# Patient Record
Sex: FEMALE | Race: WHITE | Employment: UNEMPLOYED | URBAN - NONMETROPOLITAN AREA
[De-identification: names, ages, dates, MRNs, and addresses within clinical notes are randomized per-mention and may not be internally consistent; named-entity substitution may affect disease eponyms.]

---

## 2017-01-17 RX ORDER — SPIRONOLACTONE 50 MG/1
TABLET, FILM COATED ORAL
Qty: 90 TABLET | Refills: 1 | Status: SHIPPED | OUTPATIENT
Start: 2017-01-17 | End: 2017-07-13 | Stop reason: SDUPTHER

## 2017-03-20 RX ORDER — GABAPENTIN 300 MG/1
300 CAPSULE ORAL 3 TIMES DAILY
Qty: 90 CAPSULE | Refills: 5 | OUTPATIENT
Start: 2017-03-20

## 2017-04-14 RX ORDER — ROSUVASTATIN CALCIUM 5 MG/1
5 TABLET, COATED ORAL NIGHTLY
Qty: 30 TABLET | Refills: 1 | Status: SHIPPED | OUTPATIENT
Start: 2017-04-14 | End: 2017-06-07 | Stop reason: SDUPTHER

## 2017-04-14 RX ORDER — GABAPENTIN 300 MG/1
300 CAPSULE ORAL 3 TIMES DAILY
Qty: 90 CAPSULE | Refills: 1 | Status: SHIPPED | OUTPATIENT
Start: 2017-04-14 | End: 2017-06-07 | Stop reason: DRUGHIGH

## 2017-04-17 ENCOUNTER — TELEPHONE (OUTPATIENT)
Dept: PRIMARY CARE CLINIC | Age: 47
End: 2017-04-17

## 2017-04-19 DIAGNOSIS — E78.2 MIXED HYPERLIPIDEMIA: ICD-10-CM

## 2017-04-19 LAB
CHOLESTEROL/HDL RATIO: 3.2
CHOLESTEROL: 168 MG/DL
HDLC SERPL-MCNC: 52 MG/DL
LDL CHOLESTEROL: 74 MG/DL (ref 0–130)
TRIGL SERPL-MCNC: 211 MG/DL
VLDLC SERPL CALC-MCNC: ABNORMAL MG/DL (ref 1–30)

## 2017-04-19 PROCEDURE — 80061 LIPID PANEL: CPT | Performed by: FAMILY MEDICINE

## 2017-05-15 RX ORDER — ESOMEPRAZOLE MAGNESIUM 40 MG/1
CAPSULE, DELAYED RELEASE ORAL
Qty: 60 CAPSULE | Refills: 6 | Status: SHIPPED | OUTPATIENT
Start: 2017-05-15 | End: 2017-12-08 | Stop reason: SDUPTHER

## 2017-05-15 RX ORDER — TOLTERODINE TARTRATE 2 MG/1
TABLET, EXTENDED RELEASE ORAL
Qty: 60 TABLET | Refills: 6 | Status: SHIPPED | OUTPATIENT
Start: 2017-05-15 | End: 2017-12-08 | Stop reason: SDUPTHER

## 2017-05-15 RX ORDER — METOPROLOL TARTRATE 50 MG/1
TABLET, FILM COATED ORAL
Qty: 60 TABLET | Refills: 6 | Status: SHIPPED | OUTPATIENT
Start: 2017-05-15 | End: 2017-12-08 | Stop reason: SDUPTHER

## 2017-06-07 ENCOUNTER — OFFICE VISIT (OUTPATIENT)
Dept: FAMILY MEDICINE CLINIC | Age: 47
End: 2017-06-07
Payer: MEDICARE

## 2017-06-07 VITALS
BODY MASS INDEX: 37.73 KG/M2 | WEIGHT: 221 LBS | DIASTOLIC BLOOD PRESSURE: 70 MMHG | HEART RATE: 72 BPM | RESPIRATION RATE: 16 BRPM | HEIGHT: 64 IN | SYSTOLIC BLOOD PRESSURE: 100 MMHG

## 2017-06-07 DIAGNOSIS — K21.9 GASTROESOPHAGEAL REFLUX DISEASE WITHOUT ESOPHAGITIS: ICD-10-CM

## 2017-06-07 DIAGNOSIS — N18.2 TYPE 2 DIABETES MELLITUS WITH STAGE 2 CHRONIC KIDNEY DISEASE, WITHOUT LONG-TERM CURRENT USE OF INSULIN (HCC): Primary | ICD-10-CM

## 2017-06-07 DIAGNOSIS — Z12.31 ENCOUNTER FOR SCREENING MAMMOGRAM FOR BREAST CANCER: ICD-10-CM

## 2017-06-07 DIAGNOSIS — F32.A DEPRESSION, UNSPECIFIED DEPRESSION TYPE: ICD-10-CM

## 2017-06-07 DIAGNOSIS — E03.9 HYPOTHYROIDISM, UNSPECIFIED TYPE: ICD-10-CM

## 2017-06-07 DIAGNOSIS — E11.22 TYPE 2 DIABETES MELLITUS WITH STAGE 2 CHRONIC KIDNEY DISEASE, WITHOUT LONG-TERM CURRENT USE OF INSULIN (HCC): Primary | ICD-10-CM

## 2017-06-07 DIAGNOSIS — F41.9 ANXIETY: ICD-10-CM

## 2017-06-07 DIAGNOSIS — Z00.00 MEDICARE ANNUAL WELLNESS VISIT, INITIAL: ICD-10-CM

## 2017-06-07 DIAGNOSIS — E78.2 MIXED HYPERLIPIDEMIA: ICD-10-CM

## 2017-06-07 DIAGNOSIS — I10 ESSENTIAL HYPERTENSION: ICD-10-CM

## 2017-06-07 PROCEDURE — G8417 CALC BMI ABV UP PARAM F/U: HCPCS | Performed by: FAMILY MEDICINE

## 2017-06-07 PROCEDURE — G8427 DOCREV CUR MEDS BY ELIG CLIN: HCPCS | Performed by: FAMILY MEDICINE

## 2017-06-07 PROCEDURE — 3046F HEMOGLOBIN A1C LEVEL >9.0%: CPT | Performed by: FAMILY MEDICINE

## 2017-06-07 PROCEDURE — 1036F TOBACCO NON-USER: CPT | Performed by: FAMILY MEDICINE

## 2017-06-07 PROCEDURE — G0438 PPPS, INITIAL VISIT: HCPCS | Performed by: FAMILY MEDICINE

## 2017-06-07 PROCEDURE — 99214 OFFICE O/P EST MOD 30 MIN: CPT | Performed by: FAMILY MEDICINE

## 2017-06-07 RX ORDER — ROSUVASTATIN CALCIUM 5 MG/1
5 TABLET, COATED ORAL NIGHTLY
Qty: 30 TABLET | Refills: 5 | Status: SHIPPED | OUTPATIENT
Start: 2017-06-07 | End: 2017-12-08 | Stop reason: SDUPTHER

## 2017-06-07 RX ORDER — GABAPENTIN 300 MG/1
300 CAPSULE ORAL 3 TIMES DAILY
Qty: 90 CAPSULE | Refills: 1 | Status: CANCELLED | OUTPATIENT
Start: 2017-06-15

## 2017-06-07 RX ORDER — POTASSIUM CHLORIDE 600 MG/1
TABLET, FILM COATED, EXTENDED RELEASE ORAL
Qty: 90 TABLET | Refills: 5 | Status: SHIPPED | OUTPATIENT
Start: 2017-06-07 | End: 2017-12-08 | Stop reason: SDUPTHER

## 2017-06-07 RX ORDER — LEVOTHYROXINE SODIUM 0.1 MG/1
TABLET ORAL
Qty: 30 TABLET | Refills: 5 | Status: SHIPPED | OUTPATIENT
Start: 2017-06-07 | End: 2017-12-08 | Stop reason: SDUPTHER

## 2017-06-07 RX ORDER — GABAPENTIN 600 MG/1
600 TABLET ORAL 3 TIMES DAILY
Qty: 90 TABLET | Refills: 2 | Status: SHIPPED | OUTPATIENT
Start: 2017-06-07 | End: 2017-07-13 | Stop reason: SDUPTHER

## 2017-06-07 ASSESSMENT — ENCOUNTER SYMPTOMS
NAUSEA: 0
SHORTNESS OF BREATH: 0
COUGH: 0
VOMITING: 0
CONSTIPATION: 0
SINUS PRESSURE: 0
EYE DISCHARGE: 0
SORE THROAT: 0
DIARRHEA: 0
WHEEZING: 0
RHINORRHEA: 0
TROUBLE SWALLOWING: 0
EYE REDNESS: 0
ABDOMINAL PAIN: 0

## 2017-06-07 ASSESSMENT — PATIENT HEALTH QUESTIONNAIRE - PHQ9
1. LITTLE INTEREST OR PLEASURE IN DOING THINGS: 0
2. FEELING DOWN, DEPRESSED OR HOPELESS: 0
SUM OF ALL RESPONSES TO PHQ QUESTIONS 1-9: 0
SUM OF ALL RESPONSES TO PHQ9 QUESTIONS 1 & 2: 0

## 2017-06-20 ENCOUNTER — OFFICE VISIT (OUTPATIENT)
Dept: FAMILY MEDICINE CLINIC | Age: 47
End: 2017-06-20
Payer: MEDICARE

## 2017-06-20 VITALS — DIASTOLIC BLOOD PRESSURE: 70 MMHG | HEART RATE: 64 BPM | SYSTOLIC BLOOD PRESSURE: 94 MMHG

## 2017-06-20 DIAGNOSIS — M25.561 ACUTE PAIN OF RIGHT KNEE: ICD-10-CM

## 2017-06-20 DIAGNOSIS — M25.562 ACUTE PAIN OF LEFT KNEE: Primary | ICD-10-CM

## 2017-06-20 PROCEDURE — G8417 CALC BMI ABV UP PARAM F/U: HCPCS | Performed by: FAMILY MEDICINE

## 2017-06-20 PROCEDURE — G8427 DOCREV CUR MEDS BY ELIG CLIN: HCPCS | Performed by: FAMILY MEDICINE

## 2017-06-20 PROCEDURE — 99214 OFFICE O/P EST MOD 30 MIN: CPT | Performed by: FAMILY MEDICINE

## 2017-06-20 PROCEDURE — 1036F TOBACCO NON-USER: CPT | Performed by: FAMILY MEDICINE

## 2017-06-20 RX ORDER — TRAZODONE HYDROCHLORIDE 100 MG/1
200 TABLET ORAL NIGHTLY
COMMUNITY
Start: 2017-06-16 | End: 2018-03-30 | Stop reason: DRUGHIGH

## 2017-06-20 RX ORDER — PRAZOSIN HYDROCHLORIDE 2 MG/1
2 CAPSULE ORAL NIGHTLY
COMMUNITY
Start: 2017-06-16 | End: 2021-04-13 | Stop reason: DRUGHIGH

## 2017-06-20 RX ORDER — PREDNISONE 20 MG/1
TABLET ORAL
Qty: 15 TABLET | Refills: 0 | Status: SHIPPED | OUTPATIENT
Start: 2017-06-20 | End: 2017-11-01 | Stop reason: ALTCHOICE

## 2017-06-20 RX ORDER — BUPROPION HYDROCHLORIDE 150 MG/1
150 TABLET ORAL EVERY MORNING
COMMUNITY
Start: 2017-05-02 | End: 2017-12-08 | Stop reason: DRUGHIGH

## 2017-06-20 RX ORDER — ALPRAZOLAM 0.25 MG/1
0.25 TABLET ORAL
COMMUNITY
Start: 2017-06-16 | End: 2018-03-30 | Stop reason: ALTCHOICE

## 2017-06-20 ASSESSMENT — ENCOUNTER SYMPTOMS
GASTROINTESTINAL NEGATIVE: 1
BACK PAIN: 0
RESPIRATORY NEGATIVE: 1
EYES NEGATIVE: 1

## 2017-07-13 RX ORDER — GABAPENTIN 300 MG/1
CAPSULE ORAL
Qty: 90 CAPSULE | Refills: 1 | OUTPATIENT
Start: 2017-07-13

## 2017-07-13 RX ORDER — SPIRONOLACTONE 50 MG/1
TABLET, FILM COATED ORAL
Qty: 90 TABLET | Refills: 1 | Status: SHIPPED | OUTPATIENT
Start: 2017-07-13 | End: 2017-12-10 | Stop reason: SDUPTHER

## 2017-07-13 RX ORDER — GABAPENTIN 600 MG/1
600 TABLET ORAL 3 TIMES DAILY
Qty: 90 TABLET | Refills: 2 | Status: SHIPPED | OUTPATIENT
Start: 2017-07-13 | End: 2017-11-21 | Stop reason: SDUPTHER

## 2017-09-11 RX ORDER — GABAPENTIN 600 MG/1
600 TABLET ORAL 3 TIMES DAILY
Qty: 90 TABLET | Refills: 2 | Status: CANCELLED | OUTPATIENT
Start: 2017-09-11

## 2017-09-11 RX ORDER — GABAPENTIN 300 MG/1
CAPSULE ORAL
Qty: 90 CAPSULE | Refills: 1 | OUTPATIENT
Start: 2017-09-11

## 2017-11-01 ENCOUNTER — HOSPITAL ENCOUNTER (OUTPATIENT)
Dept: LAB | Age: 47
Setting detail: SPECIMEN
Discharge: HOME | End: 2017-11-01
Payer: MEDICARE

## 2017-11-01 ENCOUNTER — OFFICE VISIT (OUTPATIENT)
Dept: FAMILY MEDICINE CLINIC | Age: 47
End: 2017-11-01
Payer: MEDICARE

## 2017-11-01 VITALS
HEIGHT: 64 IN | BODY MASS INDEX: 39.09 KG/M2 | HEART RATE: 68 BPM | DIASTOLIC BLOOD PRESSURE: 64 MMHG | RESPIRATION RATE: 16 BRPM | SYSTOLIC BLOOD PRESSURE: 96 MMHG | WEIGHT: 229 LBS

## 2017-11-01 DIAGNOSIS — D50.9 IRON DEFICIENCY ANEMIA, UNSPECIFIED IRON DEFICIENCY ANEMIA TYPE: ICD-10-CM

## 2017-11-01 DIAGNOSIS — E03.9 HYPOTHYROIDISM, UNSPECIFIED TYPE: ICD-10-CM

## 2017-11-01 DIAGNOSIS — Z23 NEED FOR INFLUENZA VACCINATION: ICD-10-CM

## 2017-11-01 DIAGNOSIS — R53.83 FATIGUE, UNSPECIFIED TYPE: Primary | ICD-10-CM

## 2017-11-01 LAB
ABSOLUTE EOS #: 0.2 K/UL (ref 0–0.4)
ABSOLUTE IMMATURE GRANULOCYTE: ABNORMAL K/UL (ref 0–0.3)
ABSOLUTE LYMPH #: 2.8 K/UL (ref 1–4.8)
ABSOLUTE MONO #: 0.6 K/UL (ref 0.1–1.2)
BASOPHILS # BLD: 1 % (ref 0–1)
BASOPHILS ABSOLUTE: 0.1 K/UL (ref 0–0.2)
DIFFERENTIAL TYPE: ABNORMAL
EOSINOPHILS RELATIVE PERCENT: 2 % (ref 1–7)
FERRITIN: 30 UG/L (ref 13–150)
HCT VFR BLD CALC: 39.4 % (ref 36–46)
HEMOGLOBIN: 13.2 G/DL (ref 12–16)
IMMATURE GRANULOCYTES: ABNORMAL %
IRON SATURATION: 20 % (ref 20–55)
IRON: 67 UG/DL (ref 37–145)
LYMPHOCYTES # BLD: 29 % (ref 16–46)
MCH RBC QN AUTO: 28.8 PG (ref 26–34)
MCHC RBC AUTO-ENTMCNC: 33.4 G/DL (ref 31–37)
MCV RBC AUTO: 86.3 FL (ref 80–100)
MONOCYTES # BLD: 6 % (ref 4–11)
PDW BLD-RTO: 15.4 % (ref 11–14.5)
PLATELET # BLD: 215 K/UL (ref 140–450)
PLATELET ESTIMATE: ABNORMAL
PMV BLD AUTO: 9.3 FL (ref 6–12)
RBC # BLD: 4.56 M/UL (ref 4–5.2)
RBC # BLD: ABNORMAL 10*6/UL
SEG NEUTROPHILS: 62 % (ref 43–77)
SEGMENTED NEUTROPHILS ABSOLUTE COUNT: 6 K/UL (ref 1.8–7.7)
THYROXINE, FREE: 1.07 NG/DL (ref 0.93–1.7)
TOTAL IRON BINDING CAPACITY: 342 UG/DL (ref 250–450)
TSH SERPL DL<=0.05 MIU/L-ACNC: 1.67 MIU/L (ref 0.3–5)
UNSATURATED IRON BINDING CAPACITY: 275 UG/DL (ref 112–347)
WBC # BLD: 9.6 K/UL (ref 3.5–11)
WBC # BLD: ABNORMAL 10*3/UL

## 2017-11-01 PROCEDURE — G8417 CALC BMI ABV UP PARAM F/U: HCPCS | Performed by: FAMILY MEDICINE

## 2017-11-01 PROCEDURE — 82728 ASSAY OF FERRITIN: CPT

## 2017-11-01 PROCEDURE — 84439 ASSAY OF FREE THYROXINE: CPT

## 2017-11-01 PROCEDURE — 90686 IIV4 VACC NO PRSV 0.5 ML IM: CPT | Performed by: FAMILY MEDICINE

## 2017-11-01 PROCEDURE — 36415 COLL VENOUS BLD VENIPUNCTURE: CPT

## 2017-11-01 PROCEDURE — G8484 FLU IMMUNIZE NO ADMIN: HCPCS | Performed by: FAMILY MEDICINE

## 2017-11-01 PROCEDURE — 1036F TOBACCO NON-USER: CPT | Performed by: FAMILY MEDICINE

## 2017-11-01 PROCEDURE — G8427 DOCREV CUR MEDS BY ELIG CLIN: HCPCS | Performed by: FAMILY MEDICINE

## 2017-11-01 PROCEDURE — 83550 IRON BINDING TEST: CPT

## 2017-11-01 PROCEDURE — 99214 OFFICE O/P EST MOD 30 MIN: CPT | Performed by: FAMILY MEDICINE

## 2017-11-01 PROCEDURE — 83540 ASSAY OF IRON: CPT

## 2017-11-01 PROCEDURE — 84443 ASSAY THYROID STIM HORMONE: CPT

## 2017-11-01 PROCEDURE — 85025 COMPLETE CBC W/AUTO DIFF WBC: CPT

## 2017-11-01 PROCEDURE — G0008 ADMIN INFLUENZA VIRUS VAC: HCPCS | Performed by: FAMILY MEDICINE

## 2017-11-01 RX ORDER — DESVENLAFAXINE 100 MG/1
50 TABLET, EXTENDED RELEASE ORAL DAILY
Refills: 0 | COMMUNITY
Start: 2017-10-03 | End: 2022-08-12 | Stop reason: DRUGHIGH

## 2017-11-01 ASSESSMENT — ENCOUNTER SYMPTOMS
GASTROINTESTINAL NEGATIVE: 1
EYES NEGATIVE: 1
RESPIRATORY NEGATIVE: 1

## 2017-11-01 NOTE — PROGRESS NOTES
Have you had an allergic reaction to the flu (influenza) shot? no  Are you allergic to eggs or any component of the flu vaccine? no  Do you have a history of Guillain-Kennard Syndrome (GBS), which is paralysis after receiving the flu vaccine? no  Are you feeling well today? yes  Flu vaccine given as ordered. Patient tolerated it well. No questions re: VIS information.

## 2017-11-01 NOTE — PROGRESS NOTES
Subjective:      Patient ID: Deyanira Wallace is a 49 y.o. female. Fatigue   This is a new problem. The current episode started 1 to 4 weeks ago. The problem occurs constantly. The problem has been gradually worsening. Associated symptoms include fatigue. Pertinent negatives include no chills or fever. Associated symptoms comments: Patient states that over the last few weeks has been feeling very fatigued. States that nothing has changed as far as her daily regimen, stress, dietary intake, or medication. Does note that she has noticed her hair is more brittle and is coming out more. Her nails are breaking easily. Also notes that her face has a line down the center and the right side is dry and the left side is oily. Does note that she doesn't sleep well at night. Has very unrefreshed sleep. States that she tosses and turns in her sleep. Has frequent awakenings. Does have known obstructive sleep apnea and previously had used a cpap machine, but has changed to a mouth guard. Unsure if this is no longer effective. . She has tried nothing for the symptoms. Did review patient's med list, allergies, social history,pmhx and pshx today as noted in the record. Review of Systems   Constitutional: Positive for fatigue. Negative for chills and fever. HENT: Negative. Eyes: Negative. Respiratory: Negative. Cardiovascular: Negative. Gastrointestinal: Negative. Musculoskeletal: Negative. Skin: Negative. Psychiatric/Behavioral: Positive for sleep disturbance. Negative for agitation and dysphoric mood. The patient is not nervous/anxious. Objective:   Physical Exam   Constitutional: She is oriented to person, place, and time. She appears well-developed and well-nourished. No distress. HENT:   Head: Normocephalic and atraumatic.    Right Ear: External ear normal.   Left Ear: External ear normal.   Nose: Nose normal.   Mouth/Throat: Oropharynx is clear and moist. No oropharyngeal

## 2017-11-07 ENCOUNTER — TELEPHONE (OUTPATIENT)
Dept: FAMILY MEDICINE CLINIC | Age: 47
End: 2017-11-07

## 2017-11-09 RX ORDER — GABAPENTIN 300 MG/1
CAPSULE ORAL
Qty: 90 CAPSULE | Refills: 1 | OUTPATIENT
Start: 2017-11-09

## 2017-11-09 NOTE — TELEPHONE ENCOUNTER
Berta Blackwell called requesting a refill of the below medication which has been pended for you: left message for patient to call back to confirm what dosage of Neurontin she is taking. I have in our files she is taking Neurontin 600 mg.     Requested Prescriptions     Pending Prescriptions Disp Refills    gabapentin (NEURONTIN) 300 MG capsule [Pharmacy Med Name: GABAPENTIN 300 MG CAPSULE] 90 capsule 1     Sig: take 1 capsule by mouth three times a day       Last Appointment Date: 11/1/2017  Next Appointment Date: 12/8/2017    Allergies   Allergen Reactions    Allopurinol     Colchicine     Erythromycin     Indocin [Indomethacin]     Lisinopril     Morphine     Norvasc [Amlodipine]     Tenormin [Atenolol]     Uloric [Febuxostat]     Hydrocodone-Acetaminophen Nausea Only    Topamax [Topiramate] Swelling and Rash

## 2017-11-21 RX ORDER — LIDOCAINE 50 MG/G
OINTMENT TOPICAL
Qty: 50 G | Refills: 5 | Status: SHIPPED | OUTPATIENT
Start: 2017-11-21 | End: 2018-12-18 | Stop reason: SDUPTHER

## 2017-11-21 RX ORDER — GABAPENTIN 600 MG/1
TABLET ORAL
Qty: 90 TABLET | Refills: 2 | Status: SHIPPED | OUTPATIENT
Start: 2017-11-21 | End: 2018-02-22 | Stop reason: SDUPTHER

## 2017-12-08 ENCOUNTER — OFFICE VISIT (OUTPATIENT)
Dept: FAMILY MEDICINE CLINIC | Age: 47
End: 2017-12-08
Payer: MEDICARE

## 2017-12-08 ENCOUNTER — TELEPHONE (OUTPATIENT)
Dept: FAMILY MEDICINE CLINIC | Age: 47
End: 2017-12-08

## 2017-12-08 VITALS
BODY MASS INDEX: 39.61 KG/M2 | HEART RATE: 68 BPM | HEIGHT: 64 IN | SYSTOLIC BLOOD PRESSURE: 100 MMHG | RESPIRATION RATE: 16 BRPM | DIASTOLIC BLOOD PRESSURE: 70 MMHG | WEIGHT: 232 LBS

## 2017-12-08 DIAGNOSIS — E11.22 TYPE 2 DIABETES MELLITUS WITH STAGE 2 CHRONIC KIDNEY DISEASE, WITHOUT LONG-TERM CURRENT USE OF INSULIN (HCC): ICD-10-CM

## 2017-12-08 DIAGNOSIS — I10 ESSENTIAL HYPERTENSION: ICD-10-CM

## 2017-12-08 DIAGNOSIS — Z12.31 SCREENING MAMMOGRAM, ENCOUNTER FOR: ICD-10-CM

## 2017-12-08 DIAGNOSIS — R20.2 PARESTHESIA OF BOTH FEET: Primary | ICD-10-CM

## 2017-12-08 DIAGNOSIS — M54.41 CHRONIC BILATERAL LOW BACK PAIN WITH RIGHT-SIDED SCIATICA: ICD-10-CM

## 2017-12-08 DIAGNOSIS — E03.9 HYPOTHYROIDISM, UNSPECIFIED TYPE: ICD-10-CM

## 2017-12-08 DIAGNOSIS — S03.00XA DISLOCATION OF TEMPOROMANDIBULAR JOINT, INITIAL ENCOUNTER: ICD-10-CM

## 2017-12-08 DIAGNOSIS — N18.2 TYPE 2 DIABETES MELLITUS WITH STAGE 2 CHRONIC KIDNEY DISEASE, WITHOUT LONG-TERM CURRENT USE OF INSULIN (HCC): ICD-10-CM

## 2017-12-08 DIAGNOSIS — G89.29 CHRONIC BILATERAL LOW BACK PAIN WITH RIGHT-SIDED SCIATICA: ICD-10-CM

## 2017-12-08 PROCEDURE — G8417 CALC BMI ABV UP PARAM F/U: HCPCS | Performed by: FAMILY MEDICINE

## 2017-12-08 PROCEDURE — 99214 OFFICE O/P EST MOD 30 MIN: CPT | Performed by: FAMILY MEDICINE

## 2017-12-08 PROCEDURE — 3044F HG A1C LEVEL LT 7.0%: CPT | Performed by: FAMILY MEDICINE

## 2017-12-08 PROCEDURE — 1036F TOBACCO NON-USER: CPT | Performed by: FAMILY MEDICINE

## 2017-12-08 PROCEDURE — G8484 FLU IMMUNIZE NO ADMIN: HCPCS | Performed by: FAMILY MEDICINE

## 2017-12-08 PROCEDURE — G8427 DOCREV CUR MEDS BY ELIG CLIN: HCPCS | Performed by: FAMILY MEDICINE

## 2017-12-08 RX ORDER — METOPROLOL TARTRATE 50 MG/1
TABLET, FILM COATED ORAL
Qty: 60 TABLET | Refills: 5 | Status: SHIPPED | OUTPATIENT
Start: 2017-12-08 | End: 2018-05-13 | Stop reason: SDUPTHER

## 2017-12-08 RX ORDER — TOLTERODINE TARTRATE 2 MG/1
TABLET, EXTENDED RELEASE ORAL
Qty: 60 TABLET | Refills: 5 | Status: SHIPPED | OUTPATIENT
Start: 2017-12-08 | End: 2018-05-13 | Stop reason: SDUPTHER

## 2017-12-08 RX ORDER — POTASSIUM CHLORIDE 600 MG/1
TABLET, FILM COATED, EXTENDED RELEASE ORAL
Qty: 90 TABLET | Refills: 5 | Status: SHIPPED | OUTPATIENT
Start: 2017-12-08 | End: 2018-06-22 | Stop reason: SDUPTHER

## 2017-12-08 RX ORDER — ESOMEPRAZOLE MAGNESIUM 40 MG/1
CAPSULE, DELAYED RELEASE ORAL
Qty: 60 CAPSULE | Refills: 5 | Status: SHIPPED | OUTPATIENT
Start: 2017-12-08 | End: 2018-05-13 | Stop reason: SDUPTHER

## 2017-12-08 RX ORDER — CYCLOBENZAPRINE HCL 10 MG
10 TABLET ORAL 3 TIMES DAILY PRN
Qty: 60 TABLET | Refills: 1 | Status: SHIPPED | OUTPATIENT
Start: 2017-12-08 | End: 2018-03-07 | Stop reason: SDUPTHER

## 2017-12-08 RX ORDER — ROSUVASTATIN CALCIUM 5 MG/1
5 TABLET, COATED ORAL NIGHTLY
Qty: 30 TABLET | Refills: 5 | Status: SHIPPED | OUTPATIENT
Start: 2017-12-08 | End: 2018-03-19 | Stop reason: CLARIF

## 2017-12-08 RX ORDER — LEVOTHYROXINE SODIUM 0.1 MG/1
TABLET ORAL
Qty: 30 TABLET | Refills: 5 | Status: SHIPPED | OUTPATIENT
Start: 2017-12-08 | End: 2018-09-05 | Stop reason: SDUPTHER

## 2017-12-08 NOTE — PATIENT INSTRUCTIONS
Hospital Outpatient Visit on 11/01/2017   Component Date Value Ref Range Status    WBC 11/01/2017 9.6  3.5 - 11.0 k/uL Final    RBC 11/01/2017 4.56  4.0 - 5.2 m/uL Final    Hemoglobin 11/01/2017 13.2  12.0 - 16.0 g/dL Final    Hematocrit 11/01/2017 39.4  36 - 46 % Final    MCV 11/01/2017 86.3  80 - 100 fL Final    MCH 11/01/2017 28.8  26 - 34 pg Final    MCHC 11/01/2017 33.4  31 - 37 g/dL Final    RDW 11/01/2017 15.4* 11.0 - 14.5 % Final    Platelets 19/89/9693 215  140 - 450 k/uL Final    MPV 11/01/2017 9.3  6.0 - 12.0 fL Final    Differential Type 11/01/2017 NOT REPORTED   Final    Immature Granulocytes 11/01/2017 NOT REPORTED  0 % Final    Absolute Immature Granulocyte 11/01/2017 NOT REPORTED  0.00 - 0.30 k/uL Final    WBC Morphology 11/01/2017 NOT REPORTED   Final    RBC Morphology 11/01/2017 NOT REPORTED   Final    Platelet Estimate 23/48/6944 NOT REPORTED   Final    Seg Neutrophils 11/01/2017 62  43 - 77 % Final    Lymphocytes 11/01/2017 29  16 - 46 % Final    Monocytes 11/01/2017 6  4 - 11 % Final    Eosinophils % 11/01/2017 2  1 - 7 % Final    Basophils 11/01/2017 1  0 - 1 % Final    Segs Absolute 11/01/2017 6.00  1.8 - 7.7 k/uL Final    Absolute Lymph # 11/01/2017 2.80  1.0 - 4.8 k/uL Final    Absolute Mono # 11/01/2017 0.60  0.1 - 1.2 k/uL Final    Absolute Eos # 11/01/2017 0.20  0.0 - 0.4 k/uL Final    Basophils # 11/01/2017 0.10  0.0 - 0.2 k/uL Final    Comment: Performed at [facility] [address] [address] [phone]      TSH 11/01/2017 1.67  0.30 - 5.00 mIU/L Final    Comment: Performed at [facility] [address] [address] [phone]      Thyroxine, Free 11/01/2017 1.07  0.93 - 1.70 ng/dL Final    Comment: Performed at [facility] [address] [facility] [address] [phone] [facility] 11/01/2017 67  37 - 145 ug/dL Final    TIBC 11/01/2017 342  250 - 450 ug/dL Final    Iron Saturation 11/01/2017 20  20 - 55 % Final    UIBC 11/01/2017 275  112 - 347 ug/dL Final    Comment: Performed at [facility] [address] [id number] [phone]      Ferritin 11/01/2017 30  13 - 150 ug/L Final    Comment: Performed at [facility] [address] [id number] [phone]         Patient Education        Diabetic Neuropathy: Care Instructions  Your Care Instructions    When you have diabetes, your blood sugar level may get too high. Over time, high blood sugar levels can damage nerves. This is called diabetic neuropathy. Nerve damage can cause pain, burning, tingling, and numbness and may leave you feeling weak. The feet are often affected. When you have nerve damage in your feet, you cannot feel your feet and toes as well as normal and may not notice cuts or sores. Even a small injury can lead to a serious infection. It is very important that you follow your doctor's advice on foot care. Sometimes diabetes damages nerves that help the body function. If this happens, your blood pressure, sweating, digestion, and urination might be affected. Your doctor may give you a target blood sugar level that is higher or lower than you are used to. Try to keep your blood sugar very close to this target level to prevent more damage. Follow-up care is a key part of your treatment and safety. Be sure to make and go to all appointments, and call your doctor if you are having problems. It's also a good idea to know your test results and keep a list of the medicines you take. How can you care for yourself at home? · Take your medicines exactly as prescribed. Call your doctor if you think you are having a problem with your medicine. It is very important that you take your insulin or diabetes pills as your doctor tells you.   · Try to keep blood sugar at your target level. ¨ Eat a variety of healthy foods, with carbohydrate spread out in your meals. A dietitian can help you plan meals. ¨ Try to get at least 30 minutes of exercise on most days. ¨ Check your blood sugar as many times each day as your doctor recommends. · Take and record your blood pressure at home if your doctor tells you to. Learn the importance of the two measures of blood pressure (such as 130 over 80, or 130/80). To take your blood pressure at home:  ¨ Ask your doctor to check your blood pressure monitor to be sure it is accurate and the cuff fits you. Also ask your doctor to watch you to make sure that you are using it right. ¨ Do not use medicine known to raise blood pressure (such as some nasal decongestant sprays) before taking your blood pressure. ¨ Avoid taking your blood pressure if you have just exercised or are nervous or upset. Rest at least 15 minutes before you take a reading. · Take pain medicines exactly as directed. ¨ If the doctor gave you a prescription medicine for pain, take it as prescribed. ¨ If you are not taking a prescription pain medicine, ask your doctor if you can take an over-the-counter medicine. · Do not smoke. Smoking can increase your chance for a heart attack or stroke. If you need help quitting, talk to your doctor about stop-smoking programs and medicines. These can increase your chances of quitting for good. · Limit alcohol to 2 drinks a day for men and 1 drink a day for women. Too much alcohol can cause health problems. · Eat small meals often, rather than 2 or 3 large meals a day. To care for your feet  · Prevent injury by wearing shoes at all times, even when you are indoors. · Do foot care as part of your daily routine. Wash your feet and then rub lotion on your feet, but not between your toes. Use a handheld mirror or magnifying mirror to inspect your feet for blisters, cuts, cracks, or sores.   · Have your toenails trimmed and filed straight across. · Wear shoes and socks that fit well. Soft shoes that have good support and that fit well (such as tennis shoes) are best for your feet. · Check your shoes for any loose objects or rough edges before you put them on. · Ask your doctor to check your feet during each visit. Your doctor may notice a foot problem you have missed. · Get early treatment for any foot problem, even a minor one. When should you call for help? Call your doctor now or seek immediate medical care if:  ? · You have symptoms of infection, such as:  ¨ Increased pain, swelling, warmth, or redness. ¨ Red streaks leading from the area. ¨ Pus draining from the area. ¨ A fever. ? · You have new or worse numbness, pain, or tingling in any part of your body. ? Watch closely for changes in your health, and be sure to contact your doctor if:  ? · You have a new problem with your feet, such as:  ¨ A new sore or ulcer. ¨ A break in the skin that is not healing after several days. ¨ Bleeding corns or calluses. ¨ An ingrown toenail. ? · You do not get better as expected. Where can you learn more? Go to [email or web address]://[email or web address] org and sign in to your [employer] account. Enter U987 in the [employer] box to learn more about \"Diabetic Neuropathy: Care Instructions. \"     If you do not have an account, please click on the \"Sign Up Now\" link. Current as of: March 13, 2017  Content Version: 11.4  © 2682-6382 Healthwise, Incorporated. Care instructions adapted under license by [facility] [place] ([facility]). If you have questions about a medical condition or this instruction, always ask your healthcare professional. [place] [address] any warranty or liability for your use of this information.

## 2017-12-11 RX ORDER — GABAPENTIN 300 MG/1
CAPSULE ORAL
Qty: 90 CAPSULE | Refills: 1 | OUTPATIENT
Start: 2017-12-11

## 2017-12-11 RX ORDER — SPIRONOLACTONE 50 MG/1
TABLET, FILM COATED ORAL
Qty: 90 TABLET | Refills: 1 | Status: SHIPPED | OUTPATIENT
Start: 2017-12-11 | End: 2018-06-13 | Stop reason: SDUPTHER

## 2017-12-11 NOTE — TELEPHONE ENCOUNTER
Harshil Marquez called requesting a refill of the below medication which has been pended for you: declined gabapentin as patient is no longer on this dose.     Requested Prescriptions     Pending Prescriptions Disp Refills    spironolactone (ALDACTONE) 50 MG tablet [Pharmacy Med Name: SPIRONOLACTONE 50 MG TABLET] 90 tablet 1     Sig: take 1 tablet by mouth once daily    gabapentin (NEURONTIN) 300 MG capsule [Pharmacy Med Name: GABAPENTIN 300 MG CAPSULE] 90 capsule 1     Sig: take 1 capsule by mouth three times a day       Last Appointment Date: 12/8/2017  Next Appointment Date: 06/14/2018    Allergies   Allergen Reactions    Allopurinol     Colchicine     Erythromycin     Indocin [Indomethacin]     Lisinopril     Morphine     Norvasc [Amlodipine]     Tenormin [Atenolol]     Uloric [Febuxostat]     Hydrocodone-Acetaminophen Nausea Only    Topamax [Topiramate] Swelling and Rash

## 2017-12-13 DIAGNOSIS — R20.2 PARESTHESIA OF BOTH FEET: Primary | ICD-10-CM

## 2017-12-13 DIAGNOSIS — M54.41 CHRONIC BILATERAL LOW BACK PAIN WITH RIGHT-SIDED SCIATICA: ICD-10-CM

## 2017-12-13 DIAGNOSIS — G89.29 CHRONIC BILATERAL LOW BACK PAIN WITH RIGHT-SIDED SCIATICA: ICD-10-CM

## 2017-12-13 NOTE — PROGRESS NOTES
Subjective:      Patient ID: Melanie Castle is a 49 y.o. female. HPI  Patient comes in today for follow up of chronic health issues   Chief Complaint   Patient presents with    Diabetes     6 mo f/u; bilateral feet \"with pins and needles on the bottom of them\"; planned visit    Chronic Kidney Disease     stage 2; 6 mo f/u    Hypertension     6 mo f/u    Hyperlipidemia     6 mo f/u    Hypothyroidism     6 mo f/u    Gastroesophageal Reflux     6 mo f/u    Anxiety     6 mo f/u    Depression     6 mo f/u    Discuss Labs     drawn 11/1/17    Back Pain     thoracic to low back with right sciatica   . Patient Does state that she has been having increased issues with paresthesias to the bottom of the feet. States that she feels constantly like she has pins and needles on the plantar aspect of both feet. This seems to be progressively worsening over time. Has had this for approximately 3-6 months duration. Seems to be progressively worsening. She is on gabapentin but doesn't really feel that this is providing her with significant benefit. Has been having increased pain in the lower thoracic and lumbar region with radicular pain down into the right lower extremity. Again this seems to be progressively worsening over time. She has taken NSAIDs and tylenol with little relief in her symptoms. At times feels weakening in her lower extremity. Did discuss with her that we may want to pursue MRI evaluation of her lumbar spine. Has a known lumbar fusion and so potentially could have disc issues in the levels above her fusion that could be contributing to her symptoms. Ultimately she seems agreeable with this plan. Her blood pressure is stable and controlled on her current medical therapy. Did have lab work done recently to check her thyroid levels that she was having increased issues with fatigue and brittleness to her hair.   We did do a blood count and a thyroid level at that time which were both relatively normal.  Does have other labs ordered for routine check of her other chronic health issues and she does need to have these done in the near future. We'll get them done at her convenience. Does have known chronic kidney disease and will get updated lab work to check the status of her renal function. Does have a known history of diabetes mellitus type 2 which has been relatively stable and controlled with dietary efforts. Her anxiety and depression are stable and controlled on her current medical therapy. Her blood pressure is stable and controlled on her current medical therapy. She does report to me that she has been having issues with her jaw popping and clicking and causing her quite a bit of pain. She did see the dentist at [facility] and they were going to refer her to an oral surgeon in [place] but apparently that surgeon did not take her insurance. She wondered if there was anyone that I knew of that would take her insurance. I did advise her after doing some research that she may have to call her insurance company and ask who would be the closest oral surgeon that would be a network. She does express understanding of this. Otherwise today no other acute medical concerns. .  Patient's recent lab reports are as follows:    Results for orders placed or performed during the hospital encounter of 11/01/17   CBC Auto Differential   Result Value Ref Range    WBC 9.6 3.5 - 11.0 k/uL    RBC 4.56 4.0 - 5.2 m/uL    Hemoglobin 13.2 12.0 - 16.0 g/dL    Hematocrit 39.4 36 - 46 %    MCV 86.3 80 - 100 fL    MCH 28.8 26 - 34 pg    MCHC 33.4 31 - 37 g/dL    RDW 15.4 (H) 11.0 - 14.5 %    Platelets 189 581 - 371 k/uL    MPV 9.3 6.0 - 12.0 fL    Differential Type NOT REPORTED     Immature Granulocytes NOT REPORTED 0 %    Absolute Immature Granulocyte NOT REPORTED 0.00 - 0.30 k/uL    WBC Morphology NOT REPORTED     RBC Morphology NOT REPORTED     Platelet Estimate NOT REPORTED     Seg Neutrophils 62 43 - 77 % Lymphocytes 29 16 - 46 %    Monocytes 6 4 - 11 %    Eosinophils % 2 1 - 7 %    Basophils 1 0 - 1 %    Segs Absolute 6.00 1.8 - 7.7 k/uL    Absolute Lymph # 2.80 1.0 - 4.8 k/uL    Absolute Mono # 0.60 0.1 - 1.2 k/uL    Absolute Eos # 0.20 0.0 - 0.4 k/uL    Basophils # 0.10 0.0 - 0.2 k/uL   TSH without Reflex   Result Value Ref Range    TSH 1.67 0.30 - 5.00 mIU/L   T4, Free   Result Value Ref Range    Thyroxine, Free 1.07 0.93 - 1.70 ng/dL   Iron And TIBC   Result Value Ref Range    Iron 67 37 - 145 ug/dL    TIBC 342 250 - 450 ug/dL    Iron Saturation 20 20 - 55 %    UIBC 275 112 - 347 ug/dL   Ferritin   Result Value Ref Range    Ferritin 30 13 - 150 ug/L     Did discuss dietary modification and increased exercise to keep cholesterol and blood sugar under good control. Other review of systems are as noted below. Did review patient's med list, allergies, social history, fam history, pmhx and pshx today as noted in the record. Preventative measures are reviewed today. See health maintenance section for complete preventative plan of care. Review of Systems   Constitutional: Negative for chills, fatigue and fever. HENT: Negative for congestion, ear pain, postnasal drip, rhinorrhea, sinus pressure, sore throat and trouble swallowing. Jaw pain to the left TMJ   Eyes: Negative for discharge and redness. Respiratory: Negative for cough, shortness of breath and wheezing. Cardiovascular: Negative for chest pain. Gastrointestinal: Negative for abdominal pain, constipation, diarrhea, nausea and vomiting. Musculoskeletal: Positive for arthralgias, back pain and myalgias. Negative for neck pain. Skin: Negative for rash and wound. Allergic/Immunologic: Negative for environmental allergies. Neurological: Positive for weakness and numbness. Negative for dizziness, light-headedness and headaches. Hematological: Negative for adenopathy. Psychiatric/Behavioral: Negative.         Objective: Physical Exam   Constitutional: She is oriented to person, place, and time. She appears well-developed and well-nourished. No distress. HENT:   Head: Normocephalic and atraumatic. Right Ear: External ear normal.   Left Ear: External ear normal.   Nose: Nose normal.   Mouth/Throat: Oropharynx is clear and moist. No oropharyngeal exudate. Eyes: Conjunctivae and EOM are normal. Pupils are equal, round, and reactive to light. Right eye exhibits no discharge. Left eye exhibits no discharge. Neck: Normal range of motion. Neck supple. No thyromegaly present. Cardiovascular: Normal rate, regular rhythm and normal heart sounds. Pulmonary/Chest: Effort normal and breath sounds normal. She has no wheezes. She has no rales. Abdominal: Soft. Bowel sounds are normal.   Musculoskeletal: Normal range of motion. She exhibits tenderness. She exhibits no edema or deformity. Increased paravertebral muscular tension and tenderness with palpation to the lower thoracic and lumbosacral spine. Pain with straight leg raise bilaterally. Patient had a diabetic foot exam today. No open areas or ulcerations noted. Fine filament testing to the entire dorsal and plantar aspect of the foot reveals good sensation in all areas. No cyanosis. +2/4 pedal pulses, symmetric bilaterally   Lymphadenopathy:     She has no cervical adenopathy. Neurological: She is alert and oriented to person, place, and time. Skin: Skin is warm and dry. No rash noted. She is not diaphoretic. Psychiatric: She has a normal mood and affect. Her behavior is normal. Judgment and thought content normal.   Nursing note and vitals reviewed. Assessment:      Encounter Diagnoses   Name Primary?     Paresthesia of both feet Yes    Chronic bilateral low back pain with right-sided sciatica     Hypothyroidism, unspecified type     Type 2 diabetes mellitus with stage 2 chronic kidney disease, without long-term current use of insulin ([address]     Essential hypertension     Dislocation of temporomandibular joint, initial encounter     Screening mammogram, encounter for            Plan:      Orders Placed This Encounter   Medications    esomeprazole (NEXIUM) 40 MG delayed release capsule     Sig: take 1 capsule by mouth twice a day     Dispense:  60 capsule     Refill:  5    tolterodine (DETROL) 2 MG tablet     Sig: take 1 tablet by mouth twice a day     Dispense:  60 tablet     Refill:  5    metoprolol tartrate (LOPRESSOR) 50 MG tablet     Sig: take 1 tablet by mouth twice a day     Dispense:  60 tablet     Refill:  5    rosuvastatin (CRESTOR) 5 MG tablet     Sig: Take 1 tablet by mouth nightly     Dispense:  30 tablet     Refill:  5    potassium chloride (KLOR-CON) 8 MEQ extended release tablet     Sig: take 3 tablets by mouth at bedtime     Dispense:  90 tablet     Refill:  5    levothyroxine (SYNTHROID) 100 MCG tablet     Sig: take 1 tablet by mouth once daily     Dispense:  30 tablet     Refill:  5    cyclobenzaprine (FLEXERIL) 10 MG tablet     Sig: Take 1 tablet by mouth 3 times daily as needed for Muscle spasms     Dispense:  60 tablet     Refill:  1     Orders Placed This Encounter   Procedures    GRACIELA DIGITAL SCREEN W CAD BILATERAL     Standing Status:   Future     Standing Expiration Date:   12/8/2018     Scheduling Instructions:      Prior to the exam, the patient should request any previous mammogram films from other organizations. On the day of the exam, the patient should not wear powder, perfume, lotions, or underarm deodorant to the breast or underarm area. Please wear a two piece outfit and be prepared to give information about prior breast procedures including mammograms, biopsies, or surgeries.      Order Specific Question:   Reason for exam:     Answer:   screening    MRI LUMBAR SPINE W CONTRAST     Standing Status:   Future     Standing Expiration Date:   12/8/2018     Patient is having worsening pain in her lower back with new onset paresthesias that are persistent in her feet. Would recommend MRI evaluation of the lumbar spine to rule out worsening disc disease as etiology of her symptoms. In the interim would continue with conservative measures including gabapentin and NSAIDs. Also will give script for flexeril to help with any tightness or spasm in the lumbar spine. Patient is to have lab work done as previously ordered. Patient is to continue on the rest of her current medical therapy. Patient is to return to my office in 6 months duration or sooner if any further problems or symptoms arise.     (Please note that portions of this note were completed with a voice recognition program. Efforts were made to edit the dictations but occasionally words are mis-transcribed.)

## 2017-12-14 ASSESSMENT — ENCOUNTER SYMPTOMS
SORE THROAT: 0
DIARRHEA: 0
TROUBLE SWALLOWING: 0
EYE DISCHARGE: 0
EYE REDNESS: 0
WHEEZING: 0
CONSTIPATION: 0
SINUS PRESSURE: 0
VOMITING: 0
COUGH: 0
SHORTNESS OF BREATH: 0
NAUSEA: 0
BACK PAIN: 1
ABDOMINAL PAIN: 0
RHINORRHEA: 0

## 2017-12-19 ENCOUNTER — HOSPITAL ENCOUNTER (OUTPATIENT)
Dept: LAB | Age: 47
Setting detail: SPECIMEN
Discharge: HOME | End: 2017-12-19
Payer: MEDICARE

## 2017-12-19 DIAGNOSIS — E11.22 TYPE 2 DIABETES MELLITUS WITH STAGE 2 CHRONIC KIDNEY DISEASE, WITHOUT LONG-TERM CURRENT USE OF INSULIN (HCC): ICD-10-CM

## 2017-12-19 DIAGNOSIS — N18.2 TYPE 2 DIABETES MELLITUS WITH STAGE 2 CHRONIC KIDNEY DISEASE, WITHOUT LONG-TERM CURRENT USE OF INSULIN (HCC): ICD-10-CM

## 2017-12-19 DIAGNOSIS — E78.2 MIXED HYPERLIPIDEMIA: ICD-10-CM

## 2017-12-19 DIAGNOSIS — E03.9 HYPOTHYROIDISM, UNSPECIFIED TYPE: ICD-10-CM

## 2017-12-19 LAB
ABSOLUTE EOS #: 0.2 K/UL (ref 0–0.4)
ABSOLUTE IMMATURE GRANULOCYTE: ABNORMAL K/UL (ref 0–0.3)
ABSOLUTE LYMPH #: 3.2 K/UL (ref 1–4.8)
ABSOLUTE MONO #: 0.5 K/UL (ref 0.1–1.2)
ALBUMIN SERPL-MCNC: 4.1 G/DL (ref 3.5–5.2)
ALBUMIN/GLOBULIN RATIO: 1.3 (ref 1–2.5)
ALP BLD-CCNC: 109 U/L (ref 35–104)
ALT SERPL-CCNC: 16 U/L (ref 5–33)
ANION GAP SERPL CALCULATED.3IONS-SCNC: 13 MMOL/L (ref 9–17)
AST SERPL-CCNC: 15 U/L
BASOPHILS # BLD: 1 % (ref 0–1)
BASOPHILS ABSOLUTE: 0.1 K/UL (ref 0–0.2)
BILIRUB SERPL-MCNC: 0.22 MG/DL (ref 0.3–1.2)
BUN BLDV-MCNC: 13 MG/DL (ref 6–20)
BUN/CREAT BLD: 12 (ref 9–20)
CALCIUM SERPL-MCNC: 9 MG/DL (ref 8.6–10.4)
CHLORIDE BLD-SCNC: 103 MMOL/L (ref 98–107)
CHOLESTEROL/HDL RATIO: 4.1
CHOLESTEROL: 176 MG/DL
CO2: 26 MMOL/L (ref 20–31)
CREAT SERPL-MCNC: 1.05 MG/DL (ref 0.5–0.9)
CREATININE URINE: 382.7 MG/DL (ref 28–217)
DIFFERENTIAL TYPE: ABNORMAL
EOSINOPHILS RELATIVE PERCENT: 2 % (ref 1–7)
ESTIMATED AVERAGE GLUCOSE: 108 MG/DL
GFR AFRICAN AMERICAN: >60 ML/MIN
GFR NON-AFRICAN AMERICAN: 56 ML/MIN
GFR SERPL CREATININE-BSD FRML MDRD: ABNORMAL ML/MIN/{1.73_M2}
GFR SERPL CREATININE-BSD FRML MDRD: ABNORMAL ML/MIN/{1.73_M2}
GLUCOSE BLD-MCNC: 99 MG/DL (ref 70–99)
HBA1C MFR BLD: 5.4 % (ref 4.8–5.9)
HCT VFR BLD CALC: 38.8 % (ref 36–46)
HDLC SERPL-MCNC: 43 MG/DL
HEMOGLOBIN: 12.9 G/DL (ref 12–16)
IMMATURE GRANULOCYTES: ABNORMAL %
LDL CHOLESTEROL: 82 MG/DL (ref 0–130)
LYMPHOCYTES # BLD: 36 % (ref 16–46)
MCH RBC QN AUTO: 28.7 PG (ref 26–34)
MCHC RBC AUTO-ENTMCNC: 33.2 G/DL (ref 31–37)
MCV RBC AUTO: 86.5 FL (ref 80–100)
MICROALBUMIN/CREAT 24H UR: 19 MG/L
MICROALBUMIN/CREAT UR-RTO: 5 MCG/MG CREAT
MONOCYTES # BLD: 6 % (ref 4–11)
PDW BLD-RTO: 15.4 % (ref 11–14.5)
PLATELET # BLD: 206 K/UL (ref 140–450)
PLATELET ESTIMATE: ABNORMAL
PMV BLD AUTO: 8.9 FL (ref 6–12)
POTASSIUM SERPL-SCNC: 4.4 MMOL/L (ref 3.7–5.3)
RBC # BLD: 4.49 M/UL (ref 4–5.2)
RBC # BLD: ABNORMAL 10*6/UL
SEG NEUTROPHILS: 55 % (ref 43–77)
SEGMENTED NEUTROPHILS ABSOLUTE COUNT: 4.9 K/UL (ref 1.8–7.7)
SODIUM BLD-SCNC: 142 MMOL/L (ref 135–144)
THYROXINE, FREE: 1.15 NG/DL (ref 0.93–1.7)
TOTAL PROTEIN: 7.3 G/DL (ref 6.4–8.3)
TRIGL SERPL-MCNC: 256 MG/DL
TSH SERPL DL<=0.05 MIU/L-ACNC: 2.25 MIU/L (ref 0.3–5)
VLDLC SERPL CALC-MCNC: ABNORMAL MG/DL (ref 1–30)
WBC # BLD: 8.9 K/UL (ref 3.5–11)
WBC # BLD: ABNORMAL 10*3/UL

## 2017-12-19 PROCEDURE — 85025 COMPLETE CBC W/AUTO DIFF WBC: CPT

## 2017-12-19 PROCEDURE — 36415 COLL VENOUS BLD VENIPUNCTURE: CPT

## 2017-12-19 PROCEDURE — 82570 ASSAY OF URINE CREATININE: CPT

## 2017-12-19 PROCEDURE — 84439 ASSAY OF FREE THYROXINE: CPT

## 2017-12-19 PROCEDURE — 80053 COMPREHEN METABOLIC PANEL: CPT

## 2017-12-19 PROCEDURE — 84443 ASSAY THYROID STIM HORMONE: CPT

## 2017-12-19 PROCEDURE — 80061 LIPID PANEL: CPT

## 2017-12-19 PROCEDURE — 83036 HEMOGLOBIN GLYCOSYLATED A1C: CPT

## 2017-12-19 PROCEDURE — 82043 UR ALBUMIN QUANTITATIVE: CPT

## 2017-12-20 ENCOUNTER — HOSPITAL ENCOUNTER (OUTPATIENT)
Dept: LAB | Age: 47
Setting detail: SPECIMEN
Discharge: HOME | End: 2017-12-20
Payer: MEDICARE

## 2017-12-20 ENCOUNTER — HOSPITAL ENCOUNTER (OUTPATIENT)
Dept: MRI IMAGING | Age: 47
Discharge: HOME | End: 2017-12-20
Payer: MEDICARE

## 2017-12-20 ENCOUNTER — TELEPHONE (OUTPATIENT)
Dept: FAMILY MEDICINE CLINIC | Age: 47
End: 2017-12-20

## 2017-12-20 DIAGNOSIS — R20.2 PARESTHESIA OF BOTH FEET: ICD-10-CM

## 2017-12-20 DIAGNOSIS — R30.0 DYSURIA: Primary | ICD-10-CM

## 2017-12-20 DIAGNOSIS — G89.29 CHRONIC BILATERAL LOW BACK PAIN WITH RIGHT-SIDED SCIATICA: ICD-10-CM

## 2017-12-20 DIAGNOSIS — R30.0 DYSURIA: ICD-10-CM

## 2017-12-20 DIAGNOSIS — M54.41 CHRONIC BILATERAL LOW BACK PAIN WITH RIGHT-SIDED SCIATICA: ICD-10-CM

## 2017-12-20 LAB
-: ABNORMAL
AMORPHOUS: ABNORMAL
BACTERIA: ABNORMAL
BILIRUBIN URINE: NEGATIVE
CASTS UA: ABNORMAL /LPF (ref 0–2)
COLOR: ABNORMAL
COMMENT UA: ABNORMAL
CRYSTALS, UA: ABNORMAL /HPF
EPITHELIAL CELLS UA: ABNORMAL /HPF (ref 0–5)
GLUCOSE URINE: NEGATIVE
KETONES, URINE: NEGATIVE
LEUKOCYTE ESTERASE, URINE: ABNORMAL
MUCUS: ABNORMAL
NITRITE, URINE: NEGATIVE
OTHER OBSERVATIONS UA: ABNORMAL
PH UA: 6 (ref 5–6)
PROTEIN UA: NEGATIVE
RBC UA: ABNORMAL /HPF (ref 0–4)
RENAL EPITHELIAL, UA: ABNORMAL /HPF
SPECIFIC GRAVITY UA: 1.02 (ref 1.01–1.02)
TRICHOMONAS: ABNORMAL
TURBIDITY: ABNORMAL
URINE HGB: ABNORMAL
UROBILINOGEN, URINE: NORMAL
WBC UA: >100 /HPF (ref 0–4)
YEAST: ABNORMAL

## 2017-12-20 PROCEDURE — 81001 URINALYSIS AUTO W/SCOPE: CPT

## 2017-12-20 PROCEDURE — 87086 URINE CULTURE/COLONY COUNT: CPT

## 2017-12-20 PROCEDURE — 87186 SC STD MICRODIL/AGAR DIL: CPT

## 2017-12-20 PROCEDURE — 6360000004 HC RX CONTRAST MEDICATION: Performed by: FAMILY MEDICINE

## 2017-12-20 PROCEDURE — 72158 MRI LUMBAR SPINE W/O & W/DYE: CPT

## 2017-12-20 PROCEDURE — A9579 GAD-BASE MR CONTRAST NOS,1ML: HCPCS | Performed by: FAMILY MEDICINE

## 2017-12-20 PROCEDURE — 87088 URINE BACTERIA CULTURE: CPT

## 2017-12-20 RX ORDER — NITROFURANTOIN 25; 75 MG/1; MG/1
100 CAPSULE ORAL 2 TIMES DAILY
Qty: 14 CAPSULE | Refills: 0 | Status: SHIPPED | OUTPATIENT
Start: 2017-12-20 | End: 2018-03-30 | Stop reason: ALTCHOICE

## 2017-12-20 RX ADMIN — GADOTERIDOL 20 ML: 279.3 INJECTION, SOLUTION INTRAVENOUS at 11:17

## 2017-12-20 NOTE — TELEPHONE ENCOUNTER
Spoke with patient and advised her that the urine given yesterday was for a microalbumin and at this point today is over 24 hours old and cannot be used for a UA. Patient verbalized understanding, advised that she would need to come in and leave a sample at the lab. Orders placed per instruction of ANGELA. Advised that patient would be contacted with results.

## 2017-12-20 NOTE — TELEPHONE ENCOUNTER
Pt calling for lab results. Pt also states she had a UA done and thinks she has a UTI. Please call pt.

## 2017-12-21 ENCOUNTER — TELEPHONE (OUTPATIENT)
Dept: FAMILY MEDICINE CLINIC | Age: 47
End: 2017-12-21

## 2017-12-21 DIAGNOSIS — N18.2 TYPE 2 DIABETES MELLITUS WITH STAGE 2 CHRONIC KIDNEY DISEASE, WITHOUT LONG-TERM CURRENT USE OF INSULIN (HCC): ICD-10-CM

## 2017-12-21 DIAGNOSIS — D50.9 IRON DEFICIENCY ANEMIA, UNSPECIFIED IRON DEFICIENCY ANEMIA TYPE: Primary | ICD-10-CM

## 2017-12-21 DIAGNOSIS — E11.22 TYPE 2 DIABETES MELLITUS WITH STAGE 2 CHRONIC KIDNEY DISEASE, WITHOUT LONG-TERM CURRENT USE OF INSULIN (HCC): ICD-10-CM

## 2017-12-21 DIAGNOSIS — R20.2 PARESTHESIA OF BOTH LOWER EXTREMITIES: Primary | ICD-10-CM

## 2017-12-21 DIAGNOSIS — E03.9 HYPOTHYROIDISM, UNSPECIFIED TYPE: ICD-10-CM

## 2017-12-21 NOTE — TELEPHONE ENCOUNTER
Patient aware of results of MRI and recommendation for EMG of lower extremities. Patient verbalized understanding. Order placed per instruction of TWV. Patient declined being transferred to schedule and state she will call at a later time to do so. Patient was given number to [facility] in [place] to schedule at her convenience.

## 2017-12-23 LAB
CULTURE: ABNORMAL
CULTURE: ABNORMAL
Lab: ABNORMAL
ORGANISM: ABNORMAL
SPECIMEN DESCRIPTION: ABNORMAL
SPECIMEN DESCRIPTION: ABNORMAL
STATUS: ABNORMAL

## 2018-01-09 RX ORDER — GABAPENTIN 300 MG/1
CAPSULE ORAL
Qty: 90 CAPSULE | Refills: 1 | OUTPATIENT
Start: 2018-01-09

## 2018-01-10 ENCOUNTER — HOSPITAL ENCOUNTER (OUTPATIENT)
Dept: GENERAL RADIOLOGY | Age: 48
Discharge: HOME | End: 2018-01-10
Payer: MEDICARE

## 2018-01-10 ENCOUNTER — OFFICE VISIT (OUTPATIENT)
Dept: PRIMARY CARE CLINIC | Age: 48
End: 2018-01-10
Payer: MEDICARE

## 2018-01-10 VITALS
DIASTOLIC BLOOD PRESSURE: 74 MMHG | SYSTOLIC BLOOD PRESSURE: 126 MMHG | OXYGEN SATURATION: 98 % | HEART RATE: 74 BPM | TEMPERATURE: 98.3 F

## 2018-01-10 DIAGNOSIS — M25.562 ACUTE PAIN OF LEFT KNEE: ICD-10-CM
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
## 2018-01-10 DIAGNOSIS — S80.02XA CONTUSION OF LEFT KNEE, INITIAL ENCOUNTER: ICD-10-CM

## 2018-01-10 DIAGNOSIS — M25.562 ACUTE PAIN OF LEFT KNEE: Primary | ICD-10-CM

## 2018-01-10 PROCEDURE — G8417 CALC BMI ABV UP PARAM F/U: HCPCS | Performed by: PHYSICIAN ASSISTANT

## 2018-01-10 PROCEDURE — 99214 OFFICE O/P EST MOD 30 MIN: CPT | Performed by: PHYSICIAN ASSISTANT

## 2018-01-10 PROCEDURE — G8427 DOCREV CUR MEDS BY ELIG CLIN: HCPCS | Performed by: PHYSICIAN ASSISTANT

## 2018-01-10 PROCEDURE — G8484 FLU IMMUNIZE NO ADMIN: HCPCS | Performed by: PHYSICIAN ASSISTANT

## 2018-01-10 PROCEDURE — 1036F TOBACCO NON-USER: CPT | Performed by: PHYSICIAN ASSISTANT

## 2018-01-10 PROCEDURE — 73562 X-RAY EXAM OF KNEE 3: CPT

## 2018-01-10 RX ORDER — METHYLPREDNISOLONE 4 MG/1
TABLET ORAL
Qty: 1 KIT | Refills: 0 | Status: SHIPPED | OUTPATIENT
Start: 2018-01-10 | End: 2018-01-16

## 2018-01-10 ASSESSMENT — ENCOUNTER SYMPTOMS: NAUSEA: 0

## 2018-01-10 NOTE — PROGRESS NOTES
Subjective:      Patient ID: Boston Heredia is a 49 y.o. female. The patient is seen due to left knee pain after a fall on ice 2 weeks. It is getting worse and swelling more with time. Her left foot swells too. Taking motrin it helps slightly. Had a TKA in 4/15 in [facility] with Dr. Barbara Killian. Has not called his office. It hurts to walk more. Getting sharp. It is not keeping her awake. Up and down steps is painful and keeps her leg straight. Review of Systems   Gastrointestinal: Negative for nausea. Musculoskeletal: Positive for arthralgias, gait problem and joint swelling. Has slight bruising medially. The left knee hurts anteriorly and distal patellar area and posterior too. Neurological: Negative for weakness and numbness. Tingling noted in left foot. Psychiatric/Behavioral: Negative for sleep disturbance. Objective:   Physical Exam   Constitutional: She is oriented to person, place, and time. She appears well-developed and well-nourished. No distress. HENT:   Head: Normocephalic and atraumatic. Nose: Nose normal.   Eyes: Conjunctivae are normal. No scleral icterus. Cardiovascular: Intact distal pulses. Pulmonary/Chest: Effort normal.   Musculoskeletal: She exhibits tenderness. Mild swelling noted in the left anterior knee. Check significant pain with palpation over the left anterior knee in the medial lateral joint line. Pain with varus and valgus maneuvers but no laxity noted. Negative drawer. She had pain with flexion extension especially extension of the left knee. Neurological: She is alert and oriented to person, place, and time. Normal strength and sensation lower extremities bilaterally. Has an antalgic gait. Skin: Skin is warm and dry. No rash noted. No erythema. Psychiatric: She has a normal mood and affect. Nursing note and vitals reviewed.   Xr Knee Left (3 Views)    Result Date: 1/10/2018  EXAMINATION: 3 VIEWS OF THE LEFT KNEE 1/10/2018 5:41 pm COMPARISON: 06/20/2017 HISTORY: ORDERING SYSTEM PROVIDED HISTORY: Acute pain of left knee TECHNOLOGIST PROVIDED HISTORY: Reason for exam:->fell a 2  weeks ago, landed on left knee, still painful. hx of knee replaced 2015 FINDINGS: AP, lateral, and sunrise views of the left knee are submitted for review. There is no evidence for hardware complication status post total knee arthroplasty. No significant patellofemoral joint effusion. No perihardware fracture or evidence for loosening. Of overlying soft tissues are otherwise unremarkable. Status post total knee arthroplasty without evidence for hardware complication. Assessment:      1. Acute pain of left knee  XR KNEE LEFT (3 VIEWS)    methylPREDNISolone (MEDROL DOSEPACK) 4 MG tablet   2. Fall due to slipping on ice or snow, initial encounter  methylPREDNISolone (MEDROL DOSEPACK) 4 MG tablet   3.  Contusion of left knee, initial encounter  methylPREDNISolone (MEDROL DOSEPACK) 4 MG tablet           Plan:      Can use her brace on the knee  Ice to the knee for 48 hours and ice and heat  Follow-up not improving or worsens  Reviewed x-rays with patient  Needs to contact her orthopedist and let him know what has happened and follow-up accordingly

## 2018-01-31 ENCOUNTER — TELEPHONE (OUTPATIENT)
Dept: FAMILY MEDICINE CLINIC | Age: 48
End: 2018-01-31

## 2018-02-22 DIAGNOSIS — G89.29 CHRONIC BILATERAL LOW BACK PAIN, WITH SCIATICA PRESENCE UNSPECIFIED: Primary | ICD-10-CM

## 2018-02-22 DIAGNOSIS — M54.2 CHRONIC NECK PAIN: ICD-10-CM

## 2018-02-22 DIAGNOSIS — G89.29 CHRONIC NECK PAIN: ICD-10-CM

## 2018-02-22 DIAGNOSIS — M54.5 CHRONIC BILATERAL LOW BACK PAIN, WITH SCIATICA PRESENCE UNSPECIFIED: Primary | ICD-10-CM

## 2018-02-23 RX ORDER — GABAPENTIN 600 MG/1
TABLET ORAL
Qty: 90 TABLET | Refills: 2 | Status: SHIPPED | OUTPATIENT
Start: 2018-02-23 | End: 2018-06-14 | Stop reason: SDUPTHER

## 2018-02-23 NOTE — TELEPHONE ENCOUNTER
Pepe called requesting a refill of the below medication which has been pended for you: last filled 1/23/2018 #90    Requested Prescriptions     Pending Prescriptions Disp Refills    gabapentin (NEURONTIN) 600 MG tablet [Pharmacy Med Name: GABAPENTIN 600 MG TABLET] 90 tablet 2     Sig: take 1 tablet by mouth three times a day       Last Appointment Date: 12/8/2017  Next Appointment Date: 6/14/2018    Allergies   Allergen Reactions    Allopurinol     Colchicine     Erythromycin     Indocin [Indomethacin]     Lisinopril     Morphine     Norvasc [Amlodipine]     Tenormin [Atenolol]     Uloric [Febuxostat]     Hydrocodone-Acetaminophen Nausea Only    Topamax [Topiramate] Swelling and Rash

## 2018-03-07 ENCOUNTER — HOSPITAL ENCOUNTER (OUTPATIENT)
Dept: NEUROLOGY | Age: 48
Discharge: HOME | End: 2018-03-07
Payer: MEDICARE

## 2018-03-07 DIAGNOSIS — R20.2 PARESTHESIA OF BOTH LOWER EXTREMITIES: ICD-10-CM

## 2018-03-07 PROCEDURE — 95886 MUSC TEST DONE W/N TEST COMP: CPT

## 2018-03-07 PROCEDURE — 95911 NRV CNDJ TEST 9-10 STUDIES: CPT

## 2018-03-07 NOTE — PROGRESS NOTES
EMG/NCS Bilateral    lower Completed    PCP: Jorden Fabian DO    Ordering: Shabbir Ramirez, Neurologist    Interpreting Physician: Shabbir Holly, Neurologist    Technician: Lauren Brooks RN

## 2018-03-08 RX ORDER — CYCLOBENZAPRINE HCL 10 MG
TABLET ORAL
Qty: 60 TABLET | Refills: 1 | Status: SHIPPED | OUTPATIENT
Start: 2018-03-08 | End: 2018-09-04 | Stop reason: ALTCHOICE

## 2018-03-08 NOTE — PROCEDURES
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 Further clinical correlation and followup recommended.         Keenan Cabrales MD  Board Certified Neurologist    D: 03/07/2018 12:04:22       T: 03/07/2018 12:49:58     [email or web address]  Job#: [id number]     Doc#: [id number]    CC:

## 2018-03-09 DIAGNOSIS — R94.131 ABNORMAL ELECTROMYOGRAM (EMG): ICD-10-CM

## 2018-03-09 DIAGNOSIS — G62.9 PERIPHERAL POLYNEUROPATHY: Primary | ICD-10-CM

## 2018-03-19 ENCOUNTER — TELEPHONE (OUTPATIENT)
Dept: FAMILY MEDICINE CLINIC | Age: 48
End: 2018-03-19

## 2018-03-19 RX ORDER — ATORVASTATIN CALCIUM 20 MG/1
20 TABLET, FILM COATED ORAL DAILY
Qty: 30 TABLET | Refills: 5 | Status: SHIPPED | OUTPATIENT
Start: 2018-03-19 | End: 2018-09-14 | Stop reason: SDUPTHER

## 2018-03-19 NOTE — TELEPHONE ENCOUNTER
Received prior auth for Crestor. This if the first cholesterol medication the patient has tried. Preferred is Pravastatin, Lovastatin, Atorvastatin, or Simvastatin. Please send new mediation to [facility].

## 2018-03-20 RX ORDER — SPIRONOLACTONE 50 MG/1
TABLET, FILM COATED ORAL
Qty: 90 TABLET | Refills: 1 | OUTPATIENT
Start: 2018-03-20

## 2018-03-30 ENCOUNTER — OFFICE VISIT (OUTPATIENT)
Dept: FAMILY MEDICINE CLINIC | Age: 48
End: 2018-03-30
Payer: MEDICARE

## 2018-03-30 VITALS
RESPIRATION RATE: 16 BRPM | SYSTOLIC BLOOD PRESSURE: 100 MMHG | HEIGHT: 64 IN | HEART RATE: 68 BPM | DIASTOLIC BLOOD PRESSURE: 70 MMHG | BODY MASS INDEX: 41.48 KG/M2 | WEIGHT: 243 LBS

## 2018-03-30 DIAGNOSIS — R29.898 WEAKNESS OF BOTH UPPER EXTREMITIES: ICD-10-CM

## 2018-03-30 DIAGNOSIS — G56.93 BILATERAL NEUROPATHY OF UPPER EXTREMITIES: Primary | ICD-10-CM

## 2018-03-30 DIAGNOSIS — M54.2 NECK PAIN: ICD-10-CM

## 2018-03-30 DIAGNOSIS — R20.2 PARESTHESIA OF BOTH HANDS: ICD-10-CM

## 2018-03-30 PROCEDURE — G8427 DOCREV CUR MEDS BY ELIG CLIN: HCPCS | Performed by: FAMILY MEDICINE

## 2018-03-30 PROCEDURE — 1036F TOBACCO NON-USER: CPT | Performed by: FAMILY MEDICINE

## 2018-03-30 PROCEDURE — 99214 OFFICE O/P EST MOD 30 MIN: CPT | Performed by: FAMILY MEDICINE

## 2018-03-30 PROCEDURE — G8482 FLU IMMUNIZE ORDER/ADMIN: HCPCS | Performed by: FAMILY MEDICINE

## 2018-03-30 PROCEDURE — G8417 CALC BMI ABV UP PARAM F/U: HCPCS | Performed by: FAMILY MEDICINE

## 2018-03-30 RX ORDER — BUPROPION HYDROCHLORIDE 200 MG/1
TABLET, EXTENDED RELEASE ORAL
Refills: 0 | COMMUNITY
Start: 2018-03-01 | End: 2018-08-21 | Stop reason: ALTCHOICE

## 2018-03-30 RX ORDER — FOLIC ACID 1 MG/1
TABLET ORAL
Refills: 0 | COMMUNITY
Start: 2018-03-02 | End: 2018-06-14

## 2018-03-30 RX ORDER — TRAZODONE HYDROCHLORIDE 300 MG/1
TABLET ORAL
Refills: 0 | COMMUNITY
Start: 2018-03-01

## 2018-03-30 RX ORDER — NORTRIPTYLINE HYDROCHLORIDE 25 MG/1
25 CAPSULE ORAL NIGHTLY
Qty: 30 CAPSULE | Refills: 5 | Status: SHIPPED | OUTPATIENT
Start: 2018-03-30 | End: 2018-10-09 | Stop reason: SDUPTHER

## 2018-03-30 RX ORDER — LAMOTRIGINE 100 MG/1
100 TABLET ORAL
COMMUNITY
Start: 2018-03-29 | End: 2018-08-21 | Stop reason: DRUGHIGH

## 2018-03-30 ASSESSMENT — ENCOUNTER SYMPTOMS
EYES NEGATIVE: 1
GASTROINTESTINAL NEGATIVE: 1
RESPIRATORY NEGATIVE: 1

## 2018-04-04 ENCOUNTER — HOSPITAL ENCOUNTER (OUTPATIENT)
Dept: LAB | Age: 48
Setting detail: SPECIMEN
Discharge: HOME | End: 2018-04-04
Payer: MEDICARE

## 2018-04-04 DIAGNOSIS — R20.2 PARESTHESIA OF BOTH HANDS: ICD-10-CM

## 2018-04-04 DIAGNOSIS — M54.2 NECK PAIN: ICD-10-CM

## 2018-04-04 LAB
CREAT SERPL-MCNC: 1 MG/DL (ref 0.5–0.9)
GFR AFRICAN AMERICAN: >60 ML/MIN
GFR NON-AFRICAN AMERICAN: 59 ML/MIN
GFR SERPL CREATININE-BSD FRML MDRD: ABNORMAL ML/MIN/{1.73_M2}
GFR SERPL CREATININE-BSD FRML MDRD: ABNORMAL ML/MIN/{1.73_M2}

## 2018-04-04 PROCEDURE — 82565 ASSAY OF CREATININE: CPT

## 2018-04-04 PROCEDURE — 36415 COLL VENOUS BLD VENIPUNCTURE: CPT

## 2018-04-05 ENCOUNTER — HOSPITAL ENCOUNTER (OUTPATIENT)
Dept: MRI IMAGING | Age: 48
Discharge: HOME | End: 2018-04-07
Payer: MEDICARE

## 2018-04-05 DIAGNOSIS — G56.93 BILATERAL NEUROPATHY OF UPPER EXTREMITIES: ICD-10-CM

## 2018-04-05 DIAGNOSIS — R29.898 WEAKNESS OF BOTH UPPER EXTREMITIES: ICD-10-CM

## 2018-04-05 DIAGNOSIS — R20.2 PARESTHESIA OF BOTH HANDS: ICD-10-CM

## 2018-04-05 DIAGNOSIS — M54.2 NECK PAIN: ICD-10-CM

## 2018-04-05 PROCEDURE — 6360000004 HC RX CONTRAST MEDICATION: Performed by: FAMILY MEDICINE

## 2018-04-05 PROCEDURE — A9579 GAD-BASE MR CONTRAST NOS,1ML: HCPCS | Performed by: FAMILY MEDICINE

## 2018-04-05 PROCEDURE — 72156 MRI NECK SPINE W/O & W/DYE: CPT

## 2018-04-05 RX ADMIN — GADOTERIDOL 15 ML: 279.3 INJECTION, SOLUTION INTRAVENOUS at 11:38

## 2018-04-09 RX ORDER — GABAPENTIN 300 MG/1
CAPSULE ORAL
Qty: 90 CAPSULE | Refills: 1 | OUTPATIENT
Start: 2018-04-09

## 2018-04-23 ENCOUNTER — OFFICE VISIT (OUTPATIENT)
Dept: PRIMARY CARE CLINIC | Age: 48
End: 2018-04-23
Payer: MEDICARE

## 2018-04-23 VITALS
HEIGHT: 66 IN | BODY MASS INDEX: 39.7 KG/M2 | RESPIRATION RATE: 16 BRPM | HEART RATE: 80 BPM | WEIGHT: 247 LBS | SYSTOLIC BLOOD PRESSURE: 100 MMHG | TEMPERATURE: 98 F | DIASTOLIC BLOOD PRESSURE: 70 MMHG | OXYGEN SATURATION: 98 %

## 2018-04-23 DIAGNOSIS — M72.2 PLANTAR FASCIITIS OF RIGHT FOOT: Primary | ICD-10-CM

## 2018-04-23 PROCEDURE — G8427 DOCREV CUR MEDS BY ELIG CLIN: HCPCS | Performed by: NURSE PRACTITIONER

## 2018-04-23 PROCEDURE — 1036F TOBACCO NON-USER: CPT | Performed by: NURSE PRACTITIONER

## 2018-04-23 PROCEDURE — G8417 CALC BMI ABV UP PARAM F/U: HCPCS | Performed by: NURSE PRACTITIONER

## 2018-04-23 PROCEDURE — 99213 OFFICE O/P EST LOW 20 MIN: CPT | Performed by: NURSE PRACTITIONER

## 2018-04-23 RX ORDER — PREDNISONE 50 MG/1
50 TABLET ORAL DAILY
Qty: 5 TABLET | Refills: 0 | Status: SHIPPED | OUTPATIENT
Start: 2018-04-23 | End: 2018-04-28

## 2018-04-23 ASSESSMENT — ENCOUNTER SYMPTOMS
ALLERGIC/IMMUNOLOGIC NEGATIVE: 1
VOMITING: 0
NAUSEA: 0
EYES NEGATIVE: 1
SHORTNESS OF BREATH: 0
COUGH: 0
SINUS PRESSURE: 0
DIARRHEA: 0
ABDOMINAL PAIN: 0
RESPIRATORY NEGATIVE: 1
CONSTIPATION: 0
CHEST TIGHTNESS: 0
GASTROINTESTINAL NEGATIVE: 1
TROUBLE SWALLOWING: 0

## 2018-05-02 ENCOUNTER — TELEPHONE (OUTPATIENT)
Dept: FAMILY MEDICINE CLINIC | Age: 48
End: 2018-05-02

## 2018-05-02 DIAGNOSIS — E87.6 LOW BLOOD POTASSIUM: Primary | ICD-10-CM

## 2018-05-03 RX ORDER — HYDROCHLOROTHIAZIDE 25 MG/1
25 TABLET ORAL DAILY
Qty: 30 TABLET | Refills: 2 | Status: SHIPPED | OUTPATIENT
Start: 2018-05-03 | End: 2018-07-08 | Stop reason: SDUPTHER

## 2018-05-09 RX ORDER — GABAPENTIN 300 MG/1
CAPSULE ORAL
Qty: 90 CAPSULE | Refills: 1 | OUTPATIENT
Start: 2018-05-09

## 2018-05-11 ENCOUNTER — HOSPITAL ENCOUNTER (OUTPATIENT)
Dept: LAB | Age: 48
Setting detail: SPECIMEN
Discharge: HOME | End: 2018-05-11
Payer: MEDICARE

## 2018-05-11 ENCOUNTER — TELEPHONE (OUTPATIENT)
Dept: FAMILY MEDICINE CLINIC | Age: 48
End: 2018-05-11

## 2018-05-11 DIAGNOSIS — E87.6 LOW BLOOD POTASSIUM: ICD-10-CM

## 2018-05-11 DIAGNOSIS — R26.89 BALANCE PROBLEM: ICD-10-CM

## 2018-05-11 DIAGNOSIS — G60.9 IDIOPATHIC PERIPHERAL NEUROPATHY: Primary | ICD-10-CM

## 2018-05-11 LAB
ANION GAP SERPL CALCULATED.3IONS-SCNC: 10 MMOL/L (ref 9–17)
BUN BLDV-MCNC: 10 MG/DL (ref 6–20)
BUN/CREAT BLD: 10 (ref 9–20)
CALCIUM SERPL-MCNC: 9.5 MG/DL (ref 8.6–10.4)
CHLORIDE BLD-SCNC: 100 MMOL/L (ref 98–107)
CO2: 31 MMOL/L (ref 20–31)
CREAT SERPL-MCNC: 0.98 MG/DL (ref 0.5–0.9)
GFR AFRICAN AMERICAN: >60 ML/MIN
GFR NON-AFRICAN AMERICAN: >60 ML/MIN
GFR SERPL CREATININE-BSD FRML MDRD: ABNORMAL ML/MIN/{1.73_M2}
GFR SERPL CREATININE-BSD FRML MDRD: ABNORMAL ML/MIN/{1.73_M2}
GLUCOSE BLD-MCNC: 140 MG/DL (ref 70–99)
POTASSIUM SERPL-SCNC: 3.5 MMOL/L (ref 3.7–5.3)
SODIUM BLD-SCNC: 141 MMOL/L (ref 135–144)

## 2018-05-11 PROCEDURE — 36415 COLL VENOUS BLD VENIPUNCTURE: CPT

## 2018-05-11 PROCEDURE — 80048 BASIC METABOLIC PNL TOTAL CA: CPT

## 2018-05-14 RX ORDER — TOLTERODINE TARTRATE 2 MG/1
TABLET, EXTENDED RELEASE ORAL
Qty: 60 TABLET | Refills: 5 | Status: SHIPPED | OUTPATIENT
Start: 2018-05-14 | End: 2018-12-06 | Stop reason: SDUPTHER

## 2018-05-14 RX ORDER — ESOMEPRAZOLE MAGNESIUM 40 MG/1
CAPSULE, DELAYED RELEASE ORAL
Qty: 60 CAPSULE | Refills: 5 | Status: SHIPPED | OUTPATIENT
Start: 2018-05-14 | End: 2018-11-21 | Stop reason: SDUPTHER

## 2018-05-14 RX ORDER — METOPROLOL TARTRATE 50 MG/1
TABLET, FILM COATED ORAL
Qty: 60 TABLET | Refills: 5 | Status: SHIPPED | OUTPATIENT
Start: 2018-05-14 | End: 2018-11-21 | Stop reason: SDUPTHER

## 2018-05-16 ENCOUNTER — HOSPITAL ENCOUNTER (OUTPATIENT)
Dept: NEUROLOGY | Age: 48
Discharge: HOME | End: 2018-05-16
Payer: MEDICARE

## 2018-05-16 DIAGNOSIS — R20.2 PARESTHESIA OF BOTH HANDS: ICD-10-CM

## 2018-05-16 DIAGNOSIS — G56.93 BILATERAL NEUROPATHY OF UPPER EXTREMITIES: ICD-10-CM

## 2018-05-16 PROCEDURE — 95886 MUSC TEST DONE W/N TEST COMP: CPT

## 2018-05-16 PROCEDURE — 95912 NRV CNDJ TEST 11-12 STUDIES: CPT

## 2018-05-21 ENCOUNTER — TELEPHONE (OUTPATIENT)
Dept: FAMILY MEDICINE CLINIC | Age: 48
End: 2018-05-21

## 2018-06-02 ENCOUNTER — HOSPITAL ENCOUNTER (OUTPATIENT)
Dept: LAB | Age: 48
Setting detail: SPECIMEN
Discharge: HOME | End: 2018-06-02
Payer: MEDICARE

## 2018-06-02 DIAGNOSIS — E03.9 HYPOTHYROIDISM, UNSPECIFIED TYPE: ICD-10-CM

## 2018-06-02 DIAGNOSIS — N18.2 TYPE 2 DIABETES MELLITUS WITH STAGE 2 CHRONIC KIDNEY DISEASE, WITHOUT LONG-TERM CURRENT USE OF INSULIN (HCC): ICD-10-CM

## 2018-06-02 DIAGNOSIS — D50.9 IRON DEFICIENCY ANEMIA, UNSPECIFIED IRON DEFICIENCY ANEMIA TYPE: ICD-10-CM

## 2018-06-02 DIAGNOSIS — E11.22 TYPE 2 DIABETES MELLITUS WITH STAGE 2 CHRONIC KIDNEY DISEASE, WITHOUT LONG-TERM CURRENT USE OF INSULIN (HCC): ICD-10-CM

## 2018-06-02 LAB
ABSOLUTE EOS #: 0.3 K/UL (ref 0–0.4)
ABSOLUTE IMMATURE GRANULOCYTE: ABNORMAL K/UL (ref 0–0.3)
ABSOLUTE LYMPH #: 2.4 K/UL (ref 1–4.8)
ABSOLUTE MONO #: 0.4 K/UL (ref 0.1–1.2)
ALBUMIN SERPL-MCNC: 4.1 G/DL (ref 3.5–5.2)
ALBUMIN/GLOBULIN RATIO: 1.5 (ref 1–2.5)
ALP BLD-CCNC: 116 U/L (ref 35–104)
ALT SERPL-CCNC: 15 U/L (ref 5–33)
ANION GAP SERPL CALCULATED.3IONS-SCNC: 12 MMOL/L (ref 9–17)
AST SERPL-CCNC: 15 U/L
BASOPHILS # BLD: 1 % (ref 0–1)
BASOPHILS ABSOLUTE: 0.1 K/UL (ref 0–0.2)
BILIRUB SERPL-MCNC: 0.26 MG/DL (ref 0.3–1.2)
BUN BLDV-MCNC: 10 MG/DL (ref 6–20)
BUN/CREAT BLD: 10 (ref 9–20)
CALCIUM SERPL-MCNC: 9.1 MG/DL (ref 8.6–10.4)
CHLORIDE BLD-SCNC: 103 MMOL/L (ref 98–107)
CHOLESTEROL/HDL RATIO: 3.9
CHOLESTEROL: 162 MG/DL
CO2: 29 MMOL/L (ref 20–31)
CREAT SERPL-MCNC: 1.03 MG/DL (ref 0.5–0.9)
DIFFERENTIAL TYPE: ABNORMAL
EOSINOPHILS RELATIVE PERCENT: 3 % (ref 1–7)
ESTIMATED AVERAGE GLUCOSE: 117 MG/DL
GFR AFRICAN AMERICAN: >60 ML/MIN
GFR NON-AFRICAN AMERICAN: 57 ML/MIN
GFR SERPL CREATININE-BSD FRML MDRD: ABNORMAL ML/MIN/{1.73_M2}
GFR SERPL CREATININE-BSD FRML MDRD: ABNORMAL ML/MIN/{1.73_M2}
GLUCOSE BLD-MCNC: 102 MG/DL (ref 70–99)
HBA1C MFR BLD: 5.7 % (ref 4.8–5.9)
HCT VFR BLD CALC: 39.4 % (ref 36–46)
HDLC SERPL-MCNC: 42 MG/DL
HEMOGLOBIN: 13 G/DL (ref 12–16)
IMMATURE GRANULOCYTES: ABNORMAL %
LDL CHOLESTEROL: 69 MG/DL (ref 0–130)
LYMPHOCYTES # BLD: 28 % (ref 16–46)
MCH RBC QN AUTO: 29 PG (ref 26–34)
MCHC RBC AUTO-ENTMCNC: 32.9 G/DL (ref 31–37)
MCV RBC AUTO: 88.3 FL (ref 80–100)
MONOCYTES # BLD: 5 % (ref 4–11)
NRBC AUTOMATED: ABNORMAL PER 100 WBC
PDW BLD-RTO: 15.5 % (ref 11–14.5)
PLATELET # BLD: 214 K/UL (ref 140–450)
PLATELET ESTIMATE: ABNORMAL
PMV BLD AUTO: 9.3 FL (ref 6–12)
POTASSIUM SERPL-SCNC: 3.8 MMOL/L (ref 3.7–5.3)
RBC # BLD: 4.47 M/UL (ref 4–5.2)
RBC # BLD: ABNORMAL 10*6/UL
SEG NEUTROPHILS: 63 % (ref 43–77)
SEGMENTED NEUTROPHILS ABSOLUTE COUNT: 5.5 K/UL (ref 1.8–7.7)
SODIUM BLD-SCNC: 144 MMOL/L (ref 135–144)
THYROXINE, FREE: 0.96 NG/DL (ref 0.93–1.7)
TOTAL PROTEIN: 6.8 G/DL (ref 6.4–8.3)
TRIGL SERPL-MCNC: 253 MG/DL
TSH SERPL DL<=0.05 MIU/L-ACNC: 3.37 MIU/L (ref 0.3–5)
VLDLC SERPL CALC-MCNC: ABNORMAL MG/DL (ref 1–30)
WBC # BLD: 8.7 K/UL (ref 3.5–11)
WBC # BLD: ABNORMAL 10*3/UL

## 2018-06-02 PROCEDURE — 80053 COMPREHEN METABOLIC PANEL: CPT

## 2018-06-02 PROCEDURE — 84439 ASSAY OF FREE THYROXINE: CPT

## 2018-06-02 PROCEDURE — 84443 ASSAY THYROID STIM HORMONE: CPT

## 2018-06-02 PROCEDURE — 80061 LIPID PANEL: CPT

## 2018-06-02 PROCEDURE — 83036 HEMOGLOBIN GLYCOSYLATED A1C: CPT

## 2018-06-02 PROCEDURE — 36415 COLL VENOUS BLD VENIPUNCTURE: CPT

## 2018-06-02 PROCEDURE — 85025 COMPLETE CBC W/AUTO DIFF WBC: CPT

## 2018-06-13 RX ORDER — SPIRONOLACTONE 50 MG/1
TABLET, FILM COATED ORAL
Qty: 90 TABLET | Refills: 1 | Status: SHIPPED | OUTPATIENT
Start: 2018-06-13 | End: 2018-07-24

## 2018-06-14 ENCOUNTER — OFFICE VISIT (OUTPATIENT)
Dept: FAMILY MEDICINE CLINIC | Age: 48
End: 2018-06-14
Payer: MEDICARE

## 2018-06-14 ENCOUNTER — HOSPITAL ENCOUNTER (OUTPATIENT)
Dept: MAMMOGRAPHY | Age: 48
Discharge: HOME | End: 2018-06-16
Payer: MEDICARE

## 2018-06-14 VITALS
SYSTOLIC BLOOD PRESSURE: 114 MMHG | DIASTOLIC BLOOD PRESSURE: 80 MMHG | HEIGHT: 65 IN | RESPIRATION RATE: 16 BRPM | HEART RATE: 80 BPM | BODY MASS INDEX: 40.98 KG/M2 | WEIGHT: 246 LBS

## 2018-06-14 DIAGNOSIS — Z12.31 SCREENING MAMMOGRAM, ENCOUNTER FOR: ICD-10-CM

## 2018-06-14 DIAGNOSIS — E03.9 HYPOTHYROIDISM, UNSPECIFIED TYPE: ICD-10-CM

## 2018-06-14 DIAGNOSIS — Z00.00 ROUTINE GENERAL MEDICAL EXAMINATION AT A HEALTH CARE FACILITY: ICD-10-CM

## 2018-06-14 DIAGNOSIS — G89.29 CHRONIC NECK PAIN: ICD-10-CM

## 2018-06-14 DIAGNOSIS — G89.29 CHRONIC BILATERAL LOW BACK PAIN, WITH SCIATICA PRESENCE UNSPECIFIED: Primary | ICD-10-CM

## 2018-06-14 DIAGNOSIS — E66.01 MORBID OBESITY (HCC): ICD-10-CM

## 2018-06-14 DIAGNOSIS — M54.2 CHRONIC NECK PAIN: ICD-10-CM

## 2018-06-14 DIAGNOSIS — M54.5 CHRONIC BILATERAL LOW BACK PAIN, WITH SCIATICA PRESENCE UNSPECIFIED: Primary | ICD-10-CM

## 2018-06-14 DIAGNOSIS — G47.33 OSA (OBSTRUCTIVE SLEEP APNEA): ICD-10-CM

## 2018-06-14 DIAGNOSIS — F41.9 ANXIETY: ICD-10-CM

## 2018-06-14 DIAGNOSIS — F32.A DEPRESSION, UNSPECIFIED DEPRESSION TYPE: ICD-10-CM

## 2018-06-14 DIAGNOSIS — E78.2 MIXED HYPERLIPIDEMIA: ICD-10-CM

## 2018-06-14 DIAGNOSIS — R73.01 IMPAIRED FASTING GLUCOSE: ICD-10-CM

## 2018-06-14 DIAGNOSIS — R13.12 OROPHARYNGEAL DYSPHAGIA: ICD-10-CM

## 2018-06-14 DIAGNOSIS — Z11.4 SCREENING FOR HIV WITHOUT PRESENCE OF RISK FACTORS: ICD-10-CM

## 2018-06-14 DIAGNOSIS — I10 ESSENTIAL HYPERTENSION: ICD-10-CM

## 2018-06-14 PROBLEM — E11.42 CONTROLLED TYPE 2 DIABETES MELLITUS WITH DIABETIC POLYNEUROPATHY, WITHOUT LONG-TERM CURRENT USE OF INSULIN (HCC): Status: ACTIVE | Noted: 2018-06-14

## 2018-06-14 PROCEDURE — 99214 OFFICE O/P EST MOD 30 MIN: CPT | Performed by: FAMILY MEDICINE

## 2018-06-14 PROCEDURE — 77067 SCR MAMMO BI INCL CAD: CPT

## 2018-06-14 PROCEDURE — 1036F TOBACCO NON-USER: CPT | Performed by: FAMILY MEDICINE

## 2018-06-14 PROCEDURE — G8417 CALC BMI ABV UP PARAM F/U: HCPCS | Performed by: FAMILY MEDICINE

## 2018-06-14 PROCEDURE — G0439 PPPS, SUBSEQ VISIT: HCPCS | Performed by: FAMILY MEDICINE

## 2018-06-14 PROCEDURE — G0444 DEPRESSION SCREEN ANNUAL: HCPCS | Performed by: FAMILY MEDICINE

## 2018-06-14 PROCEDURE — G8427 DOCREV CUR MEDS BY ELIG CLIN: HCPCS | Performed by: FAMILY MEDICINE

## 2018-06-14 RX ORDER — GABAPENTIN 600 MG/1
TABLET ORAL
Qty: 90 TABLET | Refills: 2 | Status: SHIPPED | OUTPATIENT
Start: 2018-06-14 | End: 2018-09-14 | Stop reason: SDUPTHER

## 2018-06-14 ASSESSMENT — ANXIETY QUESTIONNAIRES: GAD7 TOTAL SCORE: 4

## 2018-06-14 ASSESSMENT — LIFESTYLE VARIABLES: HOW OFTEN DO YOU HAVE A DRINK CONTAINING ALCOHOL: 0

## 2018-06-17 ASSESSMENT — ENCOUNTER SYMPTOMS
TROUBLE SWALLOWING: 1
EYE REDNESS: 0
SORE THROAT: 0
DIARRHEA: 0
WHEEZING: 0
SINUS PRESSURE: 0
CONSTIPATION: 0
COUGH: 0
SHORTNESS OF BREATH: 0
VOMITING: 0
NAUSEA: 0
RHINORRHEA: 0
ABDOMINAL PAIN: 0
EYE DISCHARGE: 0

## 2018-06-21 ENCOUNTER — TELEPHONE (OUTPATIENT)
Dept: FAMILY MEDICINE CLINIC | Age: 48
End: 2018-06-21

## 2018-06-22 RX ORDER — POTASSIUM CHLORIDE 600 MG/1
TABLET, FILM COATED, EXTENDED RELEASE ORAL
Qty: 120 TABLET | Refills: 5 | Status: SHIPPED | OUTPATIENT
Start: 2018-06-22 | End: 2018-12-04 | Stop reason: SDUPTHER

## 2018-06-25 ENCOUNTER — HOSPITAL ENCOUNTER (OUTPATIENT)
Dept: INTERVENTIONAL RADIOLOGY/VASCULAR | Age: 48
Discharge: HOME | End: 2018-06-27
Payer: MEDICARE

## 2018-06-25 DIAGNOSIS — R13.12 OROPHARYNGEAL DYSPHAGIA: ICD-10-CM

## 2018-06-25 DIAGNOSIS — E03.9 HYPOTHYROIDISM, UNSPECIFIED TYPE: ICD-10-CM

## 2018-06-25 PROCEDURE — 76536 US EXAM OF HEAD AND NECK: CPT

## 2018-07-05 ENCOUNTER — TELEPHONE (OUTPATIENT)
Dept: FAMILY MEDICINE CLINIC | Age: 48
End: 2018-07-05

## 2018-07-05 NOTE — TELEPHONE ENCOUNTER
Patient called in regards to the Sleep study she is to have, I did try to call shea to find out information however no answer. Patient states she was suppose to call and talk to Brennon Patel.

## 2018-07-09 ENCOUNTER — HOSPITAL ENCOUNTER (OUTPATIENT)
Dept: LAB | Age: 48
Setting detail: SPECIMEN
Discharge: HOME | End: 2018-07-09
Payer: MEDICARE

## 2018-07-09 ENCOUNTER — OFFICE VISIT (OUTPATIENT)
Dept: NEUROLOGY | Age: 48
End: 2018-07-09
Payer: MEDICARE

## 2018-07-09 VITALS
SYSTOLIC BLOOD PRESSURE: 124 MMHG | HEART RATE: 82 BPM | WEIGHT: 245 LBS | OXYGEN SATURATION: 95 % | HEIGHT: 66 IN | DIASTOLIC BLOOD PRESSURE: 72 MMHG | BODY MASS INDEX: 39.37 KG/M2

## 2018-07-09 DIAGNOSIS — D50.8 OTHER IRON DEFICIENCY ANEMIA: ICD-10-CM

## 2018-07-09 DIAGNOSIS — G63 POLYNEUROPATHY ASSOCIATED WITH UNDERLYING DISEASE (HCC): ICD-10-CM

## 2018-07-09 DIAGNOSIS — G56.03 BILATERAL CARPAL TUNNEL SYNDROME: ICD-10-CM

## 2018-07-09 DIAGNOSIS — I10 ESSENTIAL HYPERTENSION: ICD-10-CM

## 2018-07-09 DIAGNOSIS — R41.3 MEMORY PROBLEM: ICD-10-CM

## 2018-07-09 DIAGNOSIS — R20.2 PARESTHESIAS: ICD-10-CM

## 2018-07-09 DIAGNOSIS — F34.1 DYSTHYMIA: ICD-10-CM

## 2018-07-09 DIAGNOSIS — E03.9 HYPOTHYROIDISM, UNSPECIFIED TYPE: ICD-10-CM

## 2018-07-09 DIAGNOSIS — G93.89 OTHER SPECIFIED DISORDERS OF BRAIN (CODE): ICD-10-CM

## 2018-07-09 DIAGNOSIS — G57.30 PERONEAL NEUROPATHY, UNSPECIFIED LATERALITY: ICD-10-CM

## 2018-07-09 DIAGNOSIS — G57.40 TIBIAL NEUROPATHY, UNSPECIFIED LATERALITY: ICD-10-CM

## 2018-07-09 DIAGNOSIS — R53.82 CHRONIC FATIGUE: ICD-10-CM

## 2018-07-09 DIAGNOSIS — M54.40 CHRONIC LOW BACK PAIN WITH SCIATICA, SCIATICA LATERALITY UNSPECIFIED, UNSPECIFIED BACK PAIN LATERALITY: ICD-10-CM

## 2018-07-09 DIAGNOSIS — F41.9 ANXIETY: ICD-10-CM
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
## 2018-07-09 DIAGNOSIS — M54.2 CHRONIC NECK PAIN: ICD-10-CM

## 2018-07-09 DIAGNOSIS — R26.89 BALANCE PROBLEMS: ICD-10-CM

## 2018-07-09 DIAGNOSIS — E78.2 MIXED HYPERLIPIDEMIA: ICD-10-CM

## 2018-07-09 DIAGNOSIS — G56.23 ULNAR NEUROPATHY OF BOTH UPPER EXTREMITIES: ICD-10-CM

## 2018-07-09 DIAGNOSIS — G89.29 CHRONIC NECK PAIN: ICD-10-CM

## 2018-07-09 DIAGNOSIS — I67.82 CEREBRAL ISCHEMIA: ICD-10-CM

## 2018-07-09 DIAGNOSIS — E78.49 OTHER HYPERLIPIDEMIA: ICD-10-CM

## 2018-07-09 DIAGNOSIS — K21.9 GASTROESOPHAGEAL REFLUX DISEASE, ESOPHAGITIS PRESENCE NOT SPECIFIED: ICD-10-CM

## 2018-07-09 DIAGNOSIS — G89.29 CHRONIC LOW BACK PAIN WITH SCIATICA, SCIATICA LATERALITY UNSPECIFIED, UNSPECIFIED BACK PAIN LATERALITY: ICD-10-CM

## 2018-07-09 DIAGNOSIS — R26.89 BALANCE PROBLEMS: Primary | ICD-10-CM

## 2018-07-09 DIAGNOSIS — R13.19 OTHER DYSPHAGIA: ICD-10-CM

## 2018-07-09 DIAGNOSIS — R47.81 SLURRED SPEECH: ICD-10-CM

## 2018-07-09 PROBLEM — E78.5 HYPERLIPIDEMIA: Status: ACTIVE | Noted: 2018-07-09

## 2018-07-09 LAB
FOLATE: >20 NG/ML
RHEUMATOID FACTOR: <10 IU/ML
TSH SERPL DL<=0.05 MIU/L-ACNC: 10.15 MIU/L (ref 0.3–5)
URIC ACID: 7.1 MG/DL (ref 2.4–5.7)
VITAMIN B-12: 852 PG/ML (ref 232–1245)

## 2018-07-09 PROCEDURE — 82607 VITAMIN B-12: CPT

## 2018-07-09 PROCEDURE — 36415 COLL VENOUS BLD VENIPUNCTURE: CPT

## 2018-07-09 PROCEDURE — 99205 OFFICE O/P NEW HI 60 MIN: CPT | Performed by: PSYCHIATRY & NEUROLOGY

## 2018-07-09 PROCEDURE — 86431 RHEUMATOID FACTOR QUANT: CPT

## 2018-07-09 PROCEDURE — G8427 DOCREV CUR MEDS BY ELIG CLIN: HCPCS | Performed by: PSYCHIATRY & NEUROLOGY

## 2018-07-09 PROCEDURE — G8417 CALC BMI ABV UP PARAM F/U: HCPCS | Performed by: PSYCHIATRY & NEUROLOGY

## 2018-07-09 PROCEDURE — 85613 RUSSELL VIPER VENOM DILUTED: CPT

## 2018-07-09 PROCEDURE — 84443 ASSAY THYROID STIM HORMONE: CPT

## 2018-07-09 PROCEDURE — 85610 PROTHROMBIN TIME: CPT

## 2018-07-09 PROCEDURE — 84550 ASSAY OF BLOOD/URIC ACID: CPT

## 2018-07-09 PROCEDURE — 86038 ANTINUCLEAR ANTIBODIES: CPT

## 2018-07-09 PROCEDURE — 86618 LYME DISEASE ANTIBODY: CPT

## 2018-07-09 PROCEDURE — 86147 CARDIOLIPIN ANTIBODY EA IG: CPT

## 2018-07-09 PROCEDURE — 85730 THROMBOPLASTIN TIME PARTIAL: CPT

## 2018-07-09 PROCEDURE — 82746 ASSAY OF FOLIC ACID SERUM: CPT

## 2018-07-09 RX ORDER — HYDROCHLOROTHIAZIDE 25 MG/1
TABLET ORAL
Qty: 30 TABLET | Refills: 2 | Status: SHIPPED | OUTPATIENT
Start: 2018-07-09 | End: 2018-10-03 | Stop reason: SDUPTHER

## 2018-07-09 ASSESSMENT — ENCOUNTER SYMPTOMS
EYE DISCHARGE: 0
ABDOMINAL PAIN: 0
COLOR CHANGE: 0
BACK PAIN: 0
PHOTOPHOBIA: 0
DIARRHEA: 0
FACIAL SWELLING: 0
SINUS PRESSURE: 0
EYE REDNESS: 0
NAUSEA: 0
APNEA: 0
TROUBLE SWALLOWING: 1
EYE PAIN: 0
BLOOD IN STOOL: 0
COUGH: 0
CHOKING: 0
SHORTNESS OF BREATH: 0
SORE THROAT: 0
ABDOMINAL DISTENTION: 0
CHEST TIGHTNESS: 0
EYE ITCHING: 0
BOWEL INCONTINENCE: 0
VOMITING: 0
WHEEZING: 0
CONSTIPATION: 0
VISUAL CHANGE: 0
VOICE CHANGE: 0

## 2018-07-09 NOTE — TELEPHONE ENCOUNTER
Spoke with Noemi Hess in the sleep lab and she said she was in her que to call. She would be calling patient this week or next to schedule. Patient made aware.

## 2018-07-09 NOTE — PATIENT INSTRUCTIONS
stop taking any medications before the test. You should also make a list of your medications and give it to the technician performing the EEG. Avoid eating or drinking anything containing caffeine for at least eight hours before the test.  Your doctor may ask you to sleep as little as possible the night before the test if you have to sleep during the EEG. You may also be given a sedative to help you relax and sleep before the test begins. After the EEG is over, you can continue with your regular routine. However, if you were given a sedative, the medication will remain in your system for a little while. This means that youll have to bring someone with you so they can take you home after the test. Phuong Peralta need to rest and avoid driving until the medication wears off. What can I expect during an EEG? An EEG measures the electrical impulses in your brain by using several electrodes that are attached to your scalp. An electrode is a conductor through which an electric current enters or leaves. The electrodes transfer information from your brain to a machine that measures and records the data. Specialized technicians administer EEGs at [facility], [place] [address], and [facility]. The test usually takes 30 to 60 minutes to complete, and involves the following steps:  Youll lie down on your back in a reclining chair or on a bed. The technician will measure your head and aruna where to place the electrodes. These spots are scrubbed with a special cream that helps the electrodes get a high-quality reading. The technician will put a sticky gel adhesive on 16 to 25 electrodes, and attach them to spots on your scalp. Once the test begins, the electrodes send electrical impulse data from your brain to the recording machine. This machine converts the electrical impulses into visual patterns that appear on a screen. A computer saves these patterns.    The technician may instruct you to do certain things while the test

## 2018-07-09 NOTE — PROGRESS NOTES
BILATERAL  ULNAR  NEUROPATHY                         5)   OBESITY                        6)    CONCERN  FOR  EARLY  TYPE  II    DM                              NEEDS  MONITORING                        7)    H/O    CHRONIC  ANXIETY,   DEPRESSION      FOR  MELIDA  THAN    30  YEARS                           ON   ABILIFY,  WELLBUTRIN,  BUSPAR,  LAMICTAL,   MINIPRESS                               BEING    FOLLOWED    BY   HER  [facility]                     8)     H/O   FIBROMYALGIA                        9)       MULTIPLE  CO  MORBID  MEDICAL  CONDITIONS                               BEING  FOLLOWED  BY  HER  PCP.                       10)      H/O   MEMORY  PROBLEMS                                                   FOR   ONE  YEAR                  6)  H/O   CHRONIC  LUMBAR    PAIN     FOR  10  YEARS                       H/O  LUMBAR  SURGERY     2008   AND   2010                   12)    H/O   SLURRED  SPEECH     AND    DYSPHAGIAS                                SINCE  June 2018                               INTERMITTENTLY                           CONCERN  FOR  TIA,  STROKE,    CEREBRAL  ISCHEMIA                                              DEMYELINATING   DISEASE  LIKE  MS                                    13)      IN  VIEW  OF  THE  PATIENT'S    MULTIPLE   NEUROLOGICAL                           SYMPTOMS  AND  CONCERNS    FOR  PROLONGED   DURATION,                           AND    MULTIPLE   CO MORBID  MEDICAL  CONDITIONS,                           PATIENT    NEEDS  NEURO  DIAGNOSTIC  EVALUATIONS                      FOR   ANY   NEUROLOGICAL  PATHOLOGIES    AND  OTHER                        CORRECTABLE   ETIOLOGIES;     AND                              PATIENT  REQUEST  THE  SAME.                                   PRECIPITATING  FACTORS: including  fever/infection, exertion/relaxation, position change, stress, weather change, medications/alcohol, time of day/darkness/light  Are    absent                                                             MODIFYING  FACTORS:  fever/infection, exertion/relaxation, position change, stress, weather change, medications/alcohol, time of day/darkness/light     Are  absent         Patient   Indicates   The  Presence   And  The  Absence  Of  The  Following  Associated  And   Additional  Neurological    Symptoms:                                Balance  And coordination problems  present           Gait problems     absent            Headaches      absent              Migraines           absent           Memory problems        present           Confusion        absent            Paresthesia numbness          present           Seizures  And  Starring  Episodes           absent           Syncope,  Near  syncopal episodes         absent           Speech problems           absent             Swallowing  Problems      present            Dizziness,  Light headedness           absent                        Vertigo        absent             Generalized   Weakness    absent              focal  Weakness     present             Tremors         absent              Sleep  Problems     present             History  Of   Recent   Head  Injury     absent             History  Of   Recent  TIA     absent             History  Of   Recent    Stroke     absent             Neck  Pain and  Neck muscle  Spasms  absent             Radiating  down   And   Weakness           absent            Lower back   Pain  And     Spasms  present            Radiating    Down   And   Weakness          present                H/O   FALLS        absent               History  Of   Visual  Symptoms    absent                Associated   Diplopia       absent                                Also   Additional   Symptoms   Present    As  Documented    In   The detailed    Review  Of  Systems   And    Please   Refer   To    Them for   Additional  Information.                 Any Daughter     Depression Daughter     Anxiety Disorder Daughter     Asthma Other         sibling         Social History     Social History    Marital status:      Spouse name: N/A    Number of children: N/A    Years of education: N/A     Occupational History    Not on file.      Social History Main Topics    Smoking status: Never Smoker    Smokeless tobacco: Never Used    Alcohol use No    Drug use: No    Sexual activity: Not on file     Other Topics Concern    Not on file     Social History Narrative    No narrative on file       Vitals:    07/09/18 0942   BP: 124/72   Pulse: 82   SpO2: 95%         Wt Readings from Last 3 Encounters:   07/09/18 245 lb (111.1 kg)   06/14/18 246 lb (111.6 kg)   04/23/18 247 lb (112 kg)         BP Readings from Last 3 Encounters:   07/09/18 124/72   06/14/18 114/80   04/23/18 100/70       Hematology and Coagulation  Lab Results   Component Value Date    WBC 8.7 06/02/2018    RBC 4.47 06/02/2018    HGB 13.0 06/02/2018    HCT 39.4 06/02/2018    MCV 88.3 06/02/2018    MCH 29.0 06/02/2018    MCHC 32.9 06/02/2018    RDW 15.5 06/02/2018     06/02/2018    MPV 9.3 06/02/2018       Chemistries  Lab Results   Component Value Date     06/02/2018    K 3.8 06/02/2018     06/02/2018    CO2 29 06/02/2018    BUN 10 06/02/2018    CREATININE 1.03 06/02/2018    CALCIUM 9.1 06/02/2018    PROT 6.8 06/02/2018    LABALBU 4.1 06/02/2018    BILITOT 0.26 06/02/2018    ALKPHOS 116 06/02/2018    AST 15 06/02/2018    ALT 15 06/02/2018     Lab Results   Component Value Date    ALKPHOS 116 06/02/2018    ALT 15 06/02/2018    AST 15 06/02/2018    PROT 6.8 06/02/2018    BILITOT 0.26 06/02/2018    LABALBU 4.1 06/02/2018     Lab Results   Component Value Date    BUN 10 06/02/2018    CREATININE 1.03 06/02/2018     Lab Results   Component Value Date    CALCIUM 9.1 06/02/2018    MG 1.9 04/04/2016     Lab Results   Component Value Date    AST 15 06/02/2018    ALT 15 06/02/2018     Lab Results   Component Value Date    [id number] 308 04/04/2016         Review of Systems   Constitutional: Negative for appetite change, chills, diaphoresis, fatigue, fever and unexpected weight change. HENT: Positive for trouble swallowing. Negative for congestion, dental problem, drooling, ear discharge, ear pain, facial swelling, hearing loss, mouth sores, nosebleeds, postnasal drip, sinus pressure, sore throat, tinnitus and voice change. Eyes: Negative for photophobia, pain, discharge, redness, itching and visual disturbance. Respiratory: Negative for apnea, cough, choking, chest tightness, shortness of breath and wheezing. Cardiovascular: Negative for chest pain, palpitations, leg swelling and near-syncope. Gastrointestinal: Negative for abdominal distention, abdominal pain, blood in stool, bowel incontinence, constipation, diarrhea, nausea and vomiting. Endocrine: Negative for cold intolerance, heat intolerance, polydipsia, polyphagia and polyuria. Genitourinary: Negative for bladder incontinence. Musculoskeletal: Positive for gait problem. Negative for arthralgias, back pain, joint swelling, myalgias, neck pain and neck stiffness. Skin: Negative for color change, pallor, rash and wound. Allergic/Immunologic: Negative for environmental allergies, food allergies and immunocompromised state. Neurological: Positive for focal weakness, speech difficulty, weakness, numbness and loss of balance. Negative for dizziness, vertigo, tremors, seizures, syncope, facial asymmetry, light-headedness and headaches. Hematological: Negative for adenopathy. Does not bruise/bleed easily. Psychiatric/Behavioral: Positive for decreased concentration, memory loss and sleep disturbance. Negative for agitation, behavioral problems, confusion, dysphoric mood, hallucinations, self-injury and suicidal ideas. The patient is nervous/anxious. The patient is not hyperactive.         OBJECTIVE:    Physical Exam limits              Recent  And  Remote memory DECREASED              Attention &Concentration are DECREASED                                               B) CRANIAL NERVES :             2 CN : Visual  Acuity  And  Visual fields  within normal limits                      Fundi  Could  Not  Be  Could  Not  Be  Evaluated. 3,4,6 CN : Both  Pupils are   Reactive and  Equal.                          Extraocular   Movements  Are  Intact. No  Nystagmus. No  COLEMAN. No  Afferent  Pupillary  Defect noted. 5 CN :  Normal  Facial sensations and Corneal  Reflexes         7 CN :  Normal  Facial  Symmetry  And  Strength. No facial  Weakness. 8 CN :  Hearing  Appears within normal limits        9, 10 CN: Normal spontaneous, reflex palate movements       11 CN:   Normal  Shoulder shrug and  strength       12 CN :   Normal  Tongue movements and  Tongue  In midline                      No tongue   Fasciculations or atrophy     C) MOTOR  EXAM:                 Strength  In upper   4+/5  WITH  DECREASED  HAND              And  Lower extremities      4+/5               No  Drift. No  Atrophy             Rapid alternating  And  repetitions  Movements  DECREASED               Muscle  Tone  In upper  And  Lower  Extremities  normal              No rigidity. No  Spasticity. Bradykinesia   absent               No  Asterixis. Sustention  Tremor , Resting  Tremor   absent                  No other  Abnormal  Movements noted         D) SENSORY :             light touch, pinprick, position  And  Vibration   DECREASED       E) REFLEXES:                 Deep  Tendon  Reflexes DECREASED                  No pathological  Reflexes  Bilaterally.                                 F) COORDINATION  AND  GAIT :                              Station and  Gait  SLOW                                    Romberg's test   POSITIVE                        Ataxia negative        ASSESSMENT:    Patient Active Problem List   Diagnosis    Iron deficiency anemia    Chronic low back pain    Chronic neck pain    Hypertension    Depression    Anxiety    Hypothyroidism    Allergic rhinitis    GERD (gastroesophageal reflux disease)    Anemia    Chronic fatigue    Mixed hyperlipidemia    Paresthesias    Hyperlipidemia    Tripping over things    Balance problems    Memory problem    Bilateral carpal tunnel syndrome    Polyneuropathy associated with underlying disease (HCC)    Ulnar neuropathy of both upper extremities    Peroneal neuropathy    Tibial neuropathy    Slurred speech    Other dysphagia    Cerebral ischemia     MRI BRAIN W WO CONTRAST - 5/6/2016 10:30 AM.       HISTORY: pt c/o losing balance, shaking in hands, tremors, right sided weakness, history of HTN and DM       COMPARISON: MRI brain April 21, 2011       TECHNIQUE: Multiplanar, multisequence MR imaging of the brain was performed prior to and following the intravenous administration of 20 mL of Magnevist.       FINDINGS:  Diffusion weighted images are without evidence for active diffusion restriction to suggest acute ischemic event.  The ventricular system is normal in size and morphology.  There are no intraaxial or extraaxial fluid collections or mass    lesions.  The brainstem and craniocervical junction is normal.  Normal signal characteristics and morphology are demonstrated within the cerebral cortex, subcortical white matter, corpus callosum, deep gray nuclei, brainstem and cerebellum.  The basilar    cisterns are preserved.  There is no evidence of abnormal intracranial enhancement.  The orbits and globes are within normal limits.  The paranasal sinuses and mastoids are clear.           Impression   IMPRESSION: Unremarkable MRI examination of the brain.       Final report electronically signed by Russell Moore M.D. on 5/9/2016 8:15 AM             VISITING DIAGNOSIS:      ICD-10-CM ICD-9-CM    1. Balance problems R26.89 781.99 HELLEN Screen with Reflex      Rheumatoid Factor      Lyme Ab      Uric Acid      Lupus Anticoagulant      TSH without Reflex      Vitamin B12 & Folate      EEG      MRI Brain WO Contrast      US Carotid Artery Bilateral      US Transcranial Doppler Complete   2. Mixed hyperlipidemia E78.2 272.2 TSH without Reflex   3. Chronic fatigue R53.82 780.79 HELLEN Screen with Reflex      Rheumatoid Factor      Lyme Ab      Uric Acid      Lupus Anticoagulant      TSH without Reflex      Vitamin B12 & Folate      EEG      MRI Brain WO Contrast      US Carotid Artery Bilateral      US Transcranial Doppler Complete   4. Chronic low back pain with sciatica, sciatica laterality unspecified, unspecified back pain laterality M54.40 724.2     G89.29 724.3      338.29    5. Anxiety F41.9 300.00    6. Other iron deficiency anemia D50.8 280.8    7. Hypothyroidism, unspecified type E03.9 244.9    8. Gastroesophageal reflux disease, esophagitis presence not specified K21.9 530.81    9. Dysthymia F34.1 300.4    10. Essential hypertension I10 401.9    11. Paresthesias R20.2 782.0    12. Chronic neck pain M54.2 723.1     G89.29 338.29    13. Other hyperlipidemia E78.4 272.4    14. Tripping over things W18.40XA E917.9 HELLEN Screen with Reflex      Rheumatoid Factor      Lyme Ab      Uric Acid      Lupus Anticoagulant      TSH without Reflex      Vitamin B12 & Folate      EEG      MRI Brain WO Contrast      US Carotid Artery Bilateral      US Transcranial Doppler Complete   15. Memory problem R41.3 780.93 HELLEN Screen with Reflex      Rheumatoid Factor      Lyme Ab      Uric Acid      Lupus Anticoagulant      TSH without Reflex      Vitamin B12 & Folate      EEG      MRI Brain WO Contrast      US Carotid Artery Bilateral      US Transcranial Doppler Complete   16. Bilateral carpal tunnel syndrome G56.03 354.0    17. Polyneuropathy associated with underlying disease ([facility] [address] G63 357.4    18.  Ulnar neuropathy of both upper extremities G56.23 354.2    19. Peroneal neuropathy, unspecified laterality G57.30 355.3    20. Tibial neuropathy, unspecified laterality G57.40 355.79    21. Slurred speech R47.81 784.59    22. Other specified disorders of brain (CODE)  G93.89 348.89 US Transcranial Doppler Complete   23. Other dysphagia R13.19 787.29    24. Cerebral ischemia I67.82 437.1               CONCERNS   &   INCREASED   RISK   FOR        * TIA,  CEREBRO  VASCULAR  ISCHEMIA, STROKE       *   COGNITIVE  &   MEMORY PROBLEMS        *  MONONEUROPATHIES, RADICULOPATHY  AND  PLEXOPATHY      *   PERIPHERAL  NEUROPATHY,   NEUROPATHIC  PAIN        *  GAIT  DIFFICULTIES  &   BALANCE PROBLMES   AND  FALL            VARIOUS  RISK   FACTORS   WERE  REVIEWED   AND   DISCUSSED. *  PATIENT   HAS  MULTIPLE   MEDICAL, MENTAL HEALTH           NEUROLOGICAL   PROBLEMS . PATIENT'S   MANAGEMENT  IS  CHALLENGING. PLAN:       Deyanira Holloway  Of  The  Diagnoses,  The  Management & Treatment  Options           AND    Care  plan  Were        Reviewed and   Discussed   With  patient. * Goals  And  Expectations  Of  The  Therapy  Discussed   And  Reviewed. *   Benefits   And   Side  Effect  Profile  Of  Medication/s   Were   Discussed             * Need   For  Further   Follow up For  The  Various  Problems  Were discussed. * Results  Of  The  Previous  Diagnostic tests were reviewed and questions answered. patient  understand the same. Medical  Decision  Making  Was  Made  Based on the   Complexity  Of  Above  Mentioned  Diagnoses,    Data reviewed   & diagnostic  Tests  Reviewed,  Risk  Of  Significant   Co morbidities and complicating   Factors.              Medical  Decision  Was   High  Complexity  Due   To  The  Patient's  Multiple  Symptoms,  Advancing   Disease,  Complex  Treatment  Regimen,  Multiple medications and   Risk  Of   Side  Effects,  Difficulty  In  Medication  Management  And Diagnostic  Challenges   In  View  Of  The  Associated   Co  Morbid  Conditions   And  Problems. * FALL   PRECAUTIONS. THESE  REVIEWED   AND  DISCUSSED      *   BE  CAREFUL  WITH  ACTIVITIES   INCLUDING  DRIVING. *   AVOID   NECK  AND/ BACK  STRAINING  ACTIVITIES        *   TO   WEAR   BILATERAL   WRIST  BRACES       *   TO  AVOID  PRESSURE  OVER   ULNAR  ASPECT   OF   ELBOWS        *   ADEQUATE   FLUID  INTAKE   AND  ELECTROLYTE  BALANCE         * KEEP  DAIRY  OF   THE  NEUROLOGICAL  SYMPTOMS        RECORDING THE    DURATION  AND  FREQUENCY. *  AVOID    CONDITIONS  AND  FACTORS   THAT  MAKE                  NEUROLOGICAL  SYMPTOMS  WORSE. *  TO  MAINTAIN  REGULAR  SLEEP  WAKE  CYCLES. *   TO  HAVE  ADEQUATE  REST  AND   SLEEP    HOURS.          *    TO   AVOID   TO  SLEEP  IN   SUPINE  POSITION. *      WEIGHT   LOSS. *    AVOID  ANY USAGE OF                 TOBACCO,  EXCESSIVE  ALCOHOL  AND   ILLEGAL   SUBSTANCES      *  CONTINUE MEDICATIONS PRESCRIBED BY NEUROLOGIST AS    RECOMMENDED     *   Compliance   With  Medications   And  Instructions      * CURRENTLY  TOLERATING  THE  PRESCRIBED   MEDICATIONS. WITHOUT  ANY  SIGNIFICANT  SIDE  EFFECTS   &  GETTING BENEFIT. *  May   Use  Pill  Box,    If  Needed      *    To  Continue  The    Antiplatelet  therapy    As   Recommended  Was   Discussed      *    Prophylactic  Use   Of     Vitamin   B   Complex,  Folic  Acid,    Vitamin  B12    Multivitamin,   Calcium  With  magnesium  And  Vit D    Supplementations   Over  The  Counter  Discussed         *  FOOT  CARE, DAILY  INSPECTION  OF  FEET   AND         PERIODIC  PODIATRY EVALUATIONS .       *  PATIENT  IS  ALSO   COUNSELED   TO  KEEP    ACTIVITIES:         A)   SIMPLE      B)  ORGANIZED      C)  WRITE   DOWN                 *  EVALUATIONS  AND  FOLLOW UP:    IF  PATIENT  IS  WILLING                   * PHYSICAL  THERAPY   / OCCUPATIONAL THERAPY about stop-smoking programs and medicines. Keep trying. In addition to reducing your risk of diseases in the future, you will notice some benefits soon after you stop using tobacco. If you have shortness of breath or asthma symptoms, they will likely get better within a few weeks after you quit. Limit how much alcohol you drink. Moderate amounts of alcohol (up to 2 drinks a day for men, 1 drink a day for women) are okay. But drinking too much can lead to liver problems, high blood pressure, and other health problems. Family health  If you have a family, there are many things you can do together to improve your health. Eat meals together as a family as often as possible. Eat healthy foods. This includes fruits, vegetables, lean meats and dairy, and whole grains. Include your family in your fitness plan. Most people think of activities such as jogging or tennis as the way to fitness, but there are many ways you and your family can be more active. Anything that makes you breathe hard and gets your heart pumping is exercise. Here are some tips:  Walk to do errands or to take your child to school or the bus. Go for a family bike ride after dinner instead of watching TV. Where can you learn more? Go to https://[employer] SystemspeUni2.[employer]. org and sign in to your [employer] account. Enter V843 in the [employer] box to learn more about \"A Healthy Lifestyle: Care Instructions. \"     If you do not have an account, please click on the \"Sign Up Now\" link. Current as of: July 26, 2016  Content Version: 11.2  © 9023-9046 [employer]. Care instructions adapted under license by [facility] ([facility]). If you have questions about a medical condition or this instruction, always ask your healthcare professional. [place] [address] any warranty or liability for your use of this information.

## 2018-07-10 LAB
ANTI-NUCLEAR ANTIBODY (ANA): NEGATIVE
LYME ANTIBODY: 0.51

## 2018-07-12 LAB
ANTICARDIOLIPIN IGA ANTIBODY: 6 APU
ANTICARDIOLIPIN IGG ANTIBODY: 2.9 GPU
CARDIOLIPIN AB IGM: 16 MPU
DILUTE RUSSELL VIPER VENOM TIME: NORMAL
INR BLD: 0.9
LUPUS ANTICOAG: NORMAL
PARTIAL THROMBOPLASTIN TIME: 26.4 SEC (ref 20.5–30.5)
PROTHROMBIN TIME: 9.8 SEC (ref 9–12)

## 2018-07-16 ENCOUNTER — HOSPITAL ENCOUNTER (OUTPATIENT)
Dept: INTERVENTIONAL RADIOLOGY/VASCULAR | Age: 48
Discharge: HOME | End: 2018-07-18
Payer: MEDICARE

## 2018-07-16 ENCOUNTER — HOSPITAL ENCOUNTER (OUTPATIENT)
Dept: MRI IMAGING | Age: 48
Discharge: HOME | End: 2018-07-18
Payer: MEDICARE

## 2018-07-16 DIAGNOSIS — R53.82 CHRONIC FATIGUE: ICD-10-CM

## 2018-07-16 DIAGNOSIS — R41.3 MEMORY PROBLEM: ICD-10-CM

## 2018-07-16 DIAGNOSIS — R26.89 BALANCE PROBLEMS: ICD-10-CM
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
## 2018-07-16 PROCEDURE — 70551 MRI BRAIN STEM W/O DYE: CPT

## 2018-07-16 PROCEDURE — 93880 EXTRACRANIAL BILAT STUDY: CPT

## 2018-07-19 ENCOUNTER — TELEPHONE (OUTPATIENT)
Dept: SURGERY | Age: 48
End: 2018-07-19

## 2018-07-19 NOTE — TELEPHONE ENCOUNTER
Patient called to talk with Dr. Ki Morrsion nurse regarding her appt. Time for 7/24/19. States she is unable to do 0900 but can come in at 11:00 is that is preferred over 4:30pm. Please advise patient on preffered appt. time.

## 2018-07-24 ENCOUNTER — INITIAL CONSULT (OUTPATIENT)
Dept: SURGERY | Age: 48
End: 2018-07-24
Payer: MEDICARE

## 2018-07-24 VITALS
DIASTOLIC BLOOD PRESSURE: 80 MMHG | HEART RATE: 74 BPM | BODY MASS INDEX: 41.48 KG/M2 | HEIGHT: 65 IN | WEIGHT: 249 LBS | SYSTOLIC BLOOD PRESSURE: 112 MMHG

## 2018-07-24 DIAGNOSIS — R13.10 DYSPHAGIA, UNSPECIFIED TYPE: Primary | ICD-10-CM

## 2018-07-24 PROCEDURE — G8427 DOCREV CUR MEDS BY ELIG CLIN: HCPCS | Performed by: SURGERY

## 2018-07-24 PROCEDURE — 99215 OFFICE O/P EST HI 40 MIN: CPT | Performed by: SURGERY

## 2018-07-24 PROCEDURE — G8417 CALC BMI ABV UP PARAM F/U: HCPCS | Performed by: SURGERY

## 2018-07-24 PROCEDURE — 1036F TOBACCO NON-USER: CPT | Performed by: SURGERY

## 2018-07-24 RX ORDER — SPIRONOLACTONE 50 MG/1
TABLET, FILM COATED ORAL
Qty: 90 TABLET | Refills: 1 | Status: SHIPPED | OUTPATIENT
Start: 2018-07-24 | End: 2018-12-18 | Stop reason: SDUPTHER

## 2018-07-24 NOTE — PROGRESS NOTES
Jacob Glass is a 51 y.o. female          CC:    Chief Complaint   Patient presents with    Dysphagia       HISTORY OF PRESENT ILLNESS:    EGD  Nausea: no  Vomiting: no  Heartburn:yes, if food gets stuck or pills  Dysphagia:yes  Hematemesis:no  Epigastric pain:yes  Anemia: no  Previous work up date:2011-EGD=hiatal hernia, Dr. Lizzie Masters BID for many years      Patient is a 43-year-old female who states that she has had GERD for years. She has been on Nexium twice a day for over 15 years. She describes a sensation in the cervical esophagus or near the base of the tongue were food or pills get stuck. And then she will get some pain in the left upper quadrant as well. She had a swallowing study which was fairly normal coronary the patient. She had an EGD in 2011 which showed a hiatal hernia but no biopsies were taken. She also has a workup going for neurological issues. She has MRI of the brain MRI of the spine. this is for numbness and tingling in the hands and other issues.           Review of Systems:    General:  Fever: Negative  Weight Change:Negative  Night Sweats: Negative    Eye:  Blurry Vision:Positive  Double Vision: Positive    Ent:  Headaches: Negative  Sore throat: positive for when swallowing    Allergy/Immunology:  Hives: Negative  Latex allergy: Negative    Hematology/Lymphatic:  Bleeding Problems: Negative  Blood Clots: Negative  Swollen Lymph Nodes: Negative    Lungs:  Cough: Negative  SOB: Negative  Wheezing:Negative    Cardiovascular:  Chest Pain: Negative  Palpitations:Negative    GI:   Decreased Appetite: Negative  Heartburn: Positive  Dysphagia: Positive  Nausea/Vomiting: Negative  Abdominal Pain: Negative  Change in Bowels:Negative  Constipation: Negative  Diarrhea: Negative  Rectal Bleeding: Negative    :   Dysuria:Negative  Increase Urinary Frequency/Urgency: Negative    Neuro:  Seizures: Negative  Confusion: Negative        PAST MEDICAL HISTORY:      Family History   Problem Relation Age of Onset   Maria Del Rosario Toney Cancer Mother         melanoma    Thyroid Disease Mother     Coronary Art Dis Father         coronary artery bypass grafting x4    Hypertension Father     Glaucoma Father     Prostate Cancer Father     Heart Disease Father     Heart Disease Paternal Grandmother     High Blood Pressure Paternal Grandmother     Diabetes Paternal Grandmother     Thyroid Disease Paternal Grandmother     Heart Disease Paternal Grandfather     High Blood Pressure Paternal Grandfather     Diabetes Paternal Grandfather     Asthma Daughter     Depression Daughter     Anxiety Disorder Daughter     Depression Daughter     Anxiety Disorder Daughter     Asthma Other         sibling     Social History     Social History    Marital status:      Spouse name: N/A    Number of children: N/A    Years of education: N/A     Occupational History    Not on file. Social History Main Topics    Smoking status: Never Smoker    Smokeless tobacco: Never Used    Alcohol use No    Drug use: No    Sexual activity: Not on file     Other Topics Concern    Not on file     Social History Narrative    No narrative on file     Past Surgical History:   Procedure Laterality Date    ARTHROPLASTY  03/26/1986    5th metatarsals, MTP joints, right and left feet.  CERVICAL FUSION  6/2014    Dr Mayda Warner, LAPAROSCOPIC  11/14/2011    CYSTOSCOPY  2/8/13    no acute findings    HYSTERECTOMY  05/2005    JOINT REPLACEMENT      KNEE ARTHROSCOPY Left     KNEE ARTHROSCOPY Left 10/16/13    KNEE ARTHROSCOPY Right 02/08/2012    With partial medial and lateral synovectomies and abrasion chondroplasty.  LAPAROSCOPY  03/08/2002    diagnostic with D&C and hysterscopy. [facility] SURGERY  10/6/10    Anterior C5-C6, fusion with C-trap allograft and [place] plates, performed by Dr Dixon Motta.     LUMBAR SPINE SURGERY Left 2/13/08    L4-L5 and L5-S1 foraminotomies and L4 through S1 posterolateral fusion with pedicle screw instrumentation.  NASAL SEPTUM SURGERY      OTHER SURGICAL HISTORY  12/30/2014    spinal cord stimulator paddle electrode, spinal cord stimulator generator. both [place] specter generator and 32 lead paddle electrode by Dr Tommie Sandhoff at [address] [place] [address]  04/16/2015    spinal cord stimulator removed    SPINAL CORD DECOMPRESSION  4/23/12    Lumbar.  TOTAL KNEE ARTHROPLASTY Left 04/2015    [facility] orth[facility]    TUBAL LIGATION  04/02/1990    UPPER GASTROINTESTINAL ENDOSCOPY  10/14/2011    Hiatal hernia. Past Medical History:   Diagnosis Date    Abnormal CT of the abdomen     revealed 2 very small noncalcified pulmonary nodules in the right lung base. Suspect benign progress but repeat CT scan in June 2013 advised.  Allergic rhinitis     Anemia     Anxiety     Asthma     Chronic low back pain     Chronic neck pain     Degenerative joint disease of knee     Bilateral.    Depression     Endometriosis     history of     GERD (gastroesophageal reflux disease)     Gout     Hyperlipidemia     Hypertension     Hypothyroid     Kidney stone     history of     Left shoulder pain     Status post injections.  Leukocytosis     Palpitation     history of     Paresthesias     Generalized    Right knee pain     Status post injections. Current Outpatient Prescriptions on File Prior to Visit   Medication Sig Dispense Refill    spironolactone (ALDACTONE) 50 MG tablet take 1 tablet by mouth once daily 90 tablet 1    hydrochlorothiazide (HYDRODIURIL) 25 MG tablet take 1 tablet by mouth once daily 30 tablet 2    potassium chloride (KLOR-CON) 8 MEQ extended release tablet take 4 tablets by mouth once daily 120 tablet 5    gabapentin (NEURONTIN) 600 MG tablet take 1 tablet by mouth three times a day.  90 tablet 2    esomeprazole (NEXIUM) 40 MG delayed release capsule take 1 capsule by mouth twice a day 60 capsule 5    metoprolol tartrate (LOPRESSOR) 50 MG tablet take 1 tablet by mouth twice a day 60 tablet 5    tolterodine (DETROL) 2 MG tablet take 1 tablet by mouth twice a day 60 tablet 5    traZODone (DESYREL) 300 MG tablet take 1 tablet by mouth at bedtime  0    buPROPion (WELLBUTRIN SR) 200 MG extended release tablet take 1 tablet by mouth once daily WITH BREAKFAST  0    lamoTRIgine (LAMICTAL) 100 MG tablet Take 100 mg by mouth      nortriptyline (PAMELOR) 25 MG capsule Take 1 capsule by mouth nightly 30 capsule 5    atorvastatin (LIPITOR) 20 MG tablet Take 1 tablet by mouth daily 30 tablet 5    cyclobenzaprine (FLEXERIL) 10 MG tablet take 1 tablet by mouth three times a day if needed for muscle spasm 60 tablet 1    levothyroxine (SYNTHROID) 100 MCG tablet take 1 tablet by mouth once daily 30 tablet 5    lidocaine (XYLOCAINE) 5 % ointment Apply topically to affected area bid as needed 50 g 5    desvenlafaxine succinate (PRISTIQ) 100 MG TB24 extended release tablet take 1 tablet by mouth once daily  0    prazosin (MINIPRESS) 2 MG capsule Take 2 mg by mouth nightly       busPIRone (BUSPAR) 30 MG tablet Take 30 mg by mouth 3 times daily       promethazine (PHENERGAN) 25 MG tablet Take 1 tablet by mouth every 6 hours as needed for Nausea 60 tablet 2    Handicap Placard MISC by Does not apply route Duration 5 years 1 each 0    Cholecalciferol (VITAMIN D3) 5000 UNITS TABS Take 1 tablet by mouth daily 30 tablet 2    ARIPiprazole (ABILIFY) 10 MG tablet Take 10 mg by mouth daily       albuterol (PROVENTIL HFA) 108 (90 BASE) MCG/ACT inhaler Inhale 2 puffs into the lungs every 6 hours as needed for Wheezing 1 Inhaler 3     No current facility-administered medications on file prior to visit.       Allergies as of 07/24/2018 - Review Complete 07/24/2018   Allergen Reaction Noted    Allopurinol  08/22/2013    Colchicine  08/22/2013    Erythromycin  08/22/2013    Indocin [indomethacin]

## 2018-08-07 NOTE — TELEPHONE ENCOUNTER
Pepe called requesting a refill of the below medication which has been pended for you:     Requested Prescriptions     Pending Prescriptions Disp Refills    PROAIR  (90 Base) MCG/ACT inhaler [Pharmacy Med Name: PROAIR HFA 90 MCG INHALER] 8.5 g 3     Sig: inhale 2 puffs by mouth every 6 hours if needed for wheezing       Last Appointment Date: 6/14/2018  Next Appointment Date: 12/18/2018    Allergies   Allergen Reactions    Allopurinol     Colchicine     Erythromycin     Indocin [Indomethacin]     Lisinopril     Morphine     Norvasc [Amlodipine]     Tenormin [Atenolol]     Uloric [Febuxostat]     Hydrocodone-Acetaminophen Nausea Only    Topamax [Topiramate] Swelling and Rash

## 2018-08-13 ENCOUNTER — HOSPITAL ENCOUNTER (OUTPATIENT)
Dept: NEUROLOGY | Age: 48
Discharge: HOME | End: 2018-08-13
Payer: MEDICARE

## 2018-08-13 DIAGNOSIS — R53.82 CHRONIC FATIGUE: ICD-10-CM
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
## 2018-08-13 DIAGNOSIS — R26.89 BALANCE PROBLEMS: ICD-10-CM

## 2018-08-13 DIAGNOSIS — R41.3 MEMORY PROBLEM: ICD-10-CM

## 2018-08-13 DIAGNOSIS — G93.89 OTHER SPECIFIED DISORDERS OF BRAIN (CODE): ICD-10-CM

## 2018-08-13 PROCEDURE — 93886 INTRACRANIAL COMPLETE STUDY: CPT

## 2018-08-13 PROCEDURE — 95957 EEG DIGITAL ANALYSIS: CPT

## 2018-08-13 PROCEDURE — 95813 EEG EXTND MNTR 61-119 MIN: CPT

## 2018-08-13 PROCEDURE — 93892 TCD EMBOLI DETECT W/O INJ: CPT

## 2018-08-13 NOTE — PROGRESS NOTES
EXTENDED EEG Completed with [facility]. PCP: Clayton Georges DO    Ordering: Deja Aggarwal.  James Neurologist    Interpreting Physician: Racheal Ash Neurologist    Technician: Anahi Cristina RN

## 2018-08-14 ENCOUNTER — HOSPITAL ENCOUNTER (OUTPATIENT)
Dept: SLEEP CENTER | Age: 48
Discharge: HOME | End: 2018-08-16
Payer: MEDICARE

## 2018-08-14 DIAGNOSIS — G47.33 OSA (OBSTRUCTIVE SLEEP APNEA): ICD-10-CM

## 2018-08-14 PROCEDURE — 95810 POLYSOM 6/> YRS 4/> PARAM: CPT

## 2018-08-16 ENCOUNTER — HOSPITAL ENCOUNTER (OUTPATIENT)
Dept: GENERAL RADIOLOGY | Age: 48
Discharge: HOME | End: 2018-08-18
Payer: MEDICARE

## 2018-08-16 DIAGNOSIS — G47.33 OSA (OBSTRUCTIVE SLEEP APNEA): Primary | ICD-10-CM

## 2018-08-16 DIAGNOSIS — R13.10 DYSPHAGIA, UNSPECIFIED TYPE: ICD-10-CM

## 2018-08-16 PROCEDURE — 2500000003 HC RX 250 WO HCPCS: Performed by: SURGERY

## 2018-08-16 PROCEDURE — 74220 X-RAY XM ESOPHAGUS 1CNTRST: CPT

## 2018-08-16 RX ADMIN — BARIUM SULFATE 140 ML: 980 POWDER, FOR SUSPENSION ORAL at 09:32

## 2018-08-16 RX ADMIN — BARIUM SULFATE 120 ML: 960 POWDER, FOR SUSPENSION ORAL at 09:32

## 2018-08-17 ENCOUNTER — TELEPHONE (OUTPATIENT)
Dept: FAMILY MEDICINE CLINIC | Age: 48
End: 2018-08-17

## 2018-08-17 NOTE — PROGRESS NOTES
[facility] [address]                   [address] [place] [id number]                                    SLEEP STUDY  PATIENT NAME: Mariola Rajan                     :        1970  MED REC NO:   [id number]                             ROOM:  ACCOUNT NO:   [de-identified]                           ADMIT DATE: 2018  PROVIDER:     Candy Mcdaniel    [facility]  INTERPRETATION OF CLINICAL POLYSOMNOGRAPHY    DATE OF STUDY:  2018    REFERRING PROVIDER:  Dr. Estela Marr. CLINICAL IMPRESSION:  Sleep apnea syndrome, hypertension, hypothyroidism,  morbid obesity, depression and diabetes mellitus. PROCEDURE STANDARD:  It was a 1-night study. PROCEDURE:  The sleep/wake evaluation consisted of an intensive intake  interview, one night of clinical polysomnography, a nocturnal respiratory  battery, left and right leg anterior tibialis surface electromyography. The standard montage for clinical polysomnography included the  electroencephalogram, the electro-oculogram, mentalis surface  electromyography, and modified Lead II cardiography. The respiratory  battery consisted of measurements of oral/nasal airflow by heat thermistor,  nasal pressure transducer, thoracic and abdominal effort by Respiratory  Inductance Plethysmography. Nocturnal oxyhemoglobin saturations were  obtained by finger oximetry. Polysomnography revealed 471 minutes in bed with a total sleep time of 435  minutes. Sleep efficiency was essentially normal at 89%. Latency of sleep  onset normal at 16 minutes with 131 sleep stage changes. There were 45  awakenings during the night. Sleep architecture was abnormal with 5% stage I, 95% stage II, 0 delta, 0  REM. Latency to various sleep stages were normal.    Polysomnography did not reveal any other abnormality. Electrocardiogram did not reveal any arrhythmia.     Respiratory evaluation revealed the patient had total of a 707 respiratory  events out of which 480 were complete, 227 were partial obstructions,  apnea/hypopnea index was 97 per hour. The lowest oxygen saturation during  the night being 80%. Movement disorder evaluation did not reveal any significant degree of  periodic leg movements. IMPRESSION:  1. Obstructive sleep apnea syndrome, severe. 2.  Hypertension. 3.  Hypothyroid. 4.  Morbid obesity. 5.  Depression. 6.  Diabetes. RECOMMENDATIONS:  1. The patient has a severe degree of sleep apnea syndrome. It is  recommended that the patient should return to the sleep center for  treatment with nasal CPAP or BiPAP thereby relieving the apnea. It should  improve her daytime symptoms and also protect the patient from the  morbidity associated with the apnea. 2.  The patient should be encouraged to lose weight. 3.  The patient should be instructed on proper sleep hygiene techniques. 4.  Treatment of apnea will improve the outcome of hypertension, diabetes  and also may improve some of the symptoms of depression. 5.  The patient's thyroid function should be optimized. Hypothyroidism  will contribute to sleep apnea.         Ada Monge    D: 08/17/2018 8:48:35       T: 08/17/2018 9:52:00     [id number]  Job#: [id number]     Doc#: [id number]

## 2018-08-21 ENCOUNTER — HOSPITAL ENCOUNTER (OUTPATIENT)
Age: 48
Setting detail: SPECIMEN
Discharge: HOME | End: 2018-08-21
Payer: MEDICARE

## 2018-08-21 ENCOUNTER — OFFICE VISIT (OUTPATIENT)
Dept: PRIMARY CARE CLINIC | Age: 48
End: 2018-08-21
Payer: MEDICARE

## 2018-08-21 VITALS
HEART RATE: 84 BPM | HEIGHT: 65 IN | BODY MASS INDEX: 42.15 KG/M2 | DIASTOLIC BLOOD PRESSURE: 70 MMHG | WEIGHT: 253 LBS | SYSTOLIC BLOOD PRESSURE: 100 MMHG | TEMPERATURE: 98.5 F

## 2018-08-21 DIAGNOSIS — R30.0 DYSURIA: Primary | ICD-10-CM

## 2018-08-21 DIAGNOSIS — R30.0 DYSURIA: ICD-10-CM

## 2018-08-21 LAB
-: ABNORMAL
AMORPHOUS: ABNORMAL
BACTERIA: ABNORMAL
BILIRUBIN URINE: NEGATIVE
CASTS UA: ABNORMAL /LPF (ref 0–2)
COLOR: ABNORMAL
COMMENT UA: ABNORMAL
CRYSTALS, UA: ABNORMAL /HPF
EPITHELIAL CELLS UA: ABNORMAL /HPF (ref 0–5)
GLUCOSE URINE: ABNORMAL
KETONES, URINE: NEGATIVE
LEUKOCYTE ESTERASE, URINE: ABNORMAL
MUCUS: ABNORMAL
NITRITE, URINE: POSITIVE
OTHER OBSERVATIONS UA: ABNORMAL
PH UA: 6 (ref 5–6)
PROTEIN UA: ABNORMAL
RBC UA: ABNORMAL /HPF (ref 0–4)
RENAL EPITHELIAL, UA: ABNORMAL /HPF
SPECIFIC GRAVITY UA: 1.01 (ref 1.01–1.02)
TRICHOMONAS: ABNORMAL
TURBIDITY: ABNORMAL
URINE HGB: ABNORMAL
UROBILINOGEN, URINE: ABNORMAL
WBC UA: >100 /HPF (ref 0–4)
YEAST: ABNORMAL

## 2018-08-21 PROCEDURE — 1036F TOBACCO NON-USER: CPT | Performed by: NURSE PRACTITIONER

## 2018-08-21 PROCEDURE — G8427 DOCREV CUR MEDS BY ELIG CLIN: HCPCS | Performed by: NURSE PRACTITIONER

## 2018-08-21 PROCEDURE — 87086 URINE CULTURE/COLONY COUNT: CPT

## 2018-08-21 PROCEDURE — 87088 URINE BACTERIA CULTURE: CPT

## 2018-08-21 PROCEDURE — G8417 CALC BMI ABV UP PARAM F/U: HCPCS | Performed by: NURSE PRACTITIONER

## 2018-08-21 PROCEDURE — 81001 URINALYSIS AUTO W/SCOPE: CPT

## 2018-08-21 PROCEDURE — 99213 OFFICE O/P EST LOW 20 MIN: CPT | Performed by: NURSE PRACTITIONER

## 2018-08-21 PROCEDURE — 87186 SC STD MICRODIL/AGAR DIL: CPT

## 2018-08-21 RX ORDER — PHENAZOPYRIDINE HYDROCHLORIDE 100 MG/1
100 TABLET, FILM COATED ORAL 3 TIMES DAILY PRN
Qty: 20 TABLET | Refills: 0 | Status: SHIPPED | OUTPATIENT
Start: 2018-08-21 | End: 2018-09-04 | Stop reason: SDUPTHER

## 2018-08-21 RX ORDER — FLUCONAZOLE 150 MG/1
TABLET ORAL
Qty: 1 TABLET | Refills: 0 | Status: SHIPPED | OUTPATIENT
Start: 2018-08-21 | End: 2018-09-04 | Stop reason: ALTCHOICE

## 2018-08-21 RX ORDER — NITROFURANTOIN 25; 75 MG/1; MG/1
100 CAPSULE ORAL 2 TIMES DAILY
Qty: 14 CAPSULE | Refills: 0 | Status: SHIPPED | OUTPATIENT
Start: 2018-08-21 | End: 2018-09-04 | Stop reason: ALTCHOICE

## 2018-08-21 RX ORDER — LAMOTRIGINE 200 MG/1
200 TABLET ORAL DAILY
COMMUNITY
Start: 2018-08-17 | End: 2020-09-01 | Stop reason: DRUGHIGH

## 2018-08-21 RX ORDER — PHENAZOPYRIDINE HYDROCHLORIDE 100 MG/1
100 TABLET, FILM COATED ORAL 3 TIMES DAILY PRN
COMMUNITY
End: 2018-11-27 | Stop reason: SDUPTHER

## 2018-08-21 ASSESSMENT — ENCOUNTER SYMPTOMS
RESPIRATORY NEGATIVE: 1
BACK PAIN: 1
NAUSEA: 1

## 2018-08-21 NOTE — PROGRESS NOTES
Darci Thomas  8/21/18    Chief Complaint   Patient presents with    Dysuria     burning with urination and now cramping, abd. HPI: Presents with a history of UTI symptoms since yesterday. Now having burning with urination and abdominal and back cramping. No blood in urine. No fevers. Took some pyridium that helped temporarily. States she has had 2-3 UTI's in the past year. Treated with Macrobid last time and did well with it, but does state she is prone to yeast infections with antibiotic use. Past Med Hx:     Past Medical History:   Diagnosis Date    Abnormal CT of the abdomen     revealed 2 very small noncalcified pulmonary nodules in the right lung base. Suspect benign progress but repeat CT scan in June 2013 advised.  Allergic rhinitis     Anemia     Anxiety     Asthma     Chronic low back pain     Chronic neck pain     Degenerative joint disease of knee     Bilateral.    Depression     Endometriosis     history of     GERD (gastroesophageal reflux disease)     Gout     Hyperlipidemia     Hypertension     Hypothyroid     Kidney stone     history of     Left shoulder pain     Status post injections.  Leukocytosis     Palpitation     history of     Paresthesias     Generalized    Right knee pain     Status post injections. Past Surgical History:   Procedure Laterality Date    ARTHROPLASTY  03/26/1986    5th metatarsals, MTP joints, right and left feet.  CERVICAL FUSION  6/2014    Dr Pai Armas, LAPAROSCOPIC  11/14/2011    CYSTOSCOPY  2/8/13    no acute findings    HYSTERECTOMY  05/2005    JOINT REPLACEMENT      KNEE ARTHROSCOPY Left     KNEE ARTHROSCOPY Left 10/16/13    KNEE ARTHROSCOPY Right 02/08/2012    With partial medial and lateral synovectomies and abrasion chondroplasty.  LAPAROSCOPY  03/08/2002    diagnostic with D&C and hysterscopy.    [facility] SURGERY  10/6/10    Anterior C5-C6, fusion with C-trap allograft and [place] plates, performed by Dr Tan Peng.  LUMBAR SPINE SURGERY Left 2/13/08    L4-L5 and L5-S1 foraminotomies and L4 through S1 posterolateral fusion with pedicle screw instrumentation.  NASAL SEPTUM SURGERY      OTHER SURGICAL HISTORY  12/30/2014    spinal cord stimulator paddle electrode, spinal cord stimulator generator. both [employer] specter generator and 32 lead paddle electrode by Dr Loki Wilson at [address] [place] [address]  04/16/2015    spinal cord stimulator removed    SPINAL CORD DECOMPRESSION  4/23/12    Lumbar.  TOTAL KNEE ARTHROPLASTY Left 04/2015    [facility] orthopedics    TUBAL LIGATION  04/02/1990    UPPER GASTROINTESTINAL ENDOSCOPY  10/14/2011    Hiatal hernia. Social Hx:     Social History     Social History    Marital status:      Spouse name: N/A    Number of children: N/A    Years of education: N/A     Occupational History    Not on file.      Social History Main Topics    Smoking status: Never Smoker    Smokeless tobacco: Never Used    Alcohol use No    Drug use: No    Sexual activity: Not on file     Other Topics Concern    Not on file     Social History Narrative    No narrative on file       Lab Results   Component Value Date    LABA1C 5.7 06/02/2018     Lab Results   Component Value Date     06/02/2018     Lab Results   Component Value Date    WBC 8.7 06/02/2018    HGB 13.0 06/02/2018    HCT 39.4 06/02/2018    MCV 88.3 06/02/2018     06/02/2018     Lab Results   Component Value Date     06/02/2018    K 3.8 06/02/2018     06/02/2018    CO2 29 06/02/2018    BUN 10 06/02/2018    CREATININE 1.03 (H) 06/02/2018    GLUCOSE 102 (H) 06/02/2018    CALCIUM 9.1 06/02/2018    PROT 6.8 06/02/2018    LABALBU 4.1 06/02/2018    BILITOT 0.26 (L) 06/02/2018    ALKPHOS 116 (H) 06/02/2018    AST 15 06/02/2018    ALT 15 06/02/2018    LABGLOM 57 (L) 06/02/2018    GFRAA >60 06/02/2018       Outpatient Encounter Prescriptions as of release tablet take 1 tablet by mouth once daily  0    prazosin (MINIPRESS) 2 MG capsule Take 2 mg by mouth nightly       busPIRone (BUSPAR) 30 MG tablet Take 30 mg by mouth 3 times daily       promethazine (PHENERGAN) 25 MG tablet Take 1 tablet by mouth every 6 hours as needed for Nausea 60 tablet 2    Cholecalciferol (VITAMIN D3) 5000 UNITS TABS Take 1 tablet by mouth daily 30 tablet 2    ARIPiprazole (ABILIFY) 10 MG tablet Take 10 mg by mouth daily       [DISCONTINUED] buPROPion (WELLBUTRIN SR) 200 MG extended release tablet take 1 tablet by mouth once daily WITH BREAKFAST  0    [DISCONTINUED] lamoTRIgine (LAMICTAL) 100 MG tablet Take 100 mg by mouth      Handicap Placard MISC by Does not apply route Duration 5 years 1 each 0     No facility-administered encounter medications on file as of 8/21/2018. Review of Systems   Constitutional: Negative. HENT: Negative. Respiratory: Negative. Cardiovascular: Negative. Gastrointestinal: Positive for nausea. Genitourinary: Positive for dysuria and flank pain. Musculoskeletal: Positive for back pain. Skin: Negative. Physical Exam   Constitutional: She is oriented to person, place, and time. She appears well-developed and well-nourished. HENT:   Head: Normocephalic. Right Ear: External ear normal.   Left Ear: External ear normal.   Nose: Nose normal.   Mouth/Throat: Oropharynx is clear and moist.   Neck: Normal range of motion. No thyromegaly present. Cardiovascular: Normal rate, regular rhythm and normal heart sounds. Pulmonary/Chest: Effort normal and breath sounds normal.   Musculoskeletal: Normal range of motion. She exhibits no tenderness. Neurological: She is alert and oriented to person, place, and time. Skin: Skin is warm. Psychiatric: She has a normal mood and affect. Data reviewed:      ASSESSMENT:   Diagnosis Orders   1.  Dysuria  Urinalysis Reflex to Culture       PLAN:  Return if symptoms worsen or

## 2018-08-22 ENCOUNTER — OFFICE VISIT (OUTPATIENT)
Dept: PREOP | Age: 48
End: 2018-08-22
Payer: MEDICARE

## 2018-08-22 VITALS
SYSTOLIC BLOOD PRESSURE: 144 MMHG | DIASTOLIC BLOOD PRESSURE: 81 MMHG | RESPIRATION RATE: 16 BRPM | OXYGEN SATURATION: 95 %

## 2018-08-22 PROCEDURE — 6370000000 HC RX 637 (ALT 250 FOR IP)

## 2018-08-22 PROCEDURE — 3609015500 HC GASTRIC/DUODENAL MOTILITY &/OR MANOMETRY STUDY

## 2018-08-22 NOTE — PROGRESS NOTES
Esophageal manometry completed. Right nare prepped with viscous lidocaine. Probe inserted to 50 CM. Pt tolerated procedure well.

## 2018-08-24 LAB
CULTURE: ABNORMAL
Lab: ABNORMAL
ORGANISM: ABNORMAL
SPECIMEN DESCRIPTION: ABNORMAL
STATUS: ABNORMAL

## 2018-08-28 NOTE — PROCEDURES
artifacts noted. Intermittent generalized slow waves and slow sharp waves noted throughout  this recording. ACTIVATION PROCEDURES:    HYPERVENTILATION:  Hyperventilation was performed for 3 minutes. Patient  was cooperative with good effort. Also Post-Hyperventilation period was  monitored closely. Hyperventilation:  Unremarkable. PHOTIC STIMULATION:  Photic stimulation was performed at the following  frequencies: 1 Hz, 3 Hz, 6 Hz, 9 Hz, 12 Hz, 15 Hz, 18 Hz, 21 Hz, 25 Hz, 30  Hz and patient tolerated well. Photic stimulation:  No significant driving response noted. SLEEP:  Stage I and stage II sleep were noted. EKG:  EKG showed normal sinus rhythm without any significant abnormality. 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 Further clinical correlation, followup recommended.           Flavio Aguilar MD  Board Certified Neurologist    D: 08/24/2018 16:50:37       T: 08/24/2018 17:35:39     [id number]  Job#: [id number]     Doc#: [id number]    CC:  Tatyana Lopez

## 2018-09-04 ENCOUNTER — OFFICE VISIT (OUTPATIENT)
Dept: NEUROLOGY | Age: 48
End: 2018-09-04
Payer: MEDICARE

## 2018-09-04 VITALS
SYSTOLIC BLOOD PRESSURE: 114 MMHG | BODY MASS INDEX: 42.02 KG/M2 | DIASTOLIC BLOOD PRESSURE: 80 MMHG | HEART RATE: 88 BPM | WEIGHT: 252.2 LBS | HEIGHT: 65 IN

## 2018-09-04 DIAGNOSIS — R13.19 OTHER DYSPHAGIA: ICD-10-CM

## 2018-09-04 DIAGNOSIS — G57.30 PERONEAL NEUROPATHY, UNSPECIFIED LATERALITY: ICD-10-CM

## 2018-09-04 DIAGNOSIS — M54.40 CHRONIC LOW BACK PAIN WITH SCIATICA, SCIATICA LATERALITY UNSPECIFIED, UNSPECIFIED BACK PAIN LATERALITY: ICD-10-CM
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
## 2018-09-04 DIAGNOSIS — F32.A DEPRESSION, UNSPECIFIED DEPRESSION TYPE: ICD-10-CM

## 2018-09-04 DIAGNOSIS — M54.2 CHRONIC NECK PAIN: ICD-10-CM

## 2018-09-04 DIAGNOSIS — G57.32 NEUROPATHY OF LEFT PERONEAL NERVE: ICD-10-CM

## 2018-09-04 DIAGNOSIS — E78.2 MIXED HYPERLIPIDEMIA: ICD-10-CM

## 2018-09-04 DIAGNOSIS — G56.03 BILATERAL CARPAL TUNNEL SYNDROME: ICD-10-CM

## 2018-09-04 DIAGNOSIS — F41.9 ANXIETY: ICD-10-CM

## 2018-09-04 DIAGNOSIS — I10 ESSENTIAL HYPERTENSION: ICD-10-CM

## 2018-09-04 DIAGNOSIS — R94.01 ABNORMAL EEG: ICD-10-CM

## 2018-09-04 DIAGNOSIS — G56.23 ULNAR NEUROPATHY OF BOTH UPPER EXTREMITIES: ICD-10-CM

## 2018-09-04 DIAGNOSIS — I67.82 CEREBRAL ISCHEMIA: ICD-10-CM

## 2018-09-04 DIAGNOSIS — G89.29 CHRONIC LOW BACK PAIN WITH SCIATICA, SCIATICA LATERALITY UNSPECIFIED, UNSPECIFIED BACK PAIN LATERALITY: ICD-10-CM

## 2018-09-04 DIAGNOSIS — G89.29 CHRONIC NECK PAIN: ICD-10-CM

## 2018-09-04 DIAGNOSIS — R56.9 NOCTURNAL SEIZURES (HCC): Primary | ICD-10-CM

## 2018-09-04 DIAGNOSIS — R53.82 CHRONIC FATIGUE: ICD-10-CM

## 2018-09-04 DIAGNOSIS — E03.9 HYPOTHYROIDISM, UNSPECIFIED TYPE: ICD-10-CM

## 2018-09-04 DIAGNOSIS — R41.3 MEMORY PROBLEM: ICD-10-CM

## 2018-09-04 DIAGNOSIS — R20.2 PARESTHESIAS: ICD-10-CM

## 2018-09-04 DIAGNOSIS — K21.9 GASTROESOPHAGEAL REFLUX DISEASE, ESOPHAGITIS PRESENCE NOT SPECIFIED: ICD-10-CM

## 2018-09-04 DIAGNOSIS — G63 POLYNEUROPATHY ASSOCIATED WITH UNDERLYING DISEASE (HCC): ICD-10-CM

## 2018-09-04 DIAGNOSIS — R26.89 BALANCE PROBLEMS: ICD-10-CM

## 2018-09-04 PROCEDURE — G8417 CALC BMI ABV UP PARAM F/U: HCPCS | Performed by: PSYCHIATRY & NEUROLOGY

## 2018-09-04 PROCEDURE — 99214 OFFICE O/P EST MOD 30 MIN: CPT | Performed by: PSYCHIATRY & NEUROLOGY

## 2018-09-04 PROCEDURE — G8427 DOCREV CUR MEDS BY ELIG CLIN: HCPCS | Performed by: PSYCHIATRY & NEUROLOGY

## 2018-09-04 PROCEDURE — 1036F TOBACCO NON-USER: CPT | Performed by: PSYCHIATRY & NEUROLOGY

## 2018-09-04 RX ORDER — OXCARBAZEPINE 150 MG/1
TABLET, FILM COATED ORAL
Qty: 60 TABLET | Refills: 1 | Status: SHIPPED | OUTPATIENT
Start: 2018-09-04 | End: 2018-10-25 | Stop reason: SDUPTHER

## 2018-09-04 ASSESSMENT — ENCOUNTER SYMPTOMS
EYE ITCHING: 0
EYE REDNESS: 0
EYE PAIN: 0
SINUS PRESSURE: 0
COLOR CHANGE: 0
ABDOMINAL PAIN: 0
CHEST TIGHTNESS: 0
VOMITING: 0
FACIAL SWELLING: 0
APNEA: 0
WHEEZING: 0
BOWEL INCONTINENCE: 0
COUGH: 0
VISUAL CHANGE: 0
DIARRHEA: 0
SORE THROAT: 0
PHOTOPHOBIA: 0
TROUBLE SWALLOWING: 1
CHOKING: 0
BLOOD IN STOOL: 0
BACK PAIN: 0
EYE DISCHARGE: 0
SHORTNESS OF BREATH: 0
ABDOMINAL DISTENTION: 0
NAUSEA: 0
CONSTIPATION: 0
VOICE CHANGE: 0

## 2018-09-04 NOTE — PATIENT INSTRUCTIONS
With  You           B)  Make  Sure  There  Are  No  Sharp or  Hard  Objects  Around you. C)  Lay down  On  Your  Side  And  Relax. * FALL   PRECAUTIONS. *      WEIGHT   LOSS. *   ADEQUATE   FLUID  INTAKE   AND  ELECTROLYTE  BALANCE           * KEEP  DAIRY  OF   THE  NEUROLOGICAL  SYMPTOMS        *  TO  MAINTAIN  REGULAR  SLEEP  WAKE  CYCLES. *   TO  HAVE  ADEQUATE  REST  AND   SLEEP    HOURS.    *    AVOID  USAGE OF            TOBACCO,  EXCESSIVE  ALCOHOL  AND   ILLEGAL   SUBSTANCES,  IF  ANY        *  Maintain   Healthy  Life Style    With   Periodic  Monitoring  Of    Any  Medical  Conditions  Including   Elevated  Blood  Pressure,  Lipid  Profile,   Blood  Sugar levels  And   Heart  Disease. *   Period   Screening  For  Cancers  Involving  Breast,  Colon,   lungs  And  Other  Organs  As  Applicable,  In consultation   With  Your  Primary Care Providers. *  If   There is  Any  Significant  Worsening   Of  Current  Symptoms  And  Or  If    Any additional  New  Neurological  Symptoms  Or  Significant  Concerns   Should  Call  911 or  Go  To  Emergency  Department  For  Further  Immediate  Evaluation.

## 2018-09-04 NOTE — PROGRESS NOTES
4)    BILATERAL  CARPAL  TUNNEL   SYNDROME,                                BILATERAL  ULNAR  NEUROPATHY                         5)   OBESITY                        6)    CONCERN  FOR  EARLY  TYPE  II    DM                              NEEDS  MONITORING                        7)    H/O    CHRONIC  ANXIETY,   DEPRESSION                                       FOR  MELIDA  THAN    30  YEARS                           ON   ABILIFY,  WELLBUTRIN,  BUSPAR,  LAMICTAL,   MINIPRESS                                 BEING    FOLLOWED    BY   HER  [facility]                     8)     H/O   FIBROMYALGIA                        9)       MULTIPLE  CO  MORBID  MEDICAL  CONDITIONS                               BEING  FOLLOWED  BY  HER  PCP.                       10)      H/O   MEMORY  PROBLEMS                                                   FOR   ONE  YEAR                  6)  H/O   CHRONIC  LUMBAR    PAIN     FOR  10  YEARS                       H/O  LUMBAR  SURGERY     2008   AND   2010                   12)    H/O   SLURRED  SPEECH     AND    DYSPHAGIAS                                SINCE  June 2018                               INTERMITTENTLY                            D/D     TIA,  STROKE,    CEREBRAL  ISCHEMIA                                      PATIENT  ON  ASA   DAILY  PROPHYLACTICALLY                                     13)    PATIENT   HAD  NEURO  DIAGNOSTIC  EVALUATIONS  IN  July 2018                         MRI  BRAIN,  LABS  AND  CAROTID  DOPPLER  SHOWED  NO  ABNORMALITY                       14)     EEG    AUGUST 2018    ABNORMAL. H/O   WAKING  UP    IN  MORNING    WITH  TONGUE  BITING                                      FOR  MORE  THAN  ONE  YEAR.                                       H/O   SHAKING    EPISODES   WHILE  SLEEPING                                          WITNESSED  BY  HER                                        D/D NOCTURNAL  SEIZURE  ACTIVITY                                  PATIENT   WILL  BENEFIT  FROM    ANTI  EPILEPTIC  MEDICATIONS,                                    IF  WILLING  FOR  THE  SAME.                                  PRECIPITATING  FACTORS: including  fever/infection, exertion/relaxation, position change, stress, weather change, medications/alcohol, time of day/darkness/light  Are    absent                                                             MODIFYING  FACTORS:  fever/infection, exertion/relaxation, position change, stress, weather change, medications/alcohol, time of day/darkness/light     Are  absent         Patient   Indicates   The  Presence   And  The  Absence  Of  The  Following  Associated  And   Additional  Neurological    Symptoms:                                Balance  And coordination problems  present           Gait problems     absent            Headaches      absent              Migraines           absent           Memory problems        present           Confusion        absent            Paresthesia numbness          present           Seizures  And  Starring  Episodes           absent           Syncope,  Near  syncopal episodes         absent           Speech problems           absent             Swallowing  Problems      present            Dizziness,  Light headedness           absent                        Vertigo        absent             Generalized   Weakness    absent              focal  Weakness     present             Tremors         absent              Sleep  Problems     present             History  Of   Recent   Head  Injury     absent             History  Of   Recent  TIA     absent             History  Of   Recent    Stroke     absent             Neck  Pain and  Neck muscle  Spasms  absent             Radiating  down   And   Weakness           absent            Lower back   Pain  And     Spasms  present            Radiating    Down   And   Weakness          present release capsule take 1 capsule by mouth twice a day 60 capsule 5    metoprolol tartrate (LOPRESSOR) 50 MG tablet take 1 tablet by mouth twice a day 60 tablet 5    tolterodine (DETROL) 2 MG tablet take 1 tablet by mouth twice a day 60 tablet 5    traZODone (DESYREL) 300 MG tablet take 1 tablet by mouth at bedtime  0    nortriptyline (PAMELOR) 25 MG capsule Take 1 capsule by mouth nightly 30 capsule 5    atorvastatin (LIPITOR) 20 MG tablet Take 1 tablet by mouth daily 30 tablet 5    levothyroxine (SYNTHROID) 100 MCG tablet take 1 tablet by mouth once daily 30 tablet 5    lidocaine (XYLOCAINE) 5 % ointment Apply topically to affected area bid as needed 50 g 5    desvenlafaxine succinate (PRISTIQ) 100 MG TB24 extended release tablet take 1 tablet by mouth once daily  0    prazosin (MINIPRESS) 2 MG capsule Take 2 mg by mouth nightly       busPIRone (BUSPAR) 30 MG tablet Take 30 mg by mouth 3 times daily       Handicap Placard MISC by Does not apply route Duration 5 years 1 each 0    Cholecalciferol (VITAMIN D3) 5000 UNITS TABS Take 1 tablet by mouth daily 30 tablet 2    ARIPiprazole (ABILIFY) 10 MG tablet Take 10 mg by mouth daily       promethazine (PHENERGAN) 25 MG tablet Take 1 tablet by mouth every 6 hours as needed for Nausea 60 tablet 2     No current facility-administered medications for this visit.           Allergies   Allergen Reactions    Allopurinol     Colchicine     Erythromycin     Indocin [Indomethacin]     Lisinopril     Morphine     Norvasc [Amlodipine]     Tenormin [Atenolol]     Uloric [Febuxostat]     Hydrocodone-Acetaminophen Nausea Only    Topamax [Topiramate] Swelling and Rash         Family History   Problem Relation Age of Onset    Cancer Mother         melanoma    Thyroid Disease Mother     Coronary Art Dis Father         coronary artery bypass grafting x4    Hypertension Father     Glaucoma Father     Prostate Cancer Father     Heart Disease Father     Heart Disease Paternal Grandmother     High Blood Pressure Paternal Grandmother     Diabetes Paternal Grandmother     Thyroid Disease Paternal Grandmother     Heart Disease Paternal Grandfather     High Blood Pressure Paternal Grandfather     Diabetes Paternal Grandfather     Asthma Daughter     Depression Daughter     Anxiety Disorder Daughter     Depression Daughter     Anxiety Disorder Daughter     Asthma Other         sibling         Social History     Social History    Marital status:      Spouse name: N/A    Number of children: N/A    Years of education: N/A     Occupational History    Not on file.      Social History Main Topics    Smoking status: Never Smoker    Smokeless tobacco: Never Used    Alcohol use No    Drug use: No    Sexual activity: Not on file     Other Topics Concern    Not on file     Social History Narrative    No narrative on file       Vitals:    09/04/18 1554   BP: 114/80   Pulse: 88         Wt Readings from Last 3 Encounters:   09/04/18 252 lb 3.2 oz (114.4 kg)   08/21/18 253 lb (114.8 kg)   07/24/18 249 lb (112.9 kg)         BP Readings from Last 3 Encounters:   09/04/18 114/80   08/22/18 (!) 144/81   08/21/18 100/70       Hematology and Coagulation  Lab Results   Component Value Date    WBC 8.7 06/02/2018    RBC 4.47 06/02/2018    HGB 13.0 06/02/2018    HCT 39.4 06/02/2018    MCV 88.3 06/02/2018    MCH 29.0 06/02/2018    MCHC 32.9 06/02/2018    RDW 15.5 06/02/2018     06/02/2018    MPV 9.3 06/02/2018       Chemistries  Lab Results   Component Value Date     06/02/2018    K 3.8 06/02/2018     06/02/2018    CO2 29 06/02/2018    BUN 10 06/02/2018    CREATININE 1.03 06/02/2018    CALCIUM 9.1 06/02/2018    PROT 6.8 06/02/2018    LABALBU 4.1 06/02/2018    BILITOT 0.26 06/02/2018    ALKPHOS 116 06/02/2018    AST 15 06/02/2018    ALT 15 06/02/2018     Lab Results   Component Value Date    ALKPHOS 116 06/02/2018    ALT 15 06/02/2018    AST 15 06/02/2018 bruise/bleed easily. Psychiatric/Behavioral: Positive for decreased concentration, memory loss and sleep disturbance. Negative for agitation, behavioral problems, confusion, dysphoric mood, hallucinations, self-injury and suicidal ideas. The patient is nervous/anxious. The patient is not hyperactive. OBJECTIVE:    Physical Exam   Constitutional: She appears well-developed and well-nourished. She is cooperative. HENT:   Head: Normocephalic and atraumatic. Head is without raccoon's eyes and without Bob's sign. Right Ear: External ear normal.   Left Ear: External ear normal.   Nose: Nose normal.   Mouth/Throat: Oropharynx is clear and moist.   Eyes: Conjunctivae are normal.   Neck: Normal range of motion. Neck supple. No muscular tenderness present. Carotid bruit is not present. No neck rigidity. No tracheal deviation and normal range of motion present. No Brudzinski's sign and no Kernig's sign noted. No thyroid mass and no thyromegaly present. Cardiovascular: Normal rate and regular rhythm. Pulmonary/Chest: Effort normal.   Musculoskeletal: She exhibits no edema or tenderness. Skin: Skin is warm. No rash noted. No cyanosis or erythema. No pallor. Nails show no clubbing. Psychiatric: Her mood appears not anxious. Her affect is blunt. Her affect is not angry, not labile and not inappropriate. She is slowed. She is not agitated, not aggressive, not hyperactive, not withdrawn, not actively hallucinating and not combative. Thought content is not paranoid and not delusional. Cognition and memory are impaired. She does not express impulsivity or inappropriate judgment. She does not exhibit a depressed mood. She expresses no homicidal and no suicidal ideation. She expresses no suicidal plans and no homicidal plans. She exhibits normal recent memory and normal remote memory. She is attentive.        Neurologic Exam    NEUROLOGICAL EXAMINATION :         A) MENTAL STATUS:                   Alert and weakness, history of HTN and DM       COMPARISON: MRI brain April 21, 2011       TECHNIQUE: Multiplanar, multisequence MR imaging of the brain was performed prior to and following the intravenous administration of 20 mL of Magnevist.       FINDINGS:  Diffusion weighted images are without evidence for active diffusion restriction to suggest acute ischemic event.  The ventricular system is normal in size and morphology.  There are no intraaxial or extraaxial fluid collections or mass    lesions.  The brainstem and craniocervical junction is normal.  Normal signal characteristics and morphology are demonstrated within the cerebral cortex, subcortical white matter, corpus callosum, deep gray nuclei, brainstem and cerebellum.  The basilar    cisterns are preserved.  There is no evidence of abnormal intracranial enhancement.  The orbits and globes are within normal limits.  The paranasal sinuses and mastoids are clear.           Impression   IMPRESSION: Unremarkable MRI examination of the brain.       Final report electronically signed by Catherine Tafoya M.D. on 5/9/2016 8:15 AM             VISITING DIAGNOSIS:      ICD-10-CM ICD-9-CM    1. Nocturnal seizures (HCC) G40.909 345.80 OXcarbazepine (TRILEPTAL) 150 MG tablet   2. Bilateral carpal tunnel syndrome G56.03 354.0 OXcarbazepine (TRILEPTAL) 150 MG tablet   3. Paresthesias R20.2 782.0 OXcarbazepine (TRILEPTAL) 150 MG tablet   4. Polyneuropathy associated with underlying disease (HCC) G63 357.4 OXcarbazepine (TRILEPTAL) 150 MG tablet   5. Other dysphagia R13.19 787.29 OXcarbazepine (TRILEPTAL) 150 MG tablet   6. Cerebral ischemia I67.82 437.1 OXcarbazepine (TRILEPTAL) 150 MG tablet   7. Ulnar neuropathy of both upper extremities G56.23 354.2 OXcarbazepine (TRILEPTAL) 150 MG tablet   8. Tripping over things W18.40XA E917.9 OXcarbazepine (TRILEPTAL) 150 MG tablet   9. Balance problems R26.89 781.99 OXcarbazepine (TRILEPTAL) 150 MG tablet   10.  Neuropathy of left peroneal nerve G57.32 355.3 OXcarbazepine (TRILEPTAL) 150 MG tablet   11. Memory problem R41.3 780.93 OXcarbazepine (TRILEPTAL) 150 MG tablet   12. Chronic fatigue R53.82 780.79 OXcarbazepine (TRILEPTAL) 150 MG tablet   13. Chronic low back pain with sciatica, sciatica laterality unspecified, unspecified back pain laterality M54.40 724.2 OXcarbazepine (TRILEPTAL) 150 MG tablet    G89.29 724.3      338.29    14. Anxiety F41.9 300.00 OXcarbazepine (TRILEPTAL) 150 MG tablet   15. Mixed hyperlipidemia E78.2 272.2 OXcarbazepine (TRILEPTAL) 150 MG tablet   16. Peroneal neuropathy, unspecified laterality G57.30 355.3 OXcarbazepine (TRILEPTAL) 150 MG tablet   17. Depression, unspecified depression type F32.9 311 OXcarbazepine (TRILEPTAL) 150 MG tablet   18. Hypothyroidism, unspecified type E03.9 244.9 OXcarbazepine (TRILEPTAL) 150 MG tablet   19. Chronic neck pain M54.2 723.1 OXcarbazepine (TRILEPTAL) 150 MG tablet    G89.29 338.29    20. Essential hypertension I10 401.9 OXcarbazepine (TRILEPTAL) 150 MG tablet   21. Abnormal EEG R94.01 794.02 OXcarbazepine (TRILEPTAL) 150 MG tablet   22. Gastroesophageal reflux disease, esophagitis presence not specified K21.9 530.81 OXcarbazepine (TRILEPTAL) 150 MG tablet              CONCERNS   &   INCREASED   RISK   FOR        * TIA,  CEREBRO  VASCULAR  ISCHEMIA, STROKE       *   COGNITIVE  &   MEMORY PROBLEMS        *  MONONEUROPATHIES, RADICULOPATHY  AND  PLEXOPATHY      *   PERIPHERAL  NEUROPATHY,   NEUROPATHIC  PAIN      *   SEIZURES           *  GAIT  DIFFICULTIES  &   BALANCE PROBLMES   AND  FALL                VARIOUS  RISK   FACTORS   WERE  REVIEWED   AND   DISCUSSED. *  PATIENT   HAS  MULTIPLE   MEDICAL, MENTAL HEALTH           NEUROLOGICAL   PROBLEMS . PATIENT'S   MANAGEMENT  IS  CHALLENGING. PLAN:       Cece Rose  Of  The  Diagnoses,  The  Management & Treatment  Options           AND    Care  plan  Were        Reviewed and   Discussed   With  patient.          *

## 2018-09-05 ENCOUNTER — TELEPHONE (OUTPATIENT)
Dept: FAMILY MEDICINE CLINIC | Age: 48
End: 2018-09-05

## 2018-09-05 DIAGNOSIS — E03.9 HYPOTHYROIDISM, UNSPECIFIED TYPE: Primary | ICD-10-CM

## 2018-09-05 RX ORDER — LEVOTHYROXINE SODIUM 0.12 MG/1
TABLET ORAL
Qty: 30 TABLET | Refills: 5 | Status: SHIPPED | OUTPATIENT
Start: 2018-09-05 | End: 2019-04-04 | Stop reason: SDUPTHER

## 2018-09-05 NOTE — TELEPHONE ENCOUNTER
Pt calling stating that she had lab work done for [employer] and he told her that she thyroid medication needs to be adjusted and to contact [facility]. Please call pt and advise.

## 2018-09-05 NOTE — TELEPHONE ENCOUNTER
Left patient a message to call back confirming the dose of Levothyroxine she is taking and if she has been taking it daily or if she missed doses frequently as I see we havent refilled since Dec 2017

## 2018-09-05 NOTE — PROCEDURES
systems  during the current monitoring show no evidence of HITS (high intensity  transient signals) and no embolic shower events were detected. IMPRESSION:    1. Mild anterior cerebral artery stenosis. 2.  Otherwise, no significant focal stenosis or flow abnormalities noted in  the anterior circulation. 3.  No evidence of vertebrobasilar stenosis or insufficiency noted. 4.  TCD of intracranial arteries for emboli detection is negative. Further clinical correlation and followup recommended.         Chandan Marquez MD  Board Certified Neurologist    D: 08/31/2018 10:14:10       T: 08/31/2018 11:38:18     [email or web address]  Job#: [id number]     Doc#: [id number]    Rox Luna

## 2018-09-05 NOTE — TELEPHONE ENCOUNTER
Patient called back and states she takes every day Levothyroxine 100 mcg.  Please send to [facility]

## 2018-09-06 ENCOUNTER — TELEPHONE (OUTPATIENT)
Dept: NEUROLOGY | Age: 48
End: 2018-09-06

## 2018-09-06 NOTE — TELEPHONE ENCOUNTER
Pt thinks she has been having seizures, pt denied going to ER or urgent care.  Last ov 9/4/18 next ov 10/15/18

## 2018-09-10 ENCOUNTER — ANESTHESIA EVENT (OUTPATIENT)
Dept: OPERATING ROOM | Age: 48
End: 2018-09-10
Payer: MEDICARE

## 2018-09-10 ENCOUNTER — HOSPITAL ENCOUNTER (OUTPATIENT)
Age: 48
Setting detail: OUTPATIENT SURGERY
Discharge: HOME | End: 2018-09-10
Attending: SURGERY | Admitting: SURGERY
Payer: MEDICARE

## 2018-09-10 ENCOUNTER — ANESTHESIA (OUTPATIENT)
Dept: OPERATING ROOM | Age: 48
End: 2018-09-10
Payer: MEDICARE

## 2018-09-10 VITALS — SYSTOLIC BLOOD PRESSURE: 109 MMHG | DIASTOLIC BLOOD PRESSURE: 74 MMHG | OXYGEN SATURATION: 95 %

## 2018-09-10 VITALS
BODY MASS INDEX: 40.54 KG/M2 | SYSTOLIC BLOOD PRESSURE: 142 MMHG | HEART RATE: 81 BPM | WEIGHT: 252.25 LBS | RESPIRATION RATE: 16 BRPM | DIASTOLIC BLOOD PRESSURE: 72 MMHG | HEIGHT: 66 IN | OXYGEN SATURATION: 98 % | TEMPERATURE: 98.2 F

## 2018-09-10 PROCEDURE — 3700000000 HC ANESTHESIA ATTENDED CARE: Performed by: SURGERY

## 2018-09-10 PROCEDURE — 6360000002 HC RX W HCPCS: Performed by: NURSE ANESTHETIST, CERTIFIED REGISTERED

## 2018-09-10 PROCEDURE — 3700000001 HC ADD 15 MINUTES (ANESTHESIA): Performed by: SURGERY

## 2018-09-10 PROCEDURE — 7100000011 HC PHASE II RECOVERY - ADDTL 15 MIN: Performed by: SURGERY

## 2018-09-10 PROCEDURE — 2709999900 HC NON-CHARGEABLE SUPPLY: Performed by: SURGERY

## 2018-09-10 PROCEDURE — 91035 G-ESOPH REFLX TST W/ELECTROD: CPT | Performed by: SURGERY

## 2018-09-10 PROCEDURE — 2500000003 HC RX 250 WO HCPCS: Performed by: NURSE ANESTHETIST, CERTIFIED REGISTERED

## 2018-09-10 PROCEDURE — 3609012400 HC EGD TRANSORAL BIOPSY SINGLE/MULTIPLE: Performed by: SURGERY

## 2018-09-10 PROCEDURE — 88305 TISSUE EXAM BY PATHOLOGIST: CPT

## 2018-09-10 PROCEDURE — 7100000010 HC PHASE II RECOVERY - FIRST 15 MIN: Performed by: SURGERY

## 2018-09-10 PROCEDURE — 2720000010 HC SURG SUPPLY STERILE: Performed by: SURGERY

## 2018-09-10 PROCEDURE — 43239 EGD BIOPSY SINGLE/MULTIPLE: CPT | Performed by: SURGERY

## 2018-09-10 PROCEDURE — 2580000003 HC RX 258: Performed by: SURGERY

## 2018-09-10 PROCEDURE — 00731 ANES UPR GI NDSC PX NOS: CPT | Performed by: NURSE ANESTHETIST, CERTIFIED REGISTERED

## 2018-09-10 RX ORDER — SODIUM CHLORIDE, SODIUM LACTATE, POTASSIUM CHLORIDE, CALCIUM CHLORIDE 600; 310; 30; 20 MG/100ML; MG/100ML; MG/100ML; MG/100ML
INJECTION, SOLUTION INTRAVENOUS CONTINUOUS
Status: DISCONTINUED | OUTPATIENT
Start: 2018-09-10 | End: 2018-09-10 | Stop reason: HOSPADM

## 2018-09-10 RX ORDER — PROPOFOL 10 MG/ML
INJECTION, EMULSION INTRAVENOUS PRN
Status: DISCONTINUED | OUTPATIENT
Start: 2018-09-10 | End: 2018-09-10 | Stop reason: SDUPTHER

## 2018-09-10 RX ORDER — LIDOCAINE HYDROCHLORIDE 20 MG/ML
INJECTION, SOLUTION INFILTRATION; PERINEURAL PRN
Status: DISCONTINUED | OUTPATIENT
Start: 2018-09-10 | End: 2018-09-10 | Stop reason: SDUPTHER

## 2018-09-10 RX ADMIN — PROPOFOL 200 MG: 10 INJECTION, EMULSION INTRAVENOUS at 08:00

## 2018-09-10 RX ADMIN — SODIUM CHLORIDE, POTASSIUM CHLORIDE, SODIUM LACTATE AND CALCIUM CHLORIDE: 600; 310; 30; 20 INJECTION, SOLUTION INTRAVENOUS at 07:40

## 2018-09-10 RX ADMIN — LIDOCAINE HYDROCHLORIDE 40 MG: 20 INJECTION, SOLUTION INFILTRATION; PERINEURAL at 08:00

## 2018-09-10 ASSESSMENT — PAIN DESCRIPTION - FREQUENCY
FREQUENCY: CONTINUOUS
FREQUENCY: INTERMITTENT
FREQUENCY: CONTINUOUS

## 2018-09-10 ASSESSMENT — PAIN - FUNCTIONAL ASSESSMENT: PAIN_FUNCTIONAL_ASSESSMENT: 0-10

## 2018-09-10 ASSESSMENT — PAIN DESCRIPTION - PAIN TYPE: TYPE: ACUTE PAIN

## 2018-09-10 ASSESSMENT — PAIN SCALES - GENERAL
PAINLEVEL_OUTOF10: 2
PAINLEVEL_OUTOF10: 3
PAINLEVEL_OUTOF10: 3

## 2018-09-10 ASSESSMENT — PAIN DESCRIPTION - DESCRIPTORS
DESCRIPTORS: PRESSURE
DESCRIPTORS: PRESSURE
DESCRIPTORS: CRAMPING;PRESSURE

## 2018-09-10 ASSESSMENT — PAIN DESCRIPTION - LOCATION
LOCATION: CHEST

## 2018-09-10 NOTE — H&P
extended release tablet take 4 tablets by mouth once daily 120 tablet 5    gabapentin (NEURONTIN) 600 MG tablet take 1 tablet by mouth three times a day.  90 tablet 2    esomeprazole (NEXIUM) 40 MG delayed release capsule take 1 capsule by mouth twice a day 60 capsule 5    metoprolol tartrate (LOPRESSOR) 50 MG tablet take 1 tablet by mouth twice a day 60 tablet 5    tolterodine (DETROL) 2 MG tablet take 1 tablet by mouth twice a day 60 tablet 5    traZODone (DESYREL) 300 MG tablet take 1 tablet by mouth at bedtime   0    buPROPion (WELLBUTRIN SR) 200 MG extended release tablet take 1 tablet by mouth once daily WITH BREAKFAST   0    lamoTRIgine (LAMICTAL) 100 MG tablet Take 100 mg by mouth        nortriptyline (PAMELOR) 25 MG capsule Take 1 capsule by mouth nightly 30 capsule 5    atorvastatin (LIPITOR) 20 MG tablet Take 1 tablet by mouth daily 30 tablet 5    cyclobenzaprine (FLEXERIL) 10 MG tablet take 1 tablet by mouth three times a day if needed for muscle spasm 60 tablet 1    levothyroxine (SYNTHROID) 100 MCG tablet take 1 tablet by mouth once daily 30 tablet 5    lidocaine (XYLOCAINE) 5 % ointment Apply topically to affected area bid as needed 50 g 5    desvenlafaxine succinate (PRISTIQ) 100 MG TB24 extended release tablet take 1 tablet by mouth once daily   0    prazosin (MINIPRESS) 2 MG capsule Take 2 mg by mouth nightly         busPIRone (BUSPAR) 30 MG tablet Take 30 mg by mouth 3 times daily         promethazine (PHENERGAN) 25 MG tablet Take 1 tablet by mouth every 6 hours as needed for Nausea 60 tablet 2    Handicap Placard MISC by Does not apply route Duration 5 years 1 each 0    Cholecalciferol (VITAMIN D3) 5000 UNITS TABS Take 1 tablet by mouth daily 30 tablet 2    ARIPiprazole (ABILIFY) 10 MG tablet Take 10 mg by mouth daily         albuterol (PROVENTIL HFA) 108 (90 BASE) MCG/ACT inhaler Inhale 2 puffs into the lungs every 6 hours as needed for Wheezing 1 Inhaler 3      No current esophageal stricture, mass or ulcer.

## 2018-09-10 NOTE — ANESTHESIA PRE PROCEDURE
Department of Anesthesiology  Preprocedure Note       Name:  Negro Sunshine   Age:  51 y.o.  :  1970                                          MRN:  [id number]         Date:  9/10/2018      Surgeon: Nimo Weinstein):  Daryl Springer MD    Procedure: Procedure(s):  EGD w/ BRAVO 48hr (CPT 18361)     Medications prior to admission:   Prior to Admission medications    Medication Sig Start Date End Date Taking? Authorizing Provider   levothyroxine (SYNTHROID) 125 MCG tablet take 1 tablet by mouth once daily 18  Yes Lucinda Guadarrama DO   OXcarbazepine (TRILEPTAL) 150 MG tablet ONE  TABLET  AT  BED  TIME    FOR  ONE  WEEK,      THEN  ONE  TABLET  TWICE  DAILY 18  Yes Mia Rivero MD   lamoTRIgine (LAMICTAL) 200 MG tablet Take 200 mg by mouth daily 18  Yes Historical Provider, MD   PROAIR  (90 Base) MCG/ACT inhaler inhale 2 puffs by mouth every 6 hours if needed for wheezing 18  Yes Lucinda Guadarrama DO   spironolactone (ALDACTONE) 50 MG tablet take 1 tablet by mouth once daily 18  Yes Lucinda Guadarrama DO   hydrochlorothiazide (HYDRODIURIL) 25 MG tablet take 1 tablet by mouth once daily 18  Yes Lucinda Guadarrama DO   potassium chloride (KLOR-CON) 8 MEQ extended release tablet take 4 tablets by mouth once daily 18  Yes Lucinda Guadarrama DO   gabapentin (NEURONTIN) 600 MG tablet take 1 tablet by mouth three times a day.  18 Yes Lucinda Guadarrama DO   metoprolol tartrate (LOPRESSOR) 50 MG tablet take 1 tablet by mouth twice a day 18  Yes Lucinda Guadarrama DO   tolterodine (DETROL) 2 MG tablet take 1 tablet by mouth twice a day 18  Yes Lucinda Guadarrama DO   traZODone (DESYREL) 300 MG tablet take 1 tablet by mouth at bedtime 3/1/18  Yes Historical Provider, MD   nortriptyline (PAMELOR) 25 MG capsule Take 1 capsule by mouth nightly 3/30/18  Yes Lucinda Guadarrama DO   atorvastatin (LIPITOR) 20 MG tablet Take 1 tablet by mouth daily 3/19/18  Yes Pippa Tineo Hypothyroidism E03.9    Allergic rhinitis J30.9    GERD (gastroesophageal reflux disease) K21.9    Anemia D64.9    Chronic fatigue R53.82    Mixed hyperlipidemia E78.2    Paresthesias R20.2    Hyperlipidemia E78.5    Tripping over things W18.40XA    Balance problems R26.89    Memory problem R41.3    Bilateral carpal tunnel syndrome G56.03    Polyneuropathy associated with underlying disease ([address] G63    Ulnar neuropathy of both upper extremities G56.23    Peroneal neuropathy G57.30    Tibial neuropathy G57.40    Slurred speech R47.81    Other dysphagia R13.19    Cerebral ischemia I67.82    Abnormal EEG R94.01    Nocturnal seizures (HCC) G40.909       Past Medical History:        Diagnosis Date    Abnormal CT of the abdomen     revealed 2 very small noncalcified pulmonary nodules in the right lung base. Suspect benign progress but repeat CT scan in June 2013 advised.  Allergic rhinitis     Anemia     Anxiety     Asthma     Chronic low back pain     Chronic neck pain     Degenerative joint disease of knee     Bilateral.    Depression     Endometriosis     history of     GERD (gastroesophageal reflux disease)     Gout     Hyperlipidemia     Hypertension     Hypothyroid     Kidney stone     history of     Left shoulder pain     Status post injections.  Leukocytosis     Palpitation     history of     Paresthesias     Generalized    Right knee pain     Status post injections.  Seizure ([address]     \"nocturnal seizures\"       Past Surgical History:        Procedure Laterality Date    ARTHROPLASTY  03/26/1986    5th metatarsals, MTP joints, right and left feet.     CERVICAL FUSION  6/2014    Dr Delma Johnson, LAPAROSCOPIC  11/14/2011    CYSTOSCOPY  2/8/13    no acute findings    HYSTERECTOMY  05/2005    JOINT REPLACEMENT      KNEE ARTHROSCOPY Left     KNEE ARTHROSCOPY Left 10/16/13    KNEE ARTHROSCOPY Right 02/08/2012    With partial medial and lateral synovectomies and abrasion chondroplasty.  LAPAROSCOPY  03/08/2002    diagnostic with D&C and hysterscopy. [address] SURGERY  10/6/10    Anterior C5-C6, fusion with C-trap allograft and [place] plates, performed by Dr Miya Calles.  LUMBAR SPINE SURGERY Left 2/13/08    L4-L5 and L5-S1 foraminotomies and L4 through S1 posterolateral fusion with pedicle screw instrumentation.  NASAL SEPTUM SURGERY      OTHER SURGICAL HISTORY  12/30/2014    spinal cord stimulator paddle electrode, spinal cord stimulator generator. both [employer] specter generator and 32 lead paddle electrode by Dr Amber Pritchard at [address] [place] [address]  04/16/2015    spinal cord stimulator removed    SPINAL CORD DECOMPRESSION  4/23/12    Lumbar.  TOTAL KNEE ARTHROPLASTY Left 04/2015    [facility] orthopedics    TUBAL LIGATION  04/02/1990    UPPER GASTROINTESTINAL ENDOSCOPY  10/14/2011    Hiatal hernia. Social History:    Social History   Substance Use Topics    Smoking status: Never Smoker    Smokeless tobacco: Never Used    Alcohol use No                                Counseling given: Not Answered      Vital Signs (Current):   Vitals:    09/10/18 0720   BP: 132/77   Pulse: 86   Temp: 37.1 °C (98.7 °F)   TempSrc: Temporal   SpO2: 95%   Weight: 252 lb 4 oz (114.4 kg)   Height: 5' 5.5\" (1.664 m)                                              BP Readings from Last 3 Encounters:   09/10/18 132/77   09/04/18 114/80   08/22/18 (!) 144/81       NPO Status:                                                                                 BMI:   Wt Readings from Last 3 Encounters:   09/10/18 252 lb 4 oz (114.4 kg)   09/04/18 252 lb 3.2 oz (114.4 kg)   08/21/18 253 lb (114.8 kg)     Body mass index is 41.34 kg/m².     CBC:   Lab Results   Component Value Date    WBC 8.7 06/02/2018    RBC 4.47 06/02/2018    HGB 13.0 06/02/2018    HCT 39.4 06/02/2018    MCV 88.3 06/02/2018    RDW 15.5 06/02/2018     06/02/2018 CMP:   Lab Results   Component Value Date     06/02/2018    K 3.8 06/02/2018     06/02/2018    CO2 29 06/02/2018    BUN 10 06/02/2018    CREATININE 1.03 06/02/2018    GFRAA >60 06/02/2018    LABGLOM 57 06/02/2018    GLUCOSE 102 06/02/2018    PROT 6.8 06/02/2018    CALCIUM 9.1 06/02/2018    BILITOT 0.26 06/02/2018    ALKPHOS 116 06/02/2018    AST 15 06/02/2018    ALT 15 06/02/2018       POC Tests: No results for input(s): POCGLU, POCNA, POCK, POCCL, POCBUN, POCHEMO, POCHCT in the last 72 hours. Coags:   Lab Results   Component Value Date    PROTIME 9.8 07/09/2018    INR 0.9 07/09/2018    APTT 26.4 07/09/2018       HCG (If Applicable): No results found for: PREGTESTUR, PREGSERUM, HCG, HCGQUANT     ABGs: No results found for: PHART, PO2ART, HHH0WZK, XYK0TBS, BEART, Y7REUWPR     Type & Screen (If Applicable):  No results found for: [facility]    Anesthesia Evaluation  Patient summary reviewed and Nursing notes reviewed no history of anesthetic complications:   Airway: Mallampati: II  TM distance: >3 FB   Neck ROM: full  Mouth opening: > = 3 FB Dental: normal exam         Pulmonary: breath sounds clear to auscultation  (+) asthma: seasonal asthma,                            Cardiovascular:  Exercise tolerance: good (>4 METS),   (+) hypertension: mild,     (-)  angina      Rhythm: regular  Rate: normal           Beta Blocker:  Dose within 24 Hrs         Neuro/Psych:   (+) seizures: poorly controlled, neuromuscular disease:, depression/anxiety              ROS comment: Just started seizure meds. GI/Hepatic/Renal:   (+) GERD: poorly controlled, morbid obesity          Endo/Other:    (+) hypothyroidism::., .                 Abdominal:   (+) obese,         Vascular: negative vascular ROS. Anesthesia Plan      MAC and TIVA     ASA 2     (Patient has consented to sedation/MAC anesthesia.   The following information was discussed with the pt and the pt stated understanding and had no further questions. The pt will received medication to produce sleepiness and decreased awareness during surgery. The pt will be semiconscious. The expected result is a decrease in anxiety and awareness with decreased discomfort during the procedure. Common risks: N/V, slowed breathing, and awareness. Uncommon Risks: Respiratory arrest requiring advanced airway management, stroke, or death.   )        Anesthetic plan and risks discussed with patient.       Plan discussed with surgical team.                Isabelle Springer, APRN - CRNA   9/10/2018

## 2018-09-10 NOTE — PROGRESS NOTES
Discharge instructions reviewed with patient and spouse regarding use and return of Bravo pH recorder - both voice understanding of instructions. Patient stood and ambulated without weakness or difficulty. Preparing for discharge.

## 2018-09-11 ENCOUNTER — OFFICE VISIT (OUTPATIENT)
Dept: NEUROLOGY | Age: 48
End: 2018-09-11
Payer: MEDICARE

## 2018-09-11 VITALS
HEART RATE: 103 BPM | SYSTOLIC BLOOD PRESSURE: 126 MMHG | OXYGEN SATURATION: 96 % | WEIGHT: 253 LBS | HEIGHT: 66 IN | DIASTOLIC BLOOD PRESSURE: 74 MMHG | BODY MASS INDEX: 40.66 KG/M2

## 2018-09-11 DIAGNOSIS — R20.2 PARESTHESIAS: ICD-10-CM

## 2018-09-11 DIAGNOSIS — G56.23 ULNAR NEUROPATHY OF BOTH UPPER EXTREMITIES: ICD-10-CM

## 2018-09-11 DIAGNOSIS — R94.01 ABNORMAL EEG: ICD-10-CM

## 2018-09-11 DIAGNOSIS — R47.81 SLURRED SPEECH: ICD-10-CM

## 2018-09-11 DIAGNOSIS — K21.9 GASTROESOPHAGEAL REFLUX DISEASE, ESOPHAGITIS PRESENCE NOT SPECIFIED: ICD-10-CM

## 2018-09-11 DIAGNOSIS — G63 POLYNEUROPATHY ASSOCIATED WITH UNDERLYING DISEASE (HCC): ICD-10-CM

## 2018-09-11 DIAGNOSIS — R13.19 OTHER DYSPHAGIA: ICD-10-CM

## 2018-09-11 DIAGNOSIS — R26.89 BALANCE PROBLEMS: ICD-10-CM

## 2018-09-11 DIAGNOSIS — M54.40 CHRONIC LOW BACK PAIN WITH SCIATICA, SCIATICA LATERALITY UNSPECIFIED, UNSPECIFIED BACK PAIN LATERALITY: ICD-10-CM
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
## 2018-09-11 DIAGNOSIS — E78.2 MIXED HYPERLIPIDEMIA: ICD-10-CM

## 2018-09-11 DIAGNOSIS — F41.9 ANXIETY: ICD-10-CM

## 2018-09-11 DIAGNOSIS — I67.82 CEREBRAL ISCHEMIA: ICD-10-CM

## 2018-09-11 DIAGNOSIS — R41.3 MEMORY PROBLEM: ICD-10-CM

## 2018-09-11 DIAGNOSIS — E03.9 HYPOTHYROIDISM, UNSPECIFIED TYPE: ICD-10-CM

## 2018-09-11 DIAGNOSIS — R56.9 NOCTURNAL SEIZURES (HCC): Primary | ICD-10-CM

## 2018-09-11 DIAGNOSIS — D50.8 OTHER IRON DEFICIENCY ANEMIA: ICD-10-CM

## 2018-09-11 DIAGNOSIS — G57.40 TIBIAL NEUROPATHY, UNSPECIFIED LATERALITY: ICD-10-CM

## 2018-09-11 DIAGNOSIS — R53.82 CHRONIC FATIGUE: ICD-10-CM

## 2018-09-11 DIAGNOSIS — G89.29 CHRONIC LOW BACK PAIN WITH SCIATICA, SCIATICA LATERALITY UNSPECIFIED, UNSPECIFIED BACK PAIN LATERALITY: ICD-10-CM

## 2018-09-11 PROCEDURE — 99213 OFFICE O/P EST LOW 20 MIN: CPT | Performed by: PSYCHIATRY & NEUROLOGY

## 2018-09-11 PROCEDURE — 1036F TOBACCO NON-USER: CPT | Performed by: PSYCHIATRY & NEUROLOGY

## 2018-09-11 PROCEDURE — G8417 CALC BMI ABV UP PARAM F/U: HCPCS | Performed by: PSYCHIATRY & NEUROLOGY

## 2018-09-11 PROCEDURE — G8427 DOCREV CUR MEDS BY ELIG CLIN: HCPCS | Performed by: PSYCHIATRY & NEUROLOGY

## 2018-09-11 ASSESSMENT — ENCOUNTER SYMPTOMS
COLOR CHANGE: 0
EYE ITCHING: 0
CONSTIPATION: 0
VISUAL CHANGE: 0
BOWEL INCONTINENCE: 0
NAUSEA: 0
WHEEZING: 0
SINUS PRESSURE: 0
DIARRHEA: 0
TROUBLE SWALLOWING: 1
EYE DISCHARGE: 0
APNEA: 0
VOICE CHANGE: 0
EYE PAIN: 0
PHOTOPHOBIA: 0
CHEST TIGHTNESS: 0
ABDOMINAL DISTENTION: 0
ABDOMINAL PAIN: 0
BACK PAIN: 0
CHOKING: 0
EYE REDNESS: 0
FACIAL SWELLING: 0
SHORTNESS OF BREATH: 0
BLOOD IN STOOL: 0
VOMITING: 0
COUGH: 0
SORE THROAT: 0

## 2018-09-11 NOTE — PATIENT INSTRUCTIONS
[facility]    Due to the complex nature of our neurological testing, It is the policy of the Neurology Department not to release the results of your testing over the phone. Once all testing is completed and we have all the results back, Dr. Aleks Storey will then personally go over all the results with you and answer any questions that you may have during a follow up appointment. If you are unable to keep this appointment, please notify the [address] [place] [address] @ [phone], as soon as possible. Please bring your prescription bottles to all appointments. SEIZURE PRECAUTIONS. A) Avoid Working At [employer] B) Avoid Working With Heavy machinery. C) Avoid Swimming, Climbing A Ladder Unattended. D) Avoid Driving If You Have A Seizure. E) Must Be Seizure Free For At Least 6 months, Before You Can drive. F) Some times Your May Feel Seizure coming Before It Begins. You May feel  Strange smell or funny Feeling In Your Stomach, Which is Called Aura. TIPS TO REDUCE/ PREVENT SEIZURES   1. Take Your Anti seizure Medications As Recommended. 2. Get Enough Sleep. Sleep Deprivation Can Trigger A Seizure. 3. If You Have A fever, Treat It At Once, And Contact Your Primary Care Providers. 4. Avoid Alcohol. 5. Avoid Flashing Lights, Loud Noises and TV And Video Games,   As These May Trigger Your Seizures  6. Control Your Stress And Have Adequate Rest.  7. If You Feel A Seizure Coming On :  A) warn people Who Are With You  B) Make Sure There Are No Sharp or Hard Objects Around you. C) Lay down On Your Side And Relax.

## 2018-09-11 NOTE — PROGRESS NOTES
WAKING  UP    IN  MORNING    WITH  TONGUE  BITING                                      FOR  MORE  THAN  ONE  YEAR. H/O   SHAKING    EPISODES   WHILE  SLEEPING                                          WITNESSED  BY  HER                                        D/D   NOCTURNAL  SEIZURE  ACTIVITY                     15)   AS  DISCUSSED  WITH  PATIENT,    SHE  WAS    STARTED   ON    TRILEPTAL                                 9/04/2018         AND  PATIENT  TOLERATING  THE  SAME.                                  17)   MANAGEMENT     OPTIONS      OF                 A)     EPILEPSY  MONITORING                   B)     SECOND  NEUROLOGY  OPINIONS      AT  [facility]                           WERE  REVIEWED  WITH  PATIENT        AND                                  PATIENT    NOT  INTERESTED                                       PRECIPITATING  FACTORS: including  fever/infection, exertion/relaxation, position change, stress, weather change, medications/alcohol, time of day/darkness/light  Are    absent                                                             MODIFYING  FACTORS:  fever/infection, exertion/relaxation, position change, stress, weather change, medications/alcohol, time of day/darkness/light     Are  absent         Patient   Indicates   The  Presence   And  The  Absence  Of  The  Following  Associated  And   Additional  Neurological    Symptoms:                                Balance  And coordination problems  present           Gait problems     absent            Headaches      absent              Migraines           absent           Memory problems        present           Confusion        absent            Paresthesia numbness          present           Seizures  And  Starring  Episodes           absent           Syncope,  Near  syncopal episodes         absent           Speech problems           absent             Swallowing  Problems      present (gastroesophageal reflux disease)     Gout     Hyperlipidemia     Hypertension     Hypothyroid     Kidney stone     history of     Left shoulder pain     Status post injections.  Leukocytosis     Palpitation     history of     Paresthesias     Generalized    Right knee pain     Status post injections.  Seizure ([address]     \"nocturnal seizures\"         Past Surgical History:   Procedure Laterality Date    ARTHROPLASTY  03/26/1986    5th metatarsals, MTP joints, right and left feet.  CERVICAL FUSION  6/2014    Dr Denis Orellana, LAPAROSCOPIC  11/14/2011    CYSTOSCOPY  2/8/13    no acute findings    HYSTERECTOMY  05/2005    JOINT REPLACEMENT      KNEE ARTHROSCOPY Left     KNEE ARTHROSCOPY Left 10/16/13    KNEE ARTHROSCOPY Right 02/08/2012    With partial medial and lateral synovectomies and abrasion chondroplasty.  LAPAROSCOPY  03/08/2002    diagnostic with D&C and hysterscopy. [address] SURGERY  10/6/10    Anterior C5-C6, fusion with C-trap allograft and [place] plates, performed by Dr Bandar Hernandez.  LUMBAR SPINE SURGERY Left 2/13/08    L4-L5 and L5-S1 foraminotomies and L4 through S1 posterolateral fusion with pedicle screw instrumentation.  NASAL SEPTUM SURGERY      OTHER SURGICAL HISTORY  12/30/2014    spinal cord stimulator paddle electrode, spinal cord stimulator generator. both [employer] specter generator and 32 lead paddle electrode by Dr Srinivasa Littlejohn at [address] [place] [address]  04/16/2015    spinal cord stimulator removed    SPINAL CORD DECOMPRESSION  4/23/12    Lumbar.  TOTAL KNEE ARTHROPLASTY Left 04/2015    [facility] orthopedics    TUBAL LIGATION  04/02/1990    UPPER GASTROINTESTINAL ENDOSCOPY  10/14/2011    Hiatal hernia.     UPPER GASTROINTESTINAL ENDOSCOPY N/A 9/10/2018    EGD BIOPSY with Bravo  (CPT [id number]) performed by Demetria Allen MD at [facility] kg)         BP Readings from Last 3 Encounters:   09/11/18 126/74   09/10/18 (!) 142/72   09/10/18 109/74       Hematology and Coagulation  Lab Results   Component Value Date    WBC 8.7 06/02/2018    RBC 4.47 06/02/2018    HGB 13.0 06/02/2018    HCT 39.4 06/02/2018    MCV 88.3 06/02/2018    MCH 29.0 06/02/2018    MCHC 32.9 06/02/2018    RDW 15.5 06/02/2018     06/02/2018    MPV 9.3 06/02/2018       Chemistries  Lab Results   Component Value Date     06/02/2018    K 3.8 06/02/2018     06/02/2018    CO2 29 06/02/2018    BUN 10 06/02/2018    CREATININE 1.03 06/02/2018    CALCIUM 9.1 06/02/2018    PROT 6.8 06/02/2018    LABALBU 4.1 06/02/2018    BILITOT 0.26 06/02/2018    ALKPHOS 116 06/02/2018    AST 15 06/02/2018    ALT 15 06/02/2018     Lab Results   Component Value Date    ALKPHOS 116 06/02/2018    ALT 15 06/02/2018    AST 15 06/02/2018    PROT 6.8 06/02/2018    BILITOT 0.26 06/02/2018    LABALBU 4.1 06/02/2018     Lab Results   Component Value Date    BUN 10 06/02/2018    CREATININE 1.03 06/02/2018     Lab Results   Component Value Date    CALCIUM 9.1 06/02/2018    MG 1.9 04/04/2016     Lab Results   Component Value Date    AST 15 06/02/2018    ALT 15 06/02/2018     Lab Results   Component Value Date    [id number] 440 07/09/2018         Review of Systems   Constitutional: Negative for appetite change, chills, diaphoresis, fatigue, fever and unexpected weight change. HENT: Positive for trouble swallowing. Negative for congestion, dental problem, drooling, ear discharge, ear pain, facial swelling, hearing loss, mouth sores, nosebleeds, postnasal drip, sinus pressure, sore throat, tinnitus and voice change. Eyes: Negative for photophobia, pain, discharge, redness, itching and visual disturbance. Respiratory: Negative for apnea, cough, choking, chest tightness, shortness of breath and wheezing. Cardiovascular: Negative for chest pain, palpitations, leg swelling and near-syncope. Gastrointestinal: Negative for abdominal distention, abdominal pain, blood in stool, bowel incontinence, constipation, diarrhea, nausea and vomiting. Endocrine: Negative for cold intolerance, heat intolerance, polydipsia, polyphagia and polyuria. Genitourinary: Negative for bladder incontinence. Musculoskeletal: Positive for gait problem. Negative for arthralgias, back pain, joint swelling, myalgias, neck pain and neck stiffness. Skin: Negative for color change, pallor, rash and wound. Allergic/Immunologic: Negative for environmental allergies, food allergies and immunocompromised state. Neurological: Positive for focal weakness, seizures, speech difficulty, weakness, numbness and loss of balance. Negative for dizziness, vertigo, tremors, syncope, facial asymmetry, light-headedness and headaches. Hematological: Negative for adenopathy. Does not bruise/bleed easily. Psychiatric/Behavioral: Positive for decreased concentration, memory loss and sleep disturbance. Negative for agitation, behavioral problems, confusion, dysphoric mood, hallucinations, self-injury and suicidal ideas. The patient is nervous/anxious. The patient is not hyperactive. OBJECTIVE:    Physical Exam   Constitutional: She appears well-developed and well-nourished. She is cooperative. HENT:   Head: Normocephalic and atraumatic. Head is without raccoon's eyes and without Bob's sign. Right Ear: External ear normal.   Left Ear: External ear normal.   Nose: Nose normal.   Mouth/Throat: Oropharynx is clear and moist.   Eyes: Conjunctivae are normal.   Neck: Normal range of motion. Neck supple. No muscular tenderness present. Carotid bruit is not present. No neck rigidity. No tracheal deviation and normal range of motion present. No Brudzinski's sign and no Kernig's sign noted. No thyroid mass and no thyromegaly present. Cardiovascular: Normal rate and regular rhythm.     Pulmonary/Chest: Effort normal.   Musculoskeletal:  7/16/2018 3:55 pm       TECHNIQUE:   Multiplanar multisequence MRI of the brain was performed without the   administration of intravenous contrast.       COMPARISON:   None       HISTORY:   ORDERING SYSTEM PROVIDED HISTORY: Chronic fatigue   TECHNOLOGIST PROVIDED HISTORY:   Ordering Physician Provided Reason for Exam: Balance issues and muscles   jerking for about a year, getting worse. Acuity: Unknown   Type of Exam: Initial   Additional signs and symptoms: Tripping over things; Balance problems; Memory   problems       Initial evaluation.       FINDINGS:   INTRACRANIAL STRUCTURES/VENTRICLES: There is no acute infarct.  The   ventricles and cisternal spaces are normal in size, shape, and configuration   for the age of the patient. No areas of abnormal signal are identified in the   brain parenchyma.  There is no midline shift or mass effect.  There are no   areas of restricted diffusion.       ORBITS: The visualized portion of the orbits demonstrate no acute abnormality.       SINUSES:  The visualized paranasal sinuses and mastoid air cells are clear.       BONES/SOFT TISSUES: The bone marrow signal intensity appears normal. The soft   tissues demonstrate no acute abnormality.           Impression   No acute brain parenchymal abnormality.             ULTRASOUND EVALUATION OF THE CAROTID ARTERIES       7/16/2018       COMPARISON:   None.       HISTORY:   ORDERING SYSTEM PROVIDED HISTORY: Chronic fatigue   TECHNOLOGIST PROVIDED HISTORY:   Ordering Physician Provided Reason for Exam: dizziness, balance issues   Acuity: Acute   Type of Exam: Initial       FINDINGS:   RIGHT:       The right common carotid artery demonstrates peak systolic velocities of 29.9   and 63.3 cm/sec in the proximal and distal segments respectively.       The right internal carotid artery demonstrates the systolic velocities of   56.3, 63.3 and 71.1 cm/sec in the proximal, mid and distal segments   respectively.       The external carotid electronic transcription, some errors in transcription may have occurred. GENERAL PATIENT INSTRUCTIONS:     A Healthy Lifestyle: Care Instructions  Your Care Instructions  A healthy lifestyle can help you feel good, stay at a healthy weight, and have plenty of energy for both work and play. A healthy lifestyle is something you can share with your whole family. A healthy lifestyle also can lower your risk for serious health problems, such as high blood pressure, heart disease, and diabetes. You can follow a few steps listed below to improve your health and the health of your family. Follow-up care is a key part of your treatment and safety. Be sure to make and go to all appointments, and call your doctor if you are having problems. Its also a good idea to know your test results and keep a list of the medicines you take. How can you care for yourself at home? Do not eat too much sugar, fat, or fast foods. You can still have dessert and treats now and then. The goal is moderation. Start small to improve your eating habits. Pay attention to portion sizes, drink less juice and soda pop, and eat more fruits and vegetables. Eat a healthy amount of food. A 3-ounce serving of meat, for example, is about the size of a deck of cards. Fill the rest of your plate with vegetables and whole grains. Limit the amount of soda and sports drinks you have every day. Drink more water when you are thirsty. Eat at least 5 servings of fruits and vegetables every day. It may seem like a lot, but it is not hard to reach this goal. A serving or helping is 1 piece of fruit, 1 cup of vegetables, or 2 cups of leafy, raw vegetables. Have an apple or some carrot sticks as an afternoon snack instead of a candy bar. Try to have fruits and/or vegetables at every meal.  Make exercise part of your daily routine. You may want to start with simple activities, such as walking, bicycling, or slow swimming.  Try to be active 30 to 60 minutes

## 2018-09-12 LAB — SURGICAL PATHOLOGY REPORT: NORMAL

## 2018-09-14 DIAGNOSIS — M54.5 CHRONIC BILATERAL LOW BACK PAIN, WITH SCIATICA PRESENCE UNSPECIFIED: ICD-10-CM

## 2018-09-14 DIAGNOSIS — G89.29 CHRONIC NECK PAIN: ICD-10-CM

## 2018-09-14 DIAGNOSIS — G89.29 CHRONIC BILATERAL LOW BACK PAIN, WITH SCIATICA PRESENCE UNSPECIFIED: ICD-10-CM

## 2018-09-14 DIAGNOSIS — M54.2 CHRONIC NECK PAIN: ICD-10-CM

## 2018-09-14 RX ORDER — ATORVASTATIN CALCIUM 20 MG/1
TABLET, FILM COATED ORAL
Qty: 30 TABLET | Refills: 5 | Status: SHIPPED | OUTPATIENT
Start: 2018-09-14 | End: 2019-03-02 | Stop reason: SDUPTHER

## 2018-09-14 RX ORDER — GABAPENTIN 600 MG/1
TABLET ORAL
Qty: 90 TABLET | Refills: 2 | Status: SHIPPED | OUTPATIENT
Start: 2018-09-14 | End: 2018-12-06 | Stop reason: SDUPTHER

## 2018-09-18 ENCOUNTER — TELEPHONE (OUTPATIENT)
Dept: NEUROLOGY | Age: 48
End: 2018-09-18

## 2018-09-18 DIAGNOSIS — R41.3 MEMORY PROBLEM: ICD-10-CM

## 2018-09-18 DIAGNOSIS — I67.82 CEREBRAL ISCHEMIA: ICD-10-CM

## 2018-09-18 DIAGNOSIS — R94.01 ABNORMAL EEG: ICD-10-CM

## 2018-09-18 DIAGNOSIS — R47.81 SLURRED SPEECH: ICD-10-CM

## 2018-09-18 DIAGNOSIS — R26.89 BALANCE PROBLEMS: ICD-10-CM

## 2018-09-18 DIAGNOSIS — R56.9 NOCTURNAL SEIZURES (HCC): Primary | ICD-10-CM
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
## 2018-09-18 DIAGNOSIS — R13.19 OTHER DYSPHAGIA: ICD-10-CM

## 2018-09-18 NOTE — TELEPHONE ENCOUNTER
Notified patient, she is interested in doing Epilepsy Monitoring and would like to go to [facility]. Referral ordered. Rochele Goldberg is aware they will call her with an appointment date and time to complete testing. Referral, Demographics, Order, Last Dictation, Recent Testing/Procedures and Insurance Card Faxed to [facility] at [phone].

## 2018-09-24 ENCOUNTER — HOSPITAL ENCOUNTER (OUTPATIENT)
Dept: SLEEP CENTER | Age: 48
Discharge: HOME | End: 2018-09-26
Payer: MEDICARE

## 2018-09-24 ENCOUNTER — CLINICAL DOCUMENTATION (OUTPATIENT)
Dept: SURGERY | Age: 48
End: 2018-09-24

## 2018-09-24 DIAGNOSIS — G47.33 OSA (OBSTRUCTIVE SLEEP APNEA): ICD-10-CM

## 2018-09-24 PROCEDURE — 95811 POLYSOM 6/>YRS CPAP 4/> PARM: CPT

## 2018-09-28 NOTE — PROGRESS NOTES
[address]                   [address] [place] [id number]                                    SLEEP STUDY  PATIENT NAME: Jose Juan Head                     :        1970  MED REC NO:   [id number]                             ROOM:  ACCOUNT NO:   [de-identified]                           ADMIT DATE: 2018  PROVIDER:     Charisse Sequeira    [facility]  INTERPRETATION OF CLINICAL POLYSOMNOGRAPHY    DATE OF STUDY:  2018    CLINICAL IMPRESSION:  1. Obstructive sleep apnea syndrome. 2.  Morbid obesity. 3.  Hypertension. 4.  Glucose intolerance. PROCEDURE STANDARD:  It was a 1-night CPAP titration. PROCEDURE:  The sleep/wake evaluation consisted of an intensive intake  interview, one night of clinical polysomnography, a nocturnal respiratory  battery, left and right leg anterior tibialis surface electromyography. The standard montage for clinical polysomnography included the  electroencephalogram, the electro-oculogram, mentalis surface  electromyography, and modified Lead II cardiography. The respiratory  battery consisted of measurements of oral/nasal airflow by heat thermistor,  nasal pressure transducer, thoracic and abdominal effort by Respiratory  Inductance Plethysmography. Nocturnal oxyhemoglobin saturations were  obtained by finger oximetry. Polysomnography revealed that the patient spent 445 minutes in bed with a  total sleep time of 363 minutes. Sleep efficiency was reduced to 81%. Latency of sleep onset was normal at 25 minutes. There were 28 sleep stage  changes. There were 9 awakenings during the night. Sleep architecture was normal with 3% stage I, 82% stage II, 0 delta and  15% REM. Latency to various sleep stages were normal other than prolonged latency of  REM, which was 135 minutes. Polysomnography did not reveal any other abnormality.     Electrocardiogram did not reveal any arrhythmia. Respiratory evaluation revealed while on a CPAP at a final pressure of 11  cm water, the patient had no respiratory events. The lowest oxygen  saturation at this setting being 96%. Movement disorder evaluation did not reveal any periodic leg movements. IMPRESSION:  1. Obstructive sleep apnea syndrome. 2.  Obesity. 3.  Hypertension. 4.  Glucose intolerance. RECOMMENDATION:  The patient has significant degree of apnea that had  responded well to the use of CPAP at a final pressure of 11 cm of water. It is recommended that the patient should continue the use of CPAP at the  present setting. CPAP by relieving the apnea should improve her daytime  symptoms and also protect the patient from the morbidity associated with  the apnea. The patient should be encouraged to lose weight. The patient should be instructed in proper sleep hygiene techniques. The patient should be advised not to consume any alcohol or hypnotics at  bedtime, which could worsen the apnea. The patient is advised to use a heated humidifier along with the CPAP  machine to prevent upper airway dryness. Treatment of apnea will improve the outcome of hypertension and glucose  intolerance.         Robert Peck    D: 09/27/2018 10:14:17       T: 09/27/2018 13:56:30     [email or web address]  Job#: [id number]     Doc#: [id number]    CC:  Joanie Putnam

## 2018-10-03 RX ORDER — HYDROCHLOROTHIAZIDE 25 MG/1
TABLET ORAL
Qty: 30 TABLET | Refills: 2 | Status: SHIPPED | OUTPATIENT
Start: 2018-10-03 | End: 2018-12-28 | Stop reason: SDUPTHER

## 2018-10-04 ENCOUNTER — TELEPHONE (OUTPATIENT)
Dept: FAMILY MEDICINE CLINIC | Age: 48
End: 2018-10-04

## 2018-10-09 ENCOUNTER — OFFICE VISIT (OUTPATIENT)
Dept: SURGERY | Age: 48
End: 2018-10-09
Payer: MEDICARE

## 2018-10-09 VITALS
HEART RATE: 90 BPM | SYSTOLIC BLOOD PRESSURE: 112 MMHG | WEIGHT: 245 LBS | HEIGHT: 65 IN | BODY MASS INDEX: 40.82 KG/M2 | DIASTOLIC BLOOD PRESSURE: 80 MMHG

## 2018-10-09 DIAGNOSIS — K21.00 GASTROESOPHAGEAL REFLUX DISEASE WITH ESOPHAGITIS: ICD-10-CM

## 2018-10-09 DIAGNOSIS — E66.3 OVERWEIGHT: Primary | ICD-10-CM

## 2018-10-09 PROCEDURE — 99212 OFFICE O/P EST SF 10 MIN: CPT | Performed by: SURGERY

## 2018-10-09 PROCEDURE — 1036F TOBACCO NON-USER: CPT | Performed by: SURGERY

## 2018-10-09 PROCEDURE — G8417 CALC BMI ABV UP PARAM F/U: HCPCS | Performed by: SURGERY

## 2018-10-09 PROCEDURE — G8484 FLU IMMUNIZE NO ADMIN: HCPCS | Performed by: SURGERY

## 2018-10-09 PROCEDURE — G8427 DOCREV CUR MEDS BY ELIG CLIN: HCPCS | Performed by: SURGERY

## 2018-10-09 RX ORDER — NORTRIPTYLINE HYDROCHLORIDE 25 MG/1
CAPSULE ORAL
Qty: 30 CAPSULE | Refills: 5 | Status: SHIPPED | OUTPATIENT
Start: 2018-10-09 | End: 2019-03-24 | Stop reason: SDUPTHER

## 2018-10-09 NOTE — PATIENT INSTRUCTIONS
Continue Nexium. Add Zantac at night. See Dietician for wt loss. Follow up with Dr. Venu Kate in 3 months.

## 2018-10-10 RX ORDER — RANITIDINE 150 MG/1
150 TABLET ORAL NIGHTLY
Qty: 30 TABLET | Refills: 5 | Status: SHIPPED | OUTPATIENT
Start: 2018-10-10 | End: 2019-05-08 | Stop reason: SDUPTHER

## 2018-10-16 NOTE — PROGRESS NOTES
Pt here to follow up on GERD. The bravo showed significant reflux. At this point will continue nexium 40 mg bid and  Add zantac 150 mg nighty. Will work on lifestyle changes and diet  Refer to dietician  Follow up in 3 months.

## 2018-10-17 ENCOUNTER — OFFICE VISIT (OUTPATIENT)
Dept: NUTRITION | Age: 48
End: 2018-10-17

## 2018-10-17 DIAGNOSIS — E66.01 CLASS 3 SEVERE OBESITY WITH BODY MASS INDEX (BMI) OF 40.0 TO 44.9 IN ADULT, UNSPECIFIED OBESITY TYPE, UNSPECIFIED WHETHER SERIOUS COMORBIDITY PRESENT (HCC): Primary | ICD-10-CM

## 2018-10-17 DIAGNOSIS — K21.9 GASTROESOPHAGEAL REFLUX DISEASE, ESOPHAGITIS PRESENCE NOT SPECIFIED: ICD-10-CM

## 2018-10-17 PROCEDURE — 99999 PR OFFICE/OUTPT VISIT,PROCEDURE ONLY: CPT | Performed by: DIETITIAN, REGISTERED

## 2018-10-25 DIAGNOSIS — R53.82 CHRONIC FATIGUE: ICD-10-CM

## 2018-10-25 DIAGNOSIS — F41.9 ANXIETY: ICD-10-CM

## 2018-10-25 DIAGNOSIS — E78.2 MIXED HYPERLIPIDEMIA: ICD-10-CM

## 2018-10-25 DIAGNOSIS — M54.2 CHRONIC NECK PAIN: ICD-10-CM

## 2018-10-25 DIAGNOSIS — G57.32 NEUROPATHY OF LEFT PERONEAL NERVE: ICD-10-CM

## 2018-10-25 DIAGNOSIS — R26.89 BALANCE PROBLEMS: ICD-10-CM

## 2018-10-25 DIAGNOSIS — G56.23 ULNAR NEUROPATHY OF BOTH UPPER EXTREMITIES: ICD-10-CM
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
## 2018-10-25 DIAGNOSIS — R94.01 ABNORMAL EEG: ICD-10-CM

## 2018-10-25 DIAGNOSIS — G57.30 PERONEAL NEUROPATHY, UNSPECIFIED LATERALITY: ICD-10-CM

## 2018-10-25 DIAGNOSIS — G63 POLYNEUROPATHY ASSOCIATED WITH UNDERLYING DISEASE (HCC): ICD-10-CM

## 2018-10-25 DIAGNOSIS — R20.2 PARESTHESIAS: ICD-10-CM

## 2018-10-25 DIAGNOSIS — R56.9 NOCTURNAL SEIZURES (HCC): ICD-10-CM

## 2018-10-25 DIAGNOSIS — G89.29 CHRONIC LOW BACK PAIN WITH SCIATICA, SCIATICA LATERALITY UNSPECIFIED, UNSPECIFIED BACK PAIN LATERALITY: ICD-10-CM

## 2018-10-25 DIAGNOSIS — F32.A DEPRESSION, UNSPECIFIED DEPRESSION TYPE: ICD-10-CM

## 2018-10-25 DIAGNOSIS — G89.29 CHRONIC NECK PAIN: ICD-10-CM

## 2018-10-25 DIAGNOSIS — R41.3 MEMORY PROBLEM: ICD-10-CM

## 2018-10-25 DIAGNOSIS — G56.03 BILATERAL CARPAL TUNNEL SYNDROME: ICD-10-CM

## 2018-10-25 DIAGNOSIS — I10 ESSENTIAL HYPERTENSION: ICD-10-CM

## 2018-10-25 DIAGNOSIS — I67.82 CEREBRAL ISCHEMIA: ICD-10-CM

## 2018-10-25 DIAGNOSIS — E03.9 HYPOTHYROIDISM, UNSPECIFIED TYPE: ICD-10-CM

## 2018-10-25 DIAGNOSIS — K21.9 GASTROESOPHAGEAL REFLUX DISEASE, ESOPHAGITIS PRESENCE NOT SPECIFIED: ICD-10-CM

## 2018-10-25 DIAGNOSIS — M54.40 CHRONIC LOW BACK PAIN WITH SCIATICA, SCIATICA LATERALITY UNSPECIFIED, UNSPECIFIED BACK PAIN LATERALITY: ICD-10-CM

## 2018-10-25 DIAGNOSIS — R13.19 OTHER DYSPHAGIA: ICD-10-CM

## 2018-10-25 RX ORDER — OXCARBAZEPINE 150 MG/1
150 TABLET, FILM COATED ORAL 2 TIMES DAILY
Qty: 60 TABLET | Refills: 1 | Status: SHIPPED | OUTPATIENT
Start: 2018-10-25 | End: 2018-11-27 | Stop reason: SDUPTHER

## 2018-11-02 ENCOUNTER — OFFICE VISIT (OUTPATIENT)
Dept: PRIMARY CARE CLINIC | Age: 48
End: 2018-11-02
Payer: MEDICARE

## 2018-11-02 ENCOUNTER — HOSPITAL ENCOUNTER (OUTPATIENT)
Dept: GENERAL RADIOLOGY | Age: 48
Discharge: HOME | End: 2018-11-04
Payer: MEDICARE

## 2018-11-02 VITALS
TEMPERATURE: 98 F | OXYGEN SATURATION: 98 % | HEART RATE: 74 BPM | WEIGHT: 258 LBS | SYSTOLIC BLOOD PRESSURE: 130 MMHG | DIASTOLIC BLOOD PRESSURE: 74 MMHG | BODY MASS INDEX: 42.93 KG/M2

## 2018-11-02 DIAGNOSIS — M79.672 ACUTE FOOT PAIN, LEFT: Primary | ICD-10-CM

## 2018-11-02 DIAGNOSIS — M79.672 ACUTE FOOT PAIN, LEFT: ICD-10-CM

## 2018-11-02 DIAGNOSIS — Q66.70 HIGH ARCHES: ICD-10-CM

## 2018-11-02 PROCEDURE — G8417 CALC BMI ABV UP PARAM F/U: HCPCS | Performed by: NURSE PRACTITIONER

## 2018-11-02 PROCEDURE — 99214 OFFICE O/P EST MOD 30 MIN: CPT | Performed by: NURSE PRACTITIONER

## 2018-11-02 PROCEDURE — 1036F TOBACCO NON-USER: CPT | Performed by: NURSE PRACTITIONER

## 2018-11-02 PROCEDURE — G8427 DOCREV CUR MEDS BY ELIG CLIN: HCPCS | Performed by: NURSE PRACTITIONER

## 2018-11-02 PROCEDURE — 73630 X-RAY EXAM OF FOOT: CPT

## 2018-11-02 PROCEDURE — G8484 FLU IMMUNIZE NO ADMIN: HCPCS | Performed by: NURSE PRACTITIONER

## 2018-11-02 RX ORDER — PREDNISONE 50 MG/1
50 TABLET ORAL DAILY
Qty: 5 TABLET | Refills: 0 | Status: SHIPPED | OUTPATIENT
Start: 2018-11-02 | End: 2018-11-07

## 2018-11-02 ASSESSMENT — ENCOUNTER SYMPTOMS
COUGH: 0
CONSTIPATION: 0
GASTROINTESTINAL NEGATIVE: 1
ALLERGIC/IMMUNOLOGIC NEGATIVE: 1
VOMITING: 0
EYES NEGATIVE: 1
SHORTNESS OF BREATH: 0
NAUSEA: 0
ABDOMINAL PAIN: 0
DIARRHEA: 0
SINUS PRESSURE: 0
TROUBLE SWALLOWING: 0
CHEST TIGHTNESS: 0

## 2018-11-02 NOTE — PROGRESS NOTES
[address] [facility]  [address], [address] [place] Millan  [phone]      Fernando Ferraro a 51 y.o. female who is c/o of Foot Pain (left foot has had for a few months getting worse, no known injury, pain is in heel and arch of foot )      HPI:     HPI Patient in today for an acute visit for symptomsof left foot pain for the past few months. She has not had injury. She states that the pain is located in her heel and arch. She states that she had tried shoes with insoles and high arch support without relief. She states that when she rests or is in bed or sitting and she initially steps on her foot she has the worst pain (7/10 on pain scale) and then after she walks on it it eases (4-5/10). She states that the swelling is bad as well. She had tried IBU without effect. She states that she is not really on her feet all day. She has never seen podiatry. Subjective:     Review of Systems   Constitutional: Negative for activity change, appetite change and fever. HENT: Negative. Negative for congestion, ear pain, sinus pressure and trouble swallowing. Eyes: Negative. Respiratory: Negative for cough, chest tightness and shortness of breath. Cardiovascular: Negative for chest pain and palpitations. Gastrointestinal: Negative. Negative for abdominal pain, constipation, diarrhea, nausea and vomiting. Endocrine: Negative. Genitourinary: Negative for difficulty urinating and dysuria. Musculoskeletal: Positive for myalgias (left foot pain). Skin: Negative. Allergic/Immunologic: Negative. Neurological: Negative for dizziness, light-headedness and headaches. Hematological: Negative. Psychiatric/Behavioral: Negative.         Objective:     Vitals:    11/02/18 1013   BP: 130/74   Site: Left Upper Arm   Position: Sitting   Cuff Size: Large Adult   Pulse: 74   Temp: 98 °F (36.7 °C)   TempSrc: Tympanic   SpO2: 98%   Weight: 258 lb (117 kg)     Physical Exam

## 2018-11-06 DIAGNOSIS — K21.9 GASTROESOPHAGEAL REFLUX DISEASE, ESOPHAGITIS PRESENCE NOT SPECIFIED: Primary | ICD-10-CM

## 2018-11-13 ENCOUNTER — OFFICE VISIT (OUTPATIENT)
Dept: PODIATRY | Age: 48
End: 2018-11-13
Payer: MEDICARE

## 2018-11-13 VITALS
BODY MASS INDEX: 40.76 KG/M2 | WEIGHT: 253.6 LBS | HEART RATE: 88 BPM | DIASTOLIC BLOOD PRESSURE: 70 MMHG | SYSTOLIC BLOOD PRESSURE: 124 MMHG | RESPIRATION RATE: 20 BRPM | HEIGHT: 66 IN

## 2018-11-13 DIAGNOSIS — M72.2 PLANTAR FASCIITIS OF LEFT FOOT: Primary | ICD-10-CM

## 2018-11-13 DIAGNOSIS — M76.822 POSTERIOR TIBIAL TENDINITIS OF LEFT LEG: ICD-10-CM

## 2018-11-13 DIAGNOSIS — Q66.70 PES CAVUS: ICD-10-CM

## 2018-11-13 PROCEDURE — G8484 FLU IMMUNIZE NO ADMIN: HCPCS | Performed by: PODIATRIST

## 2018-11-13 PROCEDURE — L3040 FT ARCH SUPRT PREMOLD LONGIT: HCPCS | Performed by: PODIATRIST

## 2018-11-13 PROCEDURE — 1036F TOBACCO NON-USER: CPT | Performed by: PODIATRIST

## 2018-11-13 PROCEDURE — G8427 DOCREV CUR MEDS BY ELIG CLIN: HCPCS | Performed by: PODIATRIST

## 2018-11-13 PROCEDURE — 99203 OFFICE O/P NEW LOW 30 MIN: CPT | Performed by: PODIATRIST

## 2018-11-13 PROCEDURE — G8417 CALC BMI ABV UP PARAM F/U: HCPCS | Performed by: PODIATRIST

## 2018-11-13 RX ORDER — METHYLPREDNISOLONE 4 MG/1
TABLET ORAL
Qty: 1 KIT | Refills: 0 | Status: SHIPPED | OUTPATIENT
Start: 2018-11-13 | End: 2018-11-27 | Stop reason: ALTCHOICE

## 2018-11-13 NOTE — PROGRESS NOTES
Subjective:    Declan Dunaway is a 51 y.o. female who presents to the office today complaining of Left foot and arch pain. Symptoms began 6 month(s) ago. Patient relates pain is Present upon arising from bed in the morning and after periods of rest and then standing. The pain progresses throughout the day. Pain is rated 6 out of 10 and is described as waxing and waning, moderate. Worse at end of long day. Treatments prior to today's visit include: ibuprofen, ice. Currently denies F/C/N/V. Allergies   Allergen Reactions    Allopurinol     Colchicine     Erythromycin     Indocin [Indomethacin]     Lisinopril     Morphine     Norvasc [Amlodipine]     Tenormin [Atenolol]     Uloric [Febuxostat]     Hydrocodone-Acetaminophen Nausea Only    Topamax [Topiramate] Swelling and Rash       Past Medical History:   Diagnosis Date    Abnormal CT of the abdomen     revealed 2 very small noncalcified pulmonary nodules in the right lung base. Suspect benign progress but repeat CT scan in June 2013 advised.  Allergic rhinitis     Anemia     Anxiety     Asthma     Chronic low back pain     Chronic neck pain     Degenerative joint disease of knee     Bilateral.    Depression     Endometriosis     history of     GERD (gastroesophageal reflux disease)     Gout     Hyperlipidemia     Hypertension     Hypothyroid     Kidney stone     history of     Left shoulder pain     Status post injections.  Leukocytosis     Palpitation     history of     Paresthesias     Generalized    Right knee pain     Status post injections.  Seizure ([address]     \"nocturnal seizures\"       Prior to Admission medications    Medication Sig Start Date End Date Taking?  Authorizing Provider   methylPREDNISolone (MEDROL DOSEPACK) 4 MG tablet Take by mouth. 11/13/18  Yes Ardena Single, DPM   Elastic Bandages & Supports (ANKLE SPLINT/NIGHT AIRFORM) MISC 1 Device by Does not apply route every evening Plantar fasciitis of left take 1 tablet by mouth at bedtime 3/1/18  Yes Historical Provider, MD   lidocaine (XYLOCAINE) 5 % ointment Apply topically to affected area bid as needed 11/21/17  Yes Carla Montoya DO   desvenlafaxine succinate (PRISTIQ) 100 MG TB24 extended release tablet take 1 tablet by mouth once daily 10/3/17  Yes Historical Provider, MD   prazosin (MINIPRESS) 2 MG capsule Take 2 mg by mouth nightly  6/16/17  Yes Historical Provider, MD   busPIRone (BUSPAR) 30 MG tablet Take 30 mg by mouth 3 times daily  8/24/16  Yes Historical Provider, MD   promethazine (PHENERGAN) 25 MG tablet Take 1 tablet by mouth every 6 hours as needed for Nausea 8/31/16  Yes Carla Montoya DO   Handicap Placard [facility] by Does not apply route Duration 5 years 8/31/16  Yes Carla Montoya DO   Cholecalciferol (VITAMIN D3) 5000 UNITS TABS Take 1 tablet by mouth daily 7/7/16  Yes Carla Montoya DO   ARIPiprazole (ABILIFY) 10 MG tablet Take 10 mg by mouth daily  9/24/15  Yes Historical Provider, MD   gabapentin (NEURONTIN) 600 MG tablet take 1 tablet by mouth three times a day 9/14/18 10/14/18  Carla Montoya DO       Past Surgical History:   Procedure Laterality Date    ARTHROPLASTY  03/26/1986    5th metatarsals, MTP joints, right and left feet.  CERVICAL FUSION  6/2014    Dr Macho Umana, LAPAROSCOPIC  11/14/2011    CYSTOSCOPY  2/8/13    no acute findings    HYSTERECTOMY  05/2005    JOINT REPLACEMENT      KNEE ARTHROSCOPY Left     KNEE ARTHROSCOPY Left 10/16/13    KNEE ARTHROSCOPY Right 02/08/2012    With partial medial and lateral synovectomies and abrasion chondroplasty.  LAPAROSCOPY  03/08/2002    diagnostic with D&C and hysterscopy. [address] 71 SURGERY  10/6/10    Anterior C5-C6, fusion with C-trap allograft and [place] plates, performed by Dr Keira Hanna.  LUMBAR SPINE SURGERY Left 2/13/08    L4-L5 and L5-S1 foraminotomies and L4 through S1 posterolateral fusion with pedicle screw instrumentation.     NASAL SEPTUM SURGERY      OTHER SURGICAL HISTORY  12/30/2014    spinal cord stimulator paddle electrode, spinal cord stimulator generator. both Neema specter generator and 32 lead paddle electrode by Dr Will Tariq at [address] [place] [address]  04/16/2015    spinal cord stimulator removed    SPINAL CORD DECOMPRESSION  4/23/12    Lumbar.  TOTAL KNEE ARTHROPLASTY Left 04/2015    [facility] orth[facility]ics    TUBAL LIGATION  04/02/1990    UPPER GASTROINTESTINAL ENDOSCOPY  10/14/2011    Hiatal hernia.  UPPER GASTROINTESTINAL ENDOSCOPY N/A 9/10/2018    gastritis, esophagitis-BRAVO=reflux       Family History   Problem Relation Age of Onset    Cancer Mother         melanoma    Thyroid Disease Mother     Coronary Art Dis Father         coronary artery bypass grafting x4    Hypertension Father     Glaucoma Father     Prostate Cancer Father     Heart Disease Father     Heart Disease Paternal Grandmother     High Blood Pressure Paternal Grandmother     Diabetes Paternal Grandmother     Thyroid Disease Paternal Grandmother     Heart Disease Paternal Grandfather     High Blood Pressure Paternal Grandfather     Diabetes Paternal Grandfather     Asthma Daughter     Depression Daughter     Anxiety Disorder Daughter     Depression Daughter     Anxiety Disorder Daughter     Asthma Other         sibling       Social History   Substance Use Topics    Smoking status: Never Smoker    Smokeless tobacco: Never Used    Alcohol use No       Review of Systems: All 12 systems reviewed and pertinent positives noted above. Lower Extremity Physical Examination:     Vitals:   Vitals:    11/13/18 0805   BP: 124/70   Pulse: 88   Resp: 20     General: AAO x 3 in NAD.      Vascular: DP and PT pulses palpable 2/4, bilateral.  CFT <3 seconds, bilateral.  Hair growth present to the level of the digits, bilateral.  Edema absent, bilateral.  Varicosities absent, bilateral. Erythema absent, bilateral. barefoot. Orders Placed This Encounter   Medications    methylPREDNISolone (MEDROL DOSEPACK) 4 MG tablet     Sig: Take by mouth. Dispense:  1 kit     Refill:  0    Elastic Bandages & Supports (ANKLE SPLINT/NIGHT AIRFORM) MISC     Si Device by Does not apply route every evening Plantar fasciitis of left foot  (primary encounter diagnosis)  Posterior tibial tendinitis of left leg  Pes cavus      Dispense posterior night splint. Dispense:  1 each     Refill:  0   Rx medrol. Patient will take as directed. Risks and complications discussed with patient and also advised to read drug insert from pharmacy for further information. X rays reviewed with the pt in detail. All questions answered. Contact office with any questions/problems/concerns. Return to office in 3 week(s).

## 2018-11-21 RX ORDER — ESOMEPRAZOLE MAGNESIUM 40 MG/1
CAPSULE, DELAYED RELEASE ORAL
Qty: 60 CAPSULE | Refills: 5 | Status: SHIPPED | OUTPATIENT
Start: 2018-11-21 | End: 2019-05-04 | Stop reason: SDUPTHER

## 2018-11-21 RX ORDER — METOPROLOL TARTRATE 50 MG/1
TABLET, FILM COATED ORAL
Qty: 60 TABLET | Refills: 5 | Status: SHIPPED | OUTPATIENT
Start: 2018-11-21 | End: 2019-05-04 | Stop reason: SDUPTHER

## 2018-11-27 ENCOUNTER — HOSPITAL ENCOUNTER (OUTPATIENT)
Age: 48
Setting detail: SPECIMEN
Discharge: HOME | End: 2018-11-27
Payer: MEDICARE

## 2018-11-27 ENCOUNTER — OFFICE VISIT (OUTPATIENT)
Dept: NEUROLOGY | Age: 48
End: 2018-11-27
Payer: MEDICARE

## 2018-11-27 ENCOUNTER — OFFICE VISIT (OUTPATIENT)
Dept: PRIMARY CARE CLINIC | Age: 48
End: 2018-11-27
Payer: MEDICARE

## 2018-11-27 ENCOUNTER — TELEPHONE (OUTPATIENT)
Dept: NEUROLOGY | Age: 48
End: 2018-11-27

## 2018-11-27 VITALS
SYSTOLIC BLOOD PRESSURE: 124 MMHG | BODY MASS INDEX: 40.98 KG/M2 | HEART RATE: 86 BPM | HEIGHT: 66 IN | WEIGHT: 255 LBS | OXYGEN SATURATION: 95 % | DIASTOLIC BLOOD PRESSURE: 68 MMHG

## 2018-11-27 VITALS
SYSTOLIC BLOOD PRESSURE: 114 MMHG | WEIGHT: 260 LBS | DIASTOLIC BLOOD PRESSURE: 74 MMHG | HEART RATE: 78 BPM | BODY MASS INDEX: 41.78 KG/M2 | TEMPERATURE: 97.7 F | OXYGEN SATURATION: 94 % | RESPIRATION RATE: 16 BRPM | HEIGHT: 66 IN

## 2018-11-27 DIAGNOSIS — G63 POLYNEUROPATHY ASSOCIATED WITH UNDERLYING DISEASE (HCC): ICD-10-CM

## 2018-11-27 DIAGNOSIS — I67.82 CEREBRAL ISCHEMIA: ICD-10-CM

## 2018-11-27 DIAGNOSIS — R56.9 NOCTURNAL SEIZURES (HCC): ICD-10-CM
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
## 2018-11-27 DIAGNOSIS — F41.9 ANXIETY: ICD-10-CM

## 2018-11-27 DIAGNOSIS — R53.82 CHRONIC FATIGUE: ICD-10-CM

## 2018-11-27 DIAGNOSIS — G57.32 NEUROPATHY OF LEFT PERONEAL NERVE: ICD-10-CM

## 2018-11-27 DIAGNOSIS — R35.0 URINARY FREQUENCY: ICD-10-CM

## 2018-11-27 DIAGNOSIS — R41.3 MEMORY PROBLEM: ICD-10-CM

## 2018-11-27 DIAGNOSIS — G56.03 BILATERAL CARPAL TUNNEL SYNDROME: ICD-10-CM

## 2018-11-27 DIAGNOSIS — M54.40 CHRONIC LOW BACK PAIN WITH SCIATICA, SCIATICA LATERALITY UNSPECIFIED, UNSPECIFIED BACK PAIN LATERALITY: ICD-10-CM

## 2018-11-27 DIAGNOSIS — F32.A DEPRESSION, UNSPECIFIED DEPRESSION TYPE: ICD-10-CM

## 2018-11-27 DIAGNOSIS — E78.2 MIXED HYPERLIPIDEMIA: ICD-10-CM

## 2018-11-27 DIAGNOSIS — N30.01 ACUTE CYSTITIS WITH HEMATURIA: Primary | ICD-10-CM

## 2018-11-27 DIAGNOSIS — G56.23 ULNAR NEUROPATHY OF BOTH UPPER EXTREMITIES: ICD-10-CM

## 2018-11-27 DIAGNOSIS — R20.2 PARESTHESIAS: ICD-10-CM

## 2018-11-27 DIAGNOSIS — I10 ESSENTIAL HYPERTENSION: ICD-10-CM

## 2018-11-27 DIAGNOSIS — G89.29 CHRONIC LOW BACK PAIN WITH SCIATICA, SCIATICA LATERALITY UNSPECIFIED, UNSPECIFIED BACK PAIN LATERALITY: ICD-10-CM

## 2018-11-27 DIAGNOSIS — G57.30 PERONEAL NEUROPATHY, UNSPECIFIED LATERALITY: ICD-10-CM

## 2018-11-27 DIAGNOSIS — G57.40 TIBIAL NEUROPATHY, UNSPECIFIED LATERALITY: Primary | ICD-10-CM

## 2018-11-27 DIAGNOSIS — M54.2 CHRONIC NECK PAIN: ICD-10-CM

## 2018-11-27 DIAGNOSIS — R13.19 OTHER DYSPHAGIA: ICD-10-CM

## 2018-11-27 DIAGNOSIS — R94.01 ABNORMAL EEG: ICD-10-CM

## 2018-11-27 DIAGNOSIS — K21.9 GASTROESOPHAGEAL REFLUX DISEASE, ESOPHAGITIS PRESENCE NOT SPECIFIED: ICD-10-CM

## 2018-11-27 DIAGNOSIS — G89.29 CHRONIC NECK PAIN: ICD-10-CM

## 2018-11-27 DIAGNOSIS — R26.89 BALANCE PROBLEMS: ICD-10-CM

## 2018-11-27 DIAGNOSIS — E03.9 HYPOTHYROIDISM, UNSPECIFIED TYPE: ICD-10-CM

## 2018-11-27 LAB
-: ABNORMAL
AMORPHOUS: ABNORMAL
BACTERIA: ABNORMAL
BILIRUBIN URINE: NEGATIVE
CASTS UA: ABNORMAL /LPF (ref 0–2)
COLOR: ABNORMAL
COMMENT UA: ABNORMAL
CRYSTALS, UA: ABNORMAL /HPF
EPITHELIAL CELLS UA: ABNORMAL /HPF (ref 0–5)
GLUCOSE URINE: ABNORMAL
KETONES, URINE: NEGATIVE
LEUKOCYTE ESTERASE, URINE: ABNORMAL
MUCUS: ABNORMAL
NITRITE, URINE: POSITIVE
OTHER OBSERVATIONS UA: ABNORMAL
PH UA: 7 (ref 5–6)
PROTEIN UA: ABNORMAL
RBC UA: ABNORMAL /HPF (ref 0–4)
RENAL EPITHELIAL, UA: ABNORMAL /HPF
SPECIFIC GRAVITY UA: 1.01 (ref 1.01–1.02)
TRICHOMONAS: ABNORMAL
TURBIDITY: ABNORMAL
URINE HGB: ABNORMAL
UROBILINOGEN, URINE: ABNORMAL
WBC UA: >50 /HPF (ref 0–4)
YEAST: ABNORMAL

## 2018-11-27 PROCEDURE — 99215 OFFICE O/P EST HI 40 MIN: CPT | Performed by: PSYCHIATRY & NEUROLOGY

## 2018-11-27 PROCEDURE — 99213 OFFICE O/P EST LOW 20 MIN: CPT | Performed by: FAMILY MEDICINE

## 2018-11-27 PROCEDURE — 87086 URINE CULTURE/COLONY COUNT: CPT

## 2018-11-27 PROCEDURE — G8484 FLU IMMUNIZE NO ADMIN: HCPCS | Performed by: PSYCHIATRY & NEUROLOGY

## 2018-11-27 PROCEDURE — 1036F TOBACCO NON-USER: CPT | Performed by: FAMILY MEDICINE

## 2018-11-27 PROCEDURE — G8427 DOCREV CUR MEDS BY ELIG CLIN: HCPCS | Performed by: PSYCHIATRY & NEUROLOGY

## 2018-11-27 PROCEDURE — G8484 FLU IMMUNIZE NO ADMIN: HCPCS | Performed by: FAMILY MEDICINE

## 2018-11-27 PROCEDURE — 87088 URINE BACTERIA CULTURE: CPT

## 2018-11-27 PROCEDURE — 1036F TOBACCO NON-USER: CPT | Performed by: PSYCHIATRY & NEUROLOGY

## 2018-11-27 PROCEDURE — G8417 CALC BMI ABV UP PARAM F/U: HCPCS | Performed by: PSYCHIATRY & NEUROLOGY

## 2018-11-27 PROCEDURE — 81001 URINALYSIS AUTO W/SCOPE: CPT

## 2018-11-27 PROCEDURE — 87186 SC STD MICRODIL/AGAR DIL: CPT

## 2018-11-27 PROCEDURE — G8427 DOCREV CUR MEDS BY ELIG CLIN: HCPCS | Performed by: FAMILY MEDICINE

## 2018-11-27 PROCEDURE — G8417 CALC BMI ABV UP PARAM F/U: HCPCS | Performed by: FAMILY MEDICINE

## 2018-11-27 RX ORDER — OXCARBAZEPINE 150 MG/1
300 TABLET, FILM COATED ORAL 2 TIMES DAILY
Qty: 60 TABLET | Refills: 2 | Status: SHIPPED | OUTPATIENT
Start: 2018-11-27 | End: 2018-12-19 | Stop reason: SDUPTHER

## 2018-11-27 RX ORDER — PHENAZOPYRIDINE HYDROCHLORIDE 100 MG/1
100 TABLET, FILM COATED ORAL 3 TIMES DAILY PRN
Qty: 15 TABLET | Refills: 0 | Status: SHIPPED | OUTPATIENT
Start: 2018-11-27 | End: 2019-06-08 | Stop reason: SDUPTHER

## 2018-11-27 RX ORDER — LEVOTHYROXINE SODIUM 0.1 MG/1
TABLET ORAL
Qty: 30 TABLET | Refills: 5 | OUTPATIENT
Start: 2018-11-27

## 2018-11-27 RX ORDER — CIPROFLOXACIN 500 MG/1
500 TABLET, FILM COATED ORAL 2 TIMES DAILY
Qty: 10 TABLET | Refills: 0 | Status: SHIPPED | OUTPATIENT
Start: 2018-11-27 | End: 2018-12-02

## 2018-11-27 ASSESSMENT — ENCOUNTER SYMPTOMS
CONSTIPATION: 0
ABDOMINAL DISTENTION: 0
EYE REDNESS: 0
FACIAL SWELLING: 0
TROUBLE SWALLOWING: 1
SHORTNESS OF BREATH: 0
APNEA: 0
BLOOD IN STOOL: 0
SWOLLEN GLANDS: 0
COLOR CHANGE: 0
BACK PAIN: 0
CHANGE IN BOWEL HABIT: 0
ABDOMINAL PAIN: 0
CHEST TIGHTNESS: 0
COUGH: 0
WHEEZING: 0
PHOTOPHOBIA: 0
CHOKING: 0
VOMITING: 0
DIARRHEA: 0
BOWEL INCONTINENCE: 0
VOMITING: 0
EYE PAIN: 0
SINUS PRESSURE: 0
VOICE CHANGE: 0
NAUSEA: 0
SORE THROAT: 0
EYE ITCHING: 0
NAUSEA: 1
EYE DISCHARGE: 0
VISUAL CHANGE: 0

## 2018-11-27 NOTE — PROGRESS NOTES
for 5 days Take with food. Dispense:  10 tablet     Refill:  0    phenazopyridine (PYRIDIUM) 100 MG tablet     Sig: Take 1 tablet by mouth 3 times daily as needed (dysuria)     Dispense:  15 tablet     Refill:  0       Patient given educational materials - see patientinstructions. Discussed use, benefit, and side effects of prescribed medications. All patient questions answered. Pt voiced understanding.        Electronically signedby Debo Danielle DO on 11/28/2018 at 8:17 AM

## 2018-11-27 NOTE — PROGRESS NOTES
Quality,  Severity,  Location,  Timing,  Context,  Modifying  Factors   Of   The   Chief   Complaint   And  Present  Illness   Was   Reviewed   In   Chronological   [facility]. CURRENT PATIENT'S  MAIN  CONCERNS INCLUDE :                   1)     H/O    TRIPPING    AND  NEAR  FALLS     INTERMITTENTLY                                           FOR    ONE  YEAR                       2)     H/O    BALANCE  PROBLEMS      FOR  ONE  YEAR                       3)     PERIPHERAL POLYNEUROPATHY    BOTH  LOWER  EXTREMITIES                                  -  ON  NEURONTIN   FROM  HER  PCP.                            4)    BILATERAL  CARPAL  TUNNEL   SYNDROME,                                BILATERAL  ULNAR  NEUROPATHY                         5)   OBESITY                        6)    CONCERN  FOR  EARLY  TYPE  II    DM                              NEEDS  MONITORING                        7)    H/O    CHRONIC  ANXIETY,   DEPRESSION                                       FOR  MELIDA  THAN    30  YEARS                           ON   ABILIFY,  BUSPAR,  LAMICTAL,   MINIPRESS                                 BEING    FOLLOWED    BY   HER  [facility]                       8)     H/O   FIBROMYALGIA                          9)       MULTIPLE  CO  MORBID  MEDICAL  CONDITIONS                               BEING  FOLLOWED  BY  HER  PCP.                       10)      H/O   MEMORY  PROBLEMS                                                   FOR   ONE  YEAR                  6)  H/O   CHRONIC  LUMBAR    PAIN     FOR  10  YEARS                       H/O  LUMBAR  SURGERY     2008   AND   2010                   12)    H/O   SLURRED  SPEECH     AND    DYSPHAGIAS                                SINCE  June 2018                               INTERMITTENTLY                            D/D     TIA,  STROKE,    CEREBRAL  ISCHEMIA                                      PATIENT  ON  ASA   DAILY Symptoms:                                Balance  And coordination problems  present           Gait problems     absent            Headaches      absent              Migraines           absent           Memory problems        present           Confusion        absent            Paresthesia numbness          present           Seizures  And  Starring  Episodes           absent           Syncope,  Near  syncopal episodes         absent           Speech problems           absent             Swallowing  Problems      present            Dizziness,  Light headedness           absent                        Vertigo        absent             Generalized   Weakness    absent              focal  Weakness     present             Tremors         absent              Sleep  Problems     present             History  Of   Recent   Head  Injury     absent             History  Of   Recent  TIA     absent             History  Of   Recent    Stroke     absent             Neck  Pain and  Neck muscle  Spasms  absent             Radiating  down   And   Weakness           absent            Lower back   Pain  And     Spasms  present            Radiating    Down   And   Weakness          present                H/O   FALLS        absent               History  Of   Visual  Symptoms    absent                Associated   Diplopia       absent                                Also   Additional   Symptoms   Present    As  Documented    In   The detailed    Review  Of  Systems   And    Please   Refer   To    Them for   Additional  Information. Any components  That are either  Unobtainable  Or  Limited  In   HPI, ROS  And/or PFSH   Are   Due   To   Patient's  Medical  Problems,  Clinical  Condition and/or lack of other  Alternate resources.           RECORDS   REVIEWED:  historical medical records     INFORMATION   REVIEWED:     MEDICAL   HISTORY,     SURGICAL   HISTORY,   MEDICATIONS   LIST,   ALLERGIES AND  DRUG  INTOLERANCES,     FAMILY and sleep disturbance. Negative for agitation, behavioral problems, confusion, dysphoric mood, hallucinations, self-injury and suicidal ideas. The patient is nervous/anxious. The patient is not hyperactive. OBJECTIVE:    Physical Exam   Constitutional: She appears well-developed and well-nourished. She is cooperative. HENT:   Head: Normocephalic and atraumatic. Head is without raccoon's eyes and without Bob's sign. Right Ear: External ear normal.   Left Ear: External ear normal.   Nose: Nose normal.   Mouth/Throat: Oropharynx is clear and moist.   Eyes: Conjunctivae are normal.   Neck: Normal range of motion. Neck supple. No muscular tenderness present. Carotid bruit is not present. No neck rigidity. No tracheal deviation and normal range of motion present. No Brudzinski's sign and no Kernig's sign noted. No thyroid mass and no thyromegaly present. Cardiovascular: Normal rate and regular rhythm. Pulmonary/Chest: Effort normal.   Musculoskeletal: She exhibits no edema or tenderness. Skin: Skin is warm. No rash noted. No cyanosis or erythema. No pallor. Nails show no clubbing. Psychiatric: Her mood appears not anxious. Her affect is blunt. Her affect is not angry, not labile and not inappropriate. She is slowed. She is not agitated, not aggressive, not hyperactive, not withdrawn, not actively hallucinating and not combative. Thought content is not paranoid and not delusional. Cognition and memory are impaired. She does not express impulsivity or inappropriate judgment. She does not exhibit a depressed mood. She expresses no homicidal and no suicidal ideation. She expresses no suicidal plans and no homicidal plans. She exhibits normal recent memory and normal remote memory. She is attentive. Neurologic Exam    NEUROLOGICAL EXAMINATION :         A) MENTAL STATUS:                   Alert and  oriented  To time, place  And  Person. No Aphasia. No  Dysarthria. Bilaterally. F) COORDINATION  AND  GAIT :                              Station and  Gait  SLOW                                    Romberg's test   POSITIVE                        Ataxia negative        ASSESSMENT:    Patient Active Problem List   Diagnosis    Iron deficiency anemia    Chronic low back pain    Chronic neck pain    Hypertension    Depression    Anxiety    Hypothyroidism    Allergic rhinitis    GERD (gastroesophageal reflux disease)    Anemia    Chronic fatigue    Mixed hyperlipidemia    Paresthesias    Hyperlipidemia    Tripping over things    Balance problems    Memory problem    Bilateral carpal tunnel syndrome    Polyneuropathy associated with underlying disease (HCC)    Ulnar neuropathy of both upper extremities    Peroneal neuropathy    Tibial neuropathy    Slurred speech    Other dysphagia    Cerebral ischemia    Abnormal EEG    Nocturnal seizures (HCC)   Summary Report of 5 -day Video-EEG Monitoring : This video-EEG recording was carried out between 10/22/18- 10/26/18. During this recording, the patient was taken off Gabapentin and the dose   of Lamictal was reduced to 100 mg daily. Photic stimulation and   hyperventilation were also performed. The EEG was initially normal, until   10/25/2018, when rare sharp-and-slow wave discharges were seen in the left   mid temporal region. No electrographic seizures were seen. On 10/24/18,   patient experienced waves of nausea and dizziness followed by confusion   and fatigue, however no EEG correlates was found.    Overall, this video-EEG recording was abnormal, indicative of increased   epileptogenicity in the left mid temporal region.  No seizures were   recorded.     Tracy Carlton MD.   of Neurology and Sleep  [facility]  10/26/2018 10:11 PM  MRI OF THE BRAIN WITHOUT CONTRAST  7/16/2018 3:55 pm       TECHNIQUE:   Multiplanar multisequence MRI of the brain was performed without the   administration of intravenous contrast.       COMPARISON:   None       HISTORY:   ORDERING SYSTEM PROVIDED HISTORY: Chronic fatigue   TECHNOLOGIST PROVIDED HISTORY:   Ordering Physician Provided Reason for Exam: Balance issues and muscles   jerking for about a year, getting worse. Acuity: Unknown   Type of Exam: Initial   Additional signs and symptoms: Tripping over things; Balance problems; Memory   problems       Initial evaluation.       FINDINGS:   INTRACRANIAL STRUCTURES/VENTRICLES: There is no acute infarct.  The   ventricles and cisternal spaces are normal in size, shape, and configuration   for the age of the patient. No areas of abnormal signal are identified in the   brain parenchyma.  There is no midline shift or mass effect. There are no   areas of restricted diffusion.       ORBITS: The visualized portion of the orbits demonstrate no acute abnormality.       SINUSES:  The visualized paranasal sinuses and mastoid air cells are clear.       BONES/SOFT TISSUES: The bone marrow signal intensity appears normal. The soft   tissues demonstrate no acute abnormality.           Impression   No acute brain parenchymal abnormality.             ULTRASOUND EVALUATION OF THE CAROTID ARTERIES       7/16/2018       COMPARISON:   None.       HISTORY:   ORDERING SYSTEM PROVIDED HISTORY: Chronic fatigue   TECHNOLOGIST PROVIDED HISTORY:   Ordering Physician Provided Reason for Exam: dizziness, balance issues   Acuity: Acute   Type of Exam: Initial       FINDINGS:   RIGHT:       The right common carotid artery demonstrates peak systolic velocities of 95.0   and 63.3 cm/sec in the proximal and distal segments respectively.       The right internal carotid artery demonstrates the systolic velocities of   88.3, 63.3 and 71.1 cm/sec in the proximal, mid and distal segments   respectively.       The external carotid artery is patent.  The vertebral artery demonstrates   normal antegrade flow. tunnel syndrome G56.03 OXcarbazepine (TRILEPTAL) 150 MG tablet   2. Paresthesias R20.2 OXcarbazepine (TRILEPTAL) 150 MG tablet   3. Polyneuropathy associated with underlying disease (HCC) G63 OXcarbazepine (TRILEPTAL) 150 MG tablet   4. Other dysphagia R13.19 OXcarbazepine (TRILEPTAL) 150 MG tablet   5. Cerebral ischemia I67.82 OXcarbazepine (TRILEPTAL) 150 MG tablet   6. Ulnar neuropathy of both upper extremities G56.23 OXcarbazepine (TRILEPTAL) 150 MG tablet   7. Tripping over things W18.40XA OXcarbazepine (TRILEPTAL) 150 MG tablet   8. Balance problems R26.89 OXcarbazepine (TRILEPTAL) 150 MG tablet   9. Neuropathy of left peroneal nerve G57.32 OXcarbazepine (TRILEPTAL) 150 MG tablet   10. Memory problem R41.3 OXcarbazepine (TRILEPTAL) 150 MG tablet   11. Chronic fatigue R53.82 OXcarbazepine (TRILEPTAL) 150 MG tablet   12. Chronic low back pain with sciatica, sciatica laterality unspecified, unspecified back pain laterality M54.40 OXcarbazepine (TRILEPTAL) 150 MG tablet    G89.29    13. Anxiety F41.9 OXcarbazepine (TRILEPTAL) 150 MG tablet   14. Mixed hyperlipidemia E78.2 OXcarbazepine (TRILEPTAL) 150 MG tablet   15. Peroneal neuropathy, unspecified laterality G57.30 OXcarbazepine (TRILEPTAL) 150 MG tablet   16. Depression, unspecified depression type F32.9 OXcarbazepine (TRILEPTAL) 150 MG tablet   17. Hypothyroidism, unspecified type E03.9 OXcarbazepine (TRILEPTAL) 150 MG tablet   18. Chronic neck pain M54.2 OXcarbazepine (TRILEPTAL) 150 MG tablet    G89.29    19. Essential hypertension I10 OXcarbazepine (TRILEPTAL) 150 MG tablet   20. Abnormal EEG R94.01 OXcarbazepine (TRILEPTAL) 150 MG tablet   21. Gastroesophageal reflux disease, esophagitis presence not specified K21.9 OXcarbazepine (TRILEPTAL) 150 MG tablet   22.  Nocturnal seizures (HCC) G40.909 OXcarbazepine (TRILEPTAL) 150 MG tablet              CONCERNS   &   INCREASED   RISK   FOR        * TIA,  CEREBRO  VASCULAR  ISCHEMIA, STROKE       *   COGNITIVE  & * KEEP  DAIRY  OF   THE  NEUROLOGICAL  SYMPTOMS        RECORDING THE    DURATION  AND  FREQUENCY. *  AVOID    CONDITIONS  AND  FACTORS   THAT  MAKE                  NEUROLOGICAL  SYMPTOMS  WORSE. *  TO  MAINTAIN  REGULAR  SLEEP  WAKE  CYCLES. *   TO  HAVE  ADEQUATE  REST  AND   SLEEP    HOURS.          *    TO   AVOID   TO  SLEEP  IN   SUPINE  POSITION. *      WEIGHT   LOSS. *    AVOID  ANY USAGE OF                 TOBACCO,  EXCESSIVE  ALCOHOL  AND   ILLEGAL   SUBSTANCES      *  CONTINUE MEDICATIONS PRESCRIBED BY NEUROLOGIST AS    RECOMMENDED     *   Compliance   With  Medications   And  Instructions      * CURRENTLY  TOLERATING  THE  PRESCRIBED   MEDICATIONS. WITHOUT  ANY  SIGNIFICANT  SIDE  EFFECTS   &  GETTING BENEFIT. *  May   Use  Pill  Box,    If  Needed      *    To  Continue  The    Antiplatelet  therapy    As   Recommended  Was   Discussed      *    Prophylactic  Use   Of     Vitamin   B   Complex,  Folic  Acid,    Vitamin  B12    Multivitamin,   Calcium  With  magnesium  And  Vit D    Supplementations   Over  The  Counter  Discussed         *  FOOT  CARE, DAILY  INSPECTION  OF  FEET   AND         PERIODIC  PODIATRY EVALUATIONS . *  PATIENT  IS  ALSO   COUNSELED   TO  KEEP    ACTIVITIES:         A)   SIMPLE      B)  ORGANIZED      C)  WRITE   DOWN                 *  EVALUATIONS  AND  FOLLOW UP:    IF  PATIENT  IS  WILLING                   * PHYSICAL  THERAPY   / OCCUPATIONAL THERAPY                                  * CARDIOLOGY              *   OPTHALMOLOGY                                           *   CURRENTLY  PATIENT  DENIES  BEING  PREGNANT                  AND   HAS  NO  PLANS  TO  GET  PREGNANT. PATIENT  STILL  HAVE       1-2   EPISODES  OF  SEIZURES     AT   NIGHT  TIME                                AND   TRILEPTAL  WILL  BE   UP    TITRATED.                Controlled Substances Monitoring: Attestation: The Prescription Evaluation. *   The  Neurological   Findings,  Possible  Diagnosis,  Differential diagnoses   And  Options  For    Further   Investigations   And  management   Are  Discussed  Comprehensively. Medications   And  Prescription   Risks  And  Side effects  Are   Also  Discussed. The  Above  Were  Reviewed  With  patient and   questions  Answered  In  Detail. More   Than   50% of face  To face Time   Was  Spent  On  Counseling   And   Coordination  Of  Care   Of   Patient's multiple   Neurological  Problems   And   Comorbid  Medical   Conditions. Electronically signed by Leonie Grimm MD   Board Certified in  Neurology &  In  [facility] ([facility])      DISCLAIMER:   Although every effort was made to ensure the accuracy of this  electronic transcription, some errors in transcription may have occurred. GENERAL PATIENT INSTRUCTIONS:     A Healthy Lifestyle: Care Instructions  Your Care Instructions  A healthy lifestyle can help you feel good, stay at a healthy weight, and have plenty of energy for both work and play. A healthy lifestyle is something you can share with your whole family. A healthy lifestyle also can lower your risk for serious health problems, such as high blood pressure, heart disease, and diabetes. You can follow a few steps listed below to improve your health and the health of your family. Follow-up care is a key part of your treatment and safety. Be sure to make and go to all appointments, and call your doctor if you are having problems. Its also a good idea to know your test results and keep a list of the medicines you take. How can you care for yourself at home? Do not eat too much sugar, fat, or fast foods. You can still have dessert and treats now and then. The goal is moderation. Start small to improve your eating habits.  Pay attention to portion sizes, drink less juice and soda

## 2018-11-28 ENCOUNTER — HOSPITAL ENCOUNTER (OUTPATIENT)
Dept: LAB | Age: 48
Discharge: HOME | End: 2018-11-28
Payer: MEDICARE

## 2018-11-28 DIAGNOSIS — E78.2 MIXED HYPERLIPIDEMIA: ICD-10-CM

## 2018-11-28 DIAGNOSIS — R20.2 PARESTHESIAS: ICD-10-CM

## 2018-11-28 DIAGNOSIS — E03.9 HYPOTHYROIDISM, UNSPECIFIED TYPE: ICD-10-CM

## 2018-11-28 DIAGNOSIS — R73.01 IMPAIRED FASTING GLUCOSE: ICD-10-CM

## 2018-11-28 DIAGNOSIS — I67.82 CEREBRAL ISCHEMIA: ICD-10-CM

## 2018-11-28 DIAGNOSIS — R41.3 MEMORY PROBLEM: ICD-10-CM

## 2018-11-28 DIAGNOSIS — R26.89 BALANCE PROBLEMS: ICD-10-CM

## 2018-11-28 DIAGNOSIS — R56.9 NOCTURNAL SEIZURES (HCC): ICD-10-CM

## 2018-11-28 DIAGNOSIS — I10 ESSENTIAL HYPERTENSION: ICD-10-CM

## 2018-11-28 LAB
ABSOLUTE EOS #: 0.2 K/UL (ref 0–0.4)
ABSOLUTE IMMATURE GRANULOCYTE: ABNORMAL K/UL (ref 0–0.3)
ABSOLUTE LYMPH #: 2.6 K/UL (ref 1–4.8)
ABSOLUTE MONO #: 0.5 K/UL (ref 0.1–1.2)
ALBUMIN SERPL-MCNC: 4.6 G/DL (ref 3.5–5.2)
ALBUMIN/GLOBULIN RATIO: 1.6 (ref 1–2.5)
ALP BLD-CCNC: 123 U/L (ref 35–104)
ALT SERPL-CCNC: 17 U/L (ref 5–33)
ANION GAP SERPL CALCULATED.3IONS-SCNC: 12 MMOL/L (ref 9–17)
ANION GAP SERPL CALCULATED.3IONS-SCNC: 12 MMOL/L (ref 9–17)
AST SERPL-CCNC: 16 U/L
BASOPHILS # BLD: 1 % (ref 0–1)
BASOPHILS ABSOLUTE: 0.1 K/UL (ref 0–0.2)
BILIRUB SERPL-MCNC: 0.27 MG/DL (ref 0.3–1.2)
BUN BLDV-MCNC: 14 MG/DL (ref 6–20)
BUN/CREAT BLD: 14 (ref 9–20)
CALCIUM SERPL-MCNC: 9.5 MG/DL (ref 8.6–10.4)
CHLORIDE BLD-SCNC: 100 MMOL/L (ref 98–107)
CHLORIDE BLD-SCNC: 100 MMOL/L (ref 98–107)
CHOLESTEROL/HDL RATIO: 3
CHOLESTEROL: 150 MG/DL
CO2: 30 MMOL/L (ref 20–31)
CO2: 30 MMOL/L (ref 20–31)
CREAT SERPL-MCNC: 0.98 MG/DL (ref 0.5–0.9)
CULTURE: ABNORMAL
DIFFERENTIAL TYPE: ABNORMAL
EOSINOPHILS RELATIVE PERCENT: 2 % (ref 1–7)
ESTIMATED AVERAGE GLUCOSE: 114 MG/DL
GFR AFRICAN AMERICAN: >60 ML/MIN
GFR NON-AFRICAN AMERICAN: >60 ML/MIN
GFR SERPL CREATININE-BSD FRML MDRD: ABNORMAL ML/MIN/{1.73_M2}
GFR SERPL CREATININE-BSD FRML MDRD: ABNORMAL ML/MIN/{1.73_M2}
GLUCOSE BLD-MCNC: 99 MG/DL (ref 70–99)
HBA1C MFR BLD: 5.6 % (ref 4.8–5.9)
HCT VFR BLD CALC: 39 % (ref 36–46)
HCT VFR BLD CALC: 39 % (ref 36–46)
HDLC SERPL-MCNC: 50 MG/DL
HEMOGLOBIN: 12.6 G/DL (ref 12–16)
HEMOGLOBIN: 12.6 G/DL (ref 12–16)
IMMATURE GRANULOCYTES: ABNORMAL %
LDL CHOLESTEROL: 72 MG/DL (ref 0–130)
LYMPHOCYTES # BLD: 32 % (ref 16–46)
Lab: ABNORMAL
MCH RBC QN AUTO: 27.9 PG (ref 26–34)
MCH RBC QN AUTO: 27.9 PG (ref 26–34)
MCHC RBC AUTO-ENTMCNC: 32.2 G/DL (ref 31–37)
MCHC RBC AUTO-ENTMCNC: 32.2 G/DL (ref 31–37)
MCV RBC AUTO: 86.9 FL (ref 80–100)
MCV RBC AUTO: 86.9 FL (ref 80–100)
MONOCYTES # BLD: 6 % (ref 4–11)
NRBC AUTOMATED: ABNORMAL PER 100 WBC
NRBC AUTOMATED: ABNORMAL PER 100 WBC
ORGANISM: ABNORMAL
PDW BLD-RTO: 16.4 % (ref 11–14.5)
PDW BLD-RTO: 16.4 % (ref 11–14.5)
PLATELET # BLD: 239 K/UL (ref 140–450)
PLATELET # BLD: 239 K/UL (ref 140–450)
PLATELET ESTIMATE: ABNORMAL
PMV BLD AUTO: 9.2 FL (ref 6–12)
PMV BLD AUTO: 9.2 FL (ref 6–12)
POTASSIUM SERPL-SCNC: 3.7 MMOL/L (ref 3.7–5.3)
POTASSIUM SERPL-SCNC: 3.7 MMOL/L (ref 3.7–5.3)
RBC # BLD: 4.49 M/UL (ref 4–5.2)
RBC # BLD: 4.49 M/UL (ref 4–5.2)
RBC # BLD: ABNORMAL 10*6/UL
SEG NEUTROPHILS: 59 % (ref 43–77)
SEGMENTED NEUTROPHILS ABSOLUTE COUNT: 5 K/UL (ref 1.8–7.7)
SODIUM BLD-SCNC: 142 MMOL/L (ref 135–144)
SODIUM BLD-SCNC: 142 MMOL/L (ref 135–144)
SPECIMEN DESCRIPTION: ABNORMAL
STATUS: ABNORMAL
THYROXINE, FREE: 1.1 NG/DL (ref 0.93–1.7)
TOTAL PROTEIN: 7.5 G/DL (ref 6.4–8.3)
TRIGL SERPL-MCNC: 140 MG/DL
TSH SERPL DL<=0.05 MIU/L-ACNC: 2.12 MIU/L (ref 0.3–5)
VLDLC SERPL CALC-MCNC: NORMAL MG/DL (ref 1–30)
WBC # BLD: 8.4 K/UL (ref 3.5–11)
WBC # BLD: 8.4 K/UL (ref 3.5–11)
WBC # BLD: ABNORMAL 10*3/UL

## 2018-11-28 PROCEDURE — 80061 LIPID PANEL: CPT

## 2018-11-28 PROCEDURE — 80053 COMPREHEN METABOLIC PANEL: CPT

## 2018-11-28 PROCEDURE — 83036 HEMOGLOBIN GLYCOSYLATED A1C: CPT

## 2018-11-28 PROCEDURE — 36415 COLL VENOUS BLD VENIPUNCTURE: CPT

## 2018-11-28 PROCEDURE — 84439 ASSAY OF FREE THYROXINE: CPT

## 2018-11-28 PROCEDURE — 80183 DRUG SCRN QUANT OXCARBAZEPIN: CPT

## 2018-11-28 PROCEDURE — 85027 COMPLETE CBC AUTOMATED: CPT

## 2018-11-28 PROCEDURE — 80051 ELECTROLYTE PANEL: CPT

## 2018-11-28 PROCEDURE — 85025 COMPLETE CBC W/AUTO DIFF WBC: CPT

## 2018-11-28 PROCEDURE — 84443 ASSAY THYROID STIM HORMONE: CPT

## 2018-12-01 LAB — OXCARBAZEPINE: 5 UG/ML (ref 3–35)

## 2018-12-03 RX ORDER — SPIRONOLACTONE 25 MG/1
TABLET ORAL
Qty: 180 TABLET | Refills: 1 | OUTPATIENT
Start: 2018-12-03

## 2018-12-04 ENCOUNTER — OFFICE VISIT (OUTPATIENT)
Dept: PODIATRY | Age: 48
End: 2018-12-04
Payer: MEDICARE

## 2018-12-04 VITALS
SYSTOLIC BLOOD PRESSURE: 118 MMHG | HEART RATE: 70 BPM | HEIGHT: 66 IN | BODY MASS INDEX: 41.3 KG/M2 | DIASTOLIC BLOOD PRESSURE: 68 MMHG | WEIGHT: 257 LBS

## 2018-12-04 DIAGNOSIS — M72.2 PLANTAR FASCIITIS: Primary | ICD-10-CM

## 2018-12-04 DIAGNOSIS — M76.822 POSTERIOR TIBIAL TENDINITIS OF LEFT LEG: ICD-10-CM

## 2018-12-04 PROCEDURE — 99999 PR OFFICE/OUTPT VISIT,PROCEDURE ONLY: CPT | Performed by: PODIATRIST

## 2018-12-04 PROCEDURE — 20550 NJX 1 TENDON SHEATH/LIGAMENT: CPT | Performed by: PODIATRIST

## 2018-12-04 RX ORDER — POTASSIUM CHLORIDE 600 MG/1
TABLET, FILM COATED, EXTENDED RELEASE ORAL
Qty: 120 TABLET | Refills: 5 | Status: SHIPPED | OUTPATIENT
Start: 2018-12-04 | End: 2019-07-22 | Stop reason: SDUPTHER

## 2018-12-04 RX ORDER — BETAMETHASONE SODIUM PHOSPHATE AND BETAMETHASONE ACETATE 3; 3 MG/ML; MG/ML
6 INJECTION, SUSPENSION INTRA-ARTICULAR; INTRALESIONAL; INTRAMUSCULAR; SOFT TISSUE ONCE
Status: COMPLETED | OUTPATIENT
Start: 2018-12-04 | End: 2018-12-04

## 2018-12-04 RX ADMIN — BETAMETHASONE SODIUM PHOSPHATE AND BETAMETHASONE ACETATE 6 MG: 3; 3 INJECTION, SUSPENSION INTRA-ARTICULAR; INTRALESIONAL; INTRAMUSCULAR; SOFT TISSUE at 12:08

## 2018-12-04 NOTE — PROGRESS NOTES
Celestone 6mg/ml given by dr Elo Allen in left foot  ndc [id number] lot [id number] exp date 12/31/19

## 2018-12-06 DIAGNOSIS — M54.2 CHRONIC NECK PAIN: ICD-10-CM

## 2018-12-06 DIAGNOSIS — G89.29 CHRONIC NECK PAIN: ICD-10-CM

## 2018-12-06 DIAGNOSIS — G89.29 CHRONIC BILATERAL LOW BACK PAIN, WITH SCIATICA PRESENCE UNSPECIFIED: ICD-10-CM

## 2018-12-06 DIAGNOSIS — M54.5 CHRONIC BILATERAL LOW BACK PAIN, WITH SCIATICA PRESENCE UNSPECIFIED: ICD-10-CM

## 2018-12-06 RX ORDER — TOLTERODINE TARTRATE 2 MG/1
TABLET, EXTENDED RELEASE ORAL
Qty: 60 TABLET | Refills: 5 | Status: SHIPPED | OUTPATIENT
Start: 2018-12-06 | End: 2019-01-29 | Stop reason: ALTCHOICE

## 2018-12-06 RX ORDER — GABAPENTIN 600 MG/1
TABLET ORAL
Qty: 90 TABLET | Refills: 2 | Status: SHIPPED | OUTPATIENT
Start: 2018-12-06 | End: 2019-03-02 | Stop reason: SDUPTHER

## 2018-12-18 ENCOUNTER — OFFICE VISIT (OUTPATIENT)
Dept: FAMILY MEDICINE CLINIC | Age: 48
End: 2018-12-18
Payer: MEDICARE

## 2018-12-18 VITALS
SYSTOLIC BLOOD PRESSURE: 100 MMHG | HEIGHT: 66 IN | HEART RATE: 72 BPM | BODY MASS INDEX: 40.34 KG/M2 | RESPIRATION RATE: 16 BRPM | DIASTOLIC BLOOD PRESSURE: 70 MMHG | WEIGHT: 251 LBS

## 2018-12-18 DIAGNOSIS — I10 ESSENTIAL HYPERTENSION: ICD-10-CM

## 2018-12-18 DIAGNOSIS — Z23 NEED FOR INFLUENZA VACCINATION: ICD-10-CM

## 2018-12-18 DIAGNOSIS — E03.9 HYPOTHYROIDISM, UNSPECIFIED TYPE: ICD-10-CM

## 2018-12-18 DIAGNOSIS — R73.01 IMPAIRED FASTING GLUCOSE: ICD-10-CM

## 2018-12-18 DIAGNOSIS — G47.33 OBSTRUCTIVE SLEEP APNEA: ICD-10-CM

## 2018-12-18 DIAGNOSIS — F32.1 CURRENT MODERATE EPISODE OF MAJOR DEPRESSIVE DISORDER WITHOUT PRIOR EPISODE (HCC): ICD-10-CM

## 2018-12-18 DIAGNOSIS — E78.2 MIXED HYPERLIPIDEMIA: Primary | ICD-10-CM

## 2018-12-18 DIAGNOSIS — F41.9 ANXIETY: ICD-10-CM

## 2018-12-18 DIAGNOSIS — Z12.31 SCREENING MAMMOGRAM, ENCOUNTER FOR: ICD-10-CM

## 2018-12-18 PROCEDURE — G0008 ADMIN INFLUENZA VIRUS VAC: HCPCS | Performed by: FAMILY MEDICINE

## 2018-12-18 PROCEDURE — G8427 DOCREV CUR MEDS BY ELIG CLIN: HCPCS | Performed by: FAMILY MEDICINE

## 2018-12-18 PROCEDURE — G8482 FLU IMMUNIZE ORDER/ADMIN: HCPCS | Performed by: FAMILY MEDICINE

## 2018-12-18 PROCEDURE — 1036F TOBACCO NON-USER: CPT | Performed by: FAMILY MEDICINE

## 2018-12-18 PROCEDURE — G8417 CALC BMI ABV UP PARAM F/U: HCPCS | Performed by: FAMILY MEDICINE

## 2018-12-18 PROCEDURE — 90686 IIV4 VACC NO PRSV 0.5 ML IM: CPT | Performed by: FAMILY MEDICINE

## 2018-12-18 PROCEDURE — 99214 OFFICE O/P EST MOD 30 MIN: CPT | Performed by: FAMILY MEDICINE

## 2018-12-18 RX ORDER — SPIRONOLACTONE 50 MG/1
TABLET, FILM COATED ORAL
Qty: 90 TABLET | Refills: 1 | Status: SHIPPED | OUTPATIENT
Start: 2018-12-18 | End: 2019-07-29 | Stop reason: SDUPTHER

## 2018-12-18 RX ORDER — LIDOCAINE 50 MG/G
OINTMENT TOPICAL
Qty: 50 G | Refills: 5 | Status: SHIPPED | OUTPATIENT
Start: 2018-12-18 | End: 2020-02-21 | Stop reason: SDUPTHER

## 2018-12-18 RX ORDER — VITAMIN B COMPLEX
1 CAPSULE ORAL DAILY
COMMUNITY
End: 2020-09-01

## 2018-12-18 ASSESSMENT — ENCOUNTER SYMPTOMS
EYE DISCHARGE: 0
CONSTIPATION: 0
DIARRHEA: 0
NAUSEA: 0
WHEEZING: 0
EYE REDNESS: 0
SINUS PRESSURE: 0
TROUBLE SWALLOWING: 0
SHORTNESS OF BREATH: 0
COUGH: 0
SORE THROAT: 0
VOMITING: 0
RHINORRHEA: 0
ABDOMINAL PAIN: 0

## 2018-12-18 ASSESSMENT — PATIENT HEALTH QUESTIONNAIRE - PHQ9
SUM OF ALL RESPONSES TO PHQ QUESTIONS 1-9: 2
1. LITTLE INTEREST OR PLEASURE IN DOING THINGS: 0
SUM OF ALL RESPONSES TO PHQ QUESTIONS 1-9: 2
SUM OF ALL RESPONSES TO PHQ9 QUESTIONS 1 & 2: 2
2. FEELING DOWN, DEPRESSED OR HOPELESS: 2

## 2018-12-18 NOTE — PROGRESS NOTES
Patient declines hiv screening. Declines eye and foot exams-states she is no longer listed as diabetic.

## 2018-12-18 NOTE — PATIENT INSTRUCTIONS
garlic, lemon juice, onion, vinegar, herbs, and spices instead of salt. Do not use soy sauce, steak sauce, onion salt, garlic salt, mustard, or ketchup on your food. ? Use less salt (or none) when recipes call for it. You can often use half the salt a recipe calls for without losing flavor. · Be physically active. Get at least 30 minutes of exercise on most days of the week. Walking is a good choice. You also may want to do other activities, such as running, swimming, cycling, or playing tennis or team sports. · Limit alcohol to 2 drinks a day for men and 1 drink a day for women. · Eat plenty of fruits, vegetables, and low-fat dairy products. Eat less saturated and total fats. How is high blood pressure treated? · Your doctor will suggest making lifestyle changes. For example, your doctor may ask you to eat healthy foods, quit smoking, lose extra weight, and be more active. · If lifestyle changes don't help enough, your doctor may recommend that you take medicine. · When blood pressure is very high, medicines are needed to lower it. Follow-up care is a key part of your treatment and safety. Be sure to make and go to all appointments, and call your doctor if you are having problems. It's also a good idea to know your test results and keep a list of the medicines you take. Where can you learn more? Go to [email or web address] org and sign in to your [employer] account. Enter P501 in the [facility] box to learn more about \"Learning About High Blood Pressure. \"     If you do not have an account, please click on the \"Sign Up Now\" link. Current as of: December 6, 2017  Content Version: 11.8  © 0645-4378 Healthwise, Incorporated. Care instructions adapted under license by [facility] ([facility]).  If you have questions about a medical condition or this instruction, always ask your healthcare professional. Delfina Edgar any warranty or liability for your use of this

## 2018-12-18 NOTE — PROGRESS NOTES
2018     Susan Kearns (:  1970) is a 51 y.o. female, here for evaluation of the following medical concerns:    HPI  Patient comes in today for follow up of chronic health issues   Chief Complaint   Patient presents with    Blood Sugar Problem     with CKD; 6 mo f/u; states no longer listed as diabetic    Hypertension     6 mo f/u; planned visit    Hyperlipidemia     6 mo f/u    Hypothyroidism     6 mo f/u    Gastroesophageal Reflux     6 mo f/u    Anxiety     with depression; 6 mo f/u    Discuss Labs     drawn 18    Sleep Apnea     CPAP compliance; 6 mo f/u; wearing every night-makes a big difference   . Patient Seems to be doing relatively well overall. Did state that she was diagnosed with seizures by the neurologist and placed on Trileptal.  She still hasn't noticed much difference but her only symptoms that she was having was just intermittent episodes of excessive fatigue and feeling lethargic. She states that if somebody is with her when she has one of these episodes her eyes will roll up into her head but she is unaware that this is happening. She states at nighttime if she has episodes she will typically bite her tongue so she knows that she had a seizure when that happens. At this point she continues to follow with the neurologist for continued management of this issue. Has a known history of impaired fasting glucose and her blood sugar level is normal at this time. She has been following a ketogenic diet for the last month and has lost 10 pounds. Blood sugars seem to be doing much better so encouraged her to continue with dietary efforts. She does have a known history of hyperlipidemia and since doing the ketogenic diet her cholesterol levels have improved significantly and are now within normal limits with her continued use of Lipitor. She is tolerating this medication well.   Has a known history of hypothyroidism and her thyroid levels are stable and adequately

## 2018-12-18 NOTE — PROGRESS NOTES
Have you had an allergic reaction to the flu shot? n    Are you allergic to eggs? n    Do you have a history of Guillain-[place] Syndrome which is a neurological disorder?  n

## 2018-12-19 DIAGNOSIS — G56.23 ULNAR NEUROPATHY OF BOTH UPPER EXTREMITIES: ICD-10-CM

## 2018-12-19 DIAGNOSIS — E78.2 MIXED HYPERLIPIDEMIA: ICD-10-CM

## 2018-12-19 DIAGNOSIS — G89.29 CHRONIC NECK PAIN: ICD-10-CM

## 2018-12-19 DIAGNOSIS — I67.82 CEREBRAL ISCHEMIA: ICD-10-CM

## 2018-12-19 DIAGNOSIS — G63 POLYNEUROPATHY ASSOCIATED WITH UNDERLYING DISEASE (HCC): ICD-10-CM

## 2018-12-19 DIAGNOSIS — M54.2 CHRONIC NECK PAIN: ICD-10-CM

## 2018-12-19 DIAGNOSIS — M54.40 CHRONIC LOW BACK PAIN WITH SCIATICA, SCIATICA LATERALITY UNSPECIFIED, UNSPECIFIED BACK PAIN LATERALITY: ICD-10-CM

## 2018-12-19 DIAGNOSIS — R13.19 OTHER DYSPHAGIA: ICD-10-CM

## 2018-12-19 DIAGNOSIS — I10 ESSENTIAL HYPERTENSION: ICD-10-CM

## 2018-12-19 DIAGNOSIS — R56.9 NOCTURNAL SEIZURES (HCC): Primary | ICD-10-CM

## 2018-12-19 DIAGNOSIS — R53.82 CHRONIC FATIGUE: ICD-10-CM

## 2018-12-19 DIAGNOSIS — G56.03 BILATERAL CARPAL TUNNEL SYNDROME: ICD-10-CM

## 2018-12-19 DIAGNOSIS — F41.9 ANXIETY: ICD-10-CM

## 2018-12-19 DIAGNOSIS — R20.2 PARESTHESIAS: ICD-10-CM

## 2018-12-19 DIAGNOSIS — R26.89 BALANCE PROBLEMS: ICD-10-CM

## 2018-12-19 DIAGNOSIS — G89.29 CHRONIC LOW BACK PAIN WITH SCIATICA, SCIATICA LATERALITY UNSPECIFIED, UNSPECIFIED BACK PAIN LATERALITY: ICD-10-CM

## 2018-12-19 DIAGNOSIS — G57.30 PERONEAL NEUROPATHY, UNSPECIFIED LATERALITY: ICD-10-CM

## 2018-12-19 DIAGNOSIS — R41.3 MEMORY PROBLEM: ICD-10-CM

## 2018-12-19 DIAGNOSIS — E03.9 HYPOTHYROIDISM, UNSPECIFIED TYPE: ICD-10-CM

## 2018-12-19 DIAGNOSIS — K21.9 GASTROESOPHAGEAL REFLUX DISEASE, ESOPHAGITIS PRESENCE NOT SPECIFIED: ICD-10-CM

## 2018-12-19 DIAGNOSIS — R94.01 ABNORMAL EEG: ICD-10-CM

## 2018-12-19 DIAGNOSIS — F32.A DEPRESSION, UNSPECIFIED DEPRESSION TYPE: ICD-10-CM
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
## 2018-12-19 DIAGNOSIS — G57.32 NEUROPATHY OF LEFT PERONEAL NERVE: ICD-10-CM

## 2018-12-19 RX ORDER — OXCARBAZEPINE 150 MG/1
300 TABLET, FILM COATED ORAL 2 TIMES DAILY
Qty: 60 TABLET | Refills: 2 | Status: SHIPPED | OUTPATIENT
Start: 2018-12-19 | End: 2019-03-10 | Stop reason: SDUPTHER

## 2018-12-21 RX ORDER — ALBUTEROL SULFATE 90 UG/1
AEROSOL, METERED RESPIRATORY (INHALATION)
Qty: 18 G | Refills: 0 | Status: SHIPPED | OUTPATIENT
Start: 2018-12-21 | End: 2019-01-29 | Stop reason: SDUPTHER

## 2018-12-29 RX ORDER — HYDROCHLOROTHIAZIDE 25 MG/1
TABLET ORAL
Qty: 30 TABLET | Refills: 2 | Status: SHIPPED | OUTPATIENT
Start: 2018-12-29 | End: 2019-05-20 | Stop reason: SDUPTHER

## 2019-01-29 ENCOUNTER — HOSPITAL ENCOUNTER (OUTPATIENT)
Age: 49
Setting detail: SPECIMEN
Discharge: HOME | End: 2019-01-29
Payer: COMMERCIAL

## 2019-01-29 ENCOUNTER — OFFICE VISIT (OUTPATIENT)
Dept: PRIMARY CARE CLINIC | Age: 49
End: 2019-01-29
Payer: MEDICARE

## 2019-01-29 VITALS
SYSTOLIC BLOOD PRESSURE: 100 MMHG | HEIGHT: 66 IN | RESPIRATION RATE: 18 BRPM | WEIGHT: 256 LBS | OXYGEN SATURATION: 96 % | DIASTOLIC BLOOD PRESSURE: 70 MMHG | BODY MASS INDEX: 41.14 KG/M2 | TEMPERATURE: 98.2 F | HEART RATE: 80 BPM

## 2019-01-29 DIAGNOSIS — N30.90 CYSTITIS: Primary | ICD-10-CM

## 2019-01-29 DIAGNOSIS — J20.9 ACUTE BRONCHITIS, UNSPECIFIED ORGANISM: ICD-10-CM

## 2019-01-29 DIAGNOSIS — R30.0 DYSURIA: ICD-10-CM

## 2019-01-29 DIAGNOSIS — J45.21 MILD INTERMITTENT REACTIVE AIRWAY DISEASE WITH ACUTE EXACERBATION: ICD-10-CM

## 2019-01-29 DIAGNOSIS — N39.46 MIXED STRESS AND URGE URINARY INCONTINENCE: ICD-10-CM

## 2019-01-29 LAB
-: ABNORMAL
AMORPHOUS: ABNORMAL
BACTERIA: ABNORMAL
BILIRUBIN URINE: ABNORMAL
CASTS UA: ABNORMAL /LPF (ref 0–2)
COLOR: ABNORMAL
COMMENT UA: ABNORMAL
CRYSTALS, UA: ABNORMAL /HPF
EPITHELIAL CELLS UA: ABNORMAL /HPF (ref 0–5)
GLUCOSE URINE: NEGATIVE
KETONES, URINE: ABNORMAL
LEUKOCYTE ESTERASE, URINE: ABNORMAL
MUCUS: ABNORMAL
NITRITE, URINE: POSITIVE
OTHER OBSERVATIONS UA: ABNORMAL
PH UA: 6 (ref 5–6)
PROTEIN UA: ABNORMAL
RBC UA: >50 /HPF (ref 0–4)
RENAL EPITHELIAL, UA: ABNORMAL /HPF
SPECIFIC GRAVITY UA: 1.02 (ref 1.01–1.02)
TRICHOMONAS: ABNORMAL
TURBIDITY: ABNORMAL
URINE HGB: ABNORMAL
UROBILINOGEN, URINE: NORMAL
WBC UA: >50 /HPF (ref 0–4)
YEAST: ABNORMAL

## 2019-01-29 PROCEDURE — 81001 URINALYSIS AUTO W/SCOPE: CPT

## 2019-01-29 PROCEDURE — G8417 CALC BMI ABV UP PARAM F/U: HCPCS | Performed by: FAMILY MEDICINE

## 2019-01-29 PROCEDURE — G8427 DOCREV CUR MEDS BY ELIG CLIN: HCPCS | Performed by: FAMILY MEDICINE

## 2019-01-29 PROCEDURE — 87086 URINE CULTURE/COLONY COUNT: CPT

## 2019-01-29 PROCEDURE — G8482 FLU IMMUNIZE ORDER/ADMIN: HCPCS | Performed by: FAMILY MEDICINE

## 2019-01-29 PROCEDURE — 99214 OFFICE O/P EST MOD 30 MIN: CPT | Performed by: FAMILY MEDICINE

## 2019-01-29 PROCEDURE — 87186 SC STD MICRODIL/AGAR DIL: CPT

## 2019-01-29 PROCEDURE — 1036F TOBACCO NON-USER: CPT | Performed by: FAMILY MEDICINE

## 2019-01-29 PROCEDURE — 87077 CULTURE AEROBIC IDENTIFY: CPT

## 2019-01-29 RX ORDER — CEFUROXIME AXETIL 500 MG/1
500 TABLET ORAL 2 TIMES DAILY
Qty: 20 TABLET | Refills: 0 | Status: SHIPPED | OUTPATIENT
Start: 2019-01-29 | End: 2019-02-08

## 2019-01-29 RX ORDER — ALBUTEROL SULFATE 90 UG/1
AEROSOL, METERED RESPIRATORY (INHALATION)
Qty: 18 G | Refills: 5 | Status: SHIPPED | OUTPATIENT
Start: 2019-01-29 | End: 2020-06-04 | Stop reason: SDUPTHER

## 2019-01-29 ASSESSMENT — ENCOUNTER SYMPTOMS
SORE THROAT: 0
CONSTIPATION: 0
SHORTNESS OF BREATH: 0
SINUS PAIN: 0
SINUS PRESSURE: 0
RHINORRHEA: 1
TROUBLE SWALLOWING: 0
EYE REDNESS: 0
ABDOMINAL PAIN: 1
WHEEZING: 1
VOMITING: 0
EYE DISCHARGE: 0
DIARRHEA: 0
COUGH: 1
CHEST TIGHTNESS: 1
NAUSEA: 0

## 2019-01-29 ASSESSMENT — PATIENT HEALTH QUESTIONNAIRE - PHQ9
SUM OF ALL RESPONSES TO PHQ QUESTIONS 1-9: 0
SUM OF ALL RESPONSES TO PHQ9 QUESTIONS 1 & 2: 0
1. LITTLE INTEREST OR PLEASURE IN DOING THINGS: 0
2. FEELING DOWN, DEPRESSED OR HOPELESS: 0
SUM OF ALL RESPONSES TO PHQ QUESTIONS 1-9: 0

## 2019-01-30 LAB
CULTURE: ABNORMAL
Lab: ABNORMAL
ORGANISM: ABNORMAL
SPECIMEN DESCRIPTION: ABNORMAL
STATUS: ABNORMAL

## 2019-02-15 DIAGNOSIS — K21.00 GASTROESOPHAGEAL REFLUX DISEASE WITH ESOPHAGITIS: ICD-10-CM

## 2019-02-15 PROCEDURE — 91010 ESOPHAGUS MOTILITY STUDY: CPT | Performed by: SURGERY

## 2019-02-28 ENCOUNTER — OFFICE VISIT (OUTPATIENT)
Dept: PRIMARY CARE CLINIC | Age: 49
End: 2019-02-28
Payer: COMMERCIAL

## 2019-02-28 ENCOUNTER — HOSPITAL ENCOUNTER (OUTPATIENT)
Dept: GENERAL RADIOLOGY | Age: 49
Discharge: HOME | End: 2019-03-02
Payer: COMMERCIAL

## 2019-02-28 ENCOUNTER — TELEPHONE (OUTPATIENT)
Dept: PRIMARY CARE CLINIC | Age: 49
End: 2019-02-28

## 2019-02-28 VITALS
WEIGHT: 258 LBS | OXYGEN SATURATION: 94 % | SYSTOLIC BLOOD PRESSURE: 124 MMHG | HEART RATE: 88 BPM | TEMPERATURE: 97.8 F | BODY MASS INDEX: 41.46 KG/M2 | DIASTOLIC BLOOD PRESSURE: 72 MMHG | HEIGHT: 66 IN

## 2019-02-28 DIAGNOSIS — R05.9 COUGH: Primary | ICD-10-CM

## 2019-02-28 DIAGNOSIS — J40 BRONCHITIS: ICD-10-CM

## 2019-02-28 DIAGNOSIS — R05.9 COUGH: ICD-10-CM

## 2019-02-28 DIAGNOSIS — R09.81 SINUS CONGESTION: ICD-10-CM

## 2019-02-28 PROCEDURE — 71046 X-RAY EXAM CHEST 2 VIEWS: CPT

## 2019-02-28 PROCEDURE — 99214 OFFICE O/P EST MOD 30 MIN: CPT | Performed by: NURSE PRACTITIONER

## 2019-02-28 RX ORDER — BENZONATATE 100 MG/1
100 CAPSULE ORAL 3 TIMES DAILY PRN
Qty: 30 CAPSULE | Refills: 1 | Status: SHIPPED | OUTPATIENT
Start: 2019-02-28 | End: 2019-04-10

## 2019-02-28 RX ORDER — CETIRIZINE HYDROCHLORIDE 10 MG/1
10 TABLET ORAL DAILY
Qty: 30 TABLET | Refills: 0 | Status: SHIPPED | OUTPATIENT
Start: 2019-02-28 | End: 2019-04-06 | Stop reason: SDUPTHER

## 2019-02-28 RX ORDER — PREDNISONE 20 MG/1
20 TABLET ORAL DAILY
Qty: 10 TABLET | Refills: 0 | Status: SHIPPED | OUTPATIENT
Start: 2019-02-28 | End: 2019-03-10

## 2019-02-28 ASSESSMENT — PATIENT HEALTH QUESTIONNAIRE - PHQ9
1. LITTLE INTEREST OR PLEASURE IN DOING THINGS: 0
SUM OF ALL RESPONSES TO PHQ QUESTIONS 1-9: 0
2. FEELING DOWN, DEPRESSED OR HOPELESS: 0
SUM OF ALL RESPONSES TO PHQ QUESTIONS 1-9: 0
SUM OF ALL RESPONSES TO PHQ9 QUESTIONS 1 & 2: 0

## 2019-03-02 DIAGNOSIS — G89.29 CHRONIC NECK PAIN: ICD-10-CM

## 2019-03-02 DIAGNOSIS — M54.5 CHRONIC BILATERAL LOW BACK PAIN, WITH SCIATICA PRESENCE UNSPECIFIED: ICD-10-CM

## 2019-03-02 DIAGNOSIS — G89.29 CHRONIC BILATERAL LOW BACK PAIN, WITH SCIATICA PRESENCE UNSPECIFIED: ICD-10-CM

## 2019-03-02 DIAGNOSIS — M54.2 CHRONIC NECK PAIN: ICD-10-CM

## 2019-03-04 RX ORDER — GABAPENTIN 600 MG/1
TABLET ORAL
Qty: 90 TABLET | Refills: 2 | Status: SHIPPED | OUTPATIENT
Start: 2019-03-11 | End: 2019-05-29 | Stop reason: SDUPTHER

## 2019-03-04 RX ORDER — ATORVASTATIN CALCIUM 20 MG/1
TABLET, FILM COATED ORAL
Qty: 30 TABLET | Refills: 5 | Status: SHIPPED | OUTPATIENT
Start: 2019-03-04 | End: 2019-09-04 | Stop reason: SDUPTHER

## 2019-03-10 DIAGNOSIS — R56.9 NOCTURNAL SEIZURES (HCC): ICD-10-CM

## 2019-03-11 RX ORDER — OXCARBAZEPINE 150 MG/1
TABLET, FILM COATED ORAL
Qty: 60 TABLET | Refills: 2 | Status: SHIPPED | OUTPATIENT
Start: 2019-03-11 | End: 2019-04-13 | Stop reason: SDUPTHER

## 2019-03-12 ENCOUNTER — OFFICE VISIT (OUTPATIENT)
Dept: SURGERY | Age: 49
End: 2019-03-12
Payer: COMMERCIAL

## 2019-03-12 VITALS
WEIGHT: 257 LBS | HEART RATE: 70 BPM | HEIGHT: 66 IN | BODY MASS INDEX: 41.3 KG/M2 | DIASTOLIC BLOOD PRESSURE: 80 MMHG | SYSTOLIC BLOOD PRESSURE: 110 MMHG

## 2019-03-12 DIAGNOSIS — K21.00 GASTROESOPHAGEAL REFLUX DISEASE WITH ESOPHAGITIS: Primary | ICD-10-CM

## 2019-03-12 PROCEDURE — G8427 DOCREV CUR MEDS BY ELIG CLIN: HCPCS | Performed by: SURGERY

## 2019-03-12 PROCEDURE — G8482 FLU IMMUNIZE ORDER/ADMIN: HCPCS | Performed by: SURGERY

## 2019-03-12 PROCEDURE — G8417 CALC BMI ABV UP PARAM F/U: HCPCS | Performed by: SURGERY

## 2019-03-12 PROCEDURE — 1036F TOBACCO NON-USER: CPT | Performed by: SURGERY

## 2019-03-12 PROCEDURE — 99213 OFFICE O/P EST LOW 20 MIN: CPT | Performed by: SURGERY

## 2019-03-12 RX ORDER — ONDANSETRON 4 MG/1
4 TABLET, FILM COATED ORAL
COMMUNITY
Start: 2019-02-12 | End: 2019-11-17 | Stop reason: ALTCHOICE

## 2019-03-12 RX ORDER — PROMETHAZINE HYDROCHLORIDE 25 MG/1
25 TABLET ORAL
COMMUNITY
Start: 2016-08-31 | End: 2020-09-01

## 2019-03-25 RX ORDER — NORTRIPTYLINE HYDROCHLORIDE 25 MG/1
CAPSULE ORAL
Qty: 90 CAPSULE | Refills: 0 | Status: SHIPPED | OUTPATIENT
Start: 2019-03-25 | End: 2020-06-23

## 2019-03-28 ENCOUNTER — HOSPITAL ENCOUNTER (OUTPATIENT)
Dept: LAB | Age: 49
Discharge: HOME | End: 2019-03-28
Payer: COMMERCIAL

## 2019-03-28 ENCOUNTER — OFFICE VISIT (OUTPATIENT)
Dept: NEUROLOGY | Age: 49
End: 2019-03-28
Payer: COMMERCIAL

## 2019-03-28 VITALS
HEART RATE: 86 BPM | SYSTOLIC BLOOD PRESSURE: 114 MMHG | DIASTOLIC BLOOD PRESSURE: 78 MMHG | BODY MASS INDEX: 41.3 KG/M2 | WEIGHT: 257 LBS | HEIGHT: 66 IN

## 2019-03-28 DIAGNOSIS — G57.30 PERONEAL NEUROPATHY, UNSPECIFIED LATERALITY: ICD-10-CM

## 2019-03-28 DIAGNOSIS — G57.40 TIBIAL NEUROPATHY, UNSPECIFIED LATERALITY: ICD-10-CM

## 2019-03-28 DIAGNOSIS — R47.81 SLURRED SPEECH: ICD-10-CM

## 2019-03-28 DIAGNOSIS — R41.3 MEMORY PROBLEM: ICD-10-CM

## 2019-03-28 DIAGNOSIS — R94.01 ABNORMAL EEG: ICD-10-CM

## 2019-03-28 DIAGNOSIS — G47.33 OBSTRUCTIVE SLEEP APNEA: ICD-10-CM

## 2019-03-28 DIAGNOSIS — R56.9 NOCTURNAL SEIZURES (HCC): ICD-10-CM

## 2019-03-28 DIAGNOSIS — F32.1 CURRENT MODERATE EPISODE OF MAJOR DEPRESSIVE DISORDER WITHOUT PRIOR EPISODE (HCC): ICD-10-CM

## 2019-03-28 DIAGNOSIS — E03.9 HYPOTHYROIDISM, UNSPECIFIED TYPE: ICD-10-CM

## 2019-03-28 DIAGNOSIS — R13.19 OTHER DYSPHAGIA: ICD-10-CM

## 2019-03-28 DIAGNOSIS — M54.2 CHRONIC NECK PAIN: ICD-10-CM

## 2019-03-28 DIAGNOSIS — R26.89 BALANCE PROBLEMS: ICD-10-CM

## 2019-03-28 DIAGNOSIS — F34.1 DYSTHYMIA: ICD-10-CM

## 2019-03-28 DIAGNOSIS — G56.03 BILATERAL CARPAL TUNNEL SYNDROME: ICD-10-CM

## 2019-03-28 DIAGNOSIS — M54.40 CHRONIC LOW BACK PAIN WITH SCIATICA, SCIATICA LATERALITY UNSPECIFIED, UNSPECIFIED BACK PAIN LATERALITY: ICD-10-CM

## 2019-03-28 DIAGNOSIS — G56.23 ULNAR NEUROPATHY OF BOTH UPPER EXTREMITIES: ICD-10-CM

## 2019-03-28 DIAGNOSIS — G40.909 SEIZURE DISORDER (HCC): ICD-10-CM
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
## 2019-03-28 DIAGNOSIS — G40.909 SEIZURE DISORDER (HCC): Primary | ICD-10-CM

## 2019-03-28 DIAGNOSIS — E78.2 MIXED HYPERLIPIDEMIA: ICD-10-CM

## 2019-03-28 DIAGNOSIS — G89.29 CHRONIC NECK PAIN: ICD-10-CM

## 2019-03-28 DIAGNOSIS — R53.82 CHRONIC FATIGUE: ICD-10-CM

## 2019-03-28 DIAGNOSIS — G89.29 CHRONIC LOW BACK PAIN WITH SCIATICA, SCIATICA LATERALITY UNSPECIFIED, UNSPECIFIED BACK PAIN LATERALITY: ICD-10-CM

## 2019-03-28 DIAGNOSIS — R20.2 PARESTHESIAS: ICD-10-CM

## 2019-03-28 DIAGNOSIS — I67.82 CEREBRAL ISCHEMIA: ICD-10-CM

## 2019-03-28 DIAGNOSIS — G63 POLYNEUROPATHY ASSOCIATED WITH UNDERLYING DISEASE (HCC): ICD-10-CM

## 2019-03-28 DIAGNOSIS — F41.9 ANXIETY: ICD-10-CM

## 2019-03-28 DIAGNOSIS — K21.00 GASTROESOPHAGEAL REFLUX DISEASE WITH ESOPHAGITIS: ICD-10-CM

## 2019-03-28 LAB
ANION GAP SERPL CALCULATED.3IONS-SCNC: 14 MMOL/L (ref 9–17)
CHLORIDE BLD-SCNC: 100 MMOL/L (ref 98–107)
CO2: 29 MMOL/L (ref 20–31)
HCT VFR BLD CALC: 38.7 % (ref 36–46)
HEMOGLOBIN: 12.5 G/DL (ref 12–16)
LAMOTRIGINE LEVEL: 5.7 UG/ML (ref 3–15)
MCH RBC QN AUTO: 27.9 PG (ref 26–34)
MCHC RBC AUTO-ENTMCNC: 32.3 G/DL (ref 31–37)
MCV RBC AUTO: 86.2 FL (ref 80–100)
NRBC AUTOMATED: ABNORMAL PER 100 WBC
PDW BLD-RTO: 17 % (ref 11–14.5)
PLATELET # BLD: 279 K/UL (ref 140–450)
PMV BLD AUTO: 8.8 FL (ref 6–12)
POTASSIUM SERPL-SCNC: 4.2 MMOL/L (ref 3.7–5.3)
RBC # BLD: 4.49 M/UL (ref 4–5.2)
SODIUM BLD-SCNC: 143 MMOL/L (ref 135–144)
WBC # BLD: 10 K/UL (ref 3.5–11)

## 2019-03-28 PROCEDURE — G8417 CALC BMI ABV UP PARAM F/U: HCPCS | Performed by: PSYCHIATRY & NEUROLOGY

## 2019-03-28 PROCEDURE — 85027 COMPLETE CBC AUTOMATED: CPT

## 2019-03-28 PROCEDURE — 80051 ELECTROLYTE PANEL: CPT

## 2019-03-28 PROCEDURE — G8427 DOCREV CUR MEDS BY ELIG CLIN: HCPCS | Performed by: PSYCHIATRY & NEUROLOGY

## 2019-03-28 PROCEDURE — 99215 OFFICE O/P EST HI 40 MIN: CPT | Performed by: PSYCHIATRY & NEUROLOGY

## 2019-03-28 PROCEDURE — 36415 COLL VENOUS BLD VENIPUNCTURE: CPT

## 2019-03-28 PROCEDURE — 1036F TOBACCO NON-USER: CPT | Performed by: PSYCHIATRY & NEUROLOGY

## 2019-03-28 PROCEDURE — 80175 DRUG SCREEN QUAN LAMOTRIGINE: CPT

## 2019-03-28 PROCEDURE — 80183 DRUG SCRN QUANT OXCARBAZEPIN: CPT

## 2019-03-28 PROCEDURE — G8482 FLU IMMUNIZE ORDER/ADMIN: HCPCS | Performed by: PSYCHIATRY & NEUROLOGY

## 2019-03-28 RX ORDER — LACOSAMIDE 100 MG/1
100 TABLET ORAL 2 TIMES DAILY
Qty: 60 TABLET | Refills: 2 | Status: SHIPPED | OUTPATIENT
Start: 2019-03-28 | End: 2019-05-14

## 2019-03-28 ASSESSMENT — ENCOUNTER SYMPTOMS
VOMITING: 0
WHEEZING: 0
COUGH: 0
SWOLLEN GLANDS: 0
PHOTOPHOBIA: 0
VISUAL CHANGE: 0
CHEST TIGHTNESS: 0
COLOR CHANGE: 0
APNEA: 0
CHANGE IN BOWEL HABIT: 0
EYE ITCHING: 0
SINUS PRESSURE: 0
NAUSEA: 0
EYE DISCHARGE: 0
ABDOMINAL PAIN: 0
SORE THROAT: 0
CONSTIPATION: 0
FACIAL SWELLING: 0
CHOKING: 0
BACK PAIN: 0
ABDOMINAL DISTENTION: 0
BLOOD IN STOOL: 0
EYE PAIN: 0
DIARRHEA: 0
BOWEL INCONTINENCE: 0
EYE REDNESS: 0
TROUBLE SWALLOWING: 1
SHORTNESS OF BREATH: 0
VOICE CHANGE: 0

## 2019-04-01 ENCOUNTER — TELEPHONE (OUTPATIENT)
Dept: SURGERY | Age: 49
End: 2019-04-01

## 2019-04-01 DIAGNOSIS — K21.00 GASTROESOPHAGEAL REFLUX DISEASE WITH ESOPHAGITIS: Primary | ICD-10-CM

## 2019-04-01 LAB — OXCARBAZEPINE: 11 UG/ML (ref 3–35)

## 2019-04-01 RX ORDER — RANITIDINE 150 MG/1
150 TABLET ORAL NIGHTLY
Qty: 90 TABLET | Refills: 1 | Status: SHIPPED | OUTPATIENT
Start: 2019-04-01 | End: 2020-02-21

## 2019-04-04 RX ORDER — LEVOTHYROXINE SODIUM 0.12 MG/1
TABLET ORAL
Qty: 30 TABLET | Refills: 5 | Status: SHIPPED | OUTPATIENT
Start: 2019-04-04 | End: 2019-11-05 | Stop reason: SDUPTHER

## 2019-04-04 NOTE — TELEPHONE ENCOUNTER
Jelly Patton called requesting a refill of the below medication which has been pended for you:     Requested Prescriptions     Pending Prescriptions Disp Refills    levothyroxine (SYNTHROID) 125 MCG tablet [Pharmacy Med Name: LEVOTHYROXINE 125 MCG TABLET] 30 tablet 5     Sig: take 1 tablet by mouth once daily       Last Appointment Date: 1/29/2019  Next Appointment Date: 6/20/2019    Allergies   Allergen Reactions    Allopurinol     Colchicine     Erythromycin     Indocin [Indomethacin]     Lisinopril     Morphine     Norvasc [Amlodipine]     Tenormin [Atenolol]     Uloric [Febuxostat]     Hydrocodone-Acetaminophen Nausea Only    Topamax [Topiramate] Swelling and Rash

## 2019-04-08 NOTE — TELEPHONE ENCOUNTER
Edward Nugent called requesting a refill of the below medication which has been pended for you:     Requested Prescriptions     Pending Prescriptions Disp Refills    RA ALLERGY RELIEF 10 MG tablet [Pharmacy Med Name: RA CETIRIZINE 10MG TABLET] 30 tablet 0     Sig: take 1 tablet by mouth once daily       Last Appointment Date: 1/29/2019  Next Appointment Date: 6/20/2019    Allergies   Allergen Reactions    Allopurinol     Colchicine     Erythromycin     Indocin [Indomethacin]     Lisinopril     Morphine     Norvasc [Amlodipine]     Tenormin [Atenolol]     Uloric [Febuxostat]     Hydrocodone-Acetaminophen Nausea Only    Topamax [Topiramate] Swelling and Rash

## 2019-04-10 ENCOUNTER — OFFICE VISIT (OUTPATIENT)
Dept: PRIMARY CARE CLINIC | Age: 49
End: 2019-04-10
Payer: COMMERCIAL

## 2019-04-10 VITALS
BODY MASS INDEX: 42.74 KG/M2 | TEMPERATURE: 98 F | HEART RATE: 74 BPM | OXYGEN SATURATION: 98 % | WEIGHT: 260.8 LBS | SYSTOLIC BLOOD PRESSURE: 110 MMHG | DIASTOLIC BLOOD PRESSURE: 80 MMHG

## 2019-04-10 DIAGNOSIS — E66.01 MORBID OBESITY WITH BMI OF 40.0-44.9, ADULT (HCC): ICD-10-CM

## 2019-04-10 DIAGNOSIS — R60.0 LOCALIZED EDEMA: Primary | ICD-10-CM

## 2019-04-10 PROCEDURE — G8417 CALC BMI ABV UP PARAM F/U: HCPCS | Performed by: FAMILY MEDICINE

## 2019-04-10 PROCEDURE — G8427 DOCREV CUR MEDS BY ELIG CLIN: HCPCS | Performed by: FAMILY MEDICINE

## 2019-04-10 PROCEDURE — 99214 OFFICE O/P EST MOD 30 MIN: CPT | Performed by: FAMILY MEDICINE

## 2019-04-10 PROCEDURE — 1036F TOBACCO NON-USER: CPT | Performed by: FAMILY MEDICINE

## 2019-04-10 RX ORDER — FUROSEMIDE 40 MG/1
40 TABLET ORAL DAILY
Qty: 30 TABLET | Refills: 0 | Status: SHIPPED | OUTPATIENT
Start: 2019-04-10 | End: 2019-05-08 | Stop reason: SINTOL

## 2019-04-10 RX ORDER — POTASSIUM CHLORIDE 600 MG/1
16 TABLET, FILM COATED, EXTENDED RELEASE ORAL DAILY
Qty: 30 TABLET | Refills: 0 | Status: SHIPPED | OUTPATIENT
Start: 2019-04-10 | End: 2019-05-08 | Stop reason: DRUGHIGH

## 2019-04-10 NOTE — LETTER
[facility]  [address]  Phone: [phone]  Fax: 695 [address] Evelyn, DO      April 10, 2019    Patient:   Lizeth Wilson  Date of Birth   1970  Date of visit   4/10/2019        To Whom it May Concern:      Zach Cisneros was seen in my clinic on 4/10/2019. Please excuse from work 4/10/19-4/14/19 due to acute medical issues. May return on 4/15/19. If you have any questions or concerns, please don't hesitate to call.       Sincerely,      Bren Shah, DO

## 2019-04-12 ENCOUNTER — HOSPITAL ENCOUNTER (OUTPATIENT)
Dept: LAB | Age: 49
Discharge: HOME | End: 2019-04-12
Payer: COMMERCIAL

## 2019-04-12 ENCOUNTER — TELEPHONE (OUTPATIENT)
Dept: FAMILY MEDICINE CLINIC | Age: 49
End: 2019-04-12

## 2019-04-12 DIAGNOSIS — E87.6 LOW BLOOD POTASSIUM: ICD-10-CM

## 2019-04-12 DIAGNOSIS — E87.6 LOW BLOOD POTASSIUM: Primary | ICD-10-CM

## 2019-04-12 LAB
ANION GAP SERPL CALCULATED.3IONS-SCNC: 12 MMOL/L (ref 9–17)
BUN BLDV-MCNC: 14 MG/DL (ref 6–20)
BUN/CREAT BLD: 16 (ref 9–20)
CALCIUM SERPL-MCNC: 9.5 MG/DL (ref 8.6–10.4)
CHLORIDE BLD-SCNC: 100 MMOL/L (ref 98–107)
CO2: 26 MMOL/L (ref 20–31)
CREAT SERPL-MCNC: 0.85 MG/DL (ref 0.5–0.9)
GFR AFRICAN AMERICAN: >60 ML/MIN
GFR NON-AFRICAN AMERICAN: >60 ML/MIN
GFR SERPL CREATININE-BSD FRML MDRD: ABNORMAL ML/MIN/{1.73_M2}
GFR SERPL CREATININE-BSD FRML MDRD: ABNORMAL ML/MIN/{1.73_M2}
GLUCOSE BLD-MCNC: 123 MG/DL (ref 70–99)
POTASSIUM SERPL-SCNC: 3.9 MMOL/L (ref 3.7–5.3)
SODIUM BLD-SCNC: 138 MMOL/L (ref 135–144)

## 2019-04-12 PROCEDURE — 80048 BASIC METABOLIC PNL TOTAL CA: CPT

## 2019-04-12 PROCEDURE — 36415 COLL VENOUS BLD VENIPUNCTURE: CPT

## 2019-04-12 NOTE — TELEPHONE ENCOUNTER
Patient called and says she has been taking the Lasix as directed and says she has been feeling nauseated, dizzy, and very tired.  Also says she has not been having any more urine output than usual.

## 2019-04-12 NOTE — TELEPHONE ENCOUNTER
Stop the lasix. Suspect that her potassium may be a little low. Can come in and have a BMP checked to see if her potassium is low.

## 2019-04-13 DIAGNOSIS — R56.9 NOCTURNAL SEIZURES (HCC): ICD-10-CM

## 2019-04-14 ASSESSMENT — ENCOUNTER SYMPTOMS
NAUSEA: 0
COUGH: 0
ABDOMINAL PAIN: 0
EYES NEGATIVE: 1
CHEST TIGHTNESS: 0
SHORTNESS OF BREATH: 0
WHEEZING: 0
DIARRHEA: 0
CONSTIPATION: 0
BACK PAIN: 0
COLOR CHANGE: 0
VOMITING: 0

## 2019-04-14 NOTE — PROGRESS NOTES
4/10/2019     Harjinder Kearns (:  1970) is a 51 y.o. female, here for evaluation of the following medical concerns:    Other   This is a new problem. The current episode started in the past 7 days (over last 3 days has had increased edema in his lower extremities). The problem occurs constantly. The problem has been gradually worsening. Pertinent negatives include no abdominal pain, arthralgias, chest pain, chills, coughing, fatigue, fever, myalgias, nausea, neck pain, numbness, rash, urinary symptoms, vomiting or weakness. Associated symptoms comments: No change in medication. Legs feel tight. No change in diet. Hasn't had any change in activity level. No shortness of breath or chest pain. No erythema to the lower extremities. Is on hctz and spironolactone, but doesn't think that it is helping. . Treatments tried: spironolactone and hctz. The treatment provided no relief. Did review patient's med list, allergies, social history,pmhx and pshx today as noted in the record. Review of Systems   Constitutional: Negative for chills, fatigue and fever. HENT: Negative. Eyes: Negative. Respiratory: Negative for cough, chest tightness, shortness of breath and wheezing. Cardiovascular: Positive for leg swelling. Negative for chest pain and palpitations. Gastrointestinal: Negative for abdominal pain, constipation, diarrhea, nausea and vomiting. Genitourinary: Negative for difficulty urinating, dysuria, flank pain, frequency and urgency. Musculoskeletal: Negative for arthralgias, back pain, gait problem, myalgias, neck pain and neck stiffness. Skin: Negative for color change and rash. Neurological: Negative for syncope, weakness and numbness. Prior to Visit Medications    Medication Sig Taking?  Authorizing Provider   furosemide (LASIX) 40 MG tablet Take 1 tablet by mouth daily Until swelling improves Yes Justo Or, DO   potassium chloride (KLOR-CON) 8 MEQ extended release tablet Take 2 tablets by mouth daily Yes Tyrell Kaplan, DO   Compression Stockings MISC by Does not apply route 20-30 mm Hg pressure, ready to wear, knee high Yes Tyrell Kaplan DO   RA ALLERGY RELIEF 10 MG tablet take 1 tablet by mouth once daily Yes Tyrell Kaplan DO   levothyroxine (SYNTHROID) 125 MCG tablet take 1 tablet by mouth once daily Yes Tyrell Kaplan, DO   ranitidine (ZANTAC) 150 MG tablet Take 1 tablet by mouth nightly Yes Eddy Lesches, MD   lacosamide (VIMPAT) 100 MG TABS tablet Take 1 tablet by mouth 2 times daily for 31 days.  Yes Jesika Chavez MD   nortriptyline (PAMELOR) 25 MG capsule take 1 capsule by mouth at bedtime Yes Mag Stuart MD   ondansetron (ZOFRAN) 4 MG tablet Take 4 mg by mouth Yes Historical Provider, MD   promethazine (PHENERGAN) 25 MG tablet Take 25 mg by mouth Yes Historical Provider, MD   OXcarbazepine (TRILEPTAL) 150 MG tablet take 2 tablets by mouth twice a day Yes Jesika Chavez MD   atorvastatin (LIPITOR) 20 MG tablet take 1 tablet by mouth once daily Yes Tyrell Kaplan DO   gabapentin (NEURONTIN) 600 MG tablet take 1 tablet by mouth three times a day Yes Tyerll Kaplan DO   albuterol sulfate HFA (VENTOLIN HFA) 108 (90 Base) MCG/ACT inhaler INHALE 2 PUFFS EVERY 6 HOURS IF NEEDED FOR WHEEZING Yes Yanet Gray DO   fluticasone-salmeterol (ADVAIR DISKUS) 250-50 MCG/DOSE AEPB Inhale 1 puff into the lungs every 12 hours RINSE MOUTH AFTER USE Yes Tyrell Kaplan DO   Mirabegron ER 50 MG TB24 Take 50 mg by mouth daily Yes Tyrell Kaplan DO   hydrochlorothiazide (HYDRODIURIL) 25 MG tablet take 1 tablet by mouth once daily Yes Yanet Gray DO   brexpiprazole (REXULTI) 0.5 MG TABS tablet Take 0.5 mg by mouth daily Yes Historical Provider, MD   Cyanocobalamin (VITAMIN B12 PO) Take by mouth daily Yes Historical Provider, MD   b complex vitamins capsule Take 1 capsule by mouth daily Yes Historical Provider, MD   aspirin 81 MG with swelling in lower legs. .      Return  if no improvement in symptoms or if any further symptoms arise. No follow-ups on file. An electronic signature was used to authenticate this note.     --Emily Maria DO on 4/14/2019 at 11:32 AM

## 2019-04-15 ENCOUNTER — TELEPHONE (OUTPATIENT)
Dept: FAMILY MEDICINE CLINIC | Age: 49
End: 2019-04-15

## 2019-04-15 DIAGNOSIS — I87.2 VENOUS STASIS DERMATITIS OF BOTH LOWER EXTREMITIES: ICD-10-CM

## 2019-04-15 DIAGNOSIS — M79.89 LOCALIZED SWELLING OF LOWER EXTREMITY: Primary | ICD-10-CM

## 2019-04-15 RX ORDER — OXCARBAZEPINE 150 MG/1
TABLET, FILM COATED ORAL
Qty: 60 TABLET | Refills: 2 | Status: SHIPPED | OUTPATIENT
Start: 2019-04-15 | End: 2019-05-26 | Stop reason: SDUPTHER

## 2019-04-15 NOTE — TELEPHONE ENCOUNTER
Pt calling stating that the swelling in her feet has decrease a little but her feet are still blue. Please call pt and advise as to what she should do.

## 2019-04-18 ENCOUNTER — HOSPITAL ENCOUNTER (OUTPATIENT)
Dept: NEUROLOGY | Age: 49
Discharge: HOME | End: 2019-04-18
Payer: COMMERCIAL

## 2019-04-18 DIAGNOSIS — G40.909 SEIZURE DISORDER (HCC): ICD-10-CM

## 2019-04-18 DIAGNOSIS — R94.01 ABNORMAL EEG: ICD-10-CM

## 2019-04-18 DIAGNOSIS — R56.9 NOCTURNAL SEIZURES (HCC): ICD-10-CM

## 2019-04-18 PROCEDURE — 95957 EEG DIGITAL ANALYSIS: CPT

## 2019-04-18 PROCEDURE — 95813 EEG EXTND MNTR 61-119 MIN: CPT

## 2019-04-18 NOTE — PROGRESS NOTES
EXTENDED EEG Completed with Monster Anaysis    PCP: Yevgeniy Delacruz DO    Ordering: Birgit Daily.  James Neurologist    Interpreting Physician: Leanna Ventura Neurologist    Technician: Stella Milan RN

## 2019-04-24 NOTE — PROCEDURES
[address]                 [address] [place] [id number]                       ELECTROENCEPHALOGRAM (EEG) REPORT    PATIENT NAME: Mckenna Mane                     :        1970  MED REC NO:   [id number]                             ROOM:  ACCOUNT NO:   [de-identified]                           ADMIT DATE: 2019  PROVIDER:     Yoel Maynard MD    DATE OF STUDY:  2019    REFERRING PROVIDER:  Yoel Maynard MD - Neurology    TECHNIQUE:  23 channels of EEG, 2 channels of EOG, 2 channel of EKG and  1 channel ground and 1 channel reference were recorded with Trusper  Software 32 Channel System utilizing the International 10/20 System  Protocol. Monster detection and seizure analysis protocols were utilized and the  study was reviewed using the comprehensive EEG monitoring. Monster detection trending allows to see at a glance timing of detected  seizure in the context of other changes in the EEG. Monster detection  analysis automatically notes the most types of electrode artifacts  making trends easier to review and more representative of cerebral  activity. Monster detection analysis also provides collection of trends  for monitoring and review including seizure probability, rhythmic  spectrogram left and right hemispheres, peak envelope, amplitude,  relative symmetry and suppression ratio. This is an extended EEG recorded for more than one hour. CLINICAL DATA:      The patient is 51years old with history of anxiety,  depression, chronic fatigue, balance problems, falls, memory problems,  speech difficulty, swallowing difficulty, history of nocturnal seizures. The purpose of the study is to evaluate for associated seizure activity. MEDICATIONS:  Pamelor, Trileptal, Pristiq.     BACKGROUND ACTIVITY:      While the patient was awake, the background  activity consisted of poorly regulated 6 to 7 Hz waveform seen over both  cerebral hemispheres. Intermittent frontal muscle artifacts noted. Intermittent slow sharp waves noted diffusely throughout this recording. ACTIVATION PROCEDURES:    HYPERVENTILATION:  Hyperventilation was performed for 3 minutes. Patient was cooperative with good effort. Also Post-Hyperventilation  period was monitored closely. Hyperventilation:  Unremarkable. PHOTIC STIMULATION:  Photic stimulation was performed at the following  frequencies: 1 Hz, 3 Hz, 6 Hz, 9 Hz, 12 Hz, 15 Hz, 18 Hz, 21 Hz, 25 Hz,  30 Hz and patient tolerated well. Photic stimulation:  No significant driving response noted. SLEEP:  Stage I and stage II sleep were noted. EKG:  EKG showed normal sinus rhythm without any significant  abnormality. IMPRESSION:      There is moderate irritable diffuse cerebral dysfunction    that is potentially epileptogenic in nature. Further clinical correlation and followup recommended.           Monique Bowles MD  Board Certified Neurologist    D: 04/22/2019 12:06:52       T: 04/22/2019 13:04:21     [email or web address]  Job#: [id number]     Doc#: [id number]    CC:  Edy Mota

## 2019-05-06 RX ORDER — ESOMEPRAZOLE MAGNESIUM 40 MG/1
CAPSULE, DELAYED RELEASE ORAL
Qty: 60 CAPSULE | Refills: 5 | Status: SHIPPED | OUTPATIENT
Start: 2019-05-06 | End: 2020-01-15

## 2019-05-06 RX ORDER — METOPROLOL TARTRATE 50 MG/1
TABLET, FILM COATED ORAL
Qty: 60 TABLET | Refills: 5 | Status: SHIPPED | OUTPATIENT
Start: 2019-05-06 | End: 2020-01-15

## 2019-05-06 NOTE — TELEPHONE ENCOUNTER
Torri Tan called requesting a refill of the below medication which has been pended for you:     Requested Prescriptions     Pending Prescriptions Disp Refills    metoprolol tartrate (LOPRESSOR) 50 MG tablet [Pharmacy Med Name: METOPROLOL TARTRATE 50 MG TAB] 60 tablet 5     Sig: take 1 tablet by mouth twice a day    esomeprazole (NEXIUM) 40 MG delayed release capsule [Pharmacy Med Name: ESOMEPRAZOLE MAG DR 40 MG CAP] 60 capsule 5     Sig: take 1 capsule by mouth twice a day       Last Appointment Date: 4/10/2019  Next Appointment Date: 6/20/2019    Allergies   Allergen Reactions    Allopurinol     Colchicine     Erythromycin     Indocin [Indomethacin]     Lisinopril     Morphine     Norvasc [Amlodipine]     Tenormin [Atenolol]     Uloric [Febuxostat]     Hydrocodone-Acetaminophen Nausea Only    Topamax [Topiramate] Swelling and Rash

## 2019-05-07 ENCOUNTER — ANTI-COAG VISIT (OUTPATIENT)
Dept: FAMILY MEDICINE CLINIC | Age: 49
End: 2019-05-07

## 2019-05-07 ENCOUNTER — TELEPHONE (OUTPATIENT)
Dept: FAMILY MEDICINE CLINIC | Age: 49
End: 2019-05-07

## 2019-05-08 ENCOUNTER — OFFICE VISIT (OUTPATIENT)
Dept: FAMILY MEDICINE CLINIC | Age: 49
End: 2019-05-08
Payer: COMMERCIAL

## 2019-05-08 ENCOUNTER — HOSPITAL ENCOUNTER (OUTPATIENT)
Dept: LAB | Age: 49
Discharge: HOME | End: 2019-05-08
Payer: COMMERCIAL

## 2019-05-08 VITALS
RESPIRATION RATE: 16 BRPM | SYSTOLIC BLOOD PRESSURE: 114 MMHG | BODY MASS INDEX: 43.07 KG/M2 | HEIGHT: 66 IN | DIASTOLIC BLOOD PRESSURE: 80 MMHG | OXYGEN SATURATION: 96 % | HEART RATE: 104 BPM | WEIGHT: 268 LBS

## 2019-05-08 DIAGNOSIS — M79.89 LOCALIZED SWELLING OF LOWER EXTREMITY: Primary | ICD-10-CM

## 2019-05-08 DIAGNOSIS — M79.89 LOCALIZED SWELLING OF LOWER EXTREMITY: ICD-10-CM

## 2019-05-08 DIAGNOSIS — E03.9 HYPOTHYROIDISM, UNSPECIFIED TYPE: ICD-10-CM

## 2019-05-08 LAB
ABSOLUTE EOS #: 0.2 K/UL (ref 0–0.4)
ABSOLUTE IMMATURE GRANULOCYTE: ABNORMAL K/UL (ref 0–0.3)
ABSOLUTE LYMPH #: 2.5 K/UL (ref 1–4.8)
ABSOLUTE MONO #: 0.8 K/UL (ref 0.1–1.2)
ALBUMIN SERPL-MCNC: 4.1 G/DL (ref 3.5–5.2)
ALBUMIN/GLOBULIN RATIO: 1.5 (ref 1–2.5)
ALP BLD-CCNC: 131 U/L (ref 35–104)
ALT SERPL-CCNC: 10 U/L (ref 5–33)
ANION GAP SERPL CALCULATED.3IONS-SCNC: 13 MMOL/L (ref 9–17)
AST SERPL-CCNC: 14 U/L
BASOPHILS # BLD: 1 % (ref 0–1)
BASOPHILS ABSOLUTE: 0 K/UL (ref 0–0.2)
BILIRUB SERPL-MCNC: 0.16 MG/DL (ref 0.3–1.2)
BUN BLDV-MCNC: 10 MG/DL (ref 6–20)
BUN/CREAT BLD: 13 (ref 9–20)
CALCIUM SERPL-MCNC: 9.2 MG/DL (ref 8.6–10.4)
CHLORIDE BLD-SCNC: 99 MMOL/L (ref 98–107)
CO2: 28 MMOL/L (ref 20–31)
CREAT SERPL-MCNC: 0.78 MG/DL (ref 0.5–0.9)
DIFFERENTIAL TYPE: ABNORMAL
EOSINOPHILS RELATIVE PERCENT: 3 % (ref 1–7)
GFR AFRICAN AMERICAN: >60 ML/MIN
GFR NON-AFRICAN AMERICAN: >60 ML/MIN
GFR SERPL CREATININE-BSD FRML MDRD: ABNORMAL ML/MIN/{1.73_M2}
GFR SERPL CREATININE-BSD FRML MDRD: ABNORMAL ML/MIN/{1.73_M2}
GLUCOSE BLD-MCNC: 149 MG/DL (ref 70–99)
HCT VFR BLD CALC: 35.9 % (ref 36–46)
HEMOGLOBIN: 11.7 G/DL (ref 12–16)
IMMATURE GRANULOCYTES: ABNORMAL %
LYMPHOCYTES # BLD: 26 % (ref 16–46)
MCH RBC QN AUTO: 28 PG (ref 26–34)
MCHC RBC AUTO-ENTMCNC: 32.6 G/DL (ref 31–37)
MCV RBC AUTO: 85.7 FL (ref 80–100)
MONOCYTES # BLD: 8 % (ref 4–11)
NRBC AUTOMATED: ABNORMAL PER 100 WBC
PDW BLD-RTO: 16.1 % (ref 11–14.5)
PLATELET # BLD: 262 K/UL (ref 140–450)
PLATELET ESTIMATE: ABNORMAL
PMV BLD AUTO: 8.7 FL (ref 6–12)
POTASSIUM SERPL-SCNC: 3.7 MMOL/L (ref 3.7–5.3)
RBC # BLD: 4.19 M/UL (ref 4–5.2)
RBC # BLD: ABNORMAL 10*6/UL
SEG NEUTROPHILS: 62 % (ref 43–77)
SEGMENTED NEUTROPHILS ABSOLUTE COUNT: 5.9 K/UL (ref 1.8–7.7)
SODIUM BLD-SCNC: 140 MMOL/L (ref 135–144)
THYROXINE, FREE: 0.89 NG/DL (ref 0.93–1.7)
TOTAL PROTEIN: 6.8 G/DL (ref 6.4–8.3)
TSH SERPL DL<=0.05 MIU/L-ACNC: 1.41 MIU/L (ref 0.3–5)
WBC # BLD: 9.5 K/UL (ref 3.5–11)
WBC # BLD: ABNORMAL 10*3/UL

## 2019-05-08 PROCEDURE — 93000 ELECTROCARDIOGRAM COMPLETE: CPT | Performed by: FAMILY MEDICINE

## 2019-05-08 PROCEDURE — 84443 ASSAY THYROID STIM HORMONE: CPT

## 2019-05-08 PROCEDURE — 36415 COLL VENOUS BLD VENIPUNCTURE: CPT

## 2019-05-08 PROCEDURE — 80053 COMPREHEN METABOLIC PANEL: CPT

## 2019-05-08 PROCEDURE — 85025 COMPLETE CBC W/AUTO DIFF WBC: CPT

## 2019-05-08 PROCEDURE — 99214 OFFICE O/P EST MOD 30 MIN: CPT | Performed by: FAMILY MEDICINE

## 2019-05-08 PROCEDURE — 84439 ASSAY OF FREE THYROXINE: CPT

## 2019-05-08 RX ORDER — BUMETANIDE 1 MG/1
1 TABLET ORAL DAILY
Qty: 30 TABLET | Refills: 3 | Status: SHIPPED | OUTPATIENT
Start: 2019-05-08 | End: 2019-07-29 | Stop reason: SINTOL

## 2019-05-08 ASSESSMENT — ENCOUNTER SYMPTOMS
NAUSEA: 0
ABDOMINAL PAIN: 0
CHANGE IN BOWEL HABIT: 0

## 2019-05-08 NOTE — PROGRESS NOTES
2019     Ijeoma Kearns (:  1970) is a 51 y.o. female, here for evaluation of the following medical concerns:    Other   This is a new problem. The current episode started more than 1 month ago (has had ongoing increased edema to the bilateral lower extremities for the last 4-6 weeks. ). The problem occurs constantly. The problem has been gradually worsening. Pertinent negatives include no abdominal pain, arthralgias, change in bowel habit, chest pain, coughing, diaphoresis, fatigue, fever, myalgias, nausea or urinary symptoms. Associated symptoms comments: Did have swelling before, but has become very pronounced in the last 4-6 weeks duration. No shortness of breath. No chest pain or palpitations. Legs feel heavy and tight. Was seen in urgent care previously for this and I had given her lasix. After one day of lasix she had significant improvement in her swelling, but had side effects of extreme fatigue and dizziness, so didn't take it any longer. Follow up lab testing, BMP and CBC were essentially normal.  Newest medication is Vimpat prescribed by neurology for mild seizure activity. Has had swelling since that time. I did question if swelling may be from this medication. . Treatments tried: lasix for one dose (had side effects)  Is chronically on HCTZ and spironolactone. The treatment provided mild relief. Did review patient's med list, allergies, social history,pmhx and pshx today as noted in the record. Review of Systems   Constitutional: Negative for appetite change, diaphoresis, fatigue and fever. HENT: Negative. Eyes: Negative. Respiratory: Negative for cough, chest tightness, shortness of breath and wheezing. Cardiovascular: Positive for leg swelling. Negative for chest pain and palpitations. Gastrointestinal: Positive for abdominal distention (abdomen does feel bloated from swelling).  Negative for abdominal pain, change in bowel habit, constipation, diarrhea and nausea. Genitourinary: Negative for decreased urine volume, difficulty urinating, dyspareunia, dysuria, pelvic pain and urgency. Musculoskeletal: Negative for arthralgias, back pain and myalgias. Neurological: Negative. Psychiatric/Behavioral: Negative. Prior to Visit Medications    Medication Sig Taking? Authorizing Provider   bumetanide (BUMEX) 1 MG tablet Take 1 tablet by mouth daily Yes Karen Pineda DO   metoprolol tartrate (LOPRESSOR) 50 MG tablet take 1 tablet by mouth twice a day Yes Karen Pineda DO   esomeprazole (NEXIUM) 40 MG delayed release capsule take 1 capsule by mouth twice a day Yes Yanet Gray DO   OXcarbazepine (TRILEPTAL) 150 MG tablet take 2 tablets by mouth twice a day Yes Moses Acosta MD   RA ALLERGY RELIEF 10 MG tablet take 1 tablet by mouth once daily Yes Karen Pineda DO   levothyroxine (SYNTHROID) 125 MCG tablet take 1 tablet by mouth once daily Yes Karen Pineda DO   ranitidine (ZANTAC) 150 MG tablet Take 1 tablet by mouth nightly Yes Sarah Martines MD   lacosamide (VIMPAT) 100 MG TABS tablet Take 1 tablet by mouth 2 times daily for 31 days.  Yes Moses Acosta MD   nortriptyline (PAMELOR) 25 MG capsule take 1 capsule by mouth at bedtime Yes Elle Valdez MD   atorvastatin (LIPITOR) 20 MG tablet take 1 tablet by mouth once daily Yes Karen Pineda DO   gabapentin (NEURONTIN) 600 MG tablet take 1 tablet by mouth three times a day Yes Karen Pineda DO   albuterol sulfate HFA (VENTOLIN HFA) 108 (90 Base) MCG/ACT inhaler INHALE 2 PUFFS EVERY 6 HOURS IF NEEDED FOR WHEEZING Yes Karen Pineda, DO   fluticasone-salmeterol (ADVAIR DISKUS) 250-50 MCG/DOSE AEPB Inhale 1 puff into the lungs every 12 hours RINSE MOUTH AFTER USE Yes Karen Pineda DO   Mirabegron ER 50 MG TB24 Take 50 mg by mouth daily Yes Karen Pineda DO   hydrochlorothiazide (HYDRODIURIL) 25 MG tablet take 1 tablet by mouth once daily Yes Fely Garcia Sitting   Cuff Size: Large Adult   Pulse: 104   Resp: 16   SpO2: 96%   Weight: 268 lb (121.6 kg)   Height: 5' 6\" (1.676 m)     Estimated body mass index is 43.26 kg/m² as calculated from the following:    Height as of this encounter: 5' 6\" (1.676 m). Weight as of this encounter: 268 lb (121.6 kg). Physical Exam   Constitutional: She is oriented to person, place, and time. She appears well-developed and well-nourished. No distress. HENT:   Head: Normocephalic and atraumatic. Eyes: Conjunctivae are normal.   Neck: Normal range of motion. Cardiovascular: Normal rate and regular rhythm. No murmur heard. Pulmonary/Chest: Effort normal and breath sounds normal. She has no wheezes. She has no rales. Musculoskeletal: Normal range of motion. She exhibits edema and tenderness. She exhibits no deformity. 2+ pitting edema to the bilateral lower extremities up to the thighs. Negative homans sign. No erythema noted to the lower extremities. Neurological: She is alert and oriented to person, place, and time. Skin: Skin is warm and dry. No rash noted. She is not diaphoretic. No erythema. No pallor. Psychiatric: She has a normal mood and affect. Her behavior is normal. Judgment and thought content normal.   Nursing note and vitals reviewed. ASSESSMENT/PLAN:  Encounter Diagnoses   Name Primary?     Localized swelling of lower extremity Yes    Hypothyroidism, unspecified type      Orders Placed This Encounter   Medications    bumetanide (BUMEX) 1 MG tablet     Sig: Take 1 tablet by mouth daily     Dispense:  30 tablet     Refill:  3     Orders Placed This Encounter   Procedures    TSH without Reflex     Standing Status:   Future     Number of Occurrences:   1     Standing Expiration Date:   5/7/2020    T4, Free     Standing Status:   Future     Number of Occurrences:   1     Standing Expiration Date:   5/7/2020    CBC Auto Differential     Standing Status:   Future     Number of Occurrences:   1 Standing Expiration Date:   5/7/2020    Comprehensive Metabolic Panel     Standing Status:   Future     Number of Occurrences:   1     Standing Expiration Date:   5/7/2020    Echo Complete     Standing Status:   Future     Standing Expiration Date:   5/7/2020    EKG 12 Lead     Order Specific Question:   Reason for Exam?     Answer: Other     Comments:   edema in lower extremities     Will give Bumex to see if this is better tolerated. Should hold HCTZ while taking Bumex. Suspect that one of her medications may be contributing to the edema. Newest if Vimpat. She had not had significant episodes of seizures, but per review of neurology notes she had reported 2 seizures since November when she saw Dr. Ting Recio in March. I believe that is why he added Vimpat. She reports seizures were brief and were minimal.  Staring episodes that lasted for seconds. She states that she was agreeable to stopping the vimpat to see if this is contributing to her edema. If so, she can stay off of medication and follow up with Dr. Ting Recio if persistent seizure symptoms. Will check 2D ECHO to ensure there is no cardiac etiology contributing to the abrupt onset of edema. EKG is done today which shows normal sinus rhythm. Will check labs today. Thyroid abnormality could contribute to edema as well. Will notify patient when testing reports are available to determine if further intervention is needed. Continue to keep legs elevated as much as possible. Return  if no improvement in symptoms or if any further symptoms arise. No follow-ups on file. An electronic signature was used to authenticate this note.     --Laura Price, DO on 5/8/2019 at 11:58 AM

## 2019-05-09 ASSESSMENT — ENCOUNTER SYMPTOMS
DIARRHEA: 0
EYES NEGATIVE: 1
SHORTNESS OF BREATH: 0
BACK PAIN: 0
ABDOMINAL DISTENTION: 1
COUGH: 0
WHEEZING: 0
CHEST TIGHTNESS: 0
CONSTIPATION: 0

## 2019-05-13 ENCOUNTER — TELEPHONE (OUTPATIENT)
Dept: FAMILY MEDICINE CLINIC | Age: 49
End: 2019-05-13

## 2019-05-13 DIAGNOSIS — R10.84 GENERALIZED ABDOMINAL PAIN: Primary | ICD-10-CM

## 2019-05-13 DIAGNOSIS — R60.0 EDEMA OF BOTH LOWER EXTREMITIES: ICD-10-CM

## 2019-05-13 DIAGNOSIS — R14.0 ABDOMINAL BLOATING: ICD-10-CM

## 2019-05-13 RX ORDER — METOLAZONE 5 MG/1
5 TABLET ORAL DAILY
Qty: 15 TABLET | Refills: 0 | Status: SHIPPED | OUTPATIENT
Start: 2019-05-13 | End: 2019-07-29 | Stop reason: ALTCHOICE

## 2019-05-13 NOTE — TELEPHONE ENCOUNTER
Will add zaroxolyn 5 mg daily 30 minutes before bumex dose until swelling subsides. Then can discontinue. Will check CT abdomen and pelvis due to concern that there is an underlying abdominal or pelvic issue contributing to her abrupt swelling and abdominal pain and distention in the abdomen.

## 2019-05-13 NOTE — TELEPHONE ENCOUNTER
Tolerating Bumex, but states the swelling is getting worse not better even on the Bumex. She states she is getting abd distention/bloating/pain. Unable to wear her jeans now due to swelling. Spoke with Dr Edson Matthew regarding this and states she will add Zaroloxolyn to take 1/2 hour prior to the Bumex and would like her to get a CT abd/pelvis stat to rule out some kind of obstruction causing lower extremity swelling.  Please send script to [facility]

## 2019-05-13 NOTE — TELEPHONE ENCOUNTER
Pt calling stating she was seen last week and given Bumex for her swelling, pt states there has been no improvement, please advise at above number.

## 2019-05-14 ENCOUNTER — HOSPITAL ENCOUNTER (OUTPATIENT)
Dept: CT IMAGING | Age: 49
Discharge: HOME | End: 2019-05-16
Payer: COMMERCIAL

## 2019-05-14 DIAGNOSIS — R10.84 GENERALIZED ABDOMINAL PAIN: ICD-10-CM

## 2019-05-14 DIAGNOSIS — R14.0 ABDOMINAL BLOATING: ICD-10-CM

## 2019-05-14 DIAGNOSIS — R60.0 EDEMA OF BOTH LOWER EXTREMITIES: ICD-10-CM

## 2019-05-14 PROCEDURE — 2709999900 CT ABDOMEN PELVIS W IV CONTRAST

## 2019-05-14 PROCEDURE — 6360000004 HC RX CONTRAST MEDICATION: Performed by: FAMILY MEDICINE

## 2019-05-14 RX ADMIN — IOPAMIDOL 100 ML: 755 INJECTION, SOLUTION INTRAVENOUS at 13:33

## 2019-05-14 RX ADMIN — IOHEXOL 50 ML: 240 INJECTION, SOLUTION INTRATHECAL; INTRAVASCULAR; INTRAVENOUS; ORAL at 13:33

## 2019-05-15 ENCOUNTER — HOSPITAL ENCOUNTER (OUTPATIENT)
Dept: NON INVASIVE DIAGNOSTICS | Age: 49
Discharge: HOME | End: 2019-05-15
Payer: COMMERCIAL

## 2019-05-15 LAB
LV EF: 65 %
LVEF MODALITY: NORMAL

## 2019-05-15 PROCEDURE — 93306 TTE W/DOPPLER COMPLETE: CPT

## 2019-05-16 ENCOUNTER — TELEPHONE (OUTPATIENT)
Dept: FAMILY MEDICINE CLINIC | Age: 49
End: 2019-05-16

## 2019-05-16 ENCOUNTER — HOSPITAL ENCOUNTER (EMERGENCY)
Age: 49
Discharge: HOME | End: 2019-05-16
Attending: EMERGENCY MEDICINE
Payer: COMMERCIAL

## 2019-05-16 ENCOUNTER — APPOINTMENT (OUTPATIENT)
Dept: GENERAL RADIOLOGY | Age: 49
End: 2019-05-16
Payer: COMMERCIAL

## 2019-05-16 VITALS
WEIGHT: 262 LBS | OXYGEN SATURATION: 99 % | TEMPERATURE: 98.2 F | DIASTOLIC BLOOD PRESSURE: 70 MMHG | HEART RATE: 89 BPM | SYSTOLIC BLOOD PRESSURE: 118 MMHG | RESPIRATION RATE: 12 BRPM | BODY MASS INDEX: 42.29 KG/M2

## 2019-05-16 DIAGNOSIS — M79.89 LEG SWELLING: Primary | ICD-10-CM

## 2019-05-16 LAB
ANION GAP SERPL CALCULATED.3IONS-SCNC: 12 MMOL/L (ref 9–17)
BNP INTERPRETATION: NORMAL
BUN BLDV-MCNC: 9 MG/DL (ref 6–20)
BUN/CREAT BLD: 10 (ref 9–20)
CALCIUM SERPL-MCNC: 9.4 MG/DL (ref 8.6–10.4)
CHLORIDE BLD-SCNC: 91 MMOL/L (ref 98–107)
CO2: 33 MMOL/L (ref 20–31)
CREAT SERPL-MCNC: 0.91 MG/DL (ref 0.5–0.9)
D DIMER: 134 NG/ML
GFR AFRICAN AMERICAN: >60 ML/MIN
GFR NON-AFRICAN AMERICAN: >60 ML/MIN
GFR SERPL CREATININE-BSD FRML MDRD: ABNORMAL ML/MIN/{1.73_M2}
GFR SERPL CREATININE-BSD FRML MDRD: ABNORMAL ML/MIN/{1.73_M2}
GLUCOSE BLD-MCNC: 97 MG/DL (ref 70–99)
HCT VFR BLD CALC: 37.9 % (ref 36–46)
HEMOGLOBIN: 12.4 G/DL (ref 12–16)
MAGNESIUM: 1.7 MG/DL (ref 1.6–2.6)
MCH RBC QN AUTO: 27.8 PG (ref 26–34)
MCHC RBC AUTO-ENTMCNC: 32.8 G/DL (ref 31–37)
MCV RBC AUTO: 84.9 FL (ref 80–100)
NRBC AUTOMATED: ABNORMAL PER 100 WBC
PDW BLD-RTO: 16 % (ref 11–14.5)
PLATELET # BLD: 287 K/UL (ref 140–450)
PMV BLD AUTO: 8.3 FL (ref 6–12)
POTASSIUM SERPL-SCNC: 3.3 MMOL/L (ref 3.7–5.3)
PRO-BNP: 35 PG/ML
RBC # BLD: 4.47 M/UL (ref 4–5.2)
SODIUM BLD-SCNC: 136 MMOL/L (ref 135–144)
TROPONIN INTERP: NORMAL
TROPONIN T: NORMAL NG/ML
TROPONIN, HIGH SENSITIVITY: 8 NG/L (ref 0–14)
WBC # BLD: 9.9 K/UL (ref 3.5–11)

## 2019-05-16 PROCEDURE — 71045 X-RAY EXAM CHEST 1 VIEW: CPT

## 2019-05-16 PROCEDURE — 36415 COLL VENOUS BLD VENIPUNCTURE: CPT

## 2019-05-16 PROCEDURE — 93005 ELECTROCARDIOGRAM TRACING: CPT

## 2019-05-16 PROCEDURE — 84484 ASSAY OF TROPONIN QUANT: CPT

## 2019-05-16 PROCEDURE — 83735 ASSAY OF MAGNESIUM: CPT

## 2019-05-16 PROCEDURE — 80048 BASIC METABOLIC PNL TOTAL CA: CPT

## 2019-05-16 PROCEDURE — 99283 EMERGENCY DEPT VISIT LOW MDM: CPT

## 2019-05-16 PROCEDURE — 83880 ASSAY OF NATRIURETIC PEPTIDE: CPT

## 2019-05-16 PROCEDURE — 85027 COMPLETE CBC AUTOMATED: CPT

## 2019-05-16 PROCEDURE — 85379 FIBRIN DEGRADATION QUANT: CPT

## 2019-05-16 ASSESSMENT — ENCOUNTER SYMPTOMS
VOMITING: 0
SHORTNESS OF BREATH: 0
COUGH: 0

## 2019-05-16 NOTE — TELEPHONE ENCOUNTER
Spoke with Dr Torsten Lazaro and she advises that the patient go to ER for work up on the palpitations. Questions if she could be going in and out of A fib which could cause her to retain fluid. Also they possibly could IV diuresis her if necessary.      Left message for patient to call back

## 2019-05-16 NOTE — ED PROVIDER NOTES
[facility] ED  Formerly [facility]  Phone: [phone]        [address] [place] [address] ED  eMERGENCY dEPARTMENT eNCOUnter      Pt Name: Jana Martin  MRN: [id number]  [place] 1970  Date of evaluation: 5/16/2019  Provider: Julius Gottron, [facility]       Chief Complaint   Patient presents with    Leg Swelling     generalized edema    Palpitations         HISTORY OF PRESENT ILLNESS   (Location/Symptom, Timing/Onset,Context/Setting, Quality, Duration, Modifying Factors, Severity)  Note limiting factors. Jana Martin is a 47 y.o. female who presents to the emergency department for the evaluation of leg swelling. This is been going on for the past couple of weeks. She feels like it all started when she started taking the medication Vimpat for seizures. That medication was stopped about a week ago. Patient states she's been on multiple different diuretics but she does not like the way they make her feel and she is not impressed with the results. Compression stockings did not work for her. Not clear she has been elevating her legs or not. She's had an EKG, blood work and an echo all of which were grossly unremarkable. She was sent in today for further evaluation of the leg swelling and palpitations. Nursing Notes were reviewed. REVIEW OF SYSTEMS    (2-9systems for level 4, 10 or more for level 5)     Review of Systems   Constitutional: Negative for fever. Respiratory: Negative for cough and shortness of breath. Cardiovascular: Positive for palpitations and leg swelling. Negative for chest pain. Gastrointestinal: Negative for vomiting. Genitourinary: Negative for hematuria. Musculoskeletal: Negative for joint swelling. Skin: Negative for rash. Neurological: Negative for weakness. All other systems reviewed and are negative. Except asnoted above the remainder of the review of systems was reviewed and negative.        PAST HISTORY  04/16/2015    spinal cord stimulator removed    SPINAL CORD DECOMPRESSION  4/23/12    Lumbar.  TOTAL KNEE ARTHROPLASTY Left 04/2015    [facility] orthopedics    TUBAL LIGATION  04/02/1990    UPPER GASTROINTESTINAL ENDOSCOPY  10/14/2011    Hiatal hernia.     UPPER GASTROINTESTINAL ENDOSCOPY N/A 9/10/2018    gastritis, esophagitis-BRAVO=reflux         CURRENT MEDICATIONS     Previous Medications    ALBUTEROL SULFATE HFA (VENTOLIN HFA) 108 (90 BASE) MCG/ACT INHALER    INHALE 2 PUFFS EVERY 6 HOURS IF NEEDED FOR WHEEZING    ASPIRIN 81 MG TABLET    Take 81 mg by mouth daily    ATORVASTATIN (LIPITOR) 20 MG TABLET    take 1 tablet by mouth once daily    B COMPLEX VITAMINS CAPSULE    Take 1 capsule by mouth daily    BREXPIPRAZOLE (REXULTI) 0.5 MG TABS TABLET    Take 0.5 mg by mouth daily    BUMETANIDE (BUMEX) 1 MG TABLET    Take 1 tablet by mouth daily    BUSPIRONE (BUSPAR) 30 MG TABLET    Take 30 mg by mouth 3 times daily     CHOLECALCIFEROL (VITAMIN D3) 5000 UNITS TABS    Take 1 tablet by mouth daily    COMPRESSION STOCKINGS MISC    by Does not apply route 20-30 mm Hg pressure, ready to wear, knee high    CYANOCOBALAMIN (VITAMIN B12 PO)    Take by mouth daily    DESVENLAFAXINE SUCCINATE (PRISTIQ) 100 MG TB24 EXTENDED RELEASE TABLET    take 1 tablet by mouth once daily    ESOMEPRAZOLE (NEXIUM) 40 MG DELAYED RELEASE CAPSULE    take 1 capsule by mouth twice a day    FLUTICASONE-SALMETEROL (ADVAIR DISKUS) 250-50 MCG/DOSE AEPB    Inhale 1 puff into the lungs every 12 hours RINSE MOUTH AFTER USE    GABAPENTIN (NEURONTIN) 600 MG TABLET    take 1 tablet by mouth three times a day    HYDROCHLOROTHIAZIDE (HYDRODIURIL) 25 MG TABLET    take 1 tablet by mouth once daily    LAMOTRIGINE (LAMICTAL) 200 MG TABLET    Take 200 mg by mouth daily    LEVOTHYROXINE (SYNTHROID) 125 MCG TABLET    take 1 tablet by mouth once daily    LIDOCAINE (XYLOCAINE) 5 % OINTMENT    Apply topically to affected area bid as needed    METOLAZONE (ZAROXOLYN) 5 MG TABLET    Take 1 tablet by mouth daily 30 minutes prior to bumex dose. Take until swelling subsides, then discontinue    METOPROLOL TARTRATE (LOPRESSOR) 50 MG TABLET    take 1 tablet by mouth twice a day    MIRABEGRON ER 50 MG TB24    Take 50 mg by mouth daily    NORTRIPTYLINE (PAMELOR) 25 MG CAPSULE    take 1 capsule by mouth at bedtime    ONDANSETRON (ZOFRAN) 4 MG TABLET    Take 4 mg by mouth    OXCARBAZEPINE (TRILEPTAL) 150 MG TABLET    take 2 tablets by mouth twice a day    PHENAZOPYRIDINE (PYRIDIUM) 100 MG TABLET    Take 1 tablet by mouth 3 times daily as needed (dysuria)    POTASSIUM CHLORIDE (KLOR-CON) 8 MEQ EXTENDED RELEASE TABLET    take 4 tablets by mouth once daily    PRAZOSIN (MINIPRESS) 2 MG CAPSULE    Take 2 mg by mouth nightly     PROMETHAZINE (PHENERGAN) 25 MG TABLET    Take 25 mg by mouth    RA ALLERGY RELIEF 10 MG TABLET    take 1 tablet by mouth once daily    RANITIDINE (ZANTAC) 150 MG TABLET    Take 1 tablet by mouth nightly    SPIRONOLACTONE (ALDACTONE) 50 MG TABLET    take 1 tablet by mouth once daily    TRAZODONE (DESYREL) 300 MG TABLET    take 1 tablet by mouth at bedtime       ALLERGIES     Allopurinol; Colchicine; Erythromycin; Indocin [indomethacin]; Lasix [furosemide]; Lisinopril; Morphine; Norvasc [amlodipine]; Tenormin [atenolol]; Uloric [febuxostat];  Hydrocodone-acetaminophen; and Topamax [topiramate]    FAMILY HISTORY       Family History   Problem Relation Age of Onset    Cancer Mother         melanoma    Thyroid Disease Mother     Coronary Art Dis Father         coronary artery bypass grafting x4    Hypertension Father     Glaucoma Father     Prostate Cancer Father     Heart Disease Father     Heart Disease Paternal Grandmother     High Blood Pressure Paternal Grandmother     Diabetes Paternal Grandmother     Thyroid Disease Paternal Grandmother     Heart Disease Paternal Grandfather     High Blood Pressure Paternal Grandfather     Diabetes Paternal deviation present. Cardiovascular: Normal rate, regular rhythm, normal heart sounds and intact distal pulses. Exam reveals no friction rub. No murmur heard. Pulmonary/Chest: Effort normal and breath sounds normal. No stridor. No respiratory distress. She has no wheezes. She has no rales. She exhibits no tenderness. Abdominal: Soft. She exhibits no distension and no mass. There is no tenderness. There is no rebound and no guarding. Musculoskeletal: Normal range of motion. She exhibits edema. She exhibits no deformity. 2+ brawny edema bilaterally in the lower extremities   Neurological: She is alert. Answering questions appropriately. No gaze deficit. No gait abnormality. Moving all extremities. Skin: Skin is warm and dry. Psychiatric: She has a normal mood and affect. Judgment normal.       EMERGENCY DEPARTMENT COURSE and DIFFERENTIAL DIAGNOSIS/MDM:   Vitals:    Vitals:    05/16/19 1244 05/16/19 1331   BP: 137/69 118/70   Pulse: 92 89   Resp: 12 12   SpO2: 98% 99%   Weight: 262 lb (118.8 kg)        Patient presents to the emergency department with the complaint described above. Vitals are grossly normal and she is nontoxic and resting comfortably. Review of the medical record shows a grossly unremarkable EKG, grossly unremarkable labs and a grossly unremarkable ultrasound that was just done yesterday. At this time I will repeat the EKG and compare with previous, repeat some of the blood work and Ativan and BNP, troponin and d-dimer. Patient does not want any diuretic medication today. DIAGNOSTIC RESULTS     Twelve lead EKG interpreted by myself:  A 12 lead EKG done at 1304, interpreted by myself, showed a regular rhythm at a rate of 86bpm.  The WA interval was normal.  The QRS complex was normal.  There was no ST segment elevation or depression, T wave inversion not present. QRS progression through precordial leads was slightly delayed.   Interpretation: Normal sinus rhythm, no ST segment days        DISCHARGE MEDICATIONS:  New Prescriptions    No medications on file          (Please note that portions of this note were completed with a voice recognition program.  Efforts were made to edit the dictations but occasionally words are mis-transcribed.)    Winter Metzger DO (electronically signed)  Board Certified Emergency Physician          Winter Metzger DO  05/16/19 0587

## 2019-05-17 LAB
EKG ATRIAL RATE: 86 BPM
EKG P AXIS: 21 DEGREES
EKG P-R INTERVAL: 188 MS
EKG Q-T INTERVAL: 406 MS
EKG QRS DURATION: 94 MS
EKG QTC CALCULATION (BAZETT): 485 MS
EKG R AXIS: 10 DEGREES
EKG T AXIS: 6 DEGREES
EKG VENTRICULAR RATE: 86 BPM

## 2019-05-20 RX ORDER — HYDROCHLOROTHIAZIDE 25 MG/1
TABLET ORAL
Qty: 90 TABLET | Refills: 1 | Status: SHIPPED | OUTPATIENT
Start: 2019-05-20 | End: 2019-11-04 | Stop reason: SDUPTHER

## 2019-05-23 ENCOUNTER — OFFICE VISIT (OUTPATIENT)
Dept: VASCULAR SURGERY | Age: 49
End: 2019-05-23
Payer: COMMERCIAL

## 2019-05-23 VITALS
WEIGHT: 266 LBS | DIASTOLIC BLOOD PRESSURE: 70 MMHG | BODY MASS INDEX: 42.75 KG/M2 | SYSTOLIC BLOOD PRESSURE: 124 MMHG | HEIGHT: 66 IN | HEART RATE: 68 BPM

## 2019-05-23 DIAGNOSIS — R22.43 LOCALIZED SWELLING OF BOTH LOWER LEGS: ICD-10-CM

## 2019-05-23 PROCEDURE — 99213 OFFICE O/P EST LOW 20 MIN: CPT | Performed by: SURGERY

## 2019-05-23 NOTE — PROGRESS NOTES
member; Cancer in her mother; Coronary Art Dis in her father; Depression in her daughter and daughter; Diabetes in her paternal grandfather and paternal grandmother; Glaucoma in her father; Heart Disease in her father, paternal grandfather, and paternal grandmother; High Blood Pressure in her paternal grandfather and paternal grandmother; Hypertension in her father; Prostate Cancer in her father; Thyroid Disease in her mother and paternal grandmother. Review of Systems:   Constitutional:  negative for  fevers, chills, sweats, fatigue, and weight loss  HEENT:  negative for vision or hearing changes,   Respiratory:  negative for shortness of breath, cough, or congestion  Cardiovascular:  negative for  chest pain, palpitations  Gastrointestinal:  negative for nausea, vomiting, diarrhea, constipation, abdominal pain  Genitourinary:  negative for frequency, dysuria  Integument/Breast:  negative for rash, skin lesions  Musculoskeletal:  negative for muscle aches or joint pain  Neurological:  negative for headaches, dizziness, lightheadedness, numbness, pain and tingling extremities  Behavior/Psych:  negative for depression and anxiety    Allergies: Allopurinol; Colchicine; Erythromycin; Indocin [indomethacin]; Lasix [furosemide]; Lisinopril; Morphine; Norvasc [amlodipine]; Tenormin [atenolol]; Uloric [febuxostat]; Hydrocodone-acetaminophen; and Topamax [topiramate]    Current Meds:  Current Outpatient Medications:     hydrochlorothiazide (HYDRODIURIL) 25 MG tablet, take 1 tablet by mouth once daily, Disp: 90 tablet, Rfl: 1    metolazone (ZAROXOLYN) 5 MG tablet, Take 1 tablet by mouth daily 30 minutes prior to bumex dose.   Take until swelling subsides, then discontinue, Disp: 15 tablet, Rfl: 0    bumetanide (BUMEX) 1 MG tablet, Take 1 tablet by mouth daily, Disp: 30 tablet, Rfl: 3    metoprolol tartrate (LOPRESSOR) 50 MG tablet, take 1 tablet by mouth twice a day, Disp: 60 tablet, Rfl: 5    esomeprazole (NEXIUM) 40 by mouth once daily, Disp: 120 tablet, Rfl: 5    phenazopyridine (PYRIDIUM) 100 MG tablet, Take 1 tablet by mouth 3 times daily as needed (dysuria), Disp: 15 tablet, Rfl: 0    lamoTRIgine (LAMICTAL) 200 MG tablet, Take 200 mg by mouth daily, Disp: , Rfl:     traZODone (DESYREL) 300 MG tablet, take 1 tablet by mouth at bedtime, Disp: , Rfl: 0    desvenlafaxine succinate (PRISTIQ) 100 MG TB24 extended release tablet, take 1 tablet by mouth once daily, Disp: , Rfl: 0    prazosin (MINIPRESS) 2 MG capsule, Take 2 mg by mouth nightly , Disp: , Rfl:     busPIRone (BUSPAR) 30 MG tablet, Take 30 mg by mouth 3 times daily , Disp: , Rfl:     Cholecalciferol (VITAMIN D3) 5000 UNITS TABS, Take 1 tablet by mouth daily, Disp: 30 tablet, Rfl: 2    gabapentin (NEURONTIN) 600 MG tablet, take 1 tablet by mouth three times a day, Disp: 90 tablet, Rfl: 2    Vital Signs:  Vitals:    05/23/19 1420   BP: 124/70   Pulse: 68       Physical Exam:  General appearance - alert, well appearing and in no acute distress  Mental status - oriented to person, place and time with normal affect  Head - normocephalic and atraumatic  Eyes - pupils equal and reactive, extraocular eye movements intact, conjunctiva clear  Ears - hearing appears to be intact  Nose - no drainage noted  Mouth - mucous membranes moist  Neck - supple, no carotid bruits, thyroid not palpable, no JVD  Chest - clear to auscultation, normal effort  Heart - normal rate, regular rhythm, no murmurs  Abdomen - soft, non-tender, non-distended, bowel sounds present all four quadrants, no masses, hepatomegaly, splenomegaly or aortic enlargement  Neurological - normal speech, no focal findings or movement disorder noted, cranial nerves II through XII grossly intact  Extremities - 2+ pitting edema on the anterior tibia as well as the dorsum of the foot. Serious tender to palpation. Pulses are difficult to find due to the swelling.     R brachial 2+ L brachial 2+   R radial 2+ L

## 2019-05-26 DIAGNOSIS — R56.9 NOCTURNAL SEIZURES (HCC): ICD-10-CM

## 2019-05-28 RX ORDER — OXCARBAZEPINE 150 MG/1
TABLET, FILM COATED ORAL
Qty: 60 TABLET | Refills: 2 | Status: SHIPPED | OUTPATIENT
Start: 2019-05-28 | End: 2019-08-12 | Stop reason: SDUPTHER

## 2019-05-28 RX ORDER — TOLTERODINE TARTRATE 2 MG/1
TABLET, EXTENDED RELEASE ORAL
Qty: 180 TABLET | Refills: 1 | OUTPATIENT
Start: 2019-05-28

## 2019-05-28 NOTE — TELEPHONE ENCOUNTER
Bere Noonan called requesting a refill of the below medication which has been pended for you: declined med as this was switched back in Jan to Mirabegron.     Requested Prescriptions     Pending Prescriptions Disp Refills    tolterodine (DETROL) 2 MG tablet [Pharmacy Med Name: TOLTERODINE TARTRATE 2 MG TAB] 180 tablet 1     Sig: take 1 tablet by mouth twice a day       Last Appointment Date: 5/8/2019  Next Appointment Date: 6/20/2019    Allergies   Allergen Reactions    Allopurinol     Colchicine     Erythromycin     Indocin [Indomethacin]     Lasix [Furosemide] Other (See Comments)     Dizziness, extreme fatigue    Lisinopril     Morphine     Norvasc [Amlodipine]     Tenormin [Atenolol]     Uloric [Febuxostat]     Hydrocodone-Acetaminophen Nausea Only    Topamax [Topiramate] Swelling and Rash

## 2019-05-29 DIAGNOSIS — G89.29 CHRONIC BILATERAL LOW BACK PAIN, WITH SCIATICA PRESENCE UNSPECIFIED: ICD-10-CM

## 2019-05-29 DIAGNOSIS — M54.2 CHRONIC NECK PAIN: ICD-10-CM

## 2019-05-29 DIAGNOSIS — G89.29 CHRONIC NECK PAIN: ICD-10-CM

## 2019-05-29 DIAGNOSIS — M54.5 CHRONIC BILATERAL LOW BACK PAIN, WITH SCIATICA PRESENCE UNSPECIFIED: ICD-10-CM

## 2019-05-29 RX ORDER — GABAPENTIN 600 MG/1
600 TABLET ORAL 2 TIMES DAILY
Qty: 60 TABLET | Refills: 2 | Status: SHIPPED | OUTPATIENT
Start: 2019-05-29 | End: 2019-08-02 | Stop reason: DRUGHIGH

## 2019-05-29 NOTE — TELEPHONE ENCOUNTER
Lien Carlton called requesting a refill of the below medication which has been pended for you: last filled 5/6/2019.  Placed a do not refill date of 6/5/2019    Requested Prescriptions     Pending Prescriptions Disp Refills    gabapentin (NEURONTIN) 600 MG tablet [Pharmacy Med Name: GABAPENTIN 600 MG TABLET] 90 tablet 2     Sig: take 1 tablet by mouth three times a day       Last Appointment Date: 5/8/2019  Next Appointment Date: 6/20/2019    Allergies   Allergen Reactions    Allopurinol     Colchicine     Erythromycin     Indocin [Indomethacin]     Lasix [Furosemide] Other (See Comments)     Dizziness, extreme fatigue    Lisinopril     Morphine     Norvasc [Amlodipine]     Tenormin [Atenolol]     Uloric [Febuxostat]     Hydrocodone-Acetaminophen Nausea Only    Topamax [Topiramate] Swelling and Rash

## 2019-05-29 NOTE — TELEPHONE ENCOUNTER
Check and see what dose she is taking. We had discussed decreasing this dose to see if it would help with her swelling in her lower legs.

## 2019-05-29 NOTE — TELEPHONE ENCOUNTER
Patient states she did cut back recently to bid and has not noticed any change in swelling. She is hesitant to cut back further on this.      Lien Carlton called requesting a refill of the below medication which has been pended for you:     Requested Prescriptions     Pending Prescriptions Disp Refills    gabapentin (NEURONTIN) 600 MG tablet [Pharmacy Med Name: GABAPENTIN 600 MG TABLET] 90 tablet 2     Sig: take 1 tablet by mouth three times a day       Last Appointment Date: 5/8/2019  Next Appointment Date: 6/20/2019    Allergies   Allergen Reactions    Allopurinol     Colchicine     Erythromycin     Indocin [Indomethacin]     Lasix [Furosemide] Other (See Comments)     Dizziness, extreme fatigue    Lisinopril     Morphine     Norvasc [Amlodipine]     Tenormin [Atenolol]     Uloric [Febuxostat]     Hydrocodone-Acetaminophen Nausea Only    Topamax [Topiramate] Swelling and Rash

## 2019-05-31 ENCOUNTER — TELEPHONE (OUTPATIENT)
Dept: FAMILY MEDICINE CLINIC | Age: 49
End: 2019-05-31

## 2019-05-31 RX ORDER — FUROSEMIDE 20 MG/1
20 TABLET ORAL DAILY
Qty: 30 TABLET | Refills: 0 | Status: SHIPPED | OUTPATIENT
Start: 2019-05-31 | End: 2019-07-29 | Stop reason: SINTOL

## 2019-06-06 ENCOUNTER — OFFICE VISIT (OUTPATIENT)
Dept: NEUROLOGY | Age: 49
End: 2019-06-06
Payer: COMMERCIAL

## 2019-06-06 VITALS
OXYGEN SATURATION: 98 % | HEART RATE: 93 BPM | WEIGHT: 266.1 LBS | DIASTOLIC BLOOD PRESSURE: 68 MMHG | BODY MASS INDEX: 42.77 KG/M2 | HEIGHT: 66 IN | SYSTOLIC BLOOD PRESSURE: 124 MMHG

## 2019-06-06 DIAGNOSIS — R13.19 OTHER DYSPHAGIA: ICD-10-CM

## 2019-06-06 DIAGNOSIS — M54.2 CHRONIC NECK PAIN: ICD-10-CM

## 2019-06-06 DIAGNOSIS — E03.9 HYPOTHYROIDISM, UNSPECIFIED TYPE: ICD-10-CM

## 2019-06-06 DIAGNOSIS — I67.82 CEREBRAL ISCHEMIA: ICD-10-CM

## 2019-06-06 DIAGNOSIS — G89.29 CHRONIC LOW BACK PAIN WITH SCIATICA, SCIATICA LATERALITY UNSPECIFIED, UNSPECIFIED BACK PAIN LATERALITY: ICD-10-CM

## 2019-06-06 DIAGNOSIS — G89.29 CHRONIC NECK PAIN: ICD-10-CM

## 2019-06-06 DIAGNOSIS — R26.89 BALANCE PROBLEMS: ICD-10-CM

## 2019-06-06 DIAGNOSIS — D50.9 IRON DEFICIENCY ANEMIA, UNSPECIFIED IRON DEFICIENCY ANEMIA TYPE: ICD-10-CM

## 2019-06-06 DIAGNOSIS — G57.30 PERONEAL NEUROPATHY, UNSPECIFIED LATERALITY: ICD-10-CM

## 2019-06-06 DIAGNOSIS — K21.00 GASTROESOPHAGEAL REFLUX DISEASE WITH ESOPHAGITIS: ICD-10-CM

## 2019-06-06 DIAGNOSIS — G47.33 OBSTRUCTIVE SLEEP APNEA: ICD-10-CM

## 2019-06-06 DIAGNOSIS — R20.2 PARESTHESIAS: ICD-10-CM

## 2019-06-06 DIAGNOSIS — R22.43 LOCALIZED SWELLING OF BOTH LOWER LEGS: ICD-10-CM

## 2019-06-06 DIAGNOSIS — I10 ESSENTIAL HYPERTENSION: ICD-10-CM

## 2019-06-06 DIAGNOSIS — F41.9 ANXIETY: ICD-10-CM

## 2019-06-06 DIAGNOSIS — R56.9 NOCTURNAL SEIZURES (HCC): Primary | ICD-10-CM

## 2019-06-06 DIAGNOSIS — M54.40 CHRONIC LOW BACK PAIN WITH SCIATICA, SCIATICA LATERALITY UNSPECIFIED, UNSPECIFIED BACK PAIN LATERALITY: ICD-10-CM

## 2019-06-06 DIAGNOSIS — R41.3 MEMORY PROBLEM: ICD-10-CM

## 2019-06-06 DIAGNOSIS — G56.03 BILATERAL CARPAL TUNNEL SYNDROME: ICD-10-CM

## 2019-06-06 DIAGNOSIS — R94.01 ABNORMAL EEG: ICD-10-CM

## 2019-06-06 DIAGNOSIS — F32.1 CURRENT MODERATE EPISODE OF MAJOR DEPRESSIVE DISORDER WITHOUT PRIOR EPISODE (HCC): ICD-10-CM

## 2019-06-06 DIAGNOSIS — G57.40 TIBIAL NEUROPATHY, UNSPECIFIED LATERALITY: ICD-10-CM

## 2019-06-06 DIAGNOSIS — G40.909 SEIZURE DISORDER (HCC): ICD-10-CM

## 2019-06-06 DIAGNOSIS — G63 POLYNEUROPATHY ASSOCIATED WITH UNDERLYING DISEASE (HCC): ICD-10-CM
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
## 2019-06-06 DIAGNOSIS — R47.81 SLURRED SPEECH: ICD-10-CM

## 2019-06-06 DIAGNOSIS — E78.2 MIXED HYPERLIPIDEMIA: ICD-10-CM

## 2019-06-06 DIAGNOSIS — R53.82 CHRONIC FATIGUE: ICD-10-CM

## 2019-06-06 DIAGNOSIS — G56.23 ULNAR NEUROPATHY OF BOTH UPPER EXTREMITIES: ICD-10-CM

## 2019-06-06 PROCEDURE — 99215 OFFICE O/P EST HI 40 MIN: CPT | Performed by: PSYCHIATRY & NEUROLOGY

## 2019-06-06 ASSESSMENT — ENCOUNTER SYMPTOMS
BLOOD IN STOOL: 0
VISUAL CHANGE: 0
SHORTNESS OF BREATH: 0
DIARRHEA: 0
TROUBLE SWALLOWING: 1
SORE THROAT: 0
ABDOMINAL PAIN: 0
EYE REDNESS: 0
COUGH: 0
CHANGE IN BOWEL HABIT: 0
ABDOMINAL DISTENTION: 0
VOMITING: 0
CHOKING: 0
WHEEZING: 0
BOWEL INCONTINENCE: 0
CHEST TIGHTNESS: 0
SINUS PRESSURE: 0
COLOR CHANGE: 0
FACIAL SWELLING: 0
BACK PAIN: 0
PHOTOPHOBIA: 0
NAUSEA: 0
EYE PAIN: 0
EYE ITCHING: 0
SWOLLEN GLANDS: 0
EYE DISCHARGE: 0
APNEA: 0
CONSTIPATION: 0
VOICE CHANGE: 0

## 2019-06-06 NOTE — PROGRESS NOTES
[facility]  Neurology  [address] [address]  Phone: [phone]   Fax: [phone]    SUBJECTIVE:     PATIENT ID:  Margarito Hays is a  RIGHT  HANDED 47 y.o. female. Seizures   This is a chronic problem. Episode onset:  TWO  YEARS. The problem occurs intermittently. The problem has been waxing and waning. Associated symptoms include numbness and weakness. Pertinent negatives include no abdominal pain, anorexia, arthralgias, change in bowel habit, chest pain, chills, congestion, coughing, diaphoresis, fatigue, fever, headaches, joint swelling, myalgias, nausea, neck pain, rash, sore throat, swollen glands, urinary symptoms, vertigo, visual change or vomiting. Nothing aggravates the symptoms. Treatments tried: TRILEPTAL. The treatment provided mild relief. Neurologic Problem   The patient's primary symptoms include clumsiness, focal sensory loss, focal weakness, a loss of balance, memory loss, slurred speech and weakness. The patient's pertinent negatives include no altered mental status, near-syncope, syncope or visual change. This is a chronic problem. The current episode started more than 1 year ago. The neurological problem developed insidiously. The problem is unchanged. There was no focality noted. Pertinent negatives include no abdominal pain, auditory change, aura, back pain, bladder incontinence, bowel incontinence, chest pain, confusion, diaphoresis, dizziness, fatigue, fever, headaches, light-headedness, nausea, neck pain, palpitations, shortness of breath, vertigo or vomiting. Past treatments include nothing. The treatment provided no relief. Her past medical history is significant for mood changes. There is no history of a bleeding disorder, a clotting disorder, a CVA, dementia, head trauma, liver disease or seizures. History obtained from  The patient     and other  available medical records were  Also  reviewed.       The  [place], [place]    NOV. / [address] [facility] [address]     2018 20)     PATIENT     WAS     TRIED  WITH      VIMPAT    IN   [facility]   2019                                     FOR  BETTER  SEIZURE  CONTROL      AND  THE  SAME                              DISCONTINUED     DUE  TO     SWELLING IN  BOTH   FEET  AND  LEGS                          21)    NO  FURTHER  RECURRENCE OF  SEIZURE   ACTIVITY      SINCE   MARCH 2019                           --  FOLLOW  UP   EEG  IN   April 2019    ALSO  ABNORMAL                                    WITH  GENERALIZED    POTENTIAL  EPILEPTIFORM  FEATURES                         22)   H/O    SWELLING IN  BOTH   FEET  AND  LEGS       SINCE   April 2019                                        D / D     INCLUDE     CARDIAC,    RENAL,   AND  OTHER      ETIOLOGIES,     INCLUDING                                              MEDICATION   EFFECTS. -  PATIENT  ADVISED  TO  FOLLOW  WITH  HER  PCP  CLOSELY. 23)     VARIOUS  RISK   FACTORS   WERE  REVIEWED   AND   DISCUSSED. PATIENT   HAS  MULTIPLE   MEDICAL, MENTAL HEALTH                 NEUROLOGICAL   PROBLEMS . PATIENT'S   MANAGEMENT  IS  CHALLENGING.                           24)  PATIENT  DENIES  ANY  NEW  NEUROLOGICAL     CONCERNS.                                                                                     PRECIPITATING  FACTORS: including  fever/infection, exertion/relaxation, position change, stress, weather change, medications/alcohol, time of day/darkness/light  Are    absent                                                             MODIFYING  FACTORS:  fever/infection, exertion/relaxation, position change, stress, weather change, medications/alcohol, time of day/darkness/light     Are  absent         Patient   Indicates   The  Presence   And  The  Absence scientific specter generator and 32 lead paddle electrode by Dr Mike Vasquez at [address] [place] [address]  04/16/2015    spinal cord stimulator removed    SPINAL CORD DECOMPRESSION  4/23/12    Lumbar.  TOTAL KNEE ARTHROPLASTY Left 04/2015    [facility] orthopedics    TUBAL LIGATION  04/02/1990    UPPER GASTROINTESTINAL ENDOSCOPY  10/14/2011    Hiatal hernia.  UPPER GASTROINTESTINAL ENDOSCOPY N/A 9/10/2018    gastritis, esophagitis-BRAVO=reflux         Current Outpatient Medications   Medication Sig Dispense Refill    Elastic Bandages & Supports (ADJUSTABLE WRIST BRACE) MIS Nightly 2 each 0    furosemide (LASIX) 20 MG tablet Take 1 tablet by mouth daily 30 tablet 0    gabapentin (NEURONTIN) 600 MG tablet Take 1 tablet by mouth 2 times daily for 90 days. take 1 tablet by mouth three times a day 60 tablet 2    OXcarbazepine (TRILEPTAL) 150 MG tablet take 2 tablets by mouth twice a day 60 tablet 2    hydrochlorothiazide (HYDRODIURIL) 25 MG tablet take 1 tablet by mouth once daily 90 tablet 1    metolazone (ZAROXOLYN) 5 MG tablet Take 1 tablet by mouth daily 30 minutes prior to bumex dose.   Take until swelling subsides, then discontinue 15 tablet 0    bumetanide (BUMEX) 1 MG tablet Take 1 tablet by mouth daily 30 tablet 3    metoprolol tartrate (LOPRESSOR) 50 MG tablet take 1 tablet by mouth twice a day 60 tablet 5    esomeprazole (NEXIUM) 40 MG delayed release capsule take 1 capsule by mouth twice a day 60 capsule 5    Compression Stockings MISC by Does not apply route 20-30 mm Hg pressure, ready to wear, knee high 4 each 0    RA ALLERGY RELIEF 10 MG tablet take 1 tablet by mouth once daily 30 tablet 11    levothyroxine (SYNTHROID) 125 MCG tablet take 1 tablet by mouth once daily 30 tablet 5    ranitidine (ZANTAC) 150 MG tablet Take 1 tablet by mouth nightly 90 tablet 1    nortriptyline (PAMELOR) 25 MG capsule take 1 capsule by mouth at bedtime 90 capsule 0    ondansetron (ZOFRAN) 4 MG tablet Take 4 mg by mouth      promethazine (PHENERGAN) 25 MG tablet Take 25 mg by mouth      atorvastatin (LIPITOR) 20 MG tablet take 1 tablet by mouth once daily 30 tablet 5    albuterol sulfate HFA (VENTOLIN HFA) 108 (90 Base) MCG/ACT inhaler INHALE 2 PUFFS EVERY 6 HOURS IF NEEDED FOR WHEEZING 18 g 5    fluticasone-salmeterol (ADVAIR DISKUS) 250-50 MCG/DOSE AEPB Inhale 1 puff into the lungs every 12 hours RINSE MOUTH AFTER USE 1 Inhaler 5    Mirabegron ER 50 MG TB24 Take 50 mg by mouth daily 30 tablet 5    brexpiprazole (REXULTI) 0.5 MG TABS tablet Take 0.5 mg by mouth daily      Cyanocobalamin (VITAMIN B12 PO) Take by mouth daily      b complex vitamins capsule Take 1 capsule by mouth daily      aspirin 81 MG tablet Take 81 mg by mouth daily      spironolactone (ALDACTONE) 50 MG tablet take 1 tablet by mouth once daily 90 tablet 1    lidocaine (XYLOCAINE) 5 % ointment Apply topically to affected area bid as needed 50 g 5    potassium chloride (KLOR-CON) 8 MEQ extended release tablet take 4 tablets by mouth once daily 120 tablet 5    phenazopyridine (PYRIDIUM) 100 MG tablet Take 1 tablet by mouth 3 times daily as needed (dysuria) 15 tablet 0    lamoTRIgine (LAMICTAL) 200 MG tablet Take 200 mg by mouth daily      traZODone (DESYREL) 300 MG tablet take 1 tablet by mouth at bedtime  0    desvenlafaxine succinate (PRISTIQ) 100 MG TB24 extended release tablet take 1 tablet by mouth once daily  0    prazosin (MINIPRESS) 2 MG capsule Take 2 mg by mouth nightly       busPIRone (BUSPAR) 30 MG tablet Take 30 mg by mouth 3 times daily       Cholecalciferol (VITAMIN D3) 5000 UNITS TABS Take 1 tablet by mouth daily 30 tablet 2     No current facility-administered medications for this visit.           Allergies   Allergen Reactions    Allopurinol     Colchicine     Erythromycin     Indocin [Indomethacin]     Lasix [Furosemide] Other (See Comments)     Dizziness, extreme fatigue    Lisinopril     Morphine     Norvasc [Amlodipine]     Tenormin [Atenolol]     Uloric [Febuxostat]     Hydrocodone-Acetaminophen Nausea Only    Topamax [Topiramate] Swelling and Rash         Family History   Problem Relation Age of Onset    Cancer Mother         melanoma    Thyroid Disease Mother     Coronary Art Dis Father         coronary artery bypass grafting x4    Hypertension Father     Glaucoma Father     Prostate Cancer Father     Heart Disease Father     Heart Disease Paternal Grandmother     High Blood Pressure Paternal Grandmother     Diabetes Paternal Grandmother     Thyroid Disease Paternal Grandmother     Heart Disease Paternal Grandfather     High Blood Pressure Paternal Grandfather     Diabetes Paternal Grandfather     Asthma Daughter     Depression Daughter     Anxiety Disorder Daughter     Depression Daughter     Anxiety Disorder Daughter     Asthma Other         sibling         Social History     Socioeconomic History    Marital status:      Spouse name: Not on file    Number of children: Not on file    Years of education: Not on file    Highest education level: Not on file   Occupational History    Not on file   Social Needs    Financial resource strain: Not on file    Food insecurity:     Worry: Not on file     Inability: Not on file    Transportation needs:     Medical: Not on file     Non-medical: Not on file   Tobacco Use    Smoking status: Never Smoker    Smokeless tobacco: Never Used   Substance and Sexual Activity    Alcohol use: No    Drug use: No    Sexual activity: Not on file   Lifestyle    Physical activity:     Days per week: Not on file     Minutes per session: Not on file    Stress: Not on file   Relationships    Social connections:     Talks on phone: Not on file     Gets together: Not on file     Attends Cheondoism service: Not on file     Active member of club or organization: Not on file     Attends meetings of clubs or organizations: Not on file Relationship status: Not on file    Intimate partner violence:     Fear of current or ex partner: Not on file     Emotionally abused: Not on file     Physically abused: Not on file     Forced sexual activity: Not on file   Other Topics Concern    Not on file   Social History Narrative    Not on file       Vitals:    06/06/19 0915   BP: 124/68   Pulse: 93   SpO2: 98%         Wt Readings from Last 3 Encounters:   06/06/19 266 lb 1.5 oz (120.7 kg)   05/23/19 266 lb (120.7 kg)   05/16/19 262 lb (118.8 kg)         BP Readings from Last 3 Encounters:   06/06/19 124/68   05/23/19 124/70   05/16/19 118/70       Hematology and Coagulation  Lab Results   Component Value Date    WBC 9.9 05/16/2019    RBC 4.47 05/16/2019    HGB 12.4 05/16/2019    HCT 37.9 05/16/2019    MCV 84.9 05/16/2019    MCH 27.8 05/16/2019    MCHC 32.8 05/16/2019    RDW 16.0 05/16/2019     05/16/2019    MPV 8.3 05/16/2019       Chemistries  Lab Results   Component Value Date     05/16/2019    K 3.3 05/16/2019    CL 91 05/16/2019    CO2 33 05/16/2019    BUN 9 05/16/2019    CREATININE 0.91 05/16/2019    CALCIUM 9.4 05/16/2019    PROT 6.8 05/08/2019    LABALBU 4.1 05/08/2019    BILITOT 0.16 05/08/2019    ALKPHOS 131 05/08/2019    AST 14 05/08/2019    ALT 10 05/08/2019     Lab Results   Component Value Date    ALKPHOS 131 05/08/2019    ALT 10 05/08/2019    AST 14 05/08/2019    PROT 6.8 05/08/2019    BILITOT 0.16 05/08/2019    LABALBU 4.1 05/08/2019     Lab Results   Component Value Date    BUN 9 05/16/2019    CREATININE 0.91 05/16/2019     Lab Results   Component Value Date    CALCIUM 9.4 05/16/2019    MG 1.7 05/16/2019     Lab Results   Component Value Date    AST 14 05/08/2019    ALT 10 05/08/2019     Lab Results   Component Value Date    QFVISGBI53 504 07/09/2018         Review of Systems   Constitutional: Negative for appetite change, chills, diaphoresis, fatigue, fever and unexpected weight change.    HENT: Positive for trouble swallowing. Negative for congestion, dental problem, drooling, ear discharge, ear pain, facial swelling, hearing loss, mouth sores, nosebleeds, postnasal drip, sinus pressure, sore throat, tinnitus and voice change. Eyes: Negative for photophobia, pain, discharge, redness, itching and visual disturbance. Respiratory: Negative for apnea, cough, choking, chest tightness, shortness of breath and wheezing. Cardiovascular: Negative for chest pain, palpitations, leg swelling and near-syncope. Gastrointestinal: Negative for abdominal distention, abdominal pain, anorexia, blood in stool, bowel incontinence, change in bowel habit, constipation, diarrhea, nausea and vomiting. Endocrine: Negative for cold intolerance, heat intolerance, polydipsia, polyphagia and polyuria. Genitourinary: Negative for bladder incontinence. Musculoskeletal: Positive for gait problem. Negative for arthralgias, back pain, joint swelling, myalgias, neck pain and neck stiffness. Skin: Negative for color change, pallor, rash and wound. Allergic/Immunologic: Negative for environmental allergies, food allergies and immunocompromised state. Neurological: Positive for focal weakness, seizures, speech difficulty, weakness, numbness and loss of balance. Negative for dizziness, vertigo, tremors, syncope, facial asymmetry, light-headedness and headaches. Hematological: Negative for adenopathy. Does not bruise/bleed easily. Psychiatric/Behavioral: Positive for decreased concentration, memory loss and sleep disturbance. Negative for agitation, behavioral problems, confusion, dysphoric mood, hallucinations, self-injury and suicidal ideas. The patient is nervous/anxious. The patient is not hyperactive. OBJECTIVE:    Physical Exam   Constitutional: She appears well-developed and well-nourished. She is cooperative. HENT:   Head: Normocephalic and atraumatic. Head is without raccoon's eyes and without Bob's sign.    Right Ear: Acuity  And  Visual fields  within normal limits                      Fundi  Could  Not  Be  Could  Not  Be  Evaluated. 3,4,6 CN : Both  Pupils are   Reactive and  Equal.                          Extraocular   Movements  Are  Intact. No  Nystagmus. No  COLEMAN. No  Afferent  Pupillary  Defect noted. 5 CN :  Normal  Facial sensations and Corneal  Reflexes         7 CN :  Normal  Facial  Symmetry  And  Strength. No facial  Weakness. 8 CN :  Hearing  Appears within normal limits        9, 10 CN: Normal spontaneous, reflex palate movements       11 CN:   Normal  Shoulder shrug and  strength       12 CN :   Normal  Tongue movements and  Tongue  In midline                      No tongue   Fasciculations or atrophy     C) MOTOR  EXAM:                 Strength  In upper   4+/5  WITH  DECREASED  HAND              And  Lower extremities      4+/5               No  Drift. No  Atrophy             Rapid alternating  And  repetitions  Movements  DECREASED               Muscle  Tone  In upper  And  Lower  Extremities  normal              No rigidity. No  Spasticity. Bradykinesia   absent               No  Asterixis. Sustention  Tremor , Resting  Tremor   absent                  No other  Abnormal  Movements noted         D) SENSORY :             light touch, pinprick, position  And  Vibration   DECREASED       E) REFLEXES:                 Deep  Tendon  Reflexes DECREASED                  No pathological  Reflexes  Bilaterally.                                 F) COORDINATION  AND  GAIT :                              Station and  Gait  SLOW                                    Romberg's test   POSITIVE                        Ataxia negative        ASSESSMENT:    Patient Active Problem List   Diagnosis    Iron deficiency anemia    Chronic low back pain    Chronic neck pain    Hypertension    Depression    Anxiety    Hypothyroidism    Allergic Additional signs and symptoms: Tripping over things; Balance problems; Memory   problems       Initial evaluation.       FINDINGS:   INTRACRANIAL STRUCTURES/VENTRICLES: There is no acute infarct.  The   ventricles and cisternal spaces are normal in size, shape, and configuration   for the age of the patient. No areas of abnormal signal are identified in the   brain parenchyma.  There is no midline shift or mass effect.  There are no   areas of restricted diffusion.       ORBITS: The visualized portion of the orbits demonstrate no acute abnormality.       SINUSES:  The visualized paranasal sinuses and mastoid air cells are clear.       BONES/SOFT TISSUES: The bone marrow signal intensity appears normal. The soft   tissues demonstrate no acute abnormality.           Impression   No acute brain parenchymal abnormality.             ULTRASOUND EVALUATION OF THE CAROTID ARTERIES       7/16/2018       COMPARISON:   None.       HISTORY:   ORDERING SYSTEM PROVIDED HISTORY: Chronic fatigue   TECHNOLOGIST PROVIDED HISTORY:   Ordering Physician Provided Reason for Exam: dizziness, balance issues   Acuity: Acute   Type of Exam: Initial       FINDINGS:   RIGHT:       The right common carotid artery demonstrates peak systolic velocities of 41.3   and 63.3 cm/sec in the proximal and distal segments respectively.       The right internal carotid artery demonstrates the systolic velocities of   82.6, 63.3 and 71.1 cm/sec in the proximal, mid and distal segments   respectively.       The external carotid artery is patent.  The vertebral artery demonstrates   normal antegrade flow.       No evidence of focal atherosclerotic plaque.       ICA/CCA ratio of 0.9.       LEFT:       The left common carotid artery demonstrates peak systolic velocities of 925   and 74.1 cm/sec in the proximal and distal segments respectively.       The left internal carotid artery demonstrates the systolic velocities of   15.2, 63.3 and 64.6 cm/sec in the proximal, mid and distal segments   respectively.       The external carotid artery is patent.  The vertebral artery demonstrates   normal antegrade flow.       No evidence of focal atherosclerotic plaque.       ICA/CCA ratio of 0.5.           Impression   Unremarkable bilateral carotid ultrasound.             MRI BRAIN W WO CONTRAST - 5/6/2016 10:30 AM.       HISTORY: pt c/o losing balance, shaking in hands, tremors, right sided weakness, history of HTN and DM       COMPARISON: MRI brain April 21, 2011       TECHNIQUE: Multiplanar, multisequence MR imaging of the brain was performed prior to and following the intravenous administration of 20 mL of Magnevist.       FINDINGS:  Diffusion weighted images are without evidence for active diffusion restriction to suggest acute ischemic event.  The ventricular system is normal in size and morphology.  There are no intraaxial or extraaxial fluid collections or mass    lesions.  The brainstem and craniocervical junction is normal.  Normal signal characteristics and morphology are demonstrated within the cerebral cortex, subcortical white matter, corpus callosum, deep gray nuclei, brainstem and cerebellum.  The basilar    cisterns are preserved.  There is no evidence of abnormal intracranial enhancement.  The orbits and globes are within normal limits.  The paranasal sinuses and mastoids are clear.           Impression   IMPRESSION: Unremarkable MRI examination of the brain.       Final report electronically signed by Bolivar Moreno M.D. on 5/9/2016 8:15 AM             VISITING DIAGNOSIS:      ICD-10-CM    1. Nocturnal seizures ([facility] [address] G40.909    2. Bilateral carpal tunnel syndrome G56.03 Elastic Bandages & Supports (ADJUSTABLE WRIST BRACE) MISC   3. Ulnar neuropathy of both upper extremities G56.23 Elastic Bandages & Supports (ADJUSTABLE WRIST BRACE) MISC   4. Peroneal neuropathy, unspecified laterality G57.30    5.  Polyneuropathy associated with underlying disease ([address] Skyla Nash therapy    As   Recommended  Was   Discussed      *    Prophylactic  Use   Of     Vitamin   B   Complex,  Folic  Acid,    Vitamin  B12    Multivitamin,   Calcium  With    magnesium  And  Vit D    Supplementations   Over  The  Counter  Discussed         *  FOOT  CARE, DAILY  INSPECTION  OF  FEET   AND         PERIODIC  PODIATRY EVALUATIONS . *  PATIENT  IS  ALSO   COUNSELED   TO  KEEP    ACTIVITIES:         A)   SIMPLE      B)  ORGANIZED      C)  WRITE   DOWN                 *  EVALUATIONS  AND  FOLLOW UP:    IF  PATIENT  IS  WILLING                   * PHYSICAL  THERAPY   / OCCUPATIONAL THERAPY                                  * CARDIOLOGY              *   OPTHALMOLOGY                                               *   CURRENTLY  PATIENT  DENIES  BEING  PREGNANT                  AND   HAS  NO  PLANS  TO  GET  PREGNANT. *     TRILEPTAL  UP     TITRATED     300  MG   BID  IN  NOV. 2018                            AND  PATIENT  TOLERATING  THE  SAME       WITH                                CLINICAL  IMPROVEMENT. PATIENT  TO  CONTINUE  THE  SAME. PATIENT  ALREADY  ON  LAMICTAL  FROM  MENTAL  HEALTH PROFESSIONALS                    *         PATIENT     WAS     TRIED  WITH      VIMPAT    IN   [facility]   2019                                     FOR  BETTER  SEIZURE  CONTROL      AND  THE  SAME                              DISCONTINUED     DUE  TO     SWELLING IN  BOTH   FEET  AND  LEGS                        *    NO  FURTHER  RECURRENCE OF  SEIZURE   ACTIVITY      SINCE  DEC. 2018                          *     H/O    SWELLING IN  BOTH   FEET  AND  LEGS       SINCE   April 2019                                        D / D     INCLUDE     CARDIAC,    RENAL,   AND  OTHER      ETIOLOGIES,     INCLUDING                                              MEDICATION   EFFECTS. -  PATIENT  ADVISED  TO  FOLLOW  WITH  HER  PCP  CLOSELY. *PATIENT   TO  FOLLOW  UP  WITH   PRIMARY  CARE            AND   OTHER  CONSULTANTS  AS  BEFORE.           *TO  FOLLOW  WITH   MENTAL  HEALTH  PROFESSIONALS ,  INCLUDING            PSYCHOLOGICAL  COUNSELING   AND  PSYCHIATRIC  EVALUTIONS,             IF  THE  PATIENT  IS  WILLING. *  Maintain   Healthy  Life Style    With   Periodic  Monitoring  Of    Any  Medical  Conditions  Including   Elevated  Blood  Pressure,  Lipid  Profile,   Blood  Sugar levels  And   Heart  Disease. *   Period   Screening  For  Cancers  Involving  Breast,  Colon,  lungs  And  Other  Organs  As  Applicable,  In consultation   With  Your  Primary Care Providers. * Second  Neurological  Opinion  And  Evaluations  In  [facility]  Setting  If  Patient  Is  Interested. * Please   Contact   Neurology  Clinic   Early   If   Are  Any  New  Neurological                           Symptoms   And  Any neurological  Concerns. *  If  The  Patient remains  Neurologically  Stable   Return   To  [facility] Neurology Department   IN  3    MONTHS  TIME   FOR  FURTHER              FOLLOW UP.                 *  If   There is  Any  Significant  Worsening   Of  Current  Symptoms  And  Or  If patient  Develops       Any additional  New  Neurological  Symptoms  Or  Significant  Concerns   Should  Call  911     or  Go  To  Emergency  Department  For  Further  Immediate  Evaluation. *   The  Neurological   Findings,  Possible  Diagnosis,  Differential diagnoses   And  Options  For    Further   Investigations   And  management   Are  Discussed  Comprehensively. Medications   And  Prescription   Risks  And  Side effects  Are   Also  Discussed. The  Above  Were  Reviewed  With  patient and   questions  Answered  In  Detail.               More   Than   50% of face  To face Time   Was  Spent  On  Counseling   And   Coordination Of  Care   Of   Patient's multiple   Neurological  Problems   And   Comorbid  Medical   Conditions. Electronically signed by Karen Tubbs MD   Board Certified in  Neurology &  In  [facility] ([facility])      DISCLAIMER:   Although every effort was made to ensure the accuracy of this  electronic transcription, some errors in transcription may have occurred. GENERAL PATIENT INSTRUCTIONS:     A Healthy Lifestyle: Care Instructions  Your Care Instructions  A healthy lifestyle can help you feel good, stay at a healthy weight, and have plenty of energy for both work and play. A healthy lifestyle is something you can share with your whole family. A healthy lifestyle also can lower your risk for serious health problems, such as high blood pressure, heart disease, and diabetes. You can follow a few steps listed below to improve your health and the health of your family. Follow-up care is a key part of your treatment and safety. Be sure to make and go to all appointments, and call your doctor if you are having problems. Its also a good idea to know your test results and keep a list of the medicines you take. How can you care for yourself at home? Do not eat too much sugar, fat, or fast foods. You can still have dessert and treats now and then. The goal is moderation. Start small to improve your eating habits. Pay attention to portion sizes, drink less juice and soda pop, and eat more fruits and vegetables. Eat a healthy amount of food. A 3-ounce serving of meat, for example, is about the size of a deck of cards. Fill the rest of your plate with vegetables and whole grains. Limit the amount of soda and sports drinks you have every day. Drink more water when you are thirsty. Eat at least 5 servings of fruits and vegetables every day.  It may seem like a lot, but it is not hard to reach this goal. A serving or helping is 1 piece of fruit, 1 cup of vegetables, or 2 cups of leafy, raw vegetables. Have an apple or some carrot sticks as an afternoon snack instead of a candy bar. Try to have fruits and/or vegetables at every meal.  Make exercise part of your daily routine. You may want to start with simple activities, such as walking, bicycling, or slow swimming. Try to be active 30 to 60 minutes every day. You do not need to do all 30 to 60 minutes all at once. For example, you can exercise 3 times a day for 10 or 20 minutes. Moderate exercise is safe for most people, but it is always a good idea to talk to your doctor before starting an exercise program.  Keep moving. Soilasukh Fothergill the lawn, work in the garden, or [place]. Take the stairs instead of the elevator at work. If you smoke, quit. People who smoke have an increased risk for heart attack, stroke, cancer, and other lung illnesses. Quitting is hard, but there are ways to boost your chance of quitting tobacco for good. Use nicotine gum, patches, or lozenges. Ask your doctor about stop-smoking programs and medicines. Keep trying. In addition to reducing your risk of diseases in the future, you will notice some benefits soon after you stop using tobacco. If you have shortness of breath or asthma symptoms, they will likely get better within a few weeks after you quit. Limit how much alcohol you drink. Moderate amounts of alcohol (up to 2 drinks a day for men, 1 drink a day for women) are okay. But drinking too much can lead to liver problems, high blood pressure, and other health problems. Family health  If you have a family, there are many things you can do together to improve your health. Eat meals together as a family as often as possible. Eat healthy foods. This includes fruits, vegetables, lean meats and dairy, and whole grains. Include your family in your fitness plan.  Most people think of activities such as jogging or tennis as the way to fitness, but there are many ways you and your family can be more active. Anything that makes you breathe hard and gets your heart pumping is exercise. Here are some tips:  Walk to do errands or to take your child to school or the bus. Go for a family bike ride after dinner instead of watching TV. Where can you learn more? Go to https://chpepiceweb.Meritful. org and sign in to your Segetis account. Enter L029 in the Reframe It box to learn more about \"A Healthy Lifestyle: Care Instructions. \"     If you do not have an account, please click on the \"Sign Up Now\" link. Current as of: July 26, 2016  Content Version: 11.2  © 4666-3241 [employer]edo, Incorporated. Care instructions adapted under license by [facility] [address] If you have questions about a medical condition or this instruction, always ask your healthcare professional. [address] any warranty or liability for your use of this information.

## 2019-06-06 NOTE — PATIENT INSTRUCTIONS
[facility] NEUROLOGY    Due to the complex nature of our neurological testing, It is the policy of the Neurology Department not to release the results of your testing over the phone. Once all testing is completed and we have all the results back, Dr. Daria Dyer will then personally go over all the results with you and answer any questions that you may have during a follow up appointment. If you are unable to keep this appointment, please notify the [address] [place] [address] @ [phone], as soon as possible. Please bring your prescription bottles to all appointments. *   SEIZURE  PRECAUTIONS. A)  Avoid  Working  At   [employer] B)  Avoid  Working  With  Heavy machinery. C)  Avoid   Swimming,  Climbing  A  Ladder   Unattended. D)  Avoid   Driving   If  You   Have  A  Seizure. E)  Must   Be  Seizure  Free   For  At   Least   6 months,  Before   You  Can drive. F) Some times  Your  May  Feel  Seizure coming  Before  It  Begins. You  May feel             Strange smell or funny  Feeling  In  Your  Stomach,  Which is  Called   Aura. TIPS  TO  REDUCE/ PREVENT  SEIZURES         1. Take  Your  Anti seizure  Medications   As   Recommended. 2. Get   Enough   Sleep. Sleep  Deprivation   Can  Trigger  A  Seizure. 3. If   You   Have  A fever,  Treat  It  At  Once,  And  Contact   Your  Primary  Care Providers. 4. Avoid   Alcohol. 5. Avoid  Flashing  Lights,  Loud  Noises and  TV  And  Video  Games,           As   These  May  Trigger   Your  Seizures       6.   Control  Your  Stress  And   Have  Adequate  Rest.       7.   If  You  Feel  A  Seizure  Coming   On :           A) warn people  Who  Are  With  You           B)  Make  Sure  There  Are  No  Sharp or  Hard  Objects  Around you. C)  Lay down  On  Your  Side  And  Relax. * FALL   PRECAUTIONS. *  USE   WALKING  ASSISTANCE  DEVICES     QUAD  CANE        *      WEIGHT   LOSS. *   ADEQUATE   FLUID  INTAKE   AND  ELECTROLYTE  BALANCE           * KEEP  DAIRY  OF   THE  NEUROLOGICAL  SYMPTOMS          *  TO  MAINTAIN  REGULAR  SLEEP  WAKE  CYCLES. *   TO  HAVE  ADEQUATE  REST  AND   SLEEP    HOURS.        *    AVOID  USAGE OF   TOBACCO,  EXCESSIVE  ALCOHOL                AND   ILLEGAL   SUBSTANCES,  IF  ANY          *  Maintain   Healthy  Life Style    With   Periodic  Monitoring  Of      Any  Medical  Conditions  Including   Elevated  Blood  Pressure,  Lipid  Profile,     Blood  Sugar levels  And   Heart  Disease. *   Period   Screening  For  Cancers  Involving  Breast,  Colon,   lungs  And  Other  Organs  As  Applicable,  In consultation   With  Your  Primary Care Providers. *  If   There is  Any  Significant  Worsening   Of  Current  Symptoms  And  Or  If    Any additional  New  Neurological  Symptoms                 Or  Significant  Concerns   Should  Call  911 or  Go  To  Emergency  Department  For  Further  Immediate  Evaluation.

## 2019-06-07 ENCOUNTER — HOSPITAL ENCOUNTER (OUTPATIENT)
Dept: LAB | Age: 49
Discharge: HOME | End: 2019-06-07
Payer: COMMERCIAL

## 2019-06-07 DIAGNOSIS — I10 ESSENTIAL HYPERTENSION: ICD-10-CM

## 2019-06-07 DIAGNOSIS — E78.2 MIXED HYPERLIPIDEMIA: ICD-10-CM

## 2019-06-07 DIAGNOSIS — R73.01 IMPAIRED FASTING GLUCOSE: ICD-10-CM

## 2019-06-07 DIAGNOSIS — E03.9 HYPOTHYROIDISM, UNSPECIFIED TYPE: ICD-10-CM

## 2019-06-07 LAB
ABSOLUTE EOS #: 0.2 K/UL (ref 0–0.4)
ABSOLUTE IMMATURE GRANULOCYTE: ABNORMAL K/UL (ref 0–0.3)
ABSOLUTE LYMPH #: 2.2 K/UL (ref 1–4.8)
ABSOLUTE MONO #: 0.6 K/UL (ref 0.1–1.2)
ALBUMIN SERPL-MCNC: 4.5 G/DL (ref 3.5–5.2)
ALBUMIN/GLOBULIN RATIO: 1.7 (ref 1–2.5)
ALP BLD-CCNC: 137 U/L (ref 35–104)
ALT SERPL-CCNC: 10 U/L (ref 5–33)
ANION GAP SERPL CALCULATED.3IONS-SCNC: 12 MMOL/L (ref 9–17)
AST SERPL-CCNC: 12 U/L
BASOPHILS # BLD: 1 % (ref 0–1)
BASOPHILS ABSOLUTE: 0.1 K/UL (ref 0–0.2)
BILIRUB SERPL-MCNC: 0.28 MG/DL (ref 0.3–1.2)
BUN BLDV-MCNC: 10 MG/DL (ref 6–20)
BUN/CREAT BLD: 11 (ref 9–20)
CALCIUM SERPL-MCNC: 9.5 MG/DL (ref 8.6–10.4)
CHLORIDE BLD-SCNC: 97 MMOL/L (ref 98–107)
CHOLESTEROL/HDL RATIO: 2.8
CHOLESTEROL: 136 MG/DL
CO2: 29 MMOL/L (ref 20–31)
CREAT SERPL-MCNC: 0.94 MG/DL (ref 0.5–0.9)
DIFFERENTIAL TYPE: ABNORMAL
EOSINOPHILS RELATIVE PERCENT: 2 % (ref 1–7)
ESTIMATED AVERAGE GLUCOSE: 123 MG/DL
GFR AFRICAN AMERICAN: >60 ML/MIN
GFR NON-AFRICAN AMERICAN: >60 ML/MIN
GFR SERPL CREATININE-BSD FRML MDRD: ABNORMAL ML/MIN/{1.73_M2}
GFR SERPL CREATININE-BSD FRML MDRD: ABNORMAL ML/MIN/{1.73_M2}
GLUCOSE BLD-MCNC: 108 MG/DL (ref 70–99)
HBA1C MFR BLD: 5.9 % (ref 4.8–5.9)
HCT VFR BLD CALC: 35.9 % (ref 36–46)
HDLC SERPL-MCNC: 49 MG/DL
HEMOGLOBIN: 11.9 G/DL (ref 12–16)
IMMATURE GRANULOCYTES: ABNORMAL %
LDL CHOLESTEROL: 56 MG/DL (ref 0–130)
LYMPHOCYTES # BLD: 25 % (ref 16–46)
MCH RBC QN AUTO: 28.3 PG (ref 26–34)
MCHC RBC AUTO-ENTMCNC: 33.1 G/DL (ref 31–37)
MCV RBC AUTO: 85.7 FL (ref 80–100)
MONOCYTES # BLD: 7 % (ref 4–11)
NRBC AUTOMATED: ABNORMAL PER 100 WBC
PDW BLD-RTO: 15.4 % (ref 11–14.5)
PLATELET # BLD: 238 K/UL (ref 140–450)
PLATELET ESTIMATE: ABNORMAL
PMV BLD AUTO: 8.4 FL (ref 6–12)
POTASSIUM SERPL-SCNC: 3.6 MMOL/L (ref 3.7–5.3)
RBC # BLD: 4.19 M/UL (ref 4–5.2)
RBC # BLD: ABNORMAL 10*6/UL
SEG NEUTROPHILS: 65 % (ref 43–77)
SEGMENTED NEUTROPHILS ABSOLUTE COUNT: 5.7 K/UL (ref 1.8–7.7)
SODIUM BLD-SCNC: 138 MMOL/L (ref 135–144)
THYROXINE, FREE: 1.02 NG/DL (ref 0.93–1.7)
TOTAL PROTEIN: 7.1 G/DL (ref 6.4–8.3)
TRIGL SERPL-MCNC: 154 MG/DL
TSH SERPL DL<=0.05 MIU/L-ACNC: 1.19 MIU/L (ref 0.3–5)
VLDLC SERPL CALC-MCNC: ABNORMAL MG/DL (ref 1–30)
WBC # BLD: 8.8 K/UL (ref 3.5–11)
WBC # BLD: ABNORMAL 10*3/UL

## 2019-06-07 PROCEDURE — 80053 COMPREHEN METABOLIC PANEL: CPT

## 2019-06-07 PROCEDURE — 84443 ASSAY THYROID STIM HORMONE: CPT

## 2019-06-07 PROCEDURE — 36415 COLL VENOUS BLD VENIPUNCTURE: CPT

## 2019-06-07 PROCEDURE — 83036 HEMOGLOBIN GLYCOSYLATED A1C: CPT

## 2019-06-07 PROCEDURE — 85025 COMPLETE CBC W/AUTO DIFF WBC: CPT

## 2019-06-07 PROCEDURE — 84439 ASSAY OF FREE THYROXINE: CPT

## 2019-06-07 PROCEDURE — 80061 LIPID PANEL: CPT

## 2019-06-08 ENCOUNTER — OFFICE VISIT (OUTPATIENT)
Dept: PRIMARY CARE CLINIC | Age: 49
End: 2019-06-08
Payer: COMMERCIAL

## 2019-06-08 ENCOUNTER — HOSPITAL ENCOUNTER (OUTPATIENT)
Age: 49
Setting detail: SPECIMEN
Discharge: HOME | End: 2019-06-08
Payer: COMMERCIAL

## 2019-06-08 VITALS
HEIGHT: 65 IN | BODY MASS INDEX: 43.32 KG/M2 | WEIGHT: 260 LBS | RESPIRATION RATE: 16 BRPM | HEART RATE: 90 BPM | TEMPERATURE: 98.9 F | DIASTOLIC BLOOD PRESSURE: 84 MMHG | OXYGEN SATURATION: 100 % | SYSTOLIC BLOOD PRESSURE: 136 MMHG

## 2019-06-08 DIAGNOSIS — A49.9 BACTERIAL UTI: Primary | ICD-10-CM

## 2019-06-08 DIAGNOSIS — N39.0 BACTERIAL UTI: Primary | ICD-10-CM

## 2019-06-08 DIAGNOSIS — R30.0 DYSURIA: ICD-10-CM

## 2019-06-08 LAB
-: ABNORMAL
AMORPHOUS: ABNORMAL
BACTERIA: ABNORMAL
BILIRUBIN URINE: NEGATIVE
CASTS UA: ABNORMAL /LPF (ref 0–2)
COLOR: ABNORMAL
COMMENT UA: ABNORMAL
CRYSTALS, UA: ABNORMAL /HPF
EPITHELIAL CELLS UA: ABNORMAL /HPF (ref 0–5)
GLUCOSE URINE: ABNORMAL
KETONES, URINE: NEGATIVE
LEUKOCYTE ESTERASE, URINE: ABNORMAL
MUCUS: ABNORMAL
NITRITE, URINE: POSITIVE
OTHER OBSERVATIONS UA: ABNORMAL
PH UA: 6.5 (ref 5–6)
PROTEIN UA: ABNORMAL
RBC UA: ABNORMAL /HPF (ref 0–4)
RENAL EPITHELIAL, UA: ABNORMAL /HPF
SPECIFIC GRAVITY UA: 1.01 (ref 1.01–1.02)
TRICHOMONAS: ABNORMAL
TURBIDITY: ABNORMAL
URINE HGB: NEGATIVE
UROBILINOGEN, URINE: NORMAL
WBC UA: ABNORMAL /HPF (ref 0–4)
YEAST: ABNORMAL

## 2019-06-08 PROCEDURE — 87088 URINE BACTERIA CULTURE: CPT

## 2019-06-08 PROCEDURE — 81001 URINALYSIS AUTO W/SCOPE: CPT

## 2019-06-08 PROCEDURE — 87086 URINE CULTURE/COLONY COUNT: CPT

## 2019-06-08 PROCEDURE — 99214 OFFICE O/P EST MOD 30 MIN: CPT | Performed by: FAMILY MEDICINE

## 2019-06-08 PROCEDURE — 87186 SC STD MICRODIL/AGAR DIL: CPT

## 2019-06-08 RX ORDER — SULFAMETHOXAZOLE AND TRIMETHOPRIM 400; 80 MG/1; MG/1
1 TABLET ORAL 2 TIMES DAILY
Qty: 14 TABLET | Refills: 0 | Status: SHIPPED | OUTPATIENT
Start: 2019-06-08 | End: 2020-11-13 | Stop reason: SDUPTHER

## 2019-06-08 RX ORDER — PHENAZOPYRIDINE HYDROCHLORIDE 200 MG/1
200 TABLET, FILM COATED ORAL 3 TIMES DAILY PRN
Qty: 9 TABLET | Refills: 0 | Status: SHIPPED | OUTPATIENT
Start: 2019-06-08 | End: 2019-06-11

## 2019-06-08 NOTE — PROGRESS NOTES
[facility]             [address] [facility], [place], [address]                        Telephone [phone]             Fax [phone]     Brendan Keita  1970  MRN:  [id number]  Date of visit:  6/8/2019     Subjective:  Brendan Keita is a 47 y.o.  female who presents to [facility] today (6/8/2019) for evaluation of: Dysuria (pelvic pain, flank pain, painful urination. started yesterday)        She states that she has had lower abdominal and pelvic pain since yesterday. She also reports flank pain. She denies fever or chills. She reports nausea. She denies vomiting. She states that she gets urinary tract infections frequently. She has the following problem list:  Patient Active Problem List   Diagnosis    Iron deficiency anemia    Chronic low back pain    Chronic neck pain    Hypertension    Depression    Anxiety    Hypothyroidism    Allergic rhinitis    GERD (gastroesophageal reflux disease)    Anemia    Chronic fatigue    Mixed hyperlipidemia    Paresthesias    Hyperlipidemia    Tripping over things    Balance problems    Memory problem    Bilateral carpal tunnel syndrome    Polyneuropathy associated with underlying disease (HCC)    Ulnar neuropathy of both upper extremities    Peroneal neuropathy    Tibial neuropathy    Slurred speech    Other dysphagia    Cerebral ischemia    Abnormal EEG    Nocturnal seizures (HCC)    Obstructive sleep apnea    Localized swelling of both lower legs    Seizure disorder (HCC)        Current medications are:  Current Outpatient Medications   Medication Sig Dispense Refill    Elastic Bandages & Supports (ADJUSTABLE WRIST BRACE) MISC Nightly 2 each 0    furosemide (LASIX) 20 MG tablet Take 1 tablet by mouth daily 30 tablet 0    gabapentin (NEURONTIN) 600 MG tablet Take 1 tablet by mouth 2 times daily for 90 days.  take 1 tablet by mouth three times a day 60 tablet 2    OXcarbazepine (TRILEPTAL) 150 MG tablet take 2 tablets by mouth twice a day 60 tablet 2    hydrochlorothiazide (HYDRODIURIL) 25 MG tablet take 1 tablet by mouth once daily 90 tablet 1    metolazone (ZAROXOLYN) 5 MG tablet Take 1 tablet by mouth daily 30 minutes prior to bumex dose.   Take until swelling subsides, then discontinue 15 tablet 0    bumetanide (BUMEX) 1 MG tablet Take 1 tablet by mouth daily 30 tablet 3    metoprolol tartrate (LOPRESSOR) 50 MG tablet take 1 tablet by mouth twice a day 60 tablet 5    esomeprazole (NEXIUM) 40 MG delayed release capsule take 1 capsule by mouth twice a day 60 capsule 5    Compression Stockings MISC by Does not apply route 20-30 mm Hg pressure, ready to wear, knee high 4 each 0    RA ALLERGY RELIEF 10 MG tablet take 1 tablet by mouth once daily 30 tablet 11    levothyroxine (SYNTHROID) 125 MCG tablet take 1 tablet by mouth once daily 30 tablet 5    ranitidine (ZANTAC) 150 MG tablet Take 1 tablet by mouth nightly 90 tablet 1    nortriptyline (PAMELOR) 25 MG capsule take 1 capsule by mouth at bedtime 90 capsule 0    ondansetron (ZOFRAN) 4 MG tablet Take 4 mg by mouth      promethazine (PHENERGAN) 25 MG tablet Take 25 mg by mouth      atorvastatin (LIPITOR) 20 MG tablet take 1 tablet by mouth once daily 30 tablet 5    albuterol sulfate HFA (VENTOLIN HFA) 108 (90 Base) MCG/ACT inhaler INHALE 2 PUFFS EVERY 6 HOURS IF NEEDED FOR WHEEZING 18 g 5    fluticasone-salmeterol (ADVAIR DISKUS) 250-50 MCG/DOSE AEPB Inhale 1 puff into the lungs every 12 hours RINSE MOUTH AFTER USE 1 Inhaler 5    Mirabegron ER 50 MG TB24 Take 50 mg by mouth daily 30 tablet 5    brexpiprazole (REXULTI) 0.5 MG TABS tablet Take 0.5 mg by mouth daily      Cyanocobalamin (VITAMIN B12 PO) Take by mouth daily      b complex vitamins capsule Take 1 capsule by mouth daily      aspirin 81 MG tablet Take 81 mg by mouth daily      spironolactone (ALDACTONE) 50 MG tablet take 1 tablet by mouth once daily 90 tablet 1    lidocaine (XYLOCAINE) 5 % ointment Apply topically to affected area bid as needed 50 g 5    potassium chloride (KLOR-CON) 8 MEQ extended release tablet take 4 tablets by mouth once daily 120 tablet 5    phenazopyridine (PYRIDIUM) 100 MG tablet Take 1 tablet by mouth 3 times daily as needed (dysuria) 15 tablet 0    lamoTRIgine (LAMICTAL) 200 MG tablet Take 200 mg by mouth daily      traZODone (DESYREL) 300 MG tablet take 1 tablet by mouth at bedtime  0    desvenlafaxine succinate (PRISTIQ) 100 MG TB24 extended release tablet take 1 tablet by mouth once daily  0    prazosin (MINIPRESS) 2 MG capsule Take 2 mg by mouth nightly       busPIRone (BUSPAR) 30 MG tablet Take 30 mg by mouth 3 times daily       Cholecalciferol (VITAMIN D3) 5000 UNITS TABS Take 1 tablet by mouth daily 30 tablet 2     No current facility-administered medications for this visit. She is allergic to allopurinol; colchicine; erythromycin; indocin [indomethacin]; lasix [furosemide]; lisinopril; morphine; norvasc [amlodipine]; tenormin [atenolol]; uloric [febuxostat]; hydrocodone-acetaminophen; and topamax [topiramate]. She  reports that she has never smoked. She has never used smokeless tobacco.      Objective:  Vitals:    06/08/19 1613   BP: 136/84   Pulse: 90   Resp: 16   Temp: 98.9 °F (37.2 °C)   SpO2: 100%        Body mass index is 43.27 kg/m². Extremely obese  female healthy-appearing, alert, no distress, cooperative. Back has no costovertebral angle tenderness. Abdomen is soft, with mild lower abdominal tenderness to palpation.     Urinalysis was reviewed with the patient:  Hospital Outpatient Visit on 06/08/2019   Component Date Value Ref Range Status    Color, UA 06/08/2019 NOT REPORTED  YELLOW Final    Turbidity UA 06/08/2019 NOT REPORTED  CLEAR Final    Glucose, Ur 06/08/2019 TRACE* NEGATIVE Final    Bilirubin Urine 06/08/2019 NEGATIVE  NEGATIVE Final    Ketones, Urine 06/08/2019 NEGATIVE  NEGATIVE Final    Specific Gravity, UA 06/08/2019 1.015  1.010 - 1.025 Final    Urine Hgb 06/08/2019 NEGATIVE  NEGATIVE Final    pH, UA 06/08/2019 6.5* 5.0 - 6.0 Final    Protein, UA 06/08/2019 TRACE* NEGATIVE Final    Urobilinogen, Urine 06/08/2019 Normal  Normal Final    Nitrite, Urine 06/08/2019 POSITIVE* NEGATIVE Final    Leukocyte Esterase, Urine 06/08/2019 2+* NEGATIVE Final    Urinalysis Comments 06/08/2019 NOT REPORTED   Final    - 06/08/2019        Final    WBC, UA 06/08/2019 25 TO 50  0 - 4 /HPF Final    RBC, UA 06/08/2019 None  0 - 4 /HPF Final    Casts UA 06/08/2019 NOT REPORTED  0 - 2 /LPF Final    Crystals UA 06/08/2019 NOT REPORTED  None /HPF Final    Epithelial Cells UA 06/08/2019 0 TO 4  0 - 5 /HPF Final    Renal Epithelial, Urine 06/08/2019 NOT REPORTED  0 /HPF Final    Bacteria, UA 06/08/2019 3+* None Final    Mucus, UA 06/08/2019 NOT REPORTED  None Final    Trichomonas, UA 06/08/2019 NOT REPORTED  None Final    Amorphous, UA 06/08/2019 NOT REPORTED  None Final    Other Observations UA 06/08/2019 Specimen Cultured* NOT REQ. Final    Yeast, UA 06/08/2019 NOT REPORTED  None Final       Assessment and Plan:    Bacterial UTI  - sulfamethoxazole-trimethoprim (BACTRIM) 400-80 MG per tablet; Take 1 tablet by mouth 2 times daily for 7 days  Dispense: 14 tablet; Refill: 0  - phenazopyridine (PYRIDIUM) 200 MG tablet; Take 1 tablet by mouth 3 times daily as needed for Pain  Dispense: 9 tablet; Refill: 0     She will be contacted when the ID and sensitivity results are available. She was advised to follow up if symptoms worsen or do not resolve.        (Please note that portions of this note were completed with a voice-recognition program. Efforts were made to edit the dictation but occasionally words are mis-transcribed.)

## 2019-06-08 NOTE — PATIENT INSTRUCTIONS
from front to back. When should you call for help? Call your doctor now or seek immediate medical care if:    · Symptoms such as fever, chills, nausea, or vomiting get worse or appear for the first time.     · You have new pain in your back just below your rib cage. This is called flank pain.     · There is new blood or pus in your urine.     · You have any problems with your antibiotic medicine.    Watch closely for changes in your health, and be sure to contact your doctor if:    · You are not getting better after taking an antibiotic for 2 days.     · Your symptoms go away but then come back. Where can you learn more? Go to https://Submittable.VisionGate. org and sign in to your [employer] account. Enter N645 in the FreedomPop box to learn more about \"Urinary Tract Infection in Women: Care Instructions. \"     If you do not have an account, please click on the \"Sign Up Now\" link. Current as of: December 19, 2018  Content Version: 12.0  © 9297-7878 Healthwise, Incorporated. Care instructions adapted under license by [facility] ([facility]). If you have questions about a medical condition or this instruction, always ask your healthcare professional. [place] [address] any warranty or liability for your use of this information. We will contact you when the results of your culture are available. This usually takes at least 48 hours.

## 2019-06-10 LAB
CULTURE: ABNORMAL
Lab: ABNORMAL
SPECIMEN DESCRIPTION: ABNORMAL

## 2019-06-13 ENCOUNTER — HOSPITAL ENCOUNTER (OUTPATIENT)
Dept: OCCUPATIONAL THERAPY | Age: 49
Setting detail: THERAPIES SERIES
Discharge: HOME | End: 2019-06-13
Payer: COMMERCIAL

## 2019-06-13 PROCEDURE — 97166 OT EVAL MOD COMPLEX 45 MIN: CPT | Performed by: OCCUPATIONAL THERAPIST

## 2019-06-13 PROCEDURE — 97535 SELF CARE MNGMENT TRAINING: CPT | Performed by: OCCUPATIONAL THERAPIST

## 2019-06-13 ASSESSMENT — PAIN SCALES - WONG BAKER: WONGBAKER_NUMERICALRESPONSE: 4

## 2019-06-13 NOTE — PLAN OF CARE
Occupational Therapy    [x] [place]  Phone: [phone]  Fax: [phone]      [] [place]  Phone: [phone]  Fax: [phone]       To: Referring Practitioner: Asif Bruce      Patient: Hailee Thornton  : 1970  MRN: [id number]  Evaluation Date: 2019      Diagnosis Information:  Diagnosis: Localized swelling of both lower legs          Occupational Therapy Certification/Re-Certification Form  Dear Cris Case  The following patient has been evaluated for occupational therapy services and for therapy to continue, Insurance requires physician review of the treatment plan. Please review the attached evaluation and/or summary of the patient's plan of care, and verify that you agree therapy should continue by signing the attached document and sending it back to our office. Plan of Care/Treatment to date:  [x] Therapeutic Exercise   [] Modalities:  [x] Therapeutic Activity    [] Ultrasound  [] Electrical Stimulation   [x] Activities of Daily Living    [] Fluidotherapy [x] Kinesiotaping  [] Neuromuscular Re-education   [] Iontophoresis [] Coldpack/hotpack   [x] Instruction in HEP     [] Contrast Bath  [] Manual Therapy     Other:  [] Aquatic Therapy      [] ? Frequency/Duration:  # Days per week: [x] 1-2 visits # Weeks: [] 1 week [] 5 weeks      [] 2 days?    [] 2 weeks [] 6 weeks     [] 3 days   [] 3 weeks [] 7 weeks     [] 4 days   [x] 4 weeks [] 8 weeks  Goals:    Long term goals  Time Frame for Long term goals : 4 weeks  Long term goal 1: Assess, determine, fit, and obtain most appropriate long term compression garments for management of lymphedema  Long term goal 2: Patient to be independent with donning and doffing compression garments  Long term goal 3: Patient to be independent with home program  Rehab Potential: [] excellent [] good [x] fair  [] poor       Electronically signed by:  Zelda Jacobs OT      If you have any questions or concerns, please don't hesitate to call.  Thank you for your referral.      Physician Signature:________________________________Date:__________________  By signing above, therapists plan is approved by physician

## 2019-06-13 NOTE — PROGRESS NOTES
Occupational Therapy  Occupational Therapy Initial Assessment  Date:  2019    Patient Name: Aravind Tao  MRN: [id number]     :  1970     Treatment Diagnosis: Localized swelling of both lower legs     Subjective   General  Chart Reviewed: Yes  Patient assessed for rehabilitation services?: Yes  Family / Caregiver Present: No  Referring Practitioner: Nigel Ramirez  Diagnosis: Localized swelling of both lower legs   OT Visit Information  Onset Date: 19  Subjective  Subjective: Patient rec'd in waiting room, pleasant and cooperative 47 yr old female with Localized swelling of both lower legs   Pain Assessment  Pain Assessment: Faces  Sandoval-Baker Pain Rating: Hurts little more  Vital Signs  Patient Currently in Pain: Yes  Home Living  Social/Functional History  Lives With: Spouse  Type of Home: House  ADL Assistance: Independent  Homemaking Assistance: Independent  Homemaking Responsibilities: Yes  Meal Prep Responsibility: Primary  Laundry Responsibility: Primary  Cleaning Responsibility: Primary  Shopping Responsibility: Primary  Ambulation Assistance: Independent  Transfer Assistance: Independent  Active : Yes  Occupation: Full time employment     Objective    Measurements:   L foot 25.9, L superior ankle 28.4    Patient states she is unable to financially afford bandaging materials and does not want to try stockings again at this time. Recommend velcro self bandaging compression wraps, patient would like to trial one on her L foot and ankle only. Patient provided with ordering information on Circaid EZ Single Band Ankle-Foot Wrap, size medium. Patient to order and return as needed.       Assessment   Assessment  Treatment Diagnosis: Localized swelling of both lower legs   Decision Making: Medium Complexity  REQUIRES OT FOLLOW UP: Yes       Plan    Plan of care initiated    OutComes Score    Goals  Long term goals  Time Frame for Long term goals : 4 weeks  Long term goal 1: Assess, determine, fit, and obtain most appropriate long term compression garments for management of lymphedema  Long term goal 2: Patient to be independent with donning and doffing compression garments  Long term goal 3: Patient to be independent with home program    Therapy Time   Individual Concurrent Group Co-treatment   Time In 1405         Time Out 1455         Minutes 50         Timed Code Treatment Minutes: 1430 [facility] 4 [facility],

## 2019-06-13 NOTE — FLOWSHEET NOTE
[facility]    [x] [place]  Phone: [phone]  Fax: [phone]      [] [place]  Phone: [phone]  Fax: [phone]    Occupational Therapy Daily Treatment Note  Date:  2019    Patient Name:  Jana Martin    :  1970  MRN: [id number]  Restrictions/Precautions:      Medical/Treatment Diagnosis Information:   Diagnosis: Localized swelling of both lower legs    Insurance/Certification information:   Physician Information: Referring Practitioner: Rosangela Amin of care signed (Y/N):      Visit# / total visits:      Pain level: 4/10     Progress Note: [x]  Yes  []  No  Next due by: Visit #10      Date of evaluation/re-evaluation:    Time In: 205  Time Out:  255    Subjective:     See progress note    Objective/Assessment:   See progress note      Exercises:     Therapeutic Exercise  [] Provided verbal/tactile cueing for activities related to strengthening, flexibility, endurance, ROM. (25576)  Neuro  Re-Ed  [] Provided verbal/tactile cueing for activities related to improving balance, coordination, kinesthetic sense, posture, motor skill, proprioception. (09750)     Therapeutic Activities/ADL:   [] Provided use of dynamic activities to improve functional performance (49837)  [x] Provided self-care/home management training for activities of daily living and compensatory training (91580)     Manual Treatments:   [] Provided manual therapy to mobilize soft tissue/joints for the purpose of modulating pain, promoting relaxation, increasing ROM, reducing/eliminating soft tissue swelling/inflammation/restriction, improving soft tissue extensibility. (71624)     Orthotic Management:   [] Provided assessment and fitting orthotic device for improved functional performance.  (50841)    Service Based Modalities:      Timed Code Treatment Minutes:   15 ADL    Total Treatment Minutes:  50     Treatment/Activity Tolerance:  [x] Patient tolerated treatment well [] Patient limited by fatique  [] Patient limited by pain  [] Patient limited by other medical complications  [] Other:     Prognosis: [] Good [x] Fair  [] Poor    Patient Requires Follow-up: [x] Yes  [] No      Goals:    Long term goals  Time Frame for Long term goals : 4 weeks  Long term goal 1: Assess, determine, fit, and obtain most appropriate long term compression garments for management of lymphedema  Long term goal 2: Patient to be independent with donning and doffing compression garments  Long term goal 3: Patient to be independent with home program    Plan:   [] Continue per plan of care [] Alter current plan (see comments)  [x] Plan of care initiated [] Hold pending MD visit [] Discharge    Plan for Next Session:      Electronically signed by:  [place] OT

## 2019-07-08 ENCOUNTER — TELEPHONE (OUTPATIENT)
Dept: SURGERY | Age: 49
End: 2019-07-08

## 2019-07-08 ENCOUNTER — HOSPITAL ENCOUNTER (OUTPATIENT)
Dept: MAMMOGRAPHY | Age: 49
Discharge: HOME | End: 2019-07-10
Payer: COMMERCIAL

## 2019-07-08 DIAGNOSIS — Z12.31 SCREENING MAMMOGRAM, ENCOUNTER FOR: ICD-10-CM

## 2019-07-08 PROCEDURE — 77063 BREAST TOMOSYNTHESIS BI: CPT

## 2019-07-12 NOTE — TELEPHONE ENCOUNTER
Spoke with patient and offered her appointment 7/17/19. She is on vacation that week unable to come in. She will keep her appointment on 8/13/19.

## 2019-07-15 NOTE — TELEPHONE ENCOUNTER
Jessica Orta called requesting a refill of the below medication which has been pended for you:     Requested Prescriptions     Pending Prescriptions Disp Refills    fluticasone-salmeterol (ADVAIR DISKUS) 250-50 MCG/DOSE AEPB 1 Inhaler 5     Sig: Inhale 1 puff into the lungs every 12 hours RINSE MOUTH AFTER USE       Last Appointment Date: 5/8/2019  Next Appointment Date: 7/29/2019    Allergies   Allergen Reactions    Allopurinol     Colchicine     Erythromycin     Indocin [Indomethacin]     Lasix [Furosemide] Other (See Comments)     Dizziness, extreme fatigue    Lisinopril     Morphine     Norvasc [Amlodipine]     Tenormin [Atenolol]     Uloric [Febuxostat]     Hydrocodone-Acetaminophen Nausea Only    Topamax [Topiramate] Swelling and Rash

## 2019-07-22 RX ORDER — POTASSIUM CHLORIDE 600 MG/1
TABLET, FILM COATED, EXTENDED RELEASE ORAL
Qty: 120 TABLET | Refills: 5 | Status: SHIPPED | OUTPATIENT
Start: 2019-07-22 | End: 2020-01-08

## 2019-07-24 ENCOUNTER — TELEPHONE (OUTPATIENT)
Dept: SURGERY | Age: 49
End: 2019-07-24

## 2019-07-24 NOTE — TELEPHONE ENCOUNTER
7/24/2019 patient was scheduled to see Dr Angel Winter today regarding follow up GERD issues and possibly schedule a procedure. Patient called this morning stating she could not get out of work today. Offer her an 11:30 am appt tomorrow as there was a cancellation. Patient states she cannot come in then either because of work.   Rescheduled her to next available appointment  9/5/19 and will call her if we get any more cancellations

## 2019-07-29 ENCOUNTER — HOSPITAL ENCOUNTER (OUTPATIENT)
Age: 49
Setting detail: SPECIMEN
Discharge: HOME | End: 2019-07-29
Payer: COMMERCIAL

## 2019-07-29 ENCOUNTER — OFFICE VISIT (OUTPATIENT)
Dept: FAMILY MEDICINE CLINIC | Age: 49
End: 2019-07-29
Payer: COMMERCIAL

## 2019-07-29 VITALS
WEIGHT: 272 LBS | HEIGHT: 65 IN | HEART RATE: 68 BPM | RESPIRATION RATE: 16 BRPM | SYSTOLIC BLOOD PRESSURE: 110 MMHG | DIASTOLIC BLOOD PRESSURE: 80 MMHG | BODY MASS INDEX: 45.32 KG/M2

## 2019-07-29 DIAGNOSIS — Z01.419 SMEAR, VAGINAL, AS PART OF ROUTINE GYNECOLOGICAL EXAMINATION: ICD-10-CM

## 2019-07-29 DIAGNOSIS — Z12.72 SMEAR, VAGINAL, AS PART OF ROUTINE GYNECOLOGICAL EXAMINATION: ICD-10-CM

## 2019-07-29 DIAGNOSIS — Z23 NEED FOR HEPATITIS B VACCINATION: ICD-10-CM

## 2019-07-29 DIAGNOSIS — M79.89 LOCALIZED SWELLING OF LOWER EXTREMITY: ICD-10-CM

## 2019-07-29 DIAGNOSIS — Z12.72 SMEAR, VAGINAL, AS PART OF ROUTINE GYNECOLOGICAL EXAMINATION: Primary | ICD-10-CM

## 2019-07-29 DIAGNOSIS — E78.2 MIXED HYPERLIPIDEMIA: ICD-10-CM

## 2019-07-29 DIAGNOSIS — I10 ESSENTIAL HYPERTENSION: ICD-10-CM

## 2019-07-29 DIAGNOSIS — F32.1 CURRENT MODERATE EPISODE OF MAJOR DEPRESSIVE DISORDER WITHOUT PRIOR EPISODE (HCC): ICD-10-CM

## 2019-07-29 DIAGNOSIS — R73.01 IMPAIRED FASTING GLUCOSE: ICD-10-CM

## 2019-07-29 DIAGNOSIS — Z01.419 SMEAR, VAGINAL, AS PART OF ROUTINE GYNECOLOGICAL EXAMINATION: Primary | ICD-10-CM

## 2019-07-29 DIAGNOSIS — E03.9 HYPOTHYROIDISM, UNSPECIFIED TYPE: ICD-10-CM

## 2019-07-29 DIAGNOSIS — Z23 NEED FOR SHINGLES VACCINE: ICD-10-CM

## 2019-07-29 DIAGNOSIS — F41.9 ANXIETY: ICD-10-CM

## 2019-07-29 PROCEDURE — 90471 IMMUNIZATION ADMIN: CPT | Performed by: FAMILY MEDICINE

## 2019-07-29 PROCEDURE — 90746 HEPB VACCINE 3 DOSE ADULT IM: CPT | Performed by: FAMILY MEDICINE

## 2019-07-29 PROCEDURE — G0145 SCR C/V CYTO,THINLAYER,RESCR: HCPCS

## 2019-07-29 PROCEDURE — 99396 PREV VISIT EST AGE 40-64: CPT | Performed by: FAMILY MEDICINE

## 2019-07-29 RX ORDER — PREDNISONE 20 MG/1
TABLET ORAL
Qty: 15 TABLET | Refills: 0 | Status: SHIPPED | OUTPATIENT
Start: 2019-07-29 | End: 2019-09-29

## 2019-07-29 RX ORDER — SPIRONOLACTONE 100 MG/1
TABLET, FILM COATED ORAL
Qty: 90 TABLET | Refills: 1 | Status: SHIPPED | OUTPATIENT
Start: 2019-07-29 | End: 2020-01-14

## 2019-07-29 RX ORDER — BREXPIPRAZOLE 1 MG/1
TABLET ORAL
Refills: 0 | COMMUNITY
Start: 2019-06-26 | End: 2020-02-21 | Stop reason: ALTCHOICE

## 2019-07-29 ASSESSMENT — ENCOUNTER SYMPTOMS
EYE REDNESS: 0
SHORTNESS OF BREATH: 0
COUGH: 0
TROUBLE SWALLOWING: 0
EYE DISCHARGE: 0
NAUSEA: 0
CONSTIPATION: 0
VOMITING: 0
RHINORRHEA: 0
ABDOMINAL PAIN: 0
SORE THROAT: 0
WHEEZING: 0
DIARRHEA: 0
SINUS PRESSURE: 0

## 2019-07-29 ASSESSMENT — PATIENT HEALTH QUESTIONNAIRE - PHQ9
1. LITTLE INTEREST OR PLEASURE IN DOING THINGS: 0
SUM OF ALL RESPONSES TO PHQ9 QUESTIONS 1 & 2: 0
SUM OF ALL RESPONSES TO PHQ QUESTIONS 1-9: 0
2. FEELING DOWN, DEPRESSED OR HOPELESS: 0
SUM OF ALL RESPONSES TO PHQ QUESTIONS 1-9: 0

## 2019-07-29 NOTE — PROGRESS NOTES
Kojo Biggs received counseling on the following healthy behaviors: nutrition and exercise  Reviewed prior labs and health maintenance  Continue current medications, diet and exercise. Discussed use, benefit, and side effects of prescribed medications. Barriers to medication compliance addressed. Patient given educational materials - see patient instructions  Was a self-tracking handout given in paper form or via [employer]? Yes    Requested Prescriptions     Pending Prescriptions Disp Refills    zoster recombinant adjuvanted vaccine (SHINGRIX) 50 MCG/0.5ML SUSR injection 0.5 mL 1     Sig: Inject 0.5 mLs into the muscle once for 1 dose       All patient questions answered. Patient voiced understanding. Quality Measures    Body mass index is 45.26 kg/m². Elevated. Weight control planned discussed Healthy diet and regular exercise. BP: 110/80 Blood pressure is normal. Treatment plan consists of No treatment change needed.     Lab Results   Component Value Date    LDLCHOLESTEROL 56 06/07/2019    (goal LDL reduction with dx if diabetes is 50% LDL reduction)      PHQ Scores 7/29/2019 2/28/2019 1/29/2019 12/18/2018 6/14/2018 6/7/2017 4/7/2016   PHQ2 Score 0 0 0 2 6 0 4   PHQ9 Score 0 0 0 2 16 0 12     Interpretation of Total Score Depression Severity: 1-4 = Minimal depression, 5-9 = Mild depression, 10-14 = Moderate depression, 15-19 = Moderately severe depression, 20-27 = Severe depression

## 2019-07-29 NOTE — PROGRESS NOTES
Negative. Musculoskeletal: Positive for gait problem and myalgias. Negative for arthralgias and neck pain. Skin: Negative for rash and wound. Allergic/Immunologic: Negative for environmental allergies. Neurological: Negative for dizziness, weakness, light-headedness and headaches. Hematological: Negative for adenopathy. Psychiatric/Behavioral: Negative. Prior to Visit Medications    Medication Sig Taking? Authorizing Provider   REXULTI 1 MG TABS tablet  Yes Historical Provider, MD   zoster recombinant adjuvanted vaccine (SHINGRIX) 50 MCG/0.5ML SUSR injection Inject 0.5 mLs into the muscle once for 1 dose Yes Ping Oleary DO   spironolactone (ALDACTONE) 100 MG tablet take 1 tablet by mouth once daily Yes Ping Oleary DO   predniSONE (DELTASONE) 20 MG tablet 1 bid for 5 days, 1 qd for 5 days Yes Ping Oleary DO   potassium chloride (KLOR-CON) 8 MEQ extended release tablet take 4 tablets by mouth once daily Yes Ping Oleary DO   fluticasone-salmeterol (ADVAIR DISKUS) 250-50 MCG/DOSE AEPB Inhale 1 puff into the lungs every 12 hours RINSE MOUTH AFTER USE Yes Ping Oleary DO   Elastic Bandages & Supports (ADJUSTABLE WRIST BRACE) MISC Nightly Yes Juan Perez MD   gabapentin (NEURONTIN) 600 MG tablet Take 1 tablet by mouth 2 times daily for 90 days.  take 1 tablet by mouth three times a day Yes Yanet Gray,    OXcarbazepine (TRILEPTAL) 150 MG tablet take 2 tablets by mouth twice a day Yes Juan Perez MD   hydrochlorothiazide (HYDRODIURIL) 25 MG tablet take 1 tablet by mouth once daily Yes Ping Oleary DO   metoprolol tartrate (LOPRESSOR) 50 MG tablet take 1 tablet by mouth twice a day Yes Ping Oleary DO   esomeprazole (NEXIUM) 40 MG delayed release capsule take 1 capsule by mouth twice a day Yes Ping Oleary DO   RA ALLERGY RELIEF 10 MG tablet take 1 tablet by mouth once daily Yes Ping Oleary DO   levothyroxine (SYNTHROID)  Bumex [Bumetanide] Other (See Comments)     Extreme fatigue, nausea, dizziness    Colchicine     Erythromycin     Indocin [Indomethacin]     Lasix [Furosemide] Other (See Comments)     Dizziness, extreme fatigue    Lisinopril     Morphine     Norvasc [Amlodipine]     Tenormin [Atenolol]     Uloric [Febuxostat]     Hydrocodone-Acetaminophen Nausea Only    Topamax [Topiramate] Swelling and Rash       Past Medical History:   Diagnosis Date    Abnormal CT of the abdomen     revealed 2 very small noncalcified pulmonary nodules in the right lung base. Suspect benign progress but repeat CT scan in June 2013 advised.  Allergic rhinitis     Anemia     Anxiety     Asthma     Chronic low back pain     Chronic neck pain     Degenerative joint disease of knee     Bilateral.    Depression     Endometriosis     history of     GERD (gastroesophageal reflux disease)     Gout     Hyperlipidemia     Hypertension     Hypothyroid     Kidney stone     history of     Left shoulder pain     Status post injections.  Leukocytosis     Palpitation     history of     Paresthesias     Generalized    Right knee pain     Status post injections.  Seizure ([address]     \"nocturnal seizures\"       Past Surgical History:   Procedure Laterality Date    ARTHROPLASTY  03/26/1986    5th metatarsals, MTP joints, right and left feet.  CERVICAL FUSION  6/2014    Dr Marilee Landaverde, LAPAROSCOPIC  11/14/2011    CYSTOSCOPY  2/8/13    no acute findings    HYSTERECTOMY  05/2005    JOINT REPLACEMENT      KNEE ARTHROSCOPY Left     KNEE ARTHROSCOPY Left 10/16/13    KNEE ARTHROSCOPY Right 02/08/2012    With partial medial and lateral synovectomies and abrasion chondroplasty.  LAPAROSCOPY  03/08/2002    diagnostic with D&C and hysterscopy. [facility] SURGERY  10/6/10    Anterior C5-C6, fusion with C-trap allograft and [place] plates, performed by Dr Puja Caputo.     LUMBAR SPINE SURGERY Left 2/13/08    L4-L5 Female      Is Non- : No      Diabetic: Yes      Tobacco smoker: No      Systolic Blood Pressure: 081 mmHg      Is BP treated: Yes      HDL Cholesterol: 49 mg/dL      Total Cholesterol: 136 mg/dL    Immunization History   Administered Date(s) Administered    DTaP 01/18/2013    Influenza Virus Vaccine 10/24/2011, 10/12/2012, 10/18/2013, 10/20/2014, 10/27/2015    Influenza, Eli Hurst, 3 yrs and older, IM, PF (Fluzone 3 yrs and older or Afluria 5 yrs and older) 11/01/2017, 12/18/2018    Pneumococcal Polysaccharide ([id number]) 10/12/2012    Td, unspecified formulation 05/01/2000       Health Maintenance   Topic Date Due    Hepatitis B Vaccine (1 of 3 - Risk 3-dose series) 05/09/1988    Cervical cancer screen  04/18/2019    Shingles Vaccine (1 of 2) 05/09/2019    Colon cancer screen colonoscopy  05/09/2019    Flu vaccine (1) 09/01/2019    A1C test (Diabetic or Prediabetic)  06/07/2020    TSH testing  06/07/2020    Potassium monitoring  06/07/2020    Creatinine monitoring  06/07/2020    Breast cancer screen  07/08/2021    DTaP/Tdap/Td vaccine (2 - Tdap) 01/18/2023    Lipid screen  06/07/2024    Pneumococcal 0-64 years Vaccine  Completed    HIV screen  Discontinued       ASSESSMENT/PLAN:  Encounter Diagnoses   Name Primary?     Smear, vaginal, as part of routine gynecological examination Yes    Essential hypertension     Mixed hyperlipidemia     Hypothyroidism, unspecified type     Current moderate episode of major depressive disorder without prior episode (HCC)     Anxiety     Impaired fasting glucose     Localized swelling of lower extremity     Need for shingles vaccine     Need for hepatitis B vaccination      Orders Placed This Encounter   Medications    zoster recombinant adjuvanted vaccine (SHINGRIX) 50 MCG/0.5ML SUSR injection     Sig: Inject 0.5 mLs into the muscle once for 1 dose     Dispense:  0.5 mL     Refill:  1    spironolactone (ALDACTONE) 100 MG tablet with C-trap allograft and [place]               plates, performed by Dr Olga Eduardo. 2/13/08: LUMBAR SPINE SURGERY; Left      Comment:  L4-L5 and L5-S1 foraminotomies and L4 through S1                posterolateral fusion with pedicle screw instrumentation. No date: NASAL SEPTUM SURGERY  12/30/2014: OTHER SURGICAL HISTORY      Comment:  spinal cord stimulator paddle electrode, spinal cord                stimulator generator. both [employer]                generator and 32 lead paddle electrode [facility] Dr Xavier Fay [facility]  04/16/2015: OTHER SURGICAL HISTORY      Comment:  spinal cord stimulator removed  4/23/12: SPINAL CORD DECOMPRESSION      Comment:  Lumbar. 04/2015: TOTAL KNEE ARTHROPLASTY; Left      Comment:  [facility]  04/02/1990: TUBAL LIGATION  10/14/2011: UPPER GASTROINTESTINAL ENDOSCOPY      Comment:  Hiatal hernia. 9/10/2018: UPPER GASTROINTESTINAL ENDOSCOPY; N/A      Comment:  gastritis, esophagitis-BRAVO=reflux      Social History    Tobacco Use      Smoking status: Never Smoker      Smokeless tobacco: Never Used       Standing Status:   Future     Standing Expiration Date:   7/28/2020     Order Specific Question:   Collection Type     Answer: Thin Prep     Order Specific Question:   Prior Abnormal Pap Test     Answer:   No     Order Specific Question:   Screening or Diagnostic     Answer:   Screening     Order Specific Question:   HPV Requested? Answer:   Yes -  If ASCUS Reflex HPV     Order Specific Question:   High Risk Patient     Answer:   N/A     Thin prep PAP sample is taken, patient will be notified of results. Did give patient a prescription for prednisone to see if this will help with the inflammation in the right upper inner thigh. This may be more muscular in nature. If she does not have improvement would consider possibly physical therapy as an option. Will increase her spironolactone dose.   She seems to tolerate this diuretic without difficulty. Also she does have low potassium levels so this may help with keeping her potassium levels within a reasonable range. She is to continue to keep legs elevated while seated. She is to wear compression stockings as able. Patient is to continue to follow a low-carb/low sugar/low-fat diet and increase exercise for optimal blood sugar and cholesterol control. Patient is to continue on the rest of her current medical therapy. No additional changes are made at this time. Patient is to return to my office in 6 months duration or sooner if any further problems or symptoms arise. (Please note that portions of this note were completed with a voice recognition program. Efforts were made to edit the dictations but occasionally words are mis-transcribed.)        Return in about 6 months (around 1/29/2020). An electronic signature was used to authenticate this note.     --Ronda Nagy, DO on 7/29/2019 at 11:13 AM

## 2019-07-29 NOTE — PATIENT INSTRUCTIONS
Patient Education        Learning About High Blood Pressure  What is high blood pressure? Blood pressure is a measure of how hard the blood pushes against the walls of your arteries. It's normal for blood pressure to go up and down throughout the day, but if it stays up, you have high blood pressure. Another name for high blood pressure is hypertension. Two numbers tell you your blood pressure. The first number is the systolic pressure. It shows how hard the blood pushes when your heart is pumping. The second number is the diastolic pressure. It shows how hard the blood pushes between heartbeats, when your heart is relaxed and filling with blood. Your doctor will give you a goal for your blood pressure. Your goal will be based on your health and your age. High blood pressure (hypertension) means that the top number stays high, or the bottom number stays high, or both. High blood pressure increases the risk of stroke, heart attack, and other problems. You and your doctor will talk about your risks of these problems based on your blood pressure. What happens when you have high blood pressure? · Blood flows through your arteries with too much force. Over time, this damages the walls of your arteries. But you can't feel it. High blood pressure usually doesn't cause symptoms. · Fat and calcium start to build up in your arteries. This buildup is called plaque. Plaque makes your arteries narrower and stiffer. Blood can't flow through them as easily. · This lack of good blood flow starts to damage some of the organs in your body. This can lead to problems such as coronary artery disease and heart attack, heart failure, stroke, kidney failure, and eye damage. How can you prevent high blood pressure? · Stay at a healthy weight. · Try to limit how much sodium you eat to less than 2,300 milligrams (mg) a day. If you limit your sodium to 1,500 mg a day, you can lower your blood pressure even more.   ? Buy foods that are labeled \"unsalted,\" \"sodium-free,\" or \"low-sodium. \" Foods labeled \"reduced-sodium\" and \"light sodium\" may still have too much sodium. ? Flavor your food with garlic, lemon juice, onion, vinegar, herbs, and spices instead of salt. Do not use soy sauce, steak sauce, onion salt, garlic salt, mustard, or ketchup on your food. ? Use less salt (or none) when recipes call for it. You can often use half the salt a recipe calls for without losing flavor. · Be physically active. Get at least 30 minutes of exercise on most days of the week. Walking is a good choice. You also may want to do other activities, such as running, swimming, cycling, or playing tennis or team sports. · Limit alcohol to 2 drinks a day for men and 1 drink a day for women. · Eat plenty of fruits, vegetables, and low-fat dairy products. Eat less saturated and total fats. How is high blood pressure treated? · Your doctor will suggest making lifestyle changes to help your heart. For example, your doctor may ask you to eat healthy foods, quit smoking, lose extra weight, and be more active. · If lifestyle changes don't help enough, your doctor may recommend that you take medicine. · When blood pressure is very high, medicines are needed to lower it. Follow-up care is a key part of your treatment and safety. Be sure to make and go to all appointments, and call your doctor if you are having problems. It's also a good idea to know your test results and keep a list of the medicines you take. Where can you learn more? Go to [email or web address] and sign in to your [employer] account. Enter P501 in the [facility] box to learn more about \"Learning About High Blood Pressure. \"     If you do not have an account, please click on the \"Sign Up Now\" link. Current as of: July 22, 2018  Content Version: 12.0  © 8141-4801 [employer]. Care instructions adapted under license by [facility] ([facility]).  If you have

## 2019-07-31 LAB — CYTOLOGY REPORT: NORMAL

## 2019-08-02 RX ORDER — GABAPENTIN 600 MG/1
600 TABLET ORAL 3 TIMES DAILY
COMMUNITY
End: 2019-09-20 | Stop reason: SDUPTHER

## 2019-08-02 RX ORDER — IBUPROFEN 800 MG/1
800 TABLET ORAL EVERY 8 HOURS PRN
Qty: 30 TABLET | Refills: 0 | Status: SHIPPED | OUTPATIENT
Start: 2019-08-02 | End: 2020-09-01

## 2019-08-02 NOTE — TELEPHONE ENCOUNTER
She can take prednisone, but would only take it sparingly with the prednisone. Also can continue on her current dose of gabapentin, but if no improvement could go up to tid dosing like she was on before. Loretta Sahu

## 2019-08-07 ENCOUNTER — TELEPHONE (OUTPATIENT)
Dept: FAMILY MEDICINE CLINIC | Age: 49
End: 2019-08-07

## 2019-08-07 DIAGNOSIS — M54.42 CHRONIC LEFT-SIDED LOW BACK PAIN WITH LEFT-SIDED SCIATICA: Primary | ICD-10-CM

## 2019-08-07 DIAGNOSIS — G89.29 CHRONIC LEFT-SIDED LOW BACK PAIN WITH LEFT-SIDED SCIATICA: Primary | ICD-10-CM

## 2019-08-12 DIAGNOSIS — R56.9 NOCTURNAL SEIZURES (HCC): ICD-10-CM

## 2019-08-12 RX ORDER — MIRABEGRON 50 MG/1
TABLET, FILM COATED, EXTENDED RELEASE ORAL
Qty: 30 TABLET | Refills: 5 | Status: SHIPPED | OUTPATIENT
Start: 2019-08-12 | End: 2020-03-30

## 2019-08-12 RX ORDER — OXCARBAZEPINE 150 MG/1
TABLET, FILM COATED ORAL
Qty: 60 TABLET | Refills: 2 | Status: SHIPPED | OUTPATIENT
Start: 2019-08-12 | End: 2019-09-20 | Stop reason: SDUPTHER

## 2019-08-12 NOTE — TELEPHONE ENCOUNTER
Chavez Rogers called requesting a refill of the below medication which has been pended for you:     Requested Prescriptions     Pending Prescriptions Disp Refills    MYRBETRIQ 50 MG TB24 [Pharmacy Med Name: [facility] 50 MG TABLET] 30 tablet 0     Sig: take 1 tablet by mouth once daily       Last Appointment Date: 7/29/2019  Next Appointment Date: 1/28/2020    Allergies   Allergen Reactions    Allopurinol     Bumex [Bumetanide] Other (See Comments)     Extreme fatigue, nausea, dizziness    Colchicine     Erythromycin     Indocin [Indomethacin]     Lasix [Furosemide] Other (See Comments)     Dizziness, extreme fatigue    Lisinopril     Morphine     Norvasc [Amlodipine]     Tenormin [Atenolol]     Uloric [Febuxostat]     Hydrocodone-Acetaminophen Nausea Only    Topamax [Topiramate] Swelling and Rash

## 2019-09-04 RX ORDER — ATORVASTATIN CALCIUM 20 MG/1
TABLET, FILM COATED ORAL
Qty: 30 TABLET | Refills: 5 | Status: SHIPPED | OUTPATIENT
Start: 2019-09-04 | End: 2021-01-12 | Stop reason: SDUPTHER

## 2019-09-04 NOTE — TELEPHONE ENCOUNTER
Morales Hayden called requesting a refill of the below medication which has been pended for you:     Requested Prescriptions     Pending Prescriptions Disp Refills    atorvastatin (LIPITOR) 20 MG tablet [Pharmacy Med Name: ATORVASTATIN 20 MG TABLET] 30 tablet 5     Sig: take 1 tablet by mouth once daily       Last Appointment Date: 7/29/2019  Next Appointment Date: 1/28/2020    Allergies   Allergen Reactions    Allopurinol     Bumex [Bumetanide] Other (See Comments)     Extreme fatigue, nausea, dizziness    Colchicine     Erythromycin     Indocin [Indomethacin]     Lasix [Furosemide] Other (See Comments)     Dizziness, extreme fatigue    Lisinopril     Morphine     Norvasc [Amlodipine]     Tenormin [Atenolol]     Uloric [Febuxostat]     Hydrocodone-Acetaminophen Nausea Only    Topamax [Topiramate] Swelling and Rash

## 2019-09-05 ENCOUNTER — OFFICE VISIT (OUTPATIENT)
Dept: SURGERY | Age: 49
End: 2019-09-05
Payer: COMMERCIAL

## 2019-09-05 VITALS
BODY MASS INDEX: 46.65 KG/M2 | HEIGHT: 65 IN | WEIGHT: 280 LBS | DIASTOLIC BLOOD PRESSURE: 88 MMHG | SYSTOLIC BLOOD PRESSURE: 130 MMHG | HEART RATE: 80 BPM

## 2019-09-05 DIAGNOSIS — K21.9 GASTROESOPHAGEAL REFLUX DISEASE, ESOPHAGITIS PRESENCE NOT SPECIFIED: Primary | ICD-10-CM

## 2019-09-05 PROCEDURE — 99213 OFFICE O/P EST LOW 20 MIN: CPT | Performed by: SURGERY

## 2019-09-20 ENCOUNTER — OFFICE VISIT (OUTPATIENT)
Dept: NEUROLOGY | Age: 49
End: 2019-09-20
Payer: COMMERCIAL

## 2019-09-20 VITALS
HEIGHT: 65 IN | BODY MASS INDEX: 45.98 KG/M2 | DIASTOLIC BLOOD PRESSURE: 68 MMHG | WEIGHT: 276 LBS | HEART RATE: 82 BPM | SYSTOLIC BLOOD PRESSURE: 110 MMHG

## 2019-09-20 DIAGNOSIS — R20.2 PARESTHESIAS: ICD-10-CM

## 2019-09-20 DIAGNOSIS — G47.33 OBSTRUCTIVE SLEEP APNEA: ICD-10-CM

## 2019-09-20 DIAGNOSIS — G63 POLYNEUROPATHY ASSOCIATED WITH UNDERLYING DISEASE (HCC): ICD-10-CM

## 2019-09-20 DIAGNOSIS — G89.29 CHRONIC NECK PAIN: ICD-10-CM

## 2019-09-20 DIAGNOSIS — G56.23 ULNAR NEUROPATHY OF BOTH UPPER EXTREMITIES: ICD-10-CM

## 2019-09-20 DIAGNOSIS — F32.1 CURRENT MODERATE EPISODE OF MAJOR DEPRESSIVE DISORDER WITHOUT PRIOR EPISODE (HCC): ICD-10-CM

## 2019-09-20 DIAGNOSIS — R41.3 MEMORY PROBLEM: ICD-10-CM

## 2019-09-20 DIAGNOSIS — R47.81 SLURRED SPEECH: ICD-10-CM

## 2019-09-20 DIAGNOSIS — M54.40 CHRONIC LOW BACK PAIN WITH SCIATICA, SCIATICA LATERALITY UNSPECIFIED, UNSPECIFIED BACK PAIN LATERALITY: ICD-10-CM

## 2019-09-20 DIAGNOSIS — E78.2 MIXED HYPERLIPIDEMIA: ICD-10-CM

## 2019-09-20 DIAGNOSIS — R56.9 NOCTURNAL SEIZURES (HCC): ICD-10-CM

## 2019-09-20 DIAGNOSIS — M54.2 CHRONIC NECK PAIN: ICD-10-CM

## 2019-09-20 DIAGNOSIS — R13.19 OTHER DYSPHAGIA: ICD-10-CM

## 2019-09-20 DIAGNOSIS — G40.909 SEIZURE DISORDER (HCC): Primary | ICD-10-CM
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
## 2019-09-20 DIAGNOSIS — D50.9 IRON DEFICIENCY ANEMIA, UNSPECIFIED IRON DEFICIENCY ANEMIA TYPE: ICD-10-CM

## 2019-09-20 DIAGNOSIS — R53.82 CHRONIC FATIGUE: ICD-10-CM

## 2019-09-20 DIAGNOSIS — K21.00 GASTROESOPHAGEAL REFLUX DISEASE WITH ESOPHAGITIS: ICD-10-CM

## 2019-09-20 DIAGNOSIS — G89.29 CHRONIC LOW BACK PAIN WITH SCIATICA, SCIATICA LATERALITY UNSPECIFIED, UNSPECIFIED BACK PAIN LATERALITY: ICD-10-CM

## 2019-09-20 DIAGNOSIS — G57.30 PERONEAL NEUROPATHY, UNSPECIFIED LATERALITY: ICD-10-CM

## 2019-09-20 DIAGNOSIS — R26.89 BALANCE PROBLEMS: ICD-10-CM

## 2019-09-20 DIAGNOSIS — G57.40 TIBIAL NEUROPATHY, UNSPECIFIED LATERALITY: ICD-10-CM

## 2019-09-20 DIAGNOSIS — I10 ESSENTIAL HYPERTENSION: ICD-10-CM

## 2019-09-20 DIAGNOSIS — F41.9 ANXIETY: ICD-10-CM

## 2019-09-20 DIAGNOSIS — R94.01 ABNORMAL EEG: ICD-10-CM

## 2019-09-20 DIAGNOSIS — I67.82 CEREBRAL ISCHEMIA: ICD-10-CM

## 2019-09-20 DIAGNOSIS — E03.9 HYPOTHYROIDISM, UNSPECIFIED TYPE: ICD-10-CM

## 2019-09-20 DIAGNOSIS — G56.03 BILATERAL CARPAL TUNNEL SYNDROME: ICD-10-CM

## 2019-09-20 PROCEDURE — 99214 OFFICE O/P EST MOD 30 MIN: CPT | Performed by: PSYCHIATRY & NEUROLOGY

## 2019-09-20 RX ORDER — GABAPENTIN 600 MG/1
600 TABLET ORAL 3 TIMES DAILY
Qty: 90 TABLET | Refills: 2 | Status: SHIPPED | OUTPATIENT
Start: 2019-09-20 | End: 2019-12-12 | Stop reason: SDUPTHER

## 2019-09-20 RX ORDER — OXCARBAZEPINE 150 MG/1
TABLET, FILM COATED ORAL
Qty: 60 TABLET | Refills: 2 | Status: SHIPPED | OUTPATIENT
Start: 2019-09-20 | End: 2019-11-04 | Stop reason: SDUPTHER

## 2019-09-20 ASSESSMENT — ENCOUNTER SYMPTOMS
ABDOMINAL DISTENTION: 0
CONSTIPATION: 0
APNEA: 0
CHEST TIGHTNESS: 0
FACIAL SWELLING: 0
EYE REDNESS: 0
SORE THROAT: 0
WHEEZING: 0
DIARRHEA: 0
EYE ITCHING: 0
CHOKING: 0
PHOTOPHOBIA: 0
COUGH: 0
CHANGE IN BOWEL HABIT: 0
TROUBLE SWALLOWING: 1
COLOR CHANGE: 0
BOWEL INCONTINENCE: 0
EYE DISCHARGE: 0
VOICE CHANGE: 0
NAUSEA: 0
SHORTNESS OF BREATH: 0
VOMITING: 0
SINUS PRESSURE: 0
SWOLLEN GLANDS: 0
BLOOD IN STOOL: 0
EYE PAIN: 0
ABDOMINAL PAIN: 0
BACK PAIN: 0
VISUAL CHANGE: 0

## 2019-09-20 NOTE — PROGRESS NOTES
[facility])    PATIENT   REPORTS        EPISODES      OF   TWO  SEIZURES                                      IN    NOV. / [address] [facility] [address]     2018                                                                                      20)     PATIENT     WAS     TRIED  WITH      VIMPAT    IN   [facility]   2019                                     FOR  BETTER  SEIZURE  CONTROL      AND  THE  SAME                              DISCONTINUED     DUE  TO     SWELLING IN  BOTH   FEET  AND  LEGS                          21)    NO  FURTHER  RECURRENCE OF  SEIZURE   ACTIVITY      SINCE   MARCH 2019                           --  FOLLOW  UP   EEG  IN   April 2019    ALSO  ABNORMAL                                    WITH  GENERALIZED    POTENTIAL  EPILEPTIFORM  FEATURES                         22)   H/O    SWELLING IN  BOTH   FEET  AND  LEGS       SINCE   April 2019                                   D / D     INCLUDE     CARDIAC,    RENAL,   AND  OTHER      ETIOLOGIES,     INCLUDING                                              MEDICATION   EFFECTS.                                                     -   IMPROVING                                      -  PATIENT  ADVISED  TO  FOLLOW  WITH  HER  PCP                                                    AND   OTHER  CONSULTANTS                            23)     VARIOUS  RISK   FACTORS   WERE  REVIEWED   AND   DISCUSSED. PATIENT   HAS  MULTIPLE   MEDICAL, MENTAL HEALTH                 NEUROLOGICAL   PROBLEMS . PATIENT'S   MANAGEMENT  IS  CHALLENGING.                           24)  PATIENT  DENIES  ANY  NEW  NEUROLOGICAL     CONCERNS.                                                                                     PRECIPITATING  FACTORS: including  fever/infection, exertion/relaxation, position change, stress, weather change, medications/alcohol, time of day/darkness/light  Are    absent ROS  And/or PFSH   Are   Due   To   Patient's  Medical  Problems,  Clinical  Condition and/or lack of other  Alternate resources. RECORDS   REVIEWED:  historical medical records     INFORMATION   REVIEWED:     MEDICAL   HISTORY,     SURGICAL   HISTORY,   MEDICATIONS   LIST,   ALLERGIES AND  DRUG  INTOLERANCES,     FAMILY   HISTORY,  SOCIAL  HISTORY,    PROBLEM  LIST   FOR  PATIENT  CARE   COORDINATION    Past Medical History:   Diagnosis Date    Abnormal CT of the abdomen     revealed 2 very small noncalcified pulmonary nodules in the right lung base. Suspect benign progress but repeat CT scan in June 2013 advised.  Allergic rhinitis     Anemia     Anxiety     Asthma     Chronic low back pain     Chronic neck pain     Degenerative joint disease of knee     Bilateral.    Depression     Endometriosis     history of     GERD (gastroesophageal reflux disease)     Gout     Hyperlipidemia     Hypertension     Hypothyroid     Kidney stone     history of     Left shoulder pain     Status post injections.  Leukocytosis     Palpitation     history of     Paresthesias     Generalized    Right knee pain     Status post injections.  Seizure ([address]     \"nocturnal seizures\"         Past Surgical History:   Procedure Laterality Date    ARTHROPLASTY  03/26/1986    5th metatarsals, MTP joints, right and left feet.  CERVICAL FUSION  6/2014    Dr Gavin Carlisle, LAPAROSCOPIC  11/14/2011    CYSTOSCOPY  2/8/13    no acute findings    HYSTERECTOMY  05/2005    JOINT REPLACEMENT      KNEE ARTHROSCOPY Left     KNEE ARTHROSCOPY Left 10/16/13    KNEE ARTHROSCOPY Right 02/08/2012    With partial medial and lateral synovectomies and abrasion chondroplasty.  LAPAROSCOPY  03/08/2002    diagnostic with D&C and hysterscopy. [place] SURGERY  10/6/10    Anterior C5-C6, fusion with C-trap allograft and Turton plates, performed by Dr Nemesio Henry.     LUMBAR SPINE SURGERY Left 2/13/08 Attends meetings of clubs or organizations: Not on file     Relationship status: Not on file    Intimate partner violence:     Fear of current or ex partner: Not on file     Emotionally abused: Not on file     Physically abused: Not on file     Forced sexual activity: Not on file   Other Topics Concern    Not on file   Social History Narrative    Not on file       Vitals:    09/20/19 0926   BP: 110/68   Pulse: 82         Wt Readings from Last 3 Encounters:   09/20/19 276 lb (125.2 kg)   09/05/19 280 lb (127 kg)   07/29/19 272 lb (123.4 kg)         BP Readings from Last 3 Encounters:   09/20/19 110/68   09/05/19 130/88   07/29/19 110/80       Hematology and Coagulation  Lab Results   Component Value Date    WBC 8.8 06/07/2019    RBC 4.19 06/07/2019    HGB 11.9 06/07/2019    HCT 35.9 06/07/2019    MCV 85.7 06/07/2019    MCH 28.3 06/07/2019    MCHC 33.1 06/07/2019    RDW 15.4 06/07/2019     06/07/2019    MPV 8.4 06/07/2019       Chemistries  Lab Results   Component Value Date     06/07/2019    K 3.6 06/07/2019    CL 97 06/07/2019    CO2 29 06/07/2019    BUN 10 06/07/2019    CREATININE 0.94 06/07/2019    CALCIUM 9.5 06/07/2019    PROT 7.1 06/07/2019    LABALBU 4.5 06/07/2019    BILITOT 0.28 06/07/2019    ALKPHOS 137 06/07/2019    AST 12 06/07/2019    ALT 10 06/07/2019     Lab Results   Component Value Date    ALKPHOS 137 06/07/2019    ALT 10 06/07/2019    AST 12 06/07/2019    PROT 7.1 06/07/2019    BILITOT 0.28 06/07/2019    LABALBU 4.5 06/07/2019     Lab Results   Component Value Date    BUN 10 06/07/2019    CREATININE 0.94 06/07/2019     Lab Results   Component Value Date    CALCIUM 9.5 06/07/2019    MG 1.7 05/16/2019     Lab Results   Component Value Date    AST 12 06/07/2019    ALT 10 06/07/2019     Lab Results   Component Value Date    [id number] 07/09/2018         Review of Systems   Constitutional: Negative for appetite change, chills, diaphoresis, fatigue, fever and unexpected weight change. Significant  Worsening   Of  Current  Symptoms  And  Or  If patient  Develops       Any additional  New  Neurological  Symptoms  Or  Significant  Concerns   Should  Call  911     or  Go  To  Emergency  Department  For  Further  Immediate  Evaluation. *   The  Neurological   Findings,  Possible  Diagnosis,  Differential diagnoses   And  Options  For    Further   Investigations   And  management   Are  Discussed  Comprehensively. Medications   And  Prescription   Risks  And  Side effects  Are   Also  Discussed. The  Above  Were  Reviewed  With  patient and   questions  Answered  In  Detail. More   Than   50% of face  To face Time   Was  Spent  On  Counseling   And   Coordination  Of  Care   Of   Patient's multiple   Neurological  Problems   And   Comorbid  Medical   Conditions. Electronically signed by Alec Ricardo MD   Board Certified in  Neurology &  In  [facility] ([facility])      DISCLAIMER:   Although every effort was made to ensure the accuracy of this  electronic transcription, some errors in transcription may have occurred. GENERAL PATIENT INSTRUCTIONS:     A Healthy Lifestyle: Care Instructions  Your Care Instructions  A healthy lifestyle can help you feel good, stay at a healthy weight, and have plenty of energy for both work and play. A healthy lifestyle is something you can share with your whole family. A healthy lifestyle also can lower your risk for serious health problems, such as high blood pressure, heart disease, and diabetes. You can follow a few steps listed below to improve your health and the health of your family. Follow-up care is a key part of your treatment and safety. Be sure to make and go to all appointments, and call your doctor if you are having problems. Its also a good idea to know your test results and keep a list of the medicines you take.   How can you care for breath or asthma symptoms, they will likely get better within a few weeks after you quit. Limit how much alcohol you drink. Moderate amounts of alcohol (up to 2 drinks a day for men, 1 drink a day for women) are okay. But drinking too much can lead to liver problems, high blood pressure, and other health problems. Family health  If you have a family, there are many things you can do together to improve your health. Eat meals together as a family as often as possible. Eat healthy foods. This includes fruits, vegetables, lean meats and dairy, and whole grains. Include your family in your fitness plan. Most people think of activities such as jogging or tennis as the way to fitness, but there are many ways you and your family can be more active. Anything that makes you breathe hard and gets your heart pumping is exercise. Here are some tips:  Walk to do errands or to take your child to school or the bus. Go for a family bike ride after dinner instead of watching TV. Where can you learn more? Go to https://[employer]. org and sign in to your [employer] account. Enter J739 in the [facility] box to learn more about \"A Healthy Lifestyle: Care Instructions. \"     If you do not have an account, please click on the \"Sign Up Now\" link. Current as of: July 26, 2016  Content Version: 11.2  © 8471-3460 [employer]. Care instructions adapted under license by [facility] ([facility]). If you have questions about a medical condition or this instruction, always ask your healthcare professional. [place] [address] any warranty or liability for your use of this information.

## 2019-09-20 NOTE — PATIENT INSTRUCTIONS
AND   ILLEGAL   SUBSTANCES,  IF  ANY          *  Maintain   Healthy  Life Style    With   Periodic  Monitoring  Of      Any  Medical  Conditions  Including   Elevated  Blood  Pressure,  Lipid  Profile,     Blood  Sugar levels  And   Heart  Disease. *   Period   Screening  For  Cancers  Involving  Breast,  Colon,   lungs  And  Other  Organs  As  Applicable,  In consultation   With  Your  Primary Care Providers. *  If   There is  Any  Significant  Worsening   Of  Current  Symptoms  And  Or  If    Any additional  New  Neurological  Symptoms                 Or  Significant  Concerns   Should  Call  911 or  Go  To  Emergency  Department  For  Further  Immediate  Evaluation.

## 2019-09-29 ENCOUNTER — OFFICE VISIT (OUTPATIENT)
Dept: PRIMARY CARE CLINIC | Age: 49
End: 2019-09-29
Payer: COMMERCIAL

## 2019-09-29 VITALS
RESPIRATION RATE: 16 BRPM | BODY MASS INDEX: 44.52 KG/M2 | HEIGHT: 66 IN | OXYGEN SATURATION: 96 % | WEIGHT: 277 LBS | SYSTOLIC BLOOD PRESSURE: 110 MMHG | TEMPERATURE: 99 F | DIASTOLIC BLOOD PRESSURE: 74 MMHG | HEART RATE: 82 BPM

## 2019-09-29 DIAGNOSIS — M26.622 ARTHRALGIA OF LEFT TEMPOROMANDIBULAR JOINT: Primary | ICD-10-CM

## 2019-09-29 PROCEDURE — 99214 OFFICE O/P EST MOD 30 MIN: CPT | Performed by: FAMILY MEDICINE

## 2019-09-29 RX ORDER — PREDNISONE 20 MG/1
20 TABLET ORAL 2 TIMES DAILY
Qty: 10 TABLET | Refills: 0 | Status: SHIPPED | OUTPATIENT
Start: 2019-09-29 | End: 2019-10-04

## 2019-09-29 RX ORDER — CYCLOBENZAPRINE HCL 10 MG
10 TABLET ORAL NIGHTLY PRN
Qty: 30 TABLET | Refills: 1 | Status: SHIPPED | OUTPATIENT
Start: 2019-09-29 | End: 2019-10-09

## 2019-09-29 ASSESSMENT — ENCOUNTER SYMPTOMS
CONSTIPATION: 0
TROUBLE SWALLOWING: 0
CHOKING: 0
CHEST TIGHTNESS: 0
COUGH: 1
WHEEZING: 0
RHINORRHEA: 0
DIARRHEA: 0
NAUSEA: 0
SINUS PRESSURE: 1
SORE THROAT: 0
SHORTNESS OF BREATH: 0

## 2019-09-29 NOTE — PROGRESS NOTES
nightly 90 tablet 1    nortriptyline (PAMELOR) 25 MG capsule take 1 capsule by mouth at bedtime 90 capsule 0    ondansetron (ZOFRAN) 4 MG tablet Take 4 mg by mouth      promethazine (PHENERGAN) 25 MG tablet Take 25 mg by mouth      albuterol sulfate HFA (VENTOLIN HFA) 108 (90 Base) MCG/ACT inhaler INHALE 2 PUFFS EVERY 6 HOURS IF NEEDED FOR WHEEZING 18 g 5    Cyanocobalamin (VITAMIN B12 PO) Take by mouth daily      b complex vitamins capsule Take 1 capsule by mouth daily      aspirin 81 MG tablet Take 81 mg by mouth daily      lidocaine (XYLOCAINE) 5 % ointment Apply topically to affected area bid as needed 50 g 5    lamoTRIgine (LAMICTAL) 200 MG tablet Take 200 mg by mouth daily      traZODone (DESYREL) 300 MG tablet take 1 tablet by mouth at bedtime  0    desvenlafaxine succinate (PRISTIQ) 100 MG TB24 extended release tablet take 1 tablet by mouth once daily  0    prazosin (MINIPRESS) 2 MG capsule Take 2 mg by mouth nightly       busPIRone (BUSPAR) 30 MG tablet Take 30 mg by mouth 3 times daily       Cholecalciferol (VITAMIN D3) 5000 UNITS TABS Take 1 tablet by mouth daily 30 tablet 2     No current facility-administered medications for this visit. Allergies   Allergen Reactions    Allopurinol     Bumex [Bumetanide] Other (See Comments)     Extreme fatigue, nausea, dizziness    Colchicine     Erythromycin     Indocin [Indomethacin]     Lasix [Furosemide] Other (See Comments)     Dizziness, extreme fatigue    Lisinopril     Morphine     Norvasc [Amlodipine]     Tenormin [Atenolol]     Uloric [Febuxostat]     Hydrocodone-Acetaminophen Nausea Only    Topamax [Topiramate] Swelling and Rash       Subjective:     Review of Systems   Constitutional: Positive for fatigue. Negative for activity change, appetite change, chills and fever. HENT: Positive for congestion, ear pain, hearing loss (on the left), postnasal drip and sinus pressure.  Negative for ear discharge, rhinorrhea, Plan:        TMJ: worsening; I will treat with muscle relaxers, prednisone and she will do some jaw stretches. If no improvement she was advised to ask her dentist about a mouth guard. Return if symptoms worsen or fail to improve. No orders of the defined types were placed in this encounter. Orders Placed This Encounter   Medications    cyclobenzaprine (FLEXERIL) 10 MG tablet     Sig: Take 1 tablet by mouth nightly as needed for Muscle spasms     Dispense:  30 tablet     Refill:  1    predniSONE (DELTASONE) 20 MG tablet     Sig: Take 1 tablet by mouth 2 times daily for 5 days     Dispense:  10 tablet     Refill:  0       Patientgiven educational materials - see patient instructions. Discussed use, benefit,and side effects of prescribed medications. All patient questions answered. Ptvoiced understanding. Reviewed health maintenance. Instructed to continue currentmedications, diet and exercise. Patient agreed with treatment plan. Follow up asdirected.      Electronically signed by Celestina Howard MD on 9/29/2019 at 1:20 PM

## 2019-10-21 ENCOUNTER — OFFICE VISIT (OUTPATIENT)
Dept: PRIMARY CARE CLINIC | Age: 49
End: 2019-10-21
Payer: COMMERCIAL

## 2019-10-21 ENCOUNTER — HOSPITAL ENCOUNTER (OUTPATIENT)
Age: 49
Setting detail: SPECIMEN
Discharge: HOME | End: 2019-10-21
Payer: COMMERCIAL

## 2019-10-21 VITALS
RESPIRATION RATE: 16 BRPM | SYSTOLIC BLOOD PRESSURE: 110 MMHG | WEIGHT: 284 LBS | BODY MASS INDEX: 45.64 KG/M2 | DIASTOLIC BLOOD PRESSURE: 70 MMHG | TEMPERATURE: 98.4 F | HEIGHT: 66 IN | HEART RATE: 88 BPM

## 2019-10-21 DIAGNOSIS — A49.9 BACTERIAL UTI: ICD-10-CM

## 2019-10-21 DIAGNOSIS — N39.0 BACTERIAL UTI: ICD-10-CM

## 2019-10-21 DIAGNOSIS — R35.0 URINARY FREQUENCY: ICD-10-CM

## 2019-10-21 DIAGNOSIS — R30.0 DYSURIA: Primary | ICD-10-CM

## 2019-10-21 DIAGNOSIS — R30.0 DYSURIA: ICD-10-CM

## 2019-10-21 LAB
-: ABNORMAL
AMORPHOUS: ABNORMAL
BACTERIA: ABNORMAL
BILIRUBIN URINE: NEGATIVE
CASTS UA: ABNORMAL /LPF (ref 0–2)
COLOR: ABNORMAL
COMMENT UA: ABNORMAL
CRYSTALS, UA: ABNORMAL /HPF
EPITHELIAL CELLS UA: ABNORMAL /HPF (ref 0–5)
GLUCOSE URINE: NEGATIVE
KETONES, URINE: NEGATIVE
LEUKOCYTE ESTERASE, URINE: ABNORMAL
MUCUS: ABNORMAL
NITRITE, URINE: POSITIVE
OTHER OBSERVATIONS UA: ABNORMAL
PH UA: 6 (ref 5–6)
PROTEIN UA: ABNORMAL
RBC UA: ABNORMAL /HPF (ref 0–4)
RENAL EPITHELIAL, UA: ABNORMAL /HPF
SPECIFIC GRAVITY UA: 1.02 (ref 1.01–1.02)
TRICHOMONAS: ABNORMAL
TURBIDITY: ABNORMAL
URINE HGB: ABNORMAL
UROBILINOGEN, URINE: NORMAL
WBC UA: >50 /HPF (ref 0–4)
YEAST: ABNORMAL

## 2019-10-21 PROCEDURE — 87088 URINE BACTERIA CULTURE: CPT

## 2019-10-21 PROCEDURE — 81001 URINALYSIS AUTO W/SCOPE: CPT

## 2019-10-21 PROCEDURE — 87086 URINE CULTURE/COLONY COUNT: CPT

## 2019-10-21 PROCEDURE — 99213 OFFICE O/P EST LOW 20 MIN: CPT | Performed by: FAMILY MEDICINE

## 2019-10-21 PROCEDURE — 87186 SC STD MICRODIL/AGAR DIL: CPT

## 2019-10-21 RX ORDER — PHENAZOPYRIDINE HYDROCHLORIDE 200 MG/1
200 TABLET, FILM COATED ORAL 3 TIMES DAILY PRN
Qty: 21 TABLET | Refills: 1 | Status: SHIPPED | OUTPATIENT
Start: 2019-10-21 | End: 2020-11-13 | Stop reason: SDUPTHER

## 2019-10-21 RX ORDER — SULFAMETHOXAZOLE AND TRIMETHOPRIM 800; 160 MG/1; MG/1
1 TABLET ORAL 2 TIMES DAILY
Qty: 14 TABLET | Refills: 0 | Status: SHIPPED | OUTPATIENT
Start: 2019-10-21 | End: 2019-10-28

## 2019-10-21 ASSESSMENT — ENCOUNTER SYMPTOMS
GASTROINTESTINAL NEGATIVE: 1
ALLERGIC/IMMUNOLOGIC NEGATIVE: 1
RESPIRATORY NEGATIVE: 1
EYES NEGATIVE: 1

## 2019-10-23 ENCOUNTER — TELEPHONE (OUTPATIENT)
Dept: SURGERY | Age: 49
End: 2019-10-23

## 2019-10-23 DIAGNOSIS — K21.00 GASTROESOPHAGEAL REFLUX DISEASE WITH ESOPHAGITIS: Primary | ICD-10-CM

## 2019-10-23 LAB
CULTURE: ABNORMAL
Lab: ABNORMAL
SPECIMEN DESCRIPTION: ABNORMAL

## 2019-10-23 RX ORDER — RANITIDINE 150 MG/1
150 TABLET ORAL NIGHTLY
Qty: 90 TABLET | Refills: 3 | Status: SHIPPED | OUTPATIENT
Start: 2019-10-23 | End: 2020-02-21

## 2019-11-04 DIAGNOSIS — R56.9 NOCTURNAL SEIZURES (HCC): ICD-10-CM

## 2019-11-04 RX ORDER — OXCARBAZEPINE 150 MG/1
TABLET, FILM COATED ORAL
Qty: 60 TABLET | Refills: 2 | Status: SHIPPED | OUTPATIENT
Start: 2019-11-04 | End: 2020-01-03 | Stop reason: SDUPTHER

## 2019-11-05 RX ORDER — LEVOTHYROXINE SODIUM 0.12 MG/1
TABLET ORAL
Qty: 30 TABLET | Refills: 5 | Status: SHIPPED | OUTPATIENT
Start: 2019-11-05 | End: 2020-05-04 | Stop reason: SDUPTHER

## 2019-11-05 RX ORDER — HYDROCHLOROTHIAZIDE 25 MG/1
TABLET ORAL
Qty: 90 TABLET | Refills: 1 | Status: SHIPPED | OUTPATIENT
Start: 2019-11-05 | End: 2020-05-04 | Stop reason: SDUPTHER

## 2019-11-11 ENCOUNTER — TELEPHONE (OUTPATIENT)
Dept: FAMILY MEDICINE CLINIC | Age: 49
End: 2019-11-11

## 2019-11-11 RX ORDER — LORATADINE AND PSEUDOEPHEDRINE 10; 240 MG/1; MG/1
1 TABLET, EXTENDED RELEASE ORAL DAILY
Qty: 30 TABLET | Refills: 0 | Status: SHIPPED | OUTPATIENT
Start: 2019-11-11 | End: 2020-01-08 | Stop reason: SDUPTHER

## 2019-11-17 ENCOUNTER — OFFICE VISIT (OUTPATIENT)
Dept: PRIMARY CARE CLINIC | Age: 49
End: 2019-11-17
Payer: COMMERCIAL

## 2019-11-17 VITALS
BODY MASS INDEX: 44.87 KG/M2 | HEART RATE: 73 BPM | TEMPERATURE: 98.6 F | DIASTOLIC BLOOD PRESSURE: 78 MMHG | HEIGHT: 66 IN | SYSTOLIC BLOOD PRESSURE: 114 MMHG | OXYGEN SATURATION: 97 % | WEIGHT: 279.2 LBS

## 2019-11-17 DIAGNOSIS — J01.90 ACUTE BACTERIAL SINUSITIS: Primary | ICD-10-CM

## 2019-11-17 DIAGNOSIS — R06.2 WHEEZING: ICD-10-CM

## 2019-11-17 DIAGNOSIS — B96.89 ACUTE BACTERIAL SINUSITIS: Primary | ICD-10-CM

## 2019-11-17 PROCEDURE — 99213 OFFICE O/P EST LOW 20 MIN: CPT | Performed by: NURSE PRACTITIONER

## 2019-11-17 RX ORDER — PREDNISONE 20 MG/1
20 TABLET ORAL 2 TIMES DAILY
Qty: 10 TABLET | Refills: 0 | Status: SHIPPED | OUTPATIENT
Start: 2019-11-17 | End: 2019-11-22

## 2019-11-17 RX ORDER — AMOXICILLIN 500 MG/1
500 CAPSULE ORAL 3 TIMES DAILY
Qty: 30 CAPSULE | Refills: 0 | Status: SHIPPED | OUTPATIENT
Start: 2019-11-17 | End: 2020-02-21 | Stop reason: ALTCHOICE

## 2019-11-17 ASSESSMENT — ENCOUNTER SYMPTOMS
SINUS PRESSURE: 1
SINUS PAIN: 1
RHINORRHEA: 1
WHEEZING: 1
COUGH: 1
SORE THROAT: 0

## 2019-11-26 NOTE — DISCHARGE SUMMARY
Occupational Therapy      NOTE: This patient did not return for the recommended follow-up after their initial evaluation. Due to no further follow-up visits, there is no further testing or assessment that can be made beyond that which is included in the OT eval.  This patient is past 30 days, and is considered non-compliant with OT and will be discharged from occupational therapy services.

## 2019-12-12 DIAGNOSIS — R56.9 NOCTURNAL SEIZURES (HCC): Primary | ICD-10-CM

## 2019-12-12 RX ORDER — GABAPENTIN 600 MG/1
600 TABLET ORAL 3 TIMES DAILY
Qty: 90 TABLET | Refills: 2 | Status: SHIPPED | OUTPATIENT
Start: 2019-12-12 | End: 2020-08-18

## 2020-01-03 RX ORDER — OXCARBAZEPINE 150 MG/1
TABLET, FILM COATED ORAL
Qty: 60 TABLET | Refills: 2 | Status: SHIPPED | OUTPATIENT
Start: 2020-01-03 | End: 2020-01-10 | Stop reason: SDUPTHER

## 2020-01-03 NOTE — TELEPHONE ENCOUNTER
Ruddy Lehman called requesting a refill of the below medication which has been pended for you:     Requested Prescriptions     Pending Prescriptions Disp Refills    OXcarbazepine (TRILEPTAL) 150 MG tablet 60 tablet 2     Sig: take 2 tablets by mouth twice a day       Last Appointment Date: 7/29/2019  Next Appointment Date: 1/28/2020    Allergies   Allergen Reactions    Allopurinol     Bumex [Bumetanide] Other (See Comments)     Extreme fatigue, nausea, dizziness    Colchicine     Erythromycin     Indocin [Indomethacin]     Lasix [Furosemide] Other (See Comments)     Dizziness, extreme fatigue    Lisinopril     Morphine     Norvasc [Amlodipine]     Tenormin [Atenolol]     Uloric [Febuxostat]     Hydrocodone-Acetaminophen Nausea Only    Topamax [Topiramate] Swelling and Rash

## 2020-01-08 RX ORDER — LORATADINE AND PSEUDOEPHEDRINE 10; 240 MG/1; MG/1
1 TABLET, EXTENDED RELEASE ORAL DAILY
Qty: 30 TABLET | Refills: 0 | Status: SHIPPED | OUTPATIENT
Start: 2020-01-08 | End: 2020-02-17

## 2020-01-08 RX ORDER — POTASSIUM CHLORIDE 600 MG/1
TABLET, FILM COATED, EXTENDED RELEASE ORAL
Qty: 120 TABLET | Refills: 5 | Status: SHIPPED | OUTPATIENT
Start: 2020-01-08 | End: 2020-05-26 | Stop reason: SDUPTHER

## 2020-01-08 NOTE — TELEPHONE ENCOUNTER
Pepe called requesting a refill of the below medication which has been pended for you:     Requested Prescriptions     Pending Prescriptions Disp Refills    potassium chloride (KLOR-CON) 8 MEQ extended release tablet [Pharmacy Med Name: POTASSIUM CL ER 8 MEQ TABLET] 120 tablet 5     Sig: take 4 tablets by mouth once daily       Last Appointment Date: 7/29/2019  Next Appointment Date: 1/28/2020    Allergies   Allergen Reactions    Allopurinol     Bumex [Bumetanide] Other (See Comments)     Extreme fatigue, nausea, dizziness    Colchicine     Erythromycin     Indocin [Indomethacin]     Lasix [Furosemide] Other (See Comments)     Dizziness, extreme fatigue    Lisinopril     Morphine     Norvasc [Amlodipine]     Tenormin [Atenolol]     Uloric [Febuxostat]     Hydrocodone-Acetaminophen Nausea Only    Topamax [Topiramate] Swelling and Rash

## 2020-01-10 ENCOUNTER — OFFICE VISIT (OUTPATIENT)
Dept: NEUROLOGY | Age: 50
End: 2020-01-10
Payer: COMMERCIAL

## 2020-01-10 VITALS
HEIGHT: 66 IN | SYSTOLIC BLOOD PRESSURE: 122 MMHG | BODY MASS INDEX: 45.74 KG/M2 | HEART RATE: 84 BPM | OXYGEN SATURATION: 95 % | DIASTOLIC BLOOD PRESSURE: 70 MMHG | WEIGHT: 284.6 LBS

## 2020-01-10 PROBLEM — R73.09 ELEVATED HEMOGLOBIN A1C: Status: ACTIVE | Noted: 2020-01-10

## 2020-01-10 PROCEDURE — 99215 OFFICE O/P EST HI 40 MIN: CPT | Performed by: PSYCHIATRY & NEUROLOGY

## 2020-01-10 RX ORDER — OXCARBAZEPINE 300 MG/1
TABLET, FILM COATED ORAL
Qty: 60 TABLET | Refills: 5 | Status: SHIPPED | OUTPATIENT
Start: 2020-01-10 | End: 2020-08-06

## 2020-01-10 ASSESSMENT — ENCOUNTER SYMPTOMS
NAUSEA: 0
ABDOMINAL PAIN: 0
FACIAL SWELLING: 0
SWOLLEN GLANDS: 0
EYE REDNESS: 0
PHOTOPHOBIA: 0
BLOOD IN STOOL: 0
SORE THROAT: 0
EYE ITCHING: 0
APNEA: 0
EYE DISCHARGE: 0
CONSTIPATION: 0
ABDOMINAL DISTENTION: 0
BOWEL INCONTINENCE: 0
COUGH: 0
WHEEZING: 0
SINUS PRESSURE: 0
BACK PAIN: 0
VOICE CHANGE: 0
SHORTNESS OF BREATH: 0
VISUAL CHANGE: 0
CHEST TIGHTNESS: 0
DIARRHEA: 0
CHOKING: 0
TROUBLE SWALLOWING: 1
VOMITING: 0
COLOR CHANGE: 0
CHANGE IN BOWEL HABIT: 0
EYE PAIN: 0

## 2020-01-10 NOTE — PATIENT INSTRUCTIONS
[facility] NEUROLOGY    Due to the complex nature of our neurological testing, It is the policy of the Neurology Department not to release the results of your testing over the phone. Once all testing is completed and we have all the results back, Dr. Dennis James will then personally go over all the results with you and answer any questions that you may have during a follow up appointment. If you are unable to keep this appointment, please notify the [address] [place] [address] @ [phone], as soon as possible. Please bring your prescription bottles to all appointments. *   SEIZURE  PRECAUTIONS. A)  Avoid  Working  At   [employer] B)  Avoid  Working  With  Heavy machinery. C)  Avoid   Swimming,  Climbing  A  Ladder   Unattended. D)  Avoid   Driving   If  You   Have  A  Seizure. E)  Must   Be  Seizure  Free   For  At   Least   6 months,  Before   You  Can drive. F) Some times  Your  May  Feel  Seizure coming  Before  It  Begins. You  May feel             Strange smell or funny  Feeling  In  Your  Stomach,  Which is  Called   Aura. TIPS  TO  REDUCE/ PREVENT  SEIZURES         1. Take  Your  Anti seizure  Medications   As   Recommended. 2. Get   Enough   Sleep. Sleep  Deprivation   Can  Trigger  A  Seizure. 3. If   You   Have  A fever,  Treat  It  At  Once,  And  Contact   Your  Primary  Care Providers. 4. Avoid   Alcohol. 5. Avoid  Flashing  Lights,  Loud  Noises and  TV  And  Video  Games,           As   These  May  Trigger   Your  Seizures       6.   Control  Your  Stress  And   Have  Adequate  Rest.       7.   If  You  Feel  A  Seizure  Coming   On :           A) warn people  Who  Are  With  You           B)  Make  Sure  There  Are  No  Sharp or  Hard  Objects  Around you. C)  Lay down  On  Your  Side  And  Relax. * FALL   PRECAUTIONS. *      WEIGHT   LOSS. *   ADEQUATE   FLUID  INTAKE   AND  ELECTROLYTE  BALANCE           * KEEP  DAIRY  OF   THE  NEUROLOGICAL  SYMPTOMS          *  TO  MAINTAIN  REGULAR  SLEEP  WAKE  CYCLES. *   TO  HAVE  ADEQUATE  REST  AND   SLEEP    HOURS.        *    AVOID  USAGE OF   TOBACCO,  EXCESSIVE  ALCOHOL                AND   ILLEGAL   SUBSTANCES,  IF  ANY          *  Maintain   Healthy  Life Style    With   Periodic  Monitoring  Of      Any  Medical  Conditions  Including   Elevated  Blood  Pressure,  Lipid  Profile,     Blood  Sugar levels  And   Heart  Disease. *   Period   Screening  For  Cancers  Involving  Breast,  Colon,   lungs  And  Other  Organs  As  Applicable,  In consultation   With  Your  Primary Care Providers. *  If   There is  Any  Significant  Worsening   Of  Current  Symptoms  And  Or  If    Any additional  New  Neurological  Symptoms                 Or  Significant  Concerns   Should  Call  911 or  Go  To  Emergency  Department  For  Further  Immediate  Evaluation.

## 2020-01-10 NOTE — PROGRESS NOTES
[facility]  Neurology  [address] [place] [address]  Phone: [phone]   Fax: [phone]    SUBJECTIVE:     PATIENT ID:  Nella Poe is a  RIGHT  HANDED 47 y.o. female. Seizures   This is a chronic problem. Episode onset:  TWO  YEARS. The problem occurs intermittently. The problem has been waxing and waning. Associated symptoms include numbness and weakness. Pertinent negatives include no abdominal pain, anorexia, arthralgias, change in bowel habit, chest pain, chills, congestion, coughing, diaphoresis, fatigue, fever, headaches, joint swelling, myalgias, nausea, neck pain, rash, sore throat, swollen glands, urinary symptoms, vertigo, visual change or vomiting. Nothing aggravates the symptoms. Treatments tried: TRILEPTAL. The treatment provided mild relief. Neurologic Problem   The patient's primary symptoms include clumsiness, focal sensory loss, focal weakness, a loss of balance, memory loss, slurred speech and weakness. The patient's pertinent negatives include no altered mental status, near-syncope, syncope or visual change. This is a chronic problem. The current episode started more than 1 year ago. The neurological problem developed insidiously. The problem is unchanged. There was no focality noted. Pertinent negatives include no abdominal pain, auditory change, aura, back pain, bladder incontinence, bowel incontinence, chest pain, confusion, diaphoresis, dizziness, fatigue, fever, headaches, light-headedness, nausea, neck pain, palpitations, shortness of breath, vertigo or vomiting. Past treatments include nothing. The treatment provided no relief. Her past medical history is significant for mood changes. There is no history of a bleeding disorder, a clotting disorder, a CVA, dementia, head trauma, liver disease or seizures. History obtained from  The patient     and other  available medical records were  Also  reviewed.         The  Duration,  Quality, Severity,  Location,  Timing,  Context,  Modifying  Factors   Of   The   Chief   Complaint       And  Present  Illness   Was   Reviewed   In   Chronological   [facility]anner. PATIENT'S  MAIN  CONCERNS INCLUDE :                     1)     H/O    TRIPPING    AND  NEAR  FALLS     INTERMITTENTLY                                             SINCE     2018          -   IMPROVED                                                       2)     H/O    BALANCE  PROBLEMS      SINCE    2018                                               -    STABLE                               3)     PERIPHERAL POLYNEUROPATHY    BOTH  LOWER  EXTREMITIES                                    -  ON  NEURONTIN   FROM  HER  PCP.                                                 -      STABLE                               4)    BILATERAL  CARPAL  TUNNEL   SYNDROME,                                BILATERAL  ULNAR  NEUROPATHY                                                          --   TO   WEAR   BILATERAL   WRIST  BRACES                               --  TO  AVOID  PRESSURE  OVER   ULNAR  ASPECT   OF   ELBOWS                       5)   OBESITY ,   OSAS  ON  CPAP                                 6)    CONCERN  FOR  EARLY  TYPE  II    DM                              NEEDS  MONITORING                            7)    H/O    CHRONIC  ANXIETY,   DEPRESSION                                       FOR  MELIDA  THAN    30  YEARS                                  ON   ABILIFY,  BUSPAR,  LAMICTAL,   MINIPRESS                                                -     STABLE                                    BEING    FOLLOWED    BY   HER  [facility]  [facility]  [facility]ESSION[facility]                           8)     H/O  CHRONIC    FIBROMYALGIA                                                    -     STABLE                          9)       MULTIPLE  CO  MORBID  MEDICAL  CONDITIONS                               BEING  FOLLOWED  BY  HER  PCP. 10)      H/O   CHRONIC   MILD   MEMORY  PROBLEMS                                               SINCE    2018       -      STABLE                               -   NEEDS  MONITORING                         11)    H/O   CHRONIC  LUMBAR    PAIN     FOR  10  YEARS                         H/O  LUMBAR  SURGERY     2008   AND   2010                             PATIENT   BEING  FOLLOWED  BY  PAIN  MANAGEMENT                                               SINCE   July 2019                         12)    H/O   SLURRED  SPEECH     AND    DYSPHAGIAS                                       IN     June 2018     INTERMITTENTLY                                                -        IMPROVED                                 D/D     TIA,  STROKE,    CEREBRAL  ISCHEMIA                                      PATIENT  ON  ASA   DAILY  PROPHYLACTICALLY                                           13)     HAD  NEURO  DIAGNOSTIC  EVALUATIONS  IN  July 2018                               MRI  BRAIN,  LABS  AND  CAROTID  DOPPLER  SHOWED                                                 NO  ABNORMALITY                         14)     EEG    AUGUST 2018    ABNORMAL. H/O   WAKING  UP    IN  MORNING    WITH  TONGUE  BITING                                      SHAKING    EPISODES   WHILE  SLEEPING                                          WITNESSED  BY  HER      IN   THE  PAST                                      D/D   NOCTURNAL  SEIZURE  ACTIVITY                     15)       PATIENT  WAS    STARTED   ON    TRILEPTAL                                 9/04/2018         AND  PATIENT  TOLERATING  THE  SAME.                                                     17)           EPILEPSY  MONITORING     IN   Central    October 2018                               SHOWED      ABNORMAL  FOCUS  IN LEFT  TEMPORAL   AREA                                   SECOND  NEUROLOGY  OPINIONS      AT  [facility] WERE  REVIEWED  WITH  PATIENT                                    18)            TRILEPTAL  UP     TITRATED     300  MG   BID  IN  NOV. 2018                                                AND  PATIENT  TOLERATING  THE  SAME       WITH                                                                     CLINICAL  IMPROVEMENT. PATIENT  TO  CONTINUE  THE  SAME. PATIENT  ALREADY  ON  LAMICTAL  FROM  MENTAL  HEALTH PROFESSIONALS                         19)    PATIENT   REPORTS        EPISODES      OF   TWO  SEIZURES                                      IN    NOV. / [address] [facility] [address]     2018                                                                                      20)     PATIENT     WAS     TRIED  WITH      VIMPAT    IN   MARCH 2019                                     FOR  BETTER  SEIZURE  CONTROL      AND  THE  SAME                              DISCONTINUED     DUE  TO     SWELLING IN  BOTH   FEET  AND  LEGS                          21)    NO  FURTHER  RECURRENCE OF  SEIZURE   ACTIVITY      SINCE   MARCH 2019                           --  FOLLOW  UP   EEG  IN   April 2019    ALSO  ABNORMAL                                    WITH  GENERALIZED    POTENTIAL  EPILEPTIFORM  FEATURES                         22)   H/O    SWELLING IN  BOTH   FEET  AND  LEGS       SINCE   April 2019                                   D / D     INCLUDE     CARDIAC,    RENAL,   AND  OTHER      ETIOLOGIES,                                                 INCLUDING     MEDICATION   EFFECTS.                                                     -   IMPROVING                                      -  PATIENT  ADVISED  TO  FOLLOW  WITH  HER  PCP                                                    AND   OTHER  CONSULTANTS                              23)     VARIOUS  RISK   FACTORS   WERE  REVIEWED   AND   DISCUSSED. PATIENT   HAS  MULTIPLE   MEDICAL, MENTAL HEALTH                 NEUROLOGICAL   PROBLEMS . PATIENT'S   MANAGEMENT  IS  CHALLENGING.                           24)  PATIENT  DENIES  ANY  NEW  NEUROLOGICAL     CONCERNS.                                                                                     PRECIPITATING  FACTORS: including  fever/infection, exertion/relaxation, position change, stress, weather change,     medications/alcohol, time of day/darkness/light  Are    Absent                                                               MODIFYING  FACTORS:  fever/infection, exertion/relaxation, position change, stress, weather change,     medications/alcohol, time of day/darkness/light  Are  absent           Patient   Indicates   The  Presence   And  The  Absence  Of  The  Following  Associated  And   Additional  Neurological    Symptoms:                                Balance  And coordination problems  present           Gait problems     absent            Headaches      absent              Migraines           absent           Memory problems        Present             Confusion        absent            Paresthesia numbness          present           Seizures  And  Starring  Episodes           absent           Syncope,  Near  syncopal episodes         absent           Speech problems           absent             Swallowing  Problems      present            Dizziness,  Light headedness           absent                        Vertigo        absent             Generalized   Weakness    absent              focal  Weakness     present             Tremors         absent              Sleep  Problems     present             History  Of   Recent   Head  Injury     absent             History  Of   Recent  TIA     absent             History  Of   Recent    Stroke     absent             Neck  Pain and  Neck muscle  Spasms  Absent               Radiating  down   And   Weakness           absent Lower back   Pain  And     Spasms  Present              Radiating    Down   And   Weakness          present                H/O   FALLS        absent               History  Of   Visual  Symptoms    Absent                  Associated   Diplopia       absent                                  Also   Additional   Symptoms   Present    As  Documented    In   The detailed      Review  Of  Systems   And    Please   Refer   To    Them for   Additional  Information. Any components  That are either  Unobtainable  Or  Limited  In   HPI, ROS  And/or PFSH   Are   Due       To   Patient's  Medical  Problems,  Clinical  Condition and/or lack of other  Alternate resources. RECORDS   REVIEWED:    historical medical records       INFORMATION   REVIEWED:     MEDICAL   HISTORY,     SURGICAL   HISTORY,   MEDICATIONS   LIST,   ALLERGIES AND  DRUG  INTOLERANCES,     FAMILY   HISTORY,  SOCIAL  HISTORY,    PROBLEM  LIST   FOR  PATIENT  CARE   COORDINATION    Past Medical History:   Diagnosis Date    Abnormal CT of the abdomen     revealed 2 very small noncalcified pulmonary nodules in the right lung base. Suspect benign progress but repeat CT scan in June 2013 advised.  Allergic rhinitis     Anemia     Anxiety     Asthma     Chronic low back pain     Chronic neck pain     Degenerative joint disease of knee     Bilateral.    Depression     Endometriosis     history of     GERD (gastroesophageal reflux disease)     Gout     Hyperlipidemia     Hypertension     Hypothyroid     Kidney stone     history of     Left shoulder pain     Status post injections.  Leukocytosis     Palpitation     history of     Paresthesias     Generalized    Right knee pain     Status post injections.  Seizure ([address]     \"nocturnal seizures\"         Past Surgical History:   Procedure Laterality Date    ARTHROPLASTY  03/26/1986    5th metatarsals, MTP joints, right and left feet.     CERVICAL FUSION  6/2014     Young    CHOLECYSTECTOMY, LAPAROSCOPIC  11/14/2011    CYSTOSCOPY  2/8/13    no acute findings    HYSTERECTOMY  05/2005    JOINT REPLACEMENT      KNEE ARTHROSCOPY Left     KNEE ARTHROSCOPY Left 10/16/13    KNEE ARTHROSCOPY Right 02/08/2012    With partial medial and lateral synovectomies and abrasion chondroplasty.  LAPAROSCOPY  03/08/2002    diagnostic with D&C and hysterscopy. [address] SURGERY  10/6/10    Anterior C5-C6, fusion with C-trap allograft and [place] plates, performed by Dr Lyric Delarosa.  LUMBAR SPINE SURGERY Left 2/13/08    L4-L5 and L5-S1 foraminotomies and L4 through S1 posterolateral fusion with pedicle screw instrumentation.  NASAL SEPTUM SURGERY      OTHER SURGICAL HISTORY  12/30/2014    spinal cord stimulator paddle electrode, spinal cord stimulator generator. both [employer] specter generator and 32 lead paddle electrode by Dr Shaun Mendez at [address] [place] [address]  04/16/2015    spinal cord stimulator removed    SPINAL CORD DECOMPRESSION  4/23/12    Lumbar.  TOTAL KNEE ARTHROPLASTY Left 04/2015    [facility]    TUBAL LIGATION  04/02/1990    UPPER GASTROINTESTINAL ENDOSCOPY  10/14/2011    Hiatal hernia.  UPPER GASTROINTESTINAL ENDOSCOPY N/A 9/10/2018    gastritis, esophagitis-BRAVO=reflux         Current Outpatient Medications   Medication Sig Dispense Refill    loratadine-pseudoephedrine (CLARITIN-D 24 HOUR)  MG per extended release tablet Take 1 tablet by mouth daily 30 tablet 0    potassium chloride (KLOR-CON) 8 MEQ extended release tablet take 4 tablets by mouth once daily 120 tablet 5    OXcarbazepine (TRILEPTAL) 150 MG tablet take 2 tablets by mouth twice a day 60 tablet 2    gabapentin (NEURONTIN) 600 MG tablet Take 1 tablet by mouth 3 times daily for 30 days.  90 tablet 2    amoxicillin (AMOXIL) 500 MG capsule Take 1 capsule by mouth 3 times daily 30 capsule 0    hydrochlorothiazide (HYDRODIURIL) 25 MG tablet take 1 (DESYREL) 300 MG tablet take 1 tablet by mouth at bedtime  0    desvenlafaxine succinate (PRISTIQ) 100 MG TB24 extended release tablet take 1 tablet by mouth once daily  0    prazosin (MINIPRESS) 2 MG capsule Take 2 mg by mouth nightly       busPIRone (BUSPAR) 30 MG tablet Take 30 mg by mouth 3 times daily       Cholecalciferol (VITAMIN D3) 5000 UNITS TABS Take 1 tablet by mouth daily 30 tablet 2     No current facility-administered medications for this visit.           Allergies   Allergen Reactions    Allopurinol     Bumex [Bumetanide] Other (See Comments)     Extreme fatigue, nausea, dizziness    Colchicine     Erythromycin     Indocin [Indomethacin]     Lasix [Furosemide] Other (See Comments)     Dizziness, extreme fatigue    Lisinopril     Morphine     Norvasc [Amlodipine]     Tenormin [Atenolol]     Uloric [Febuxostat]     Hydrocodone-Acetaminophen Nausea Only    Topamax [Topiramate] Swelling and Rash         Family History   Problem Relation Age of Onset    Cancer Mother         melanoma    Thyroid Disease Mother     Coronary Art Dis Father         coronary artery bypass grafting x4    Hypertension Father     Glaucoma Father     Prostate Cancer Father     Heart Disease Father     Heart Disease Paternal Grandmother     High Blood Pressure Paternal Grandmother     Diabetes Paternal Grandmother     Thyroid Disease Paternal Grandmother     Heart Disease Paternal Grandfather     High Blood Pressure Paternal Grandfather     Diabetes Paternal Grandfather     Asthma Daughter     Depression Daughter     Anxiety Disorder Daughter     Depression Daughter     Anxiety Disorder Daughter     Asthma Other         sibling         Social History     Socioeconomic History    Marital status:      Spouse name: Not on file    Number of children: Not on file    Years of education: Not on file    Highest education level: Not on file   Occupational History    Not on file   Social Needs BILITOT 0.28 06/07/2019    ALKPHOS 137 06/07/2019    AST 12 06/07/2019    ALT 10 06/07/2019     Lab Results   Component Value Date    ALKPHOS 137 06/07/2019    ALT 10 06/07/2019    AST 12 06/07/2019    PROT 7.1 06/07/2019    BILITOT 0.28 06/07/2019    LABALBU 4.5 06/07/2019     Lab Results   Component Value Date    BUN 10 06/07/2019    CREATININE 0.94 06/07/2019     Lab Results   Component Value Date    CALCIUM 9.5 06/07/2019    MG 1.7 05/16/2019     Lab Results   Component Value Date    AST 12 06/07/2019    ALT 10 06/07/2019     Lab Results   Component Value Date    [id number] 336 07/09/2018         Review of Systems   Constitutional: Negative for appetite change, chills, diaphoresis, fatigue, fever and unexpected weight change. HENT: Positive for trouble swallowing. Negative for congestion, dental problem, drooling, ear discharge, ear pain, facial swelling, hearing loss, mouth sores, nosebleeds, postnasal drip, sinus pressure, sore throat, tinnitus and voice change. Eyes: Negative for photophobia, pain, discharge, redness, itching and visual disturbance. Respiratory: Negative for apnea, cough, choking, chest tightness, shortness of breath and wheezing. Cardiovascular: Negative for chest pain, palpitations, leg swelling and near-syncope. Gastrointestinal: Negative for abdominal distention, abdominal pain, anorexia, blood in stool, bowel incontinence, change in bowel habit, constipation, diarrhea, nausea and vomiting. Endocrine: Negative for cold intolerance, heat intolerance, polydipsia, polyphagia and polyuria. Genitourinary: Negative for bladder incontinence. Musculoskeletal: Positive for gait problem. Negative for arthralgias, back pain, joint swelling, myalgias, neck pain and neck stiffness. Skin: Negative for color change, pallor, rash and wound. Allergic/Immunologic: Negative for environmental allergies, food allergies and immunocompromised state.    Neurological: Positive for focal weakness, seizures, speech difficulty, weakness, numbness and loss of balance. Negative for dizziness, vertigo, tremors, syncope, facial asymmetry, light-headedness and headaches. Hematological: Negative for adenopathy. Does not bruise/bleed easily. Psychiatric/Behavioral: Positive for decreased concentration, memory loss and sleep disturbance. Negative for agitation, behavioral problems, confusion, dysphoric mood, hallucinations, self-injury and suicidal ideas. The patient is nervous/anxious. The patient is not hyperactive. OBJECTIVE:    Physical Exam  Constitutional:       Appearance: She is well-developed. HENT:      Head: Normocephalic and atraumatic. No raccoon eyes or Bob's sign. Right Ear: External ear normal.      Left Ear: External ear normal.      Nose: Nose normal.   Eyes:      Conjunctiva/sclera: Conjunctivae normal.   Neck:      Musculoskeletal: Normal range of motion and neck supple. Normal range of motion. No neck rigidity or muscular tenderness. Thyroid: No thyroid mass or thyromegaly. Vascular: No carotid bruit. Trachea: No tracheal deviation. Meningeal: Brudzinski's sign and Kernig's sign absent. Cardiovascular:      Rate and Rhythm: Normal rate and regular rhythm. Pulmonary:      Effort: Pulmonary effort is normal.   Musculoskeletal:         General: No tenderness. Skin:     General: Skin is warm. Coloration: Skin is not pale. Findings: No erythema or rash. Nails: There is no clubbing. Psychiatric:         Attention and Perception: She is attentive. Mood and Affect: Mood is not anxious or depressed. Affect is blunt. Affect is not labile, angry or inappropriate. Behavior: Behavior is slowed. Behavior is not agitated, aggressive, withdrawn, hyperactive or combative. Behavior is cooperative. Thought Content:  Thought content is not paranoid or delusional. Thought content does not include homicidal or suicidal TECHNOLOGIST PROVIDED HISTORY:   Ordering Physician Provided Reason for Exam: dizziness, balance issues   Acuity: Acute   Type of Exam: Initial       FINDINGS:   RIGHT:       The right common carotid artery demonstrates peak systolic velocities of 61.5   and 63.3 cm/sec in the proximal and distal segments respectively.       The right internal carotid artery demonstrates the systolic velocities of   65.3, 63.3 and 71.1 cm/sec in the proximal, mid and distal segments   respectively.       The external carotid artery is patent.  The vertebral artery demonstrates   normal antegrade flow.       No evidence of focal atherosclerotic plaque.       ICA/CCA ratio of 0.9.       LEFT:       The left common carotid artery demonstrates peak systolic velocities of 691   and 74.1 cm/sec in the proximal and distal segments respectively.       The left internal carotid artery demonstrates the systolic velocities of   94.4, 63.3 and 64.6 cm/sec in the proximal, mid and distal segments   respectively.       The external carotid artery is patent.  The vertebral artery demonstrates   normal antegrade flow.       No evidence of focal atherosclerotic plaque.       ICA/CCA ratio of 0.5.           Impression   Unremarkable bilateral carotid ultrasound.             MRI BRAIN W WO CONTRAST - 5/6/2016 10:30 AM.       HISTORY: pt c/o losing balance, shaking in hands, tremors, right sided weakness, history of HTN and DM       COMPARISON: MRI brain April 21, 2011       TECHNIQUE: Multiplanar, multisequence MR imaging of the brain was performed prior to and following the intravenous administration of 20 mL of Magnevist.       FINDINGS:  Diffusion weighted images are without evidence for active diffusion restriction to suggest acute ischemic event.  The ventricular system is normal in size and morphology.  There are no intraaxial or extraaxial fluid collections or mass    lesions.  The brainstem and craniocervical junction is normal.  Normal signal characteristics and morphology are demonstrated within the cerebral cortex, subcortical white matter, corpus callosum, deep gray nuclei, brainstem and cerebellum.  The basilar    cisterns are preserved.  There is no evidence of abnormal intracranial enhancement.  The orbits and globes are within normal limits.  The paranasal sinuses and mastoids are clear.           Impression   IMPRESSION: Unremarkable MRI examination of the brain.       Final report electronically signed by Ja Zheng M.D. on 5/9/2016 8:15 AM             VISITING DIAGNOSIS:      ICD-10-CM    1. Seizure disorder ([facility] G40.909    2. Chronic low back pain with sciatica, sciatica laterality unspecified, unspecified back pain laterality M54.40     G89.29    3. Chronic neck pain M54.2     G89.29    4. Essential hypertension I10    5. Current moderate episode of major depressive disorder without prior episode ([facility] F32.1    6. Anxiety F41.9    7. Hypothyroidism, unspecified type E03.9    8. Chronic fatigue R53.82    9. Mixed hyperlipidemia E78.2    10. Memory problem R41.3    11. Cerebral ischemia I67.82    12. Obstructive sleep apnea G47.33    13. Other dysphagia R13.19    14. Balance problems R26.89    15. Paresthesias R20.2    16. Tripping over things W18.40XA    17. Slurred speech R47.81    18. Abnormal EEG R94.01    19. Peroneal neuropathy, unspecified laterality G57.30    20. Nocturnal seizures ([facility] G40.909    21. Tibial neuropathy, unspecified laterality G57.40    22. Polyneuropathy associated with underlying disease ([facility] G63    23. Dysthymia F34.1    24. Gastroesophageal reflux disease with esophagitis K21.0    25. Depression, unspecified depression type F32.9    26.  Iron deficiency anemia, unspecified iron deficiency anemia type D50.9               CONCERNS   &   INCREASED   RISK   FOR        * TIA,  CEREBRO  VASCULAR  ISCHEMIA,        *   COGNITIVE  &   MEMORY PROBLEMS        *  MONONEUROPATHIES, RADICULOPATHY  AND  PLEXOPATHY *   PERIPHERAL  NEUROPATHY,   NEUROPATHIC  PAIN      *   SEIZURE   DISORDER              *     BALANCE PROBLMES   AND  FALL                VARIOUS  RISK   FACTORS   WERE  REVIEWED   AND   DISCUSSED. *  PATIENT   HAS  MULTIPLE   MEDICAL, MENTAL HEALTH           NEUROLOGICAL   PROBLEMS . PATIENT'S   MANAGEMENT  IS  CHALLENGING. PLAN:       Shay Ferrell  Of  The  Diagnoses,  The  Management & Treatment  Options           AND    Care  plan  Were        Reviewed and   Discussed   With  patient. * Goals  And  Expectations  Of  The  Therapy  Discussed   And  Reviewed. *   Benefits   And   Side  Effect  Profile  Of  Medication/s   Were   Discussed             * Need   For  Further   Follow up For  The  Various  Problems  Were discussed. * Results  Of  The  Previous  Diagnostic tests were reviewed and questions answered. patient  understand the same. Medical  Decision  Making  Was  Made  Based on the   Complexity  Of  Above  Mentioned  Diagnoses,        Data reviewed   & diagnostic  Tests  Reviewed,  Risk  Of  Significant   Co morbidities and complicating   Factors. Medical  Decision  Was   High  Complexity  Due   To  The  Patient's  Multiple  Symptoms,  Advancing   Disease,  Complex      Treatment  Regimen,  Multiple medications and   Risk  Of   Side  Effects,  Difficulty  In  Medication  Management  And  Diagnostic      Challenges   In  View  Of  The  Associated   Co  Morbid  Conditions   And  Problems. * FALL   PRECAUTIONS. THESE  REVIEWED   AND  DISCUSSED      *   BE  CAREFUL  WITH  ACTIVITIES   INCLUDING  DRIVING.         *   AVOID   NECK  AND/ BACK  STRAINING  ACTIVITIES        *   TO   WEAR   BILATERAL   WRIST  BRACES       *   TO  AVOID  PRESSURE  OVER   ULNAR  ASPECT   OF   ELBOWS        *   ADEQUATE   FLUID  INTAKE   AND  ELECTROLYTE  BALANCE         * KEEP  DAIRY  OF   THE  NEUROLOGICAL  SYMPTOMS        RECORDING THE DURATION  AND  FREQUENCY. *  AVOID    CONDITIONS  AND  FACTORS   THAT  MAKE                  NEUROLOGICAL  SYMPTOMS  WORSE. *  TO  MAINTAIN  REGULAR  SLEEP  WAKE  CYCLES. *   TO  HAVE  ADEQUATE  REST  AND   SLEEP    HOURS.          *    TO   AVOID   TO  SLEEP  IN   SUPINE  POSITION. *      WEIGHT   LOSS. *    AVOID  ANY USAGE OF                   TOBACCO,  EXCESSIVE  ALCOHOL  AND   ILLEGAL   SUBSTANCES      *  CONTINUE MEDICATIONS PRESCRIBED BY NEUROLOGIST AS    RECOMMENDED     *   Compliance   With  Medications   And  Instructions        * CURRENTLY  TOLERATING  THE  PRESCRIBED   MEDICATIONS. WITHOUT  ANY  SIGNIFICANT  SIDE  EFFECTS   &  GETTING BENEFIT. *  May   Use  Pill  Box,    If  Needed      *    To  Continue  The    Antiplatelet  therapy    As   Recommended  Was   Discussed      *    Prophylactic  Use   Of     Vitamin   B   Complex,  Folic  Acid,    Vitamin  B12    Multivitamin,   Calcium  With    magnesium  And  Vit D    Supplementations   Over  The  Counter  Discussed         *  FOOT  CARE, DAILY  INSPECTION  OF  FEET   AND         PERIODIC  PODIATRY EVALUATIONS . *  PATIENT  IS  ALSO   COUNSELED   TO  KEEP    ACTIVITIES:         A)   SIMPLE      B)  ORGANIZED      C)  WRITE   DOWN                 *  EVALUATIONS  AND  FOLLOW UP:    IF  PATIENT  IS  WILLING                   * PHYSICAL  THERAPY   / OCCUPATIONAL THERAPY                                  * CARDIOLOGY              *   OPTHALMOLOGY                                               *   CURRENTLY  PATIENT  DENIES  BEING  PREGNANT                  AND   HAS  NO  PLANS  TO  GET  PREGNANT. *     TRILEPTAL  UP     TITRATED     300  MG   BID  IN  NOV. 2018                            AND  PATIENT  TOLERATING  THE  SAME       WITH                                CLINICAL  IMPROVEMENT. PATIENT  TO  CONTINUE  THE  SAME.                         PATIENT  ALREADY  ON  LAMICTAL FROM  MENTAL  HEALTH PROFESSIONALS                    *    NO  FURTHER  RECURRENCE OF  SEIZURE   ACTIVITY      SINCE  DEC. 2018                                 Orders Placed This Encounter   Procedures    Oxcarbazepine Level       Orders Placed This Encounter   Medications    OXcarbazepine (TRILEPTAL) 300 MG tablet     Sig: ONE  TABLET  TWICE  DAILY     Dispense:  60 tablet     Refill:  5                       *PATIENT   TO  FOLLOW  UP  WITH   PRIMARY  CARE            AND   OTHER  CONSULTANTS  AS  BEFORE.           *TO  FOLLOW  WITH   MENTAL  HEALTH  PROFESSIONALS ,  INCLUDING            PSYCHOLOGICAL  COUNSELING   AND  PSYCHIATRIC  EVALUTIONS,            *  Maintain   Healthy  Life Style    With   Periodic  Monitoring  Of      Any  Medical  Conditions  Including   Elevated  Blood  Pressure,  Lipid  Profile,     Blood  Sugar levels  And   Heart  Disease. *   Period   Screening  For  Cancers  Involving  Breast,  Colon,    lungs  And  Other  Organs  As  Applicable,  In consultation   With  Your  Primary Care Providers. * Second  Neurological  Opinion  And  Evaluations  In  [facility]  Setting  If  Patient  Is  Interested. * Please   Contact   Neurology  Clinic   Early   If   Are  Any  New  Neurological                             Symptoms   And  Any neurological  Concerns. *  If  The  Patient remains  Neurologically  Stable   Return   To  [facility] Neurology Department   IN  3 -  6     MONTHS  TIME   FOR  FURTHER              FOLLOW UP.                     *  If   There is  Any  Significant  Worsening   Of  Current  Symptoms  And  Or  If patient  Develops       Any additional  New  Neurological  Symptoms  Or  Significant  Concerns   Should  Call  911     or  Go  To  Emergency  Department  For  Further  Immediate  Evaluation.                      *   The  Neurological   Findings,  Possible  Diagnosis,  Differential diagnoses And  Options      For    Further   Investigations   And  management   Are  Discussed  Comprehensively. Medications   And  Prescription   Risks  And  Side effects  Are   Also  Discussed. The  Above  Were  Reviewed  With  patient and     questions  Answered  In  Detail. More   Than   50% of face  To face Time   Was  Spent  On  Counseling   And   Coordination  Of  Care       Of   Patient's multiple   Neurological  Problems   And   Comorbid  Medical   Conditions. Electronically signed by Karen Knight MD     Board Certified in  Neurology &  In  [facility] ([facility])      DISCLAIMER:   Although every effort was made to ensure the accuracy of this  electronic transcription, some errors in transcription may have occurred. GENERAL PATIENT INSTRUCTIONS:     A Healthy Lifestyle: Care Instructions  Your Care Instructions  A healthy lifestyle can help you feel good, stay at a healthy weight, and have plenty of energy for both work and play. A healthy lifestyle is something you can share with your whole family. A healthy lifestyle also can lower your risk for serious health problems, such as high blood pressure, heart disease, and diabetes. You can follow a few steps listed below to improve your health and the health of your family. Follow-up care is a key part of your treatment and safety. Be sure to make and go to all appointments, and call your doctor if you are having problems. Its also a good idea to know your test results and keep a list of the medicines you take. How can you care for yourself at home? Do not eat too much sugar, fat, or fast foods. You can still have dessert and treats now and then. The goal is moderation. Start small to improve your eating habits. Pay attention to portion sizes, drink less juice and soda pop, and eat more fruits and vegetables. Eat a healthy amount of food.  A 3-ounce serving of meat, for example, is about the size of a deck of cards. Fill the rest of your plate with vegetables and whole grains. Limit the amount of soda and sports drinks you have every day. Drink more water when you are thirsty. Eat at least 5 servings of fruits and vegetables every day. It may seem like a lot, but it is not hard to reach this goal. A serving or helping is 1 piece of fruit, 1 cup of vegetables, or 2 cups of leafy, raw vegetables. Have an apple or some carrot sticks as an afternoon snack instead of a candy bar. Try to have fruits and/or vegetables at every meal.  Make exercise part of your daily routine. You may want to start with simple activities, such as walking, bicycling, or slow swimming. Try to be active 30 to 60 minutes every day. You do not need to do all 30 to 60 minutes all at once. For example, you can exercise 3 times a day for 10 or 20 minutes. Moderate exercise is safe for most people, but it is always a good idea to talk to your doctor before starting an exercise program.  Keep moving. [employer] the lawn, work in the garden, or [employer]. Take the stairs instead of the elevator at work. If you smoke, quit. People who smoke have an increased risk for heart attack, stroke, cancer, and other lung illnesses. Quitting is hard, but there are ways to boost your chance of quitting tobacco for good. Use nicotine gum, patches, or lozenges. Ask your doctor about stop-smoking programs and medicines. Keep trying. In addition to reducing your risk of diseases in the future, you will notice some benefits soon after you stop using tobacco. If you have shortness of breath or asthma symptoms, they will likely get better within a few weeks after you quit. Limit how much alcohol you drink. Moderate amounts of alcohol (up to 2 drinks a day for men, 1 drink a day for women) are okay. But drinking too much can lead to liver problems, high blood pressure, and other health problems.   Family health  If you

## 2020-01-14 RX ORDER — SPIRONOLACTONE 100 MG/1
TABLET, FILM COATED ORAL
Qty: 90 TABLET | Refills: 1 | Status: SHIPPED | OUTPATIENT
Start: 2020-01-14 | End: 2020-05-26 | Stop reason: SDUPTHER

## 2020-01-14 NOTE — TELEPHONE ENCOUNTER
Alpha Sorenson called requesting a refill of the below medication which has been pended for you:     Requested Prescriptions     Pending Prescriptions Disp Refills    spironolactone (ALDACTONE) 100 MG tablet [Pharmacy Med Name: SPIRONOLACTONE 100 MG TABLET] 90 tablet 1     Sig: take 1 tablet by mouth once daily       Last Appointment Date: 7/29/2019  Next Appointment Date: 1/28/2020    Allergies   Allergen Reactions    Allopurinol     Bumex [Bumetanide] Other (See Comments)     Extreme fatigue, nausea, dizziness    Colchicine     Erythromycin     Indocin [Indomethacin]     Lasix [Furosemide] Other (See Comments)     Dizziness, extreme fatigue    Lisinopril     Morphine     Norvasc [Amlodipine]     Tenormin [Atenolol]     Uloric [Febuxostat]     Hydrocodone-Acetaminophen Nausea Only    Topamax [Topiramate] Swelling and Rash

## 2020-01-15 RX ORDER — ESOMEPRAZOLE MAGNESIUM 40 MG/1
CAPSULE, DELAYED RELEASE ORAL
Qty: 60 CAPSULE | Refills: 5 | Status: SHIPPED | OUTPATIENT
Start: 2020-01-15 | End: 2020-05-26 | Stop reason: SDUPTHER

## 2020-01-15 RX ORDER — METOPROLOL TARTRATE 50 MG/1
TABLET, FILM COATED ORAL
Qty: 60 TABLET | Refills: 5 | Status: SHIPPED | OUTPATIENT
Start: 2020-01-15 | End: 2020-05-26 | Stop reason: SDUPTHER

## 2020-02-03 ENCOUNTER — TELEPHONE (OUTPATIENT)
Dept: FAMILY MEDICINE CLINIC | Age: 50
End: 2020-02-03

## 2020-02-17 RX ORDER — NICOTINE POLACRILEX 2 MG/1
LOZENGE ORAL
Qty: 30 TABLET | Refills: 2 | Status: SHIPPED | OUTPATIENT
Start: 2020-02-17 | End: 2020-02-21 | Stop reason: SDUPTHER

## 2020-02-20 PROBLEM — E66.01 MORBID OBESITY WITH BMI OF 40.0-44.9, ADULT (HCC): Status: ACTIVE | Noted: 2020-02-20

## 2020-02-20 ASSESSMENT — ENCOUNTER SYMPTOMS
WHEEZING: 0
NAUSEA: 0
DIARRHEA: 0
EYE DISCHARGE: 0
RHINORRHEA: 0
ABDOMINAL PAIN: 0
COUGH: 0
CONSTIPATION: 0
SINUS PRESSURE: 0
BACK PAIN: 1
EYE REDNESS: 0
VOMITING: 0
SORE THROAT: 0
SHORTNESS OF BREATH: 0
TROUBLE SWALLOWING: 0

## 2020-02-21 ENCOUNTER — HOSPITAL ENCOUNTER (OUTPATIENT)
Dept: LAB | Age: 50
Discharge: HOME | End: 2020-02-21
Payer: COMMERCIAL

## 2020-02-21 ENCOUNTER — OFFICE VISIT (OUTPATIENT)
Dept: FAMILY MEDICINE CLINIC | Age: 50
End: 2020-02-21
Payer: COMMERCIAL

## 2020-02-21 VITALS
WEIGHT: 283.2 LBS | DIASTOLIC BLOOD PRESSURE: 78 MMHG | HEIGHT: 66 IN | RESPIRATION RATE: 16 BRPM | TEMPERATURE: 98.6 F | SYSTOLIC BLOOD PRESSURE: 104 MMHG | BODY MASS INDEX: 45.51 KG/M2 | HEART RATE: 84 BPM | OXYGEN SATURATION: 98 %

## 2020-02-21 LAB
ABSOLUTE EOS #: 0.21 K/UL (ref 0–0.44)
ABSOLUTE IMMATURE GRANULOCYTE: 0.06 K/UL (ref 0–0.3)
ABSOLUTE LYMPH #: 2.38 K/UL (ref 1.1–3.7)
ABSOLUTE MONO #: 0.61 K/UL (ref 0.1–1.2)
ALBUMIN SERPL-MCNC: 4.6 G/DL (ref 3.5–5.2)
ALBUMIN/GLOBULIN RATIO: 1.8 (ref 1–2.5)
ALP BLD-CCNC: 141 U/L (ref 35–104)
ALT SERPL-CCNC: 14 U/L (ref 5–33)
ANION GAP SERPL CALCULATED.3IONS-SCNC: 14 MMOL/L (ref 9–17)
AST SERPL-CCNC: 14 U/L
BASOPHILS # BLD: 0 % (ref 0–2)
BASOPHILS ABSOLUTE: 0.04 K/UL (ref 0–0.2)
BILIRUB SERPL-MCNC: 0.27 MG/DL (ref 0.3–1.2)
BUN BLDV-MCNC: 10 MG/DL (ref 6–20)
BUN/CREAT BLD: 11 (ref 9–20)
CALCIUM SERPL-MCNC: 9.5 MG/DL (ref 8.6–10.4)
CHLORIDE BLD-SCNC: 98 MMOL/L (ref 98–107)
CHOLESTEROL/HDL RATIO: 3.5
CHOLESTEROL: 149 MG/DL
CO2: 24 MMOL/L (ref 20–31)
CREAT SERPL-MCNC: 0.89 MG/DL (ref 0.5–0.9)
DIFFERENTIAL TYPE: ABNORMAL
EOSINOPHILS RELATIVE PERCENT: 2 % (ref 1–4)
ESTIMATED AVERAGE GLUCOSE: 137 MG/DL
GFR AFRICAN AMERICAN: >60 ML/MIN
GFR NON-AFRICAN AMERICAN: >60 ML/MIN
GFR SERPL CREATININE-BSD FRML MDRD: ABNORMAL ML/MIN/{1.73_M2}
GFR SERPL CREATININE-BSD FRML MDRD: ABNORMAL ML/MIN/{1.73_M2}
GLUCOSE BLD-MCNC: 128 MG/DL (ref 70–99)
HBA1C MFR BLD: 6.4 % (ref 4.8–5.9)
HCT VFR BLD CALC: 38.4 % (ref 36.3–47.1)
HDLC SERPL-MCNC: 43 MG/DL
HEMOGLOBIN: 12.4 G/DL (ref 11.9–15.1)
IMMATURE GRANULOCYTES: 1 %
LDL CHOLESTEROL: 70 MG/DL (ref 0–130)
LYMPHOCYTES # BLD: 25 % (ref 24–43)
MCH RBC QN AUTO: 27.7 PG (ref 25.2–33.5)
MCHC RBC AUTO-ENTMCNC: 32.3 G/DL (ref 25.2–33.5)
MCV RBC AUTO: 85.9 FL (ref 82.6–102.9)
MONOCYTES # BLD: 6 % (ref 3–12)
NRBC AUTOMATED: 0 PER 100 WBC
PDW BLD-RTO: 14.8 % (ref 11.8–14.4)
PLATELET # BLD: 269 K/UL (ref 138–453)
PLATELET ESTIMATE: ABNORMAL
PMV BLD AUTO: 10.5 FL (ref 8.1–13.5)
POTASSIUM SERPL-SCNC: 3.9 MMOL/L (ref 3.7–5.3)
RBC # BLD: 4.47 M/UL (ref 3.95–5.11)
RBC # BLD: ABNORMAL 10*6/UL
SEG NEUTROPHILS: 66 % (ref 36–65)
SEGMENTED NEUTROPHILS ABSOLUTE COUNT: 6.4 K/UL (ref 1.5–8.1)
SODIUM BLD-SCNC: 136 MMOL/L (ref 135–144)
THYROXINE, FREE: 1.09 NG/DL (ref 0.93–1.7)
TOTAL PROTEIN: 7.1 G/DL (ref 6.4–8.3)
TRIGL SERPL-MCNC: 179 MG/DL
TSH SERPL DL<=0.05 MIU/L-ACNC: 1.27 MIU/L (ref 0.3–5)
VLDLC SERPL CALC-MCNC: ABNORMAL MG/DL (ref 1–30)
WBC # BLD: 9.7 K/UL (ref 3.5–11.3)
WBC # BLD: ABNORMAL 10*3/UL

## 2020-02-21 PROCEDURE — 84443 ASSAY THYROID STIM HORMONE: CPT

## 2020-02-21 PROCEDURE — 84439 ASSAY OF FREE THYROXINE: CPT

## 2020-02-21 PROCEDURE — 36415 COLL VENOUS BLD VENIPUNCTURE: CPT

## 2020-02-21 PROCEDURE — 85025 COMPLETE CBC W/AUTO DIFF WBC: CPT

## 2020-02-21 PROCEDURE — 80061 LIPID PANEL: CPT

## 2020-02-21 PROCEDURE — 80183 DRUG SCRN QUANT OXCARBAZEPIN: CPT

## 2020-02-21 PROCEDURE — 99214 OFFICE O/P EST MOD 30 MIN: CPT | Performed by: FAMILY MEDICINE

## 2020-02-21 PROCEDURE — 83036 HEMOGLOBIN GLYCOSYLATED A1C: CPT

## 2020-02-21 PROCEDURE — 80053 COMPREHEN METABOLIC PANEL: CPT

## 2020-02-21 RX ORDER — METFORMIN HYDROCHLORIDE 500 MG/1
TABLET, EXTENDED RELEASE ORAL
Qty: 360 TABLET | Refills: 1 | Status: SHIPPED | OUTPATIENT
Start: 2020-02-21 | End: 2020-06-23 | Stop reason: SINTOL

## 2020-02-21 RX ORDER — LORATADINE AND PSEUDOEPHEDRINE 10; 240 MG/1; MG/1
TABLET, EXTENDED RELEASE ORAL
Qty: 30 TABLET | Refills: 2 | Status: SHIPPED | OUTPATIENT
Start: 2020-02-21 | End: 2020-05-26 | Stop reason: SDUPTHER

## 2020-02-21 RX ORDER — LIDOCAINE 50 MG/G
OINTMENT TOPICAL
Qty: 50 G | Refills: 5 | Status: SHIPPED | OUTPATIENT
Start: 2020-02-21 | End: 2020-09-01

## 2020-02-21 NOTE — PROGRESS NOTES
MCHC 32.3 02/21/2020    RDW 14.8 02/21/2020     02/21/2020    MPV 10.5 02/21/2020       Lab Results   Component Value Date    CHOL 149 02/21/2020    HDL 43 02/21/2020    CHOLHDLRATIO 3.5 02/21/2020    TRIG 179 02/21/2020    VLDL NOT REPORTED 02/21/2020       Lab Results   Component Value Date    TSH 1.27 02/21/2020       Lab Results   Component Value Date     02/21/2020    K 3.9 02/21/2020    CL 98 02/21/2020    CO2 24 02/21/2020    BUN 10 02/21/2020    CREATININE 0.89 02/21/2020    GLUCOSE 128 02/21/2020    CALCIUM 9.5 02/21/2020       Lab Results   Component Value Date    LABA1C 6.4 02/21/2020         Preventative measures are reviewed today. See health maintenance section for complete preventative plan of care. Did review patient's med list, allergies, social history, fam history, pmhx and pshx today as noted in the record. Review of Systems   Constitutional: Negative for chills, fatigue and fever. HENT: Negative for congestion, ear pain, postnasal drip, rhinorrhea, sinus pressure, sore throat and trouble swallowing. Eyes: Negative for discharge and redness. Respiratory: Negative for cough, shortness of breath and wheezing. Cardiovascular: Negative for chest pain. Gastrointestinal: Negative for abdominal pain, constipation, diarrhea, nausea and vomiting. Genitourinary: Negative for dysuria, flank pain, frequency and urgency. Musculoskeletal: Positive for arthralgias, back pain and myalgias. Negative for neck pain. Skin: Negative for rash and wound. Allergic/Immunologic: Negative for environmental allergies. Neurological: Negative for dizziness, weakness, light-headedness and headaches. Hematological: Negative for adenopathy. Psychiatric/Behavioral: Negative. Prior to Visit Medications    Medication Sig Taking?  Authorizing Provider   RA LORATA-D  MG per extended release tablet take 1 tablet by mouth once daily  Yanet Gray,    metoprolol tartrate (LOPRESSOR) 50 MG tablet take 1 tablet by mouth twice a day  Festus Giordano DO   esomeprazole (NEXIUM) 40 MG delayed release capsule take 1 capsule by mouth twice a day  Yanet Gray DO   spironolactone (ALDACTONE) 100 MG tablet take 1 tablet by mouth once daily  Yanet Gray DO   OXcarbazepine (TRILEPTAL) 300 MG tablet ONE  TABLET  TWICE  DAILY  Minnie Sanderson MD   potassium chloride (KLOR-CON) 8 MEQ extended release tablet take 4 tablets by mouth once daily  Yanet Gray DO   gabapentin (NEURONTIN) 600 MG tablet Take 1 tablet by mouth 3 times daily for 30 days.   Minnie Sanderson MD   amoxicillin (AMOXIL) 500 MG capsule Take 1 capsule by mouth 3 times daily  KENROY Orta - JAQUELINE   hydrochlorothiazide (HYDRODIURIL) 25 MG tablet take 1 tablet by mouth daily  Yanet Cuadra Walker, DO   levothyroxine (SYNTHROID) 125 MCG tablet take 1 tablet by mouth once daily  Yanet Gray DO   ranitidine (ZANTAC) 150 MG tablet Take 1 tablet by mouth nightly  Lisa Tang MD   atorvastatin (LIPITOR) 20 MG tablet take 1 tablet by mouth once daily  Yanet Gray DO   MYRBETRIQ 50 MG TB24 take 1 tablet by mouth once daily  Yanet Gray DO   ibuprofen (ADVIL;MOTRIN) 800 MG tablet Take 1 tablet by mouth every 8 hours as needed for Pain Would use sparingly  Yanet Gray DO   REXULTI 1 MG TABS tablet   Historical Provider, MD   fluticasone-salmeterol (ADVAIR DISKUS) 250-50 MCG/DOSE AEPB Inhale 1 puff into the lungs every 12 hours RINSE MOUTH AFTER USE  Festus Giordano DO   Elastic Bandages & Supports (ADJUSTABLE WRIST BRACE) MISC Nightly  Minnie Sanderson MD   Compression Stockings MISC by Does not apply route 20-30 mm Hg pressure, ready to wear, knee high  Festus Giordano DO   RA ALLERGY RELIEF 10 MG tablet take 1 tablet by mouth once daily  Yanet Gray DO   ranitidine (ZANTAC) 150 MG tablet Take 1 tablet by mouth nightly  Lisa Tang MD   nortriptyline Left Ear: Tympanic membrane, ear canal and external ear normal.      Nose: Nose normal.      Mouth/Throat:      Mouth: Mucous membranes are moist.      Pharynx: Oropharynx is clear. No oropharyngeal exudate. Eyes:      General:         Right eye: No discharge. Left eye: No discharge. Conjunctiva/sclera: Conjunctivae normal.      Pupils: Pupils are equal, round, and reactive to light. Neck:      Musculoskeletal: Normal range of motion and neck supple. Thyroid: No thyromegaly. Cardiovascular:      Rate and Rhythm: Normal rate and regular rhythm. Heart sounds: Normal heart sounds. Pulmonary:      Effort: Pulmonary effort is normal.      Breath sounds: Normal breath sounds. No wheezing or rales. Abdominal:      General: Bowel sounds are normal. There is no distension. Palpations: Abdomen is soft. Tenderness: There is no abdominal tenderness. Lymphadenopathy:      Cervical: No cervical adenopathy. Skin:     General: Skin is warm and dry. Findings: No rash. Neurological:      Mental Status: She is alert and oriented to person, place, and time. Psychiatric:         Behavior: Behavior normal.         Thought Content: Thought content normal.         Judgment: Judgment normal.         ASSESSMENT/PLAN:  Encounter Diagnoses   Name Primary?     Type 2 diabetes mellitus without complication, without long-term current use of insulin (HCC) Yes    Essential hypertension     Mixed hyperlipidemia     Hypothyroidism, unspecified type     Anxiety     Current moderate episode of major depressive disorder without prior episode ([facility] [address]     Morbid obesity with BMI of 45.0-49.9, adult ([facility]     Screening for colon cancer      Orders Placed This Encounter   Medications    loratadine-pseudoephedrine (RA LORATA-D)  MG per extended release tablet     Sig: take 1 tablet by mouth once daily     Dispense:  30 tablet     Refill:  2    lidocaine (XYLOCAINE) 5 % ointment     Sig:

## 2020-02-25 LAB — OXCARBAZEPINE: 16 UG/ML (ref 3–35)

## 2020-03-20 ENCOUNTER — TELEPHONE (OUTPATIENT)
Dept: FAMILY MEDICINE CLINIC | Age: 50
End: 2020-03-20

## 2020-03-20 RX ORDER — DULAGLUTIDE 0.75 MG/.5ML
0.75 INJECTION, SOLUTION SUBCUTANEOUS WEEKLY
Qty: 2 ML | Refills: 2 | Status: SHIPPED | OUTPATIENT
Start: 2020-03-20 | End: 2020-06-23 | Stop reason: SINTOL

## 2020-03-20 NOTE — TELEPHONE ENCOUNTER
Pt calling stating that [facility] started pt on metformin but pt states she's not tolerating it, states [facility] mentioned some type of shot, can she do this, pt uses loaded pharmacy, please advise at above number.

## 2020-03-24 ENCOUNTER — HOSPITAL ENCOUNTER (OUTPATIENT)
Dept: GENERAL RADIOLOGY | Age: 50
Discharge: HOME | End: 2020-03-26
Payer: COMMERCIAL

## 2020-03-24 ENCOUNTER — OFFICE VISIT (OUTPATIENT)
Dept: PRIMARY CARE CLINIC | Age: 50
End: 2020-03-24
Payer: COMMERCIAL

## 2020-03-24 VITALS
OXYGEN SATURATION: 98 % | SYSTOLIC BLOOD PRESSURE: 126 MMHG | WEIGHT: 250 LBS | BODY MASS INDEX: 40.97 KG/M2 | TEMPERATURE: 98.1 F | DIASTOLIC BLOOD PRESSURE: 74 MMHG

## 2020-03-24 PROCEDURE — 99214 OFFICE O/P EST MOD 30 MIN: CPT | Performed by: NURSE PRACTITIONER

## 2020-03-24 PROCEDURE — 73080 X-RAY EXAM OF ELBOW: CPT

## 2020-03-24 PROCEDURE — 73090 X-RAY EXAM OF FOREARM: CPT

## 2020-03-24 ASSESSMENT — ENCOUNTER SYMPTOMS
COUGH: 0
WHEEZING: 0
BOWEL INCONTINENCE: 0
SHORTNESS OF BREATH: 0
VISUAL CHANGE: 0
NAUSEA: 0

## 2020-03-24 ASSESSMENT — PATIENT HEALTH QUESTIONNAIRE - PHQ9
SUM OF ALL RESPONSES TO PHQ QUESTIONS 1-9: 0
SUM OF ALL RESPONSES TO PHQ QUESTIONS 1-9: 0
SUM OF ALL RESPONSES TO PHQ9 QUESTIONS 1 & 2: 0
1. LITTLE INTEREST OR PLEASURE IN DOING THINGS: 0
2. FEELING DOWN, DEPRESSED OR HOPELESS: 0

## 2020-03-24 NOTE — PROGRESS NOTES
\"nocturnal seizures\"       Past Surgical History:   Procedure Laterality Date    ARTHROPLASTY  03/26/1986    5th metatarsals, MTP joints, right and left feet.  CERVICAL FUSION  6/2014    Dr Uma Young, LAPAROSCOPIC  11/14/2011    CYSTOSCOPY  2/8/13    no acute findings    HYSTERECTOMY  05/2005    JOINT REPLACEMENT      KNEE ARTHROSCOPY Left     KNEE ARTHROSCOPY Left 10/16/13    KNEE ARTHROSCOPY Right 02/08/2012    With partial medial and lateral synovectomies and abrasion chondroplasty.  LAPAROSCOPY  03/08/2002    diagnostic with D&C and hysterscopy. [address] SURGERY  10/6/10    Anterior C5-C6, fusion with C-trap allograft and [place] plates, performed by Dr Lyric Delarosa.  LUMBAR SPINE SURGERY Left 2/13/08    L4-L5 and L5-S1 foraminotomies and L4 through S1 posterolateral fusion with pedicle screw instrumentation.  NASAL SEPTUM SURGERY      OTHER SURGICAL HISTORY  12/30/2014    spinal cord stimulator paddle electrode, spinal cord stimulator generator. both [employer] specter generator and 32 lead paddle electrode by Dr Shaun Mendez at [address] [place] [address]  04/16/2015    spinal cord stimulator removed    SPINAL CORD DECOMPRESSION  4/23/12    Lumbar.  TOTAL KNEE ARTHROPLASTY Left 04/2015    [facility]    TUBAL LIGATION  04/02/1990    UPPER GASTROINTESTINAL ENDOSCOPY  10/14/2011    Hiatal hernia.     UPPER GASTROINTESTINAL ENDOSCOPY N/A 9/10/2018    gastritis, esophagitis-BRAVO=reflux       Social History     Socioeconomic History    Marital status:      Spouse name: None    Number of children: None    Years of education: None    Highest education level: None   Occupational History    None   Social Needs    Financial resource strain: None    Food insecurity     Worry: None     Inability: None    Transportation needs     Medical: None     Non-medical: None   Tobacco Use    Smoking status: Never Smoker    Smokeless tobacco: Never Used   Substance and Sexual Activity    Alcohol use: No    Drug use: No    Sexual activity: None   Lifestyle    Physical activity     Days per week: None     Minutes per session: None    Stress: None   Relationships    Social connections     Talks on phone: None     Gets together: None     Attends Mandaeism service: None     Active member of club or organization: None     Attends meetings of clubs or organizations: None     Relationship status: None    Intimate partner violence     Fear of current or ex partner: None     Emotionally abused: None     Physically abused: None     Forced sexual activity: None   Other Topics Concern    None   Social History Narrative    None       Family History   Problem Relation Age of Onset    Cancer Mother         melanoma    Thyroid Disease Mother     Coronary Art Dis Father         coronary artery bypass grafting x4    Hypertension Father     Glaucoma Father     Prostate Cancer Father     Heart Disease Father     Heart Disease Paternal Grandmother     High Blood Pressure Paternal Grandmother     Diabetes Paternal Grandmother     Thyroid Disease Paternal Grandmother     Heart Disease Paternal Grandfather     High Blood Pressure Paternal Grandfather     Diabetes Paternal Grandfather     Asthma Daughter     Depression Daughter     Anxiety Disorder Daughter     Depression Daughter     Anxiety Disorder Daughter     Asthma Other         sibling       Allergies   Allergen Reactions    Allopurinol     Bumex [Bumetanide] Other (See Comments)     Extreme fatigue, nausea, dizziness    Colchicine     Erythromycin     Indocin [Indomethacin]     Lasix [Furosemide] Other (See Comments)     Dizziness, extreme fatigue    Lisinopril     Morphine     Norvasc [Amlodipine]     Tenormin [Atenolol]     Uloric [Febuxostat]     Hydrocodone-Acetaminophen Nausea Only    Topamax [Topiramate] Swelling and Rash       Current Outpatient Medications   Medication Sig  albuterol sulfate HFA (VENTOLIN HFA) 108 (90 Base) MCG/ACT inhaler INHALE 2 PUFFS EVERY 6 HOURS IF NEEDED FOR WHEEZING 18 g 5    Cyanocobalamin (VITAMIN B12 PO) Take by mouth daily      b complex vitamins capsule Take 1 capsule by mouth daily      aspirin 81 MG tablet Take 81 mg by mouth daily      lamoTRIgine (LAMICTAL) 200 MG tablet Take 200 mg by mouth daily      traZODone (DESYREL) 300 MG tablet take 1 tablet by mouth at bedtime  0    desvenlafaxine succinate (PRISTIQ) 100 MG TB24 extended release tablet take 1 tablet by mouth once daily  0    prazosin (MINIPRESS) 2 MG capsule Take 2 mg by mouth nightly       busPIRone (BUSPAR) 30 MG tablet Take 30 mg by mouth 3 times daily       Cholecalciferol (VITAMIN D3) 5000 UNITS TABS Take 1 tablet by mouth daily 30 tablet 2    gabapentin (NEURONTIN) 600 MG tablet Take 1 tablet by mouth 3 times daily for 30 days. 90 tablet 2     No current facility-administered medications for this visit. Review of Systems   Constitutional: Negative for activity change, appetite change and fever. Eyes: Negative for visual disturbance. Respiratory: Negative for cough, shortness of breath and wheezing. Cardiovascular: Negative for chest pain and palpitations. Gastrointestinal: Negative for bowel incontinence and nausea. Genitourinary: Negative for hematuria. Musculoskeletal:        Left arm pain   Neurological: Positive for headaches. Negative for tingling, loss of consciousness and numbness. Objective:   Physical Exam  Vitals signs and nursing note reviewed. Constitutional:       Appearance: Normal appearance. HENT:      Head: Normocephalic. Abrasion and contusion present. Cardiovascular:      Rate and Rhythm: Normal rate and regular rhythm. Heart sounds: Normal heart sounds. Pulmonary:      Effort: Pulmonary effort is normal.      Breath sounds: Normal breath sounds.    Musculoskeletal:      Left forearm: She exhibits tenderness (with brusing) and swelling. Arms:    Skin:     Capillary Refill: Capillary refill takes less than 2 seconds. Neurological:      General: No focal deficit present. Mental Status: She is alert and oriented to person, place, and time. GCS: GCS eye subscore is 4. GCS verbal subscore is 5. GCS motor subscore is 6. Cranial Nerves: Cranial nerves are intact. Motor: Motor function is intact. Coordination: Coordination is intact. Gait: Gait is intact. XR ELBOW LEFT (MIN 3 VIEWS)  Narrative: EXAMINATION:  TWO XRAY VIEWS OF THE LEFT FOREARM; THREE XRAY VIEWS OF THE LEFT ELBOW    3/24/2020 4:18 pm    COMPARISON:  None. HISTORY:  ORDERING SYSTEM PROVIDED HISTORY: Pain, arm, left  TECHNOLOGIST PROVIDED HISTORY:  fell landed on left arm pain left lower arm radiates to elbow  Reason for Exam: Posterior left forearm pain and swelling following fall today  Acuity: Acute  Type of Exam: Initial    59-year-old female with left forearm pain and swelling after a fall    FINDINGS:  Left elbow:    Osseous alignment is normal.  Joint spaces are well maintained. No acute  fracture or gross dislocation is seen. The radial head and radial neck appear intact. There is no significant elevation of the posterior fat pad or sail sign to  suggest a joint effusion. Left forearm:    Mild edema throughout the left forearm. Left radius and ulna grossly unremarkable. No sail sign or joint effusion at  the left elbow. Mild degenerative changes of the triscaphe and 1st CMC  joints. Joint spaces otherwise well maintained. No acute fracture or dislocation. Impression: Left elbow:    No acute fracture or dislocation. Left forearm:    1. Mild edema throughout the left forearm. 2. Mild osteoarthrosis. 3. No acute fracture or dislocation.   XR RADIUS ULNA LEFT (2 VIEWS)  Narrative: EXAMINATION:  TWO XRAY VIEWS OF THE LEFT FOREARM; THREE XRAY VIEWS OF THE LEFT ELBOW    3/24/2020 4:18 pm    COMPARISON:  None. HISTORY:  ORDERING SYSTEM PROVIDED HISTORY: Pain, arm, left  TECHNOLOGIST PROVIDED HISTORY:  fell landed on left arm pain left lower arm radiates to elbow  Reason for Exam: Posterior left forearm pain and swelling following fall today  Acuity: Acute  Type of Exam: Initial    26-year-old female with left forearm pain and swelling after a fall    FINDINGS:  Left elbow:    Osseous alignment is normal.  Joint spaces are well maintained. No acute  fracture or gross dislocation is seen. The radial head and radial neck appear intact. There is no significant elevation of the posterior fat pad or sail sign to  suggest a joint effusion. Left forearm:    Mild edema throughout the left forearm. Left radius and ulna grossly unremarkable. No sail sign or joint effusion at  the left elbow. Mild degenerative changes of the triscaphe and 1st CMC  joints. Joint spaces otherwise well maintained. No acute fracture or dislocation. Impression: Left elbow:    No acute fracture or dislocation. Left forearm:    1. Mild edema throughout the left forearm. 2. Mild osteoarthrosis. 3. No acute fracture or dislocation. Assessment:       Diagnosis Orders   1. Contusion of left upper extremity, initial encounter     2. Pain, arm, left  XR RADIUS ULNA LEFT (2 VIEWS)    XR ELBOW LEFT (MIN 3 VIEWS)   3. Concussion without loss of consciousness, initial encounter             Plan:      Reviewed xray with patient   No acute fracture noted. Continue supportive measures  Ice/tylenol as needed  Advised patient's significant other to monitor patient's neurological status. She is to be evaluated if there is a change.  Pt verbalizes understanding  Pt to return if symptoms do not improve or worsen   Return SEEMA Bee, APRN - CNP

## 2020-03-30 ENCOUNTER — TELEPHONE (OUTPATIENT)
Dept: FAMILY MEDICINE CLINIC | Age: 50
End: 2020-03-30

## 2020-03-30 ENCOUNTER — HOSPITAL ENCOUNTER (OUTPATIENT)
Dept: GENERAL RADIOLOGY | Age: 50
Discharge: HOME | End: 2020-04-01
Payer: COMMERCIAL

## 2020-03-30 PROCEDURE — 73090 X-RAY EXAM OF FOREARM: CPT

## 2020-03-30 RX ORDER — MIRABEGRON 50 MG/1
TABLET, FILM COATED, EXTENDED RELEASE ORAL
Qty: 30 TABLET | Refills: 5 | Status: SHIPPED | OUTPATIENT
Start: 2020-03-30 | End: 2020-05-26 | Stop reason: SDUPTHER

## 2020-03-30 NOTE — TELEPHONE ENCOUNTER
Pt calling stating she was seen last week after falling on her arm, pt calling questioning if she needs another xray, states arm still has a lump, it's black and blue and painful, please advise at above number.

## 2020-05-04 RX ORDER — HYDROCHLOROTHIAZIDE 25 MG/1
TABLET ORAL
Qty: 90 TABLET | Refills: 1 | Status: CANCELLED | OUTPATIENT
Start: 2020-05-04

## 2020-05-04 RX ORDER — HYDROCHLOROTHIAZIDE 25 MG/1
TABLET ORAL
Qty: 90 TABLET | Refills: 1 | Status: SHIPPED | OUTPATIENT
Start: 2020-05-04 | End: 2020-05-26 | Stop reason: SDUPTHER

## 2020-05-04 RX ORDER — LEVOTHYROXINE SODIUM 0.12 MG/1
TABLET ORAL
Qty: 90 TABLET | Refills: 1 | Status: SHIPPED | OUTPATIENT
Start: 2020-05-04 | End: 2020-05-26 | Stop reason: SDUPTHER

## 2020-05-04 RX ORDER — METOPROLOL TARTRATE 50 MG/1
TABLET, FILM COATED ORAL
Qty: 60 TABLET | Refills: 5 | Status: CANCELLED | OUTPATIENT
Start: 2020-05-04

## 2020-05-22 ENCOUNTER — OFFICE VISIT (OUTPATIENT)
Dept: NEUROLOGY | Age: 50
End: 2020-05-22
Payer: COMMERCIAL

## 2020-05-22 VITALS
SYSTOLIC BLOOD PRESSURE: 122 MMHG | OXYGEN SATURATION: 98 % | BODY MASS INDEX: 44.45 KG/M2 | HEART RATE: 83 BPM | DIASTOLIC BLOOD PRESSURE: 74 MMHG | HEIGHT: 66 IN | WEIGHT: 276.6 LBS

## 2020-05-22 PROCEDURE — 99215 OFFICE O/P EST HI 40 MIN: CPT | Performed by: PSYCHIATRY & NEUROLOGY

## 2020-05-22 ASSESSMENT — ENCOUNTER SYMPTOMS
DIARRHEA: 0
BOWEL INCONTINENCE: 0
ABDOMINAL DISTENTION: 0
EYE PAIN: 0
VOMITING: 0
PHOTOPHOBIA: 0
FACIAL SWELLING: 0
VOICE CHANGE: 0
SINUS PRESSURE: 0
SWOLLEN GLANDS: 0
NAUSEA: 0
COUGH: 0
WHEEZING: 0
CHANGE IN BOWEL HABIT: 0
CONSTIPATION: 0
TROUBLE SWALLOWING: 1
VISUAL CHANGE: 0
EYE ITCHING: 0
SHORTNESS OF BREATH: 0
EYE REDNESS: 0
ABDOMINAL PAIN: 0
EYE DISCHARGE: 0
CHEST TIGHTNESS: 0
BLOOD IN STOOL: 0
COLOR CHANGE: 0
BACK PAIN: 0
SORE THROAT: 0
APNEA: 0
CHOKING: 0

## 2020-05-22 NOTE — PROGRESS NOTES
[facility]  Neurology  [address] [place] [address]  Phone: [phone]   Fax: [phone]    SUBJECTIVE:     PATIENT ID:  Matt Miguel is a  RIGHT  HANDED 45 y.o. female. Seizures   This is a chronic problem. Episode onset:  TWO  YEARS. The problem occurs intermittently. The problem has been waxing and waning. Associated symptoms include numbness and weakness. Pertinent negatives include no abdominal pain, anorexia, arthralgias, change in bowel habit, chest pain, chills, congestion, coughing, diaphoresis, fatigue, fever, headaches, joint swelling, myalgias, nausea, neck pain, rash, sore throat, swollen glands, urinary symptoms, vertigo, visual change or vomiting. Nothing aggravates the symptoms. Treatments tried: TRILEPTAL. The treatment provided mild relief. Neurologic Problem   The patient's primary symptoms include clumsiness, focal sensory loss, focal weakness, a loss of balance, memory loss, slurred speech and weakness. The patient's pertinent negatives include no altered mental status, near-syncope, syncope or visual change. This is a chronic problem. The current episode started more than 1 year ago. The neurological problem developed insidiously. The problem is unchanged. There was no focality noted. Pertinent negatives include no abdominal pain, auditory change, aura, back pain, bladder incontinence, bowel incontinence, chest pain, confusion, diaphoresis, dizziness, fatigue, fever, headaches, light-headedness, nausea, neck pain, palpitations, shortness of breath, vertigo or vomiting. Past treatments include nothing. The treatment provided no relief. Her past medical history is significant for mood changes. There is no history of a bleeding disorder, a clotting disorder, a CVA, dementia, head trauma, liver disease or seizures. History obtained from  The patient     and other  available medical records were  Also  reviewed.         The  Duration,  Quality, Severity,  Location,  Timing,  Context,  Modifying  Factors   Of   The   Chief   Complaint       And  Present  Illness   Was   Reviewed   In   Chronological   Manner. PATIENT'S  MAIN  CONCERNS INCLUDE :                     1)     H/O    TRIPPING    AND  NEAR  FALLS     INTERMITTENTLY                                             SINCE     2018          -   IMPROVED                                                       2)     H/O    BALANCE  PROBLEMS      SINCE    2018                                               -    STABLE                               3)     PERIPHERAL POLYNEUROPATHY    BOTH  LOWER  EXTREMITIES                                    -  ON  NEURONTIN  .                                                 -      STABLE                               4)    BILATERAL  CARPAL  TUNNEL   SYNDROME,                                BILATERAL  ULNAR  NEUROPATHY                                                          --   TO   WEAR   BILATERAL   WRIST  BRACES                               --  TO  AVOID  PRESSURE  OVER   ULNAR  ASPECT   OF   ELBOWS                       5)   OBESITY ,   OSAS  ON  CPAP                                 6)    H/O    HYPERGLYCEMIA    AND     EARLY  TYPE  II    DM                           H/O      ELEVATED   HEMOGLOBIN   A 1 C   IN   FEB. 2020                           -         NEEDS  MONITORING                                       TO  FOLLOW  WITH  HER PCP                            7)    H/O    CHRONIC  ANXIETY,   DEPRESSION                                       FOR  MELIDA  THAN    30  YEARS                                  ON   ABILIFY,  BUSPAR,  LAMICTAL,   MINIPRESS                                                -     STABLE                                    BEING    FOLLOWED    BY   HER  MENTAL  HEALTH  PROFESSIONALS                           8)     H/O  CHRONIC    FIBROMYALGIA                                                    - SHOWED      ABNORMAL  FOCUS  IN LEFT  TEMPORAL   AREA                                   SECOND  NEUROLOGY  OPINIONS      AT  [facility]                                   WERE  REVIEWED  WITH  PATIENT                                    18)            TRILEPTAL  UP     TITRATED     300  MG   BID  IN  NOV. 2018                                                AND  PATIENT  TOLERATING  THE  SAME       WITH                                                                     CLINICAL  IMPROVEMENT. PATIENT  TO  CONTINUE  THE  SAME. PATIENT  ALREADY  ON  LAMICTAL  FROM  MENTAL  HEALTH PROFESSIONALS                         19)    PATIENT   REPORTS        EPISODES      OF   TWO  SEIZURES                                      IN    NOV.  /  [address] [facility] [address]     2018                                                                                      20)     PATIENT     WAS     TRIED  WITH      VIMPAT    IN   MARCH 2019                                     FOR  BETTER  SEIZURE  CONTROL      AND  THE  SAME                              DISCONTINUED     DUE  TO     SWELLING IN  BOTH   FEET  AND  LEGS                          21)    NO    CLINICAL     RECURRENCE OF  SEIZURE   ACTIVITY                                       FROM   [facility]  2019     TO  FEB. 2020                            --  FOLLOW  UP   EEG  IN   April 2019    ALSO  ABNORMAL                                    WITH  GENERALIZED    POTENTIAL  EPILEPTIFORM  FEATURES                           22)   H/O    SWELLING IN  BOTH   FEET  AND  LEGS       SINCE   April 2019                                   D / D     INCLUDE     CARDIAC,    RENAL,   AND  OTHER      ETIOLOGIES,                                                  -   RESOLVED                                      -  PATIENT  ADVISED  TO  FOLLOW  WITH  HER  PCP lower legs    Seizure disorder (HCC)    Elevated hemoglobin A1c    Morbid obesity with BMI of 40.0-44.9, adult [facility])           Summary Report of 5 -day Video-EEG Monitoring : This video-EEG recording was carried out between 10/22/18- 10/26/18. During this recording, the patient was taken off Gabapentin and the dose   of Lamictal was reduced to 100 mg daily. Photic stimulation and   hyperventilation were also performed. The EEG was initially normal, until   10/25/2018, when rare sharp-and-slow wave discharges were seen in the left   mid temporal region. No electrographic seizures were seen. On 10/24/18,   patient experienced waves of nausea and dizziness followed by confusion   and fatigue, however no EEG correlates was found. Overall, this video-EEG recording was abnormal, indicative of increased   epileptogenicity in the left mid temporal region.  No seizures were   recorded.     Deanne Deshpande MD.   of Neurology and Sleep  [facility]  10/26/2018 10:11 PM    MRI OF THE BRAIN WITHOUT CONTRAST  7/16/2018 3:55 pm       TECHNIQUE:   Multiplanar multisequence MRI of the brain was performed without the   administration of intravenous contrast.       COMPARISON:   None       HISTORY:   ORDERING SYSTEM PROVIDED HISTORY: Chronic fatigue   TECHNOLOGIST PROVIDED HISTORY:   Ordering Physician Provided Reason for Exam: Balance issues and muscles   jerking for about a year, getting worse. Acuity: Unknown   Type of Exam: Initial   Additional signs and symptoms: Tripping over things; Balance problems; Memory   problems       Initial evaluation.       FINDINGS:   INTRACRANIAL STRUCTURES/VENTRICLES: There is no acute infarct.  The   ventricles and cisternal spaces are normal in size, shape, and configuration   for the age of the patient. No areas of abnormal signal are identified in the   brain parenchyma.  There is no midline shift or mass effect.  There are no   areas of restricted diffusion.       ORBITS: The visualized portion of the orbits demonstrate no acute abnormality.       SINUSES:  The visualized paranasal sinuses and mastoid air cells are clear.       BONES/SOFT TISSUES: The bone marrow signal intensity appears normal. The soft   tissues demonstrate no acute abnormality.           Impression   No acute brain parenchymal abnormality.             ULTRASOUND EVALUATION OF THE CAROTID ARTERIES       7/16/2018       COMPARISON:   None.       HISTORY:   ORDERING SYSTEM PROVIDED HISTORY: Chronic fatigue   TECHNOLOGIST PROVIDED HISTORY:   Ordering Physician Provided Reason for Exam: dizziness, balance issues   Acuity: Acute   Type of Exam: Initial       FINDINGS:   RIGHT:       The right common carotid artery demonstrates peak systolic velocities of 00.6   and 63.3 cm/sec in the proximal and distal segments respectively.       The right internal carotid artery demonstrates the systolic velocities of   80.3, 63.3 and 71.1 cm/sec in the proximal, mid and distal segments   respectively.       The external carotid artery is patent.  The vertebral artery demonstrates   normal antegrade flow.       No evidence of focal atherosclerotic plaque.       ICA/CCA ratio of 0.9.       LEFT:       The left common carotid artery demonstrates peak systolic velocities of 481   and 74.1 cm/sec in the proximal and distal segments respectively.       The left internal carotid artery demonstrates the systolic velocities of   87.8, 63.3 and 64.6 cm/sec in the proximal, mid and distal segments   respectively.       The external carotid artery is patent.  The vertebral artery demonstrates   normal antegrade flow.       No evidence of focal atherosclerotic plaque.       ICA/CCA ratio of 0.5.           Impression   Unremarkable bilateral carotid ultrasound.             MRI BRAIN W WO CONTRAST - 5/6/2016 10:30 AM.       HISTORY: pt c/o losing balance, shaking in hands, tremors, right sided weakness, history sleep apnea G47.33    19. Abnormal EEG R94.01    20. Paresthesias R20.2    21. Morbid obesity with BMI of 40.0-44.9, [facility] E66.01     Z68.41    22. Chronic fatigue R53.82    23. Essential hypertension I10    24. Current moderate episode of major depressive disorder without prior episode (HCC) F32.1    25. Anxiety F41.9               CONCERNS   &   INCREASED   RISK   FOR        * TIA,  CEREBRO  VASCULAR  ISCHEMIA,        *   COGNITIVE  &   MEMORY PROBLEMS        *  MONONEUROPATHIES, RADICULOPATHY  AND  PLEXOPATHY      *   PERIPHERAL  NEUROPATHY,   NEUROPATHIC  PAIN      *   SEIZURE   DISORDER              *     BALANCE PROBLMES   AND  FALL                VARIOUS  RISK   FACTORS   WERE  REVIEWED   AND   DISCUSSED. *  PATIENT   HAS  MULTIPLE   MEDICAL, MENTAL HEALTH           NEUROLOGICAL   PROBLEMS . PATIENT'S   MANAGEMENT  IS  CHALLENGING. PLAN:       [employer]  Of  The  Diagnoses,  The  Management & Treatment  Options           AND    Care  plan  Were        Reviewed and   Discussed   With  patient. * Goals  And  Expectations  Of  The  Therapy  Discussed   And  Reviewed. *   Benefits   And   Side  Effect  Profile  Of  Medication/s   Were   Discussed             * Need   For  Further   Follow up For  The  Various  Problems  Were discussed. * Results  Of  The  Previous  Diagnostic tests were reviewed and questions answered. patient  understand the same. Medical  Decision  Making  Was  Made  Based on the   Complexity  Of  Above  Mentioned  Diagnoses,        Data reviewed   & diagnostic  Tests  Reviewed,  Risk  Of  Significant   Co morbidities and complicating   Factors.                Medical  Decision  Was   High  Complexity  Due   To  The  Patient's  Multiple  Symptoms,  Advancing   Disease,  Complex      Treatment  Regimen,  Multiple medications and   Risk  Of   Side  Effects,  Difficulty  In  Medication  Management  And  Diagnostic * CARDIOLOGY              *   OPTHALMOLOGY                                               *   CURRENTLY  PATIENT  DENIES  BEING  PREGNANT                  AND   HAS  NO  PLANS  TO  GET  PREGNANT. *     TRILEPTAL  UP     TITRATED     300  MG   BID  IN  NOV. 2018                            AND  PATIENT  TOLERATING  THE  SAME       WITH                                CLINICAL  IMPROVEMENT. PATIENT  TO  CONTINUE  THE  SAME. *        PATIENT  ALREADY  ON  LAMICTAL  FROM  [facility]                         *       H/O  BLACK  OUT     AND      FALL    WITH  MILD    HEAD INJURY                                            IN     MARCH 2020                                       D / D  INCLUDE      SEIZURE,    CARDIAC  ETIOLOGIES                      *        H/O    TEETH  GRINDING    AT  NIGHT  TIME                                 PATIENT  ADVISED   TO  FOLLOW  WITH     ENT    AND  DENTAL PROVIDERS               Controlled Substances Monitoring: Periodic Controlled Substance Monitoring: Possible medication side effects, risk of tolerance/dependence & alternative treatments discussed. , Assessed functional status.  Negra Crawford MD)            Orders Placed This Encounter   Procedures    CT Head WO Contrast    VL DUP CAROTID BILATERAL    US Transcranial Doppler Complete    XR CERVICAL SPINE (2-3 VIEWS)    CBC    Electrolyte Panel    Oxcarbazepine Level   Demarcus Julian MD, Cardiology, [place]    EEG                   *PATIENT   TO  FOLLOW  UP  WITH   PRIMARY  CARE            AND   OTHER  CONSULTANTS  AS  BEFORE.           *TO  FOLLOW  WITH   MENTAL  HEALTH  PROFESSIONALS ,  INCLUDING            PSYCHOLOGICAL  COUNSELING   AND  PSYCHIATRIC  EVALUTIONS,            *  Maintain   Healthy  Life Style    With   Periodic  Monitoring  Of      Any  Medical  Conditions  Including   Elevated  Blood  Pressure,  Lipid learn more about \"A Healthy Lifestyle: Care Instructions. \"     If you do not have an account, please click on the \"Sign Up Now\" link. Current as of: July 26, 2016  Content Version: 11.2  © 2815-4445 [employer]. Care instructions adapted under license by [facility] ([facility]). If you have questions about a medical condition or this instruction, always ask your healthcare professional. [address] any warranty or liability for your use of this information.

## 2020-05-22 NOTE — PATIENT INSTRUCTIONS
[facility] NEUROLOGY    Due to the complex nature of our neurological testing, It is the policy of the Neurology Department not to release the results of your testing over the phone. Once all testing is completed and we have all the results back, Dr. Radha Quan will then personally go over all the results with you and answer any questions that you may have during a follow up appointment. If you are unable to keep this appointment, please notify the [address] @ [phone], as soon as possible. Please bring your prescription bottles to all appointments. SEIZURE PRECAUTIONS. A) Avoid Working At [employer] B) Avoid Working With Heavy machinery. C) Avoid Swimming, Climbing A Ladder Unattended. D) Avoid Driving If You Have A Seizure. E) Must Be Seizure Free For At Least 6 months, Before You Can drive. F) Some times Your May Feel Seizure coming Before It Begins. You May feel  Strange smell or funny Feeling In Your Stomach, Which is Called Aura. TIPS TO REDUCE/ PREVENT SEIZURES   1. Take Your Anti seizure Medications As Recommended. 2. Get Enough Sleep. Sleep Deprivation Can Trigger A Seizure. 3. If You Have A fever, Treat It At Once, And Contact Your Primary Care Providers. 4. Avoid Alcohol. 5. Avoid Flashing Lights, Loud Noises and TV And Video Games,   As These May Trigger Your Seizures  6. Control Your Stress And Have Adequate Rest.  7. If You Feel A Seizure Coming On :  A) warn people Who Are With You  B) Make Sure There Are No Sharp or Hard Objects Around you. C) Lay down On Your Side And Relax. Advance Care Planning  People with COVID-19 may have no symptoms, mild symptoms, such as fever, cough, and shortness of breath or they may have more severe illness, developing severe and fatal pneumonia.   As a result, Advance Care Planning with attention to naming a health care decision maker (someone you trust to make healthcare decisions for you if you could not speak for virus. When possible, have another member of your household care for your animals while you are sick. If you are sick with COVID-19, avoid contact with your pet, including petting, snuggling, being kissed or licked, and sharing food. If you must care for your pet or be around animals while you are sick, wash your hands before and after you interact with pets and wear a facemask. Call ahead before visiting your doctor  If you have a medical appointment, call the healthcare provider and tell them that you have or may have COVID-19. This will help the healthcare providers office take steps to keep other people from getting infected or exposed. Wear a facemask  You should wear a facemask when you are around other people (e.g., sharing a room or vehicle) or pets and before you enter a healthcare providers office. If you are not able to wear a facemask (for example, because it causes trouble breathing), then people who live with you should not stay in the same room with you, or they should wear a facemask if they enter your room. Cover your coughs and sneezes  Cover your mouth and nose with a tissue when you cough or sneeze. Throw used tissues in a lined trash can. Immediately wash your hands with soap and water for at least 20 seconds or, if soap and water are not available, clean your hands with an alcohol-based hand  that contains at least 60% alcohol. Clean your hands often  Wash your hands often with soap and water for at least 20 seconds, especially after blowing your nose, coughing, or sneezing; going to the bathroom; and before eating or preparing food. If soap and water are not readily available, use an alcohol-based hand  with at least 60% alcohol, covering all surfaces of your hands and rubbing them together until they feel dry. Soap and water are the best option if hands are visibly dirty. Avoid touching your eyes, nose, and mouth with unwashed hands.   Avoid sharing personal household items  You should not share dishes, drinking glasses, cups, eating utensils, towels, or bedding with other people or pets in your home. After using these items, they should be washed thoroughly with soap and water. Clean all high-touch surfaces everyday  High touch surfaces include counters, tabletops, doorknobs, bathroom fixtures, toilets, phones, keyboards, tablets, and bedside tables. Also, clean any surfaces that may have blood, stool, or body fluids on them. Use a household cleaning spray or wipe, according to the label instructions. Labels contain instructions for safe and effective use of the cleaning product including precautions you should take when applying the product, such as wearing gloves and making sure you have good ventilation during use of the product. Monitor your symptoms  Seek prompt medical attention if your illness is worsening (e.g., difficulty breathing). Before seeking care, call your healthcare provider and tell them that you have, or are being evaluated for, COVID-19. Put on a facemask before you enter the facility. These steps will help the healthcare providers office to keep other people in the office or waiting room from getting infected or exposed. Ask your healthcare provider to call the local or state health department. Persons who are placed under active monitoring or facilitated self-monitoring should follow instructions provided by their local health department or occupational health professionals, as appropriate. When working with your local health department check their available hours. If you have a medical emergency and need to call 911, notify the dispatch personnel that you have, or are being evaluated for COVID-19. If possible, put on a facemask before emergency medical services arrive. Discontinuing home isolation  Patients with confirmed COVID-19 should remain under home isolation precautions until the risk of secondary transmission to others is thought to be low. The decision to discontinue home isolation precautions should be made on a case-by-case basis, in consultation with healthcare providers and state and local health departments. *   SEIZURE  PRECAUTIONS. A)  Avoid  Working  At   [employer] B)  Avoid  Working  With  Heavy machinery. C)  Avoid   Swimming,  Climbing  A  Ladder   Unattended. D)  Avoid   Driving   If  You   Have  A  Seizure. E)  Must   Be  Seizure  Free   For  At   Least   6 months,  Before   You  Can drive. F) Some times  Your  May  Feel  Seizure coming  Before  It  Begins. You  May feel             Strange smell or funny  Feeling  In  Your  Stomach,  Which is  Called   Aura. TIPS  TO  REDUCE/ PREVENT  SEIZURES         1. Take  Your  Anti seizure  Medications   As   Recommended. 2. Get   Enough   Sleep. Sleep  Deprivation   Can  Trigger  A  Seizure. 3. If   You   Have  A fever,  Treat  It  At  Once,  And  Contact   Your  Primary  Care Providers. 4. Avoid   Alcohol. 5. Avoid  Flashing  Lights,  Loud  Noises and  TV  And  Video  Games,           As   These  May  Trigger   Your  Seizures       6. Control  Your  Stress  And   Have  Adequate  Rest.       7.   If  You  Feel  A  Seizure  Coming   On :           A) warn people  Who  Are  With  You           B)  Make  Sure  There  Are  No  Sharp or  Hard  Objects  Around you. C)  Lay down  On  Your  Side  And  Relax. * FALL   PRECAUTIONS. *   ADEQUATE   FLUID  INTAKE   AND  ELECTROLYTE  BALANCE           * KEEP  DAIRY  OF   THE  NEUROLOGICAL  SYMPTOMS          *  TO  MAINTAIN  REGULAR  SLEEP  WAKE  CYCLES.      *   TO  HAVE

## 2020-05-26 RX ORDER — LEVOTHYROXINE SODIUM 0.12 MG/1
TABLET ORAL
Qty: 90 TABLET | Refills: 1 | Status: SHIPPED | OUTPATIENT
Start: 2020-05-26 | End: 2020-11-09

## 2020-05-26 RX ORDER — ASPIRIN 81 MG/1
81 TABLET ORAL DAILY
Qty: 90 TABLET | Refills: 1 | Status: SHIPPED | OUTPATIENT
Start: 2020-05-26 | End: 2020-11-28

## 2020-05-26 RX ORDER — NICOTINE POLACRILEX 2 MG/1
LOZENGE ORAL
Qty: 90 TABLET | Refills: 0 | Status: SHIPPED | OUTPATIENT
Start: 2020-05-26 | End: 2020-09-01 | Stop reason: SDUPTHER

## 2020-05-26 RX ORDER — POTASSIUM CHLORIDE 600 MG/1
TABLET, FILM COATED, EXTENDED RELEASE ORAL
Qty: 360 TABLET | Refills: 1 | Status: SHIPPED | OUTPATIENT
Start: 2020-05-26 | End: 2020-11-09

## 2020-05-26 RX ORDER — SPIRONOLACTONE 100 MG/1
TABLET, FILM COATED ORAL
Qty: 90 TABLET | Refills: 1 | Status: SHIPPED | OUTPATIENT
Start: 2020-05-26 | End: 2020-11-28

## 2020-05-26 RX ORDER — METOPROLOL TARTRATE 50 MG/1
50 TABLET, FILM COATED ORAL 2 TIMES DAILY
Qty: 180 TABLET | Refills: 1 | Status: SHIPPED | OUTPATIENT
Start: 2020-05-26 | End: 2020-12-29

## 2020-05-26 RX ORDER — HYDROCHLOROTHIAZIDE 25 MG/1
TABLET ORAL
Qty: 90 TABLET | Refills: 1 | Status: SHIPPED | OUTPATIENT
Start: 2020-05-26 | End: 2020-11-09

## 2020-05-26 RX ORDER — ESOMEPRAZOLE MAGNESIUM 40 MG/1
40 CAPSULE, DELAYED RELEASE ORAL 2 TIMES DAILY
Qty: 180 CAPSULE | Refills: 1 | Status: SHIPPED | OUTPATIENT
Start: 2020-05-26 | End: 2020-11-28

## 2020-06-04 RX ORDER — ALBUTEROL SULFATE 90 UG/1
AEROSOL, METERED RESPIRATORY (INHALATION)
Qty: 18 G | Refills: 5 | Status: SHIPPED | OUTPATIENT
Start: 2020-06-04 | End: 2021-04-13 | Stop reason: SDUPTHER

## 2020-06-23 ENCOUNTER — HOSPITAL ENCOUNTER (OUTPATIENT)
Dept: LAB | Age: 50
Discharge: HOME | End: 2020-06-23
Payer: COMMERCIAL

## 2020-06-23 ENCOUNTER — VIRTUAL VISIT (OUTPATIENT)
Dept: FAMILY MEDICINE CLINIC | Age: 50
End: 2020-06-23
Payer: COMMERCIAL

## 2020-06-23 ENCOUNTER — TELEPHONE (OUTPATIENT)
Dept: FAMILY MEDICINE CLINIC | Age: 50
End: 2020-06-23

## 2020-06-23 LAB
ANION GAP SERPL CALCULATED.3IONS-SCNC: 12 MMOL/L (ref 9–17)
BUN BLDV-MCNC: 11 MG/DL (ref 6–20)
BUN/CREAT BLD: 13 (ref 9–20)
CALCIUM SERPL-MCNC: 8.9 MG/DL (ref 8.6–10.4)
CHLORIDE BLD-SCNC: 98 MMOL/L (ref 98–107)
CO2: 28 MMOL/L (ref 20–31)
CREAT SERPL-MCNC: 0.87 MG/DL (ref 0.5–0.9)
GFR AFRICAN AMERICAN: >60 ML/MIN
GFR NON-AFRICAN AMERICAN: >60 ML/MIN
GFR SERPL CREATININE-BSD FRML MDRD: ABNORMAL ML/MIN/{1.73_M2}
GFR SERPL CREATININE-BSD FRML MDRD: ABNORMAL ML/MIN/{1.73_M2}
GLUCOSE BLD-MCNC: 112 MG/DL (ref 70–99)
POTASSIUM SERPL-SCNC: 4.2 MMOL/L (ref 3.7–5.3)
SODIUM BLD-SCNC: 138 MMOL/L (ref 135–144)
TSH SERPL DL<=0.05 MIU/L-ACNC: 1.16 MIU/L (ref 0.3–5)

## 2020-06-23 PROCEDURE — 36415 COLL VENOUS BLD VENIPUNCTURE: CPT

## 2020-06-23 PROCEDURE — 84443 ASSAY THYROID STIM HORMONE: CPT

## 2020-06-23 PROCEDURE — 80048 BASIC METABOLIC PNL TOTAL CA: CPT

## 2020-06-23 PROCEDURE — 99213 OFFICE O/P EST LOW 20 MIN: CPT | Performed by: FAMILY MEDICINE

## 2020-06-23 ASSESSMENT — ENCOUNTER SYMPTOMS
SHORTNESS OF BREATH: 0
COUGH: 0
ABDOMINAL PAIN: 0
EYE DISCHARGE: 0
EYE REDNESS: 0
RHINORRHEA: 0
CONSTIPATION: 0
SORE THROAT: 0
SINUS PRESSURE: 0
WHEEZING: 0
VOMITING: 0
NAUSEA: 0
TROUBLE SWALLOWING: 0
DIARRHEA: 0

## 2020-06-23 NOTE — PROGRESS NOTES
Negative for discharge and redness. Respiratory: Negative for cough, shortness of breath and wheezing. Cardiovascular: Negative for chest pain. Gastrointestinal: Negative for abdominal pain, constipation, diarrhea, nausea and vomiting. Genitourinary: Negative for dysuria, flank pain, frequency and urgency. Musculoskeletal: Positive for myalgias. Negative for arthralgias and neck pain. Skin: Negative for rash and wound. Allergic/Immunologic: Negative for environmental allergies. Neurological: Negative for dizziness, weakness, light-headedness and headaches. Feels foggy in head and tired. Also heaviness in upper arms. Hematological: Negative for adenopathy. Psychiatric/Behavioral: Negative. Prior to Visit Medications    Medication Sig Taking?  Authorizing Provider   albuterol sulfate HFA (VENTOLIN HFA) 108 (90 Base) MCG/ACT inhaler INHALE 2 PUFFS EVERY 6 HOURS IF NEEDED FOR WHEEZING Yes Slick Guzmán MD   mirabegron (MYRBETRIQ) 50 MG TB24 Take 50 mg by mouth daily Yes Theo Crisostomo DO   hydroCHLOROthiazide (HYDRODIURIL) 25 MG tablet take 1 tablet by mouth daily Yes Theo Crisostomo DO   levothyroxine (SYNTHROID) 125 MCG tablet take 1 tablet by mouth once daily Yes Theo Crisostomo DO   spironolactone (ALDACTONE) 100 MG tablet take 1 tablet by mouth once daily Yes Theo Crisostomo DO   esomeprazole (NEXIUM) 40 MG delayed release capsule Take 1 capsule by mouth 2 times daily Yes Theo Crisostomo DO   metoprolol tartrate (LOPRESSOR) 50 MG tablet Take 1 tablet by mouth 2 times daily Yes Yanet Gray DO   potassium chloride (KLOR-CON) 8 MEQ extended release tablet take 4 tablets by mouth once daily Yes Yanet Gray DO   loratadine-pseudoephedrine (RA LORATA-D)  MG per extended release tablet take 1 tablet by mouth once daily Yes Theo Crisostomo DO   aspirin EC 81 MG EC tablet Take 1 tablet by mouth daily Yes Theo Crisostomo DO   Dulaglutide Indocin [Indomethacin]     Lasix [Furosemide] Other (See Comments)     Dizziness, extreme fatigue    Lisinopril     Morphine     Norvasc [Amlodipine]     Tenormin [Atenolol]     Uloric [Febuxostat]     Hydrocodone-Acetaminophen Nausea Only    Topamax [Topiramate] Swelling and Rash   ,   Past Medical History:   Diagnosis Date    Abnormal CT of the abdomen     revealed 2 very small noncalcified pulmonary nodules in the right lung base. Suspect benign progress but repeat CT scan in June 2013 advised.  Allergic rhinitis     Anemia     Anxiety     Asthma     Chronic low back pain     Chronic neck pain     Degenerative joint disease of knee     Bilateral.    Depression     Endometriosis     history of     GERD (gastroesophageal reflux disease)     Gout     Hyperlipidemia     Hypertension     Hypothyroid     Kidney stone     history of     Left shoulder pain     Status post injections.  Leukocytosis     Palpitation     history of     Paresthesias     Generalized    Right knee pain     Status post injections.  Seizure ([address]     \"nocturnal seizures\"   ,   Past Surgical History:   Procedure Laterality Date    ARTHROPLASTY  03/26/1986    5th metatarsals, MTP joints, right and left feet.  CERVICAL FUSION  6/2014    Dr David Oleary, LAPAROSCOPIC  11/14/2011    CYSTOSCOPY  2/8/13    no acute findings    HYSTERECTOMY  05/2005    JOINT REPLACEMENT      KNEE ARTHROSCOPY Left     KNEE ARTHROSCOPY Left 10/16/13    KNEE ARTHROSCOPY Right 02/08/2012    With partial medial and lateral synovectomies and abrasion chondroplasty.  LAPAROSCOPY  03/08/2002    diagnostic with D&C and hysterscopy. [facility] SURGERY  10/6/10    Anterior C5-C6, fusion with C-trap allograft and [place] plates, performed by Dr Tomas Young.  LUMBAR SPINE SURGERY Left 2/13/08    L4-L5 and L5-S1 foraminotomies and L4 through S1 posterolateral fusion with pedicle screw instrumentation.     NASAL SEPTUM SURGERY      OTHER SURGICAL HISTORY  12/30/2014    spinal cord stimulator paddle electrode, spinal cord stimulator generator. both [employer] specter generator and 32 lead paddle electrode by Dr Claudia Granda at [address] [place] [address]  04/16/2015    spinal cord stimulator removed    SPINAL CORD DECOMPRESSION  4/23/12    Lumbar.  TOTAL KNEE ARTHROPLASTY Left 04/2015    [facility] orth[facility]    TUBAL LIGATION  04/02/1990    UPPER GASTROINTESTINAL ENDOSCOPY  10/14/2011    Hiatal hernia.  UPPER GASTROINTESTINAL ENDOSCOPY N/A 9/10/2018    gastritis, esophagitis-BRAVO=reflux       Physical Exam  Vitals signs and nursing note reviewed. Constitutional:       General: She is not in acute distress. Appearance: Normal appearance. HENT:      Head: Normocephalic and atraumatic. Right Ear: External ear normal.      Left Ear: External ear normal.      Nose: Nose normal. No congestion or rhinorrhea. Mouth/Throat:      Mouth: Mucous membranes are moist.   Eyes:      General:         Right eye: No discharge. Left eye: No discharge. Extraocular Movements: Extraocular movements intact. Conjunctiva/sclera: Conjunctivae normal.   Neck:      Musculoskeletal: Normal range of motion. Pulmonary:      Effort: Pulmonary effort is normal.   Skin:     Findings: No erythema or rash. Neurological:      Mental Status: She is alert and oriented to person, place, and time. Mental status is at baseline. ASSESSMENT/PLAN:  Encounter Diagnoses   Name Primary?  Fatigue, unspecified type Yes    Hypothyroidism, unspecified type     Hypokalemia      No orders of the defined types were placed in this encounter.     Orders Placed This Encounter   Procedures    Basic Metabolic Panel     Standing Status:   Future     Standing Expiration Date:   6/23/2021    TSH without Reflex     Standing Status:   Future     Standing Expiration Date:   6/23/2021     Will check lab testing as noted and make further recommendations based on test reports. If potassium level is normal then we will need to consider other etiologies contributing to her symptoms. Patient is to increase the potassium rich foods in her diet to see if this helps with her symptoms. Patient is to continue to follow a low-carb/low sugar diet and increase exercise to keep her blood sugars optimally controlled. We will take Trulicity off of her med list as she does not seem to be able to tolerate this medical therapy. Patient is return to my office as scheduled for her routine medical follow-up visit or sooner if any further problems or symptoms arise. (Please note that portions of this note were completed with a voice recognition program. Efforts were made to edit the dictations but occasionally words are mis-transcribed.)      Searcy Pallas is a 45 y.o. female being evaluated by a Virtual Visit (video visit) encounter to address concerns as mentioned above. A caregiver was present when appropriate. Due to this being a TeleHealth encounter (During JOOFB-34 public health emergency), evaluation of the following organ systems was limited: Vitals/Constitutional/EENT/Resp/CV/GI//MS/Neuro/Skin/Heme-Lymph-Imm. Pursuant to the emergency declaration under the [facility] [address] authority and the [employer] Act, this Virtual Visit was conducted with patient's (and/or legal guardian's) consent, to reduce the patient's risk of exposure to COVID-19 and provide necessary medical care. The patient (and/or legal guardian) has also been advised to contact this office for worsening conditions or problems, and seek emergency medical treatment and/or call 911 if deemed necessary.      Patient identification was verified at the start of the visit: Yes    Total time spent on this encounter: Not billed by time    Services were provided through

## 2020-07-06 ENCOUNTER — HOSPITAL ENCOUNTER (EMERGENCY)
Age: 50
Discharge: HOME | End: 2020-07-06
Attending: EMERGENCY MEDICINE
Payer: COMMERCIAL

## 2020-07-06 ENCOUNTER — APPOINTMENT (OUTPATIENT)
Dept: GENERAL RADIOLOGY | Age: 50
End: 2020-07-06
Payer: COMMERCIAL

## 2020-07-06 ENCOUNTER — TELEPHONE (OUTPATIENT)
Dept: FAMILY MEDICINE CLINIC | Age: 50
End: 2020-07-06

## 2020-07-06 ENCOUNTER — APPOINTMENT (OUTPATIENT)
Dept: MRI IMAGING | Age: 50
End: 2020-07-06
Payer: COMMERCIAL

## 2020-07-06 VITALS
OXYGEN SATURATION: 96 % | TEMPERATURE: 98.1 F | HEIGHT: 66 IN | WEIGHT: 280 LBS | BODY MASS INDEX: 45 KG/M2 | HEART RATE: 67 BPM | SYSTOLIC BLOOD PRESSURE: 139 MMHG | DIASTOLIC BLOOD PRESSURE: 65 MMHG | RESPIRATION RATE: 16 BRPM

## 2020-07-06 PROCEDURE — 6360000002 HC RX W HCPCS: Performed by: EMERGENCY MEDICINE

## 2020-07-06 PROCEDURE — 96375 TX/PRO/DX INJ NEW DRUG ADDON: CPT

## 2020-07-06 PROCEDURE — A9579 GAD-BASE MR CONTRAST NOS,1ML: HCPCS | Performed by: EMERGENCY MEDICINE

## 2020-07-06 PROCEDURE — 96374 THER/PROPH/DIAG INJ IV PUSH: CPT

## 2020-07-06 PROCEDURE — 72100 X-RAY EXAM L-S SPINE 2/3 VWS: CPT

## 2020-07-06 PROCEDURE — 96372 THER/PROPH/DIAG INJ SC/IM: CPT

## 2020-07-06 PROCEDURE — 99284 EMERGENCY DEPT VISIT MOD MDM: CPT

## 2020-07-06 PROCEDURE — 6360000004 HC RX CONTRAST MEDICATION: Performed by: EMERGENCY MEDICINE

## 2020-07-06 PROCEDURE — 72158 MRI LUMBAR SPINE W/O & W/DYE: CPT

## 2020-07-06 RX ORDER — KETOROLAC TROMETHAMINE 30 MG/ML
30 INJECTION, SOLUTION INTRAMUSCULAR; INTRAVENOUS ONCE
Status: COMPLETED | OUTPATIENT
Start: 2020-07-06 | End: 2020-07-06

## 2020-07-06 RX ORDER — FENTANYL CITRATE 50 UG/ML
50 INJECTION, SOLUTION INTRAMUSCULAR; INTRAVENOUS ONCE
Status: COMPLETED | OUTPATIENT
Start: 2020-07-06 | End: 2020-07-06

## 2020-07-06 RX ORDER — ORPHENADRINE CITRATE 30 MG/ML
60 INJECTION INTRAMUSCULAR; INTRAVENOUS ONCE
Status: COMPLETED | OUTPATIENT
Start: 2020-07-06 | End: 2020-07-06

## 2020-07-06 RX ORDER — ONDANSETRON 2 MG/ML
4 INJECTION INTRAMUSCULAR; INTRAVENOUS ONCE
Status: COMPLETED | OUTPATIENT
Start: 2020-07-06 | End: 2020-07-06

## 2020-07-06 RX ORDER — OXYCODONE HYDROCHLORIDE AND ACETAMINOPHEN 5; 325 MG/1; MG/1
1 TABLET ORAL EVERY 6 HOURS PRN
Qty: 12 TABLET | Refills: 0 | Status: SHIPPED | OUTPATIENT
Start: 2020-07-06 | End: 2020-07-09

## 2020-07-06 RX ORDER — FENTANYL CITRATE 50 UG/ML
50 INJECTION, SOLUTION INTRAMUSCULAR; INTRAVENOUS ONCE
Status: DISCONTINUED | OUTPATIENT
Start: 2020-07-06 | End: 2020-07-06

## 2020-07-06 RX ADMIN — KETOROLAC TROMETHAMINE 30 MG: 30 INJECTION, SOLUTION INTRAMUSCULAR at 07:25

## 2020-07-06 RX ADMIN — FENTANYL CITRATE 50 MCG: 50 INJECTION, SOLUTION INTRAMUSCULAR; INTRAVENOUS at 08:06

## 2020-07-06 RX ADMIN — GADOTERIDOL 20 ML: 279.3 INJECTION, SOLUTION INTRAVENOUS at 13:28

## 2020-07-06 RX ADMIN — ORPHENADRINE CITRATE 60 MG: 30 INJECTION INTRAMUSCULAR; INTRAVENOUS at 07:25

## 2020-07-06 RX ADMIN — ONDANSETRON 4 MG: 2 INJECTION INTRAMUSCULAR; INTRAVENOUS at 09:04

## 2020-07-06 RX ADMIN — HYDROMORPHONE HYDROCHLORIDE 0.5 MG: 1 INJECTION, SOLUTION INTRAMUSCULAR; INTRAVENOUS; SUBCUTANEOUS at 09:00

## 2020-07-06 ASSESSMENT — PAIN DESCRIPTION - DESCRIPTORS: DESCRIPTORS: SHOOTING;STABBING

## 2020-07-06 ASSESSMENT — PAIN SCALES - GENERAL
PAINLEVEL_OUTOF10: 8
PAINLEVEL_OUTOF10: 8
PAINLEVEL_OUTOF10: 9

## 2020-07-06 ASSESSMENT — PAIN DESCRIPTION - PAIN TYPE: TYPE: ACUTE PAIN;CHRONIC PAIN

## 2020-07-06 ASSESSMENT — PAIN - FUNCTIONAL ASSESSMENT: PAIN_FUNCTIONAL_ASSESSMENT: 0-10

## 2020-07-06 ASSESSMENT — PAIN DESCRIPTION - ORIENTATION: ORIENTATION: LOWER

## 2020-07-06 ASSESSMENT — PAIN DESCRIPTION - LOCATION: LOCATION: BACK

## 2020-07-06 NOTE — ED PROVIDER NOTES
[facility]  [address]  [place] [address]  Phone: [phone]    Flip          Pt Name: Marcus Dc  MRN: [id number]  [place] 1970  Date of evaluation: 7/6/2020      CHIEF COMPLAINT       Chief Complaint   Patient presents with    Back Pain     acute on chronic       HISTORY OF PRESENT ILLNESS       Marcus Dc is a 45 y.o. female who presents with lumbar back pain that began last night. Took ibuprofen last dose last night as well. Patient states she has had 4 lumbar surgeries. Most recent was 2012. They were fusion surgeries. Denies any recent trauma. States it began after they came home from vacation while driving in the car for about 4-1/2 hours. Patient is unsure what caused the exacerbation but does have a history of chronic back pain. Denies any bowel/bladder retention/incontinence. Pain does not radiate down either leg but stays right in the middle of the lumbar spine. Movement makes it worse. Rest makes it better. No recent surgeries or procedures. Denies fevers. Denies other symptoms. REVIEW OF SYSTEMS       Review of Systems       PAST MEDICAL HISTORY    has a past medical history of Abnormal CT of the abdomen, Allergic rhinitis, Anemia, Anxiety, Asthma, Chronic low back pain, Chronic neck pain, Degenerative joint disease of knee, Depression, Endometriosis, GERD (gastroesophageal reflux disease), Gout, Hyperlipidemia, Hypertension, Hypothyroid, Kidney stone, Left shoulder pain, Leukocytosis, Palpitation, Paresthesias, Right knee pain, and Seizure ([address] SURGICAL HISTORY      has a past surgical history that includes Lumbar disc surgery (10/6/10); Lumbar spine surgery (Left, 2/13/08); laparoscopy (03/08/2002); Tubal ligation (04/02/1990); arthroplasty (03/26/1986); Knee arthroscopy (Left); Nasal septum surgery; spinal cord decompression (4/23/12); Cystoscopy (2/8/13);  Knee arthroscopy (Left, 10/16/13); Hysterectomy (05/2005); Knee arthroscopy (Right, 02/08/2012); Cholecystectomy, laparoscopic (11/14/2011); cervical fusion (6/2014); other surgical history (12/30/2014); other surgical history (04/16/2015); Total knee arthroplasty (Left, 04/2015); joint replacement; Upper gastrointestinal endoscopy (10/14/2011); and Upper gastrointestinal endoscopy (N/A, 9/10/2018). CURRENT MEDICATIONS       Previous Medications    ALBUTEROL SULFATE HFA (VENTOLIN HFA) 108 (90 BASE) MCG/ACT INHALER    INHALE 2 PUFFS EVERY 6 HOURS IF NEEDED FOR WHEEZING    ASPIRIN EC 81 MG EC TABLET    Take 1 tablet by mouth daily    ATORVASTATIN (LIPITOR) 20 MG TABLET    take 1 tablet by mouth once daily    B COMPLEX VITAMINS CAPSULE    Take 1 capsule by mouth daily    BUSPIRONE (BUSPAR) 30 MG TABLET    Take 30 mg by mouth 3 times daily     CHOLECALCIFEROL (VITAMIN D3) 5000 UNITS TABS    Take 1 tablet by mouth daily    DESVENLAFAXINE SUCCINATE (PRISTIQ) 100 MG TB24 EXTENDED RELEASE TABLET    take 1 tablet by mouth once daily    ESOMEPRAZOLE (NEXIUM) 40 MG DELAYED RELEASE CAPSULE    Take 1 capsule by mouth 2 times daily    FLUTICASONE-SALMETEROL (ADVAIR DISKUS) 250-50 MCG/DOSE AEPB    Inhale 1 puff into the lungs every 12 hours RINSE MOUTH AFTER USE    GABAPENTIN (NEURONTIN) 600 MG TABLET    Take 1 tablet by mouth 3 times daily for 30 days.     HYDROCHLOROTHIAZIDE (HYDRODIURIL) 25 MG TABLET    take 1 tablet by mouth daily    IBUPROFEN (ADVIL;MOTRIN) 800 MG TABLET    Take 1 tablet by mouth every 8 hours as needed for Pain Would use sparingly    LAMOTRIGINE (LAMICTAL) 200 MG TABLET    Take 200 mg by mouth daily    LEVOTHYROXINE (SYNTHROID) 125 MCG TABLET    take 1 tablet by mouth once daily    LIDOCAINE (XYLOCAINE) 5 % OINTMENT    Apply topically to affected area bid as needed    LORATADINE-PSEUDOEPHEDRINE (RA LORATA-D)  MG PER EXTENDED RELEASE TABLET    take 1 tablet by mouth once daily    METOPROLOL TARTRATE (LOPRESSOR) 50 MG TABLET    Take 1 tablet by mouth 2 times daily    MIRABEGRON (MYRBETRIQ) 50 MG TB24    Take 50 mg by mouth daily    OXCARBAZEPINE (TRILEPTAL) 300 MG TABLET    ONE  TABLET  TWICE  DAILY    POTASSIUM CHLORIDE (KLOR-CON) 8 MEQ EXTENDED RELEASE TABLET    take 4 tablets by mouth once daily    PRAZOSIN (MINIPRESS) 2 MG CAPSULE    Take 2 mg by mouth nightly     PROMETHAZINE (PHENERGAN) 25 MG TABLET    Take 25 mg by mouth    SPIRONOLACTONE (ALDACTONE) 100 MG TABLET    take 1 tablet by mouth once daily    TRAZODONE (DESYREL) 300 MG TABLET    take 1 tablet by mouth at bedtime       ALLERGIES     is allergic to allopurinol; bumex [bumetanide]; colchicine; erythromycin; indocin [indomethacin]; lasix [furosemide]; lisinopril; morphine; norvasc [amlodipine]; tenormin [atenolol]; uloric [febuxostat]; hydrocodone-acetaminophen; and topamax [topiramate]. FAMILY HISTORY     She indicated that her mother is alive. She indicated that her father is alive. She indicated that both of her sisters are alive. She indicated that both of her brothers are alive. She indicated that her maternal grandmother is . She indicated that her maternal grandfather is . She indicated that her paternal grandmother is . She indicated that her paternal grandfather is . She indicated that both of her daughters are alive. She indicated that the status of her other is unknown.     family history includes Anxiety Disorder in her daughter and daughter; Asthma in her daughter and another family member; Cancer in her mother; Coronary Art Dis in her father; Depression in her daughter and daughter; Diabetes in her paternal grandfather and paternal grandmother; Glaucoma in her father; Heart Disease in her father, paternal grandfather, and paternal grandmother; High Blood Pressure in her paternal grandfather and paternal grandmother; Hypertension in her father; Prostate Cancer in her father;  Thyroid Disease in her mother and paternal

## 2020-07-06 NOTE — ED PROVIDER NOTES
ATTENDING  ADDENDUM     Care of this patient was assumed from Dr. Roslyn Francis. The patient was seen for Back Pain (acute on chronic)  . The patient's initial evaluation and plan have been discussed with the prior provider who initially evaluated the patient. Nursing Notes, Past Medical Hx, Past Surgical Hx, Social Hx, Allergies, and Family Hx were all reviewed. ED COURSE      The patient was given the following medications:  Orders Placed This Encounter   Medications    orphenadrine (NORFLEX) injection 60 mg    ketorolac (TORADOL) injection 30 mg       RECENT VITALS:   Temp: 98.1 °F (36.7 °C), Pulse: 82, Resp: 16, BP: (!) 143/67    MEDICAL DECISION MAKING       67-year-old female here with low back pain. Prior history of back surgery. No new neurologic dysfunction. X-rays pending. Pain control ordered. I reassessed the patient. She states her left leg is numb and \"feels like rubber\". Unable to participate in exam due to discomfort. States that she is unable to walk. Sensation seems grossly intact. Does not give a true history of weakness, but I cannot assess her in this manner. No incontinence or urinary retention. I will order more pain control and attempt a physical exam again. She has had a prior back surgery in the remote past and is currently not following with a surgeon. Her left leg symptoms started acutely last night. Reassessed patient. She is very tender in the lower lumbar spine. Has difficulty flexing the left hip. Weakness of left knee flexion and extension. Able to stand but drags her left leg slightly. Normal dorsiflexion and plantarflexion. Sensation remains grossly intact. Patient persistently saying she has not had pain like this since her back surgery. Partner at bedside says he has never seen her like this before. Keeps repeating \"there's something wrong\". Prior history of opiate addiction. Fentanyl not controlling her pain.  Gets epidural injections but has not had one in months. Plan for MRI lumbar. Soonest MRI I can get is at 12:30 PM.     MRI with no new findings. Continued DJD. No acute canal stenosis. Plan to discharge home.  Follow up with PCP    Diagnosis: acute exacerbation of chronic low back pain    Michael Thurman MD  Emergency Medicine Attending        Michael Thurman MD  07/06/20 [address] [facility] [address], [place]  07/06/20 1157

## 2020-07-06 NOTE — TELEPHONE ENCOUNTER
Spoke with patient and reviewed her results. She is going to come in and see Dr Ubaldo Habermann in [facility] for eval tomorrow.

## 2020-07-06 NOTE — TELEPHONE ENCOUNTER
Patient called and stated she spent the day in ER and she is having issues with her left leg shooting pain to her spine. She stated that while in the ER she had MRI and XR, but they didn't give her much information and she's curious She is in a lot of pain and stated that she don't know what she is going to do.

## 2020-07-07 ENCOUNTER — OFFICE VISIT (OUTPATIENT)
Dept: PRIMARY CARE CLINIC | Age: 50
End: 2020-07-07
Payer: COMMERCIAL

## 2020-07-07 VITALS
HEART RATE: 76 BPM | OXYGEN SATURATION: 97 % | RESPIRATION RATE: 16 BRPM | TEMPERATURE: 98.9 F | DIASTOLIC BLOOD PRESSURE: 68 MMHG | HEIGHT: 66 IN | SYSTOLIC BLOOD PRESSURE: 110 MMHG | BODY MASS INDEX: 45 KG/M2 | WEIGHT: 280 LBS

## 2020-07-07 PROCEDURE — 20552 NJX 1/MLT TRIGGER POINT 1/2: CPT | Performed by: FAMILY MEDICINE

## 2020-07-07 PROCEDURE — 99214 OFFICE O/P EST MOD 30 MIN: CPT | Performed by: FAMILY MEDICINE

## 2020-07-07 RX ORDER — TRIAMCINOLONE ACETONIDE 40 MG/ML
40 INJECTION, SUSPENSION INTRA-ARTICULAR; INTRAMUSCULAR ONCE
Status: COMPLETED | OUTPATIENT
Start: 2020-07-07 | End: 2020-07-07

## 2020-07-07 RX ADMIN — TRIAMCINOLONE ACETONIDE 40 MG: 40 INJECTION, SUSPENSION INTRA-ARTICULAR; INTRAMUSCULAR at 10:51

## 2020-07-07 ASSESSMENT — PATIENT HEALTH QUESTIONNAIRE - PHQ9
SUM OF ALL RESPONSES TO PHQ QUESTIONS 1-9: 0
SUM OF ALL RESPONSES TO PHQ QUESTIONS 1-9: 0
1. LITTLE INTEREST OR PLEASURE IN DOING THINGS: 0
SUM OF ALL RESPONSES TO PHQ9 QUESTIONS 1 & 2: 0
2. FEELING DOWN, DEPRESSED OR HOPELESS: 0

## 2020-07-15 ENCOUNTER — HOSPITAL ENCOUNTER (OUTPATIENT)
Age: 50
Setting detail: SPECIMEN
Discharge: HOME | End: 2020-07-15
Payer: COMMERCIAL

## 2020-07-15 ENCOUNTER — OFFICE VISIT (OUTPATIENT)
Dept: PRIMARY CARE CLINIC | Age: 50
End: 2020-07-15
Payer: COMMERCIAL

## 2020-07-15 VITALS
DIASTOLIC BLOOD PRESSURE: 80 MMHG | SYSTOLIC BLOOD PRESSURE: 130 MMHG | OXYGEN SATURATION: 95 % | HEART RATE: 72 BPM | BODY MASS INDEX: 44.22 KG/M2 | WEIGHT: 274 LBS | TEMPERATURE: 97.7 F

## 2020-07-15 PROCEDURE — 99213 OFFICE O/P EST LOW 20 MIN: CPT | Performed by: NURSE PRACTITIONER

## 2020-07-15 PROCEDURE — U0003 INFECTIOUS AGENT DETECTION BY NUCLEIC ACID (DNA OR RNA); SEVERE ACUTE RESPIRATORY SYNDROME CORONAVIRUS 2 (SARS-COV-2) (CORONAVIRUS DISEASE [COVID-19]), AMPLIFIED PROBE TECHNIQUE, MAKING USE OF HIGH THROUGHPUT TECHNOLOGIES AS DESCRIBED BY CMS-2020-01-R: HCPCS

## 2020-07-15 RX ORDER — FEXOFENADINE HCL AND PSEUDOEPHEDRINE HCI 180; 240 MG/1; MG/1
1 TABLET, EXTENDED RELEASE ORAL DAILY
Qty: 30 TABLET | Refills: 0 | Status: SHIPPED | OUTPATIENT
Start: 2020-07-15 | End: 2020-09-01

## 2020-07-15 ASSESSMENT — ENCOUNTER SYMPTOMS
SINUS COMPLAINT: 1
SINUS PRESSURE: 0
RHINORRHEA: 0
WHEEZING: 0
SHORTNESS OF BREATH: 0
EYES NEGATIVE: 1
RESPIRATORY NEGATIVE: 1
GASTROINTESTINAL NEGATIVE: 1
BACK PAIN: 1
COUGH: 1
GASTROINTESTINAL NEGATIVE: 1
SORE THROAT: 1

## 2020-07-15 NOTE — PROGRESS NOTES
[facility]             [address], [address]                        Telephone [phone]             Fax [phone]     Erwin Ramsey  1970  [id number]   Date of visit:  7/15/2020    Subjective:    Erwin Ramsey is a 45 y.o.  female who presents to [facility] today (7/15/2020) for evaluation of:    Chief Complaint   Patient presents with    Concern For COVID-19     cough x2 days. ST. congestion. Sinus Problem   This is a new problem. The current episode started in the past 7 days (X 2 days). The problem is unchanged. There has been no fever. She is experiencing no pain. Associated symptoms include congestion, coughing (dry began yesterday) and a sore throat (now resolved). Pertinent negatives include no chills, ear pain, headaches, shortness of breath, sinus pressure or sneezing. Treatments tried: claritin D. The treatment provided no relief.        She has the following problem list:  Patient Active Problem List   Diagnosis    Iron deficiency anemia    Chronic low back pain    Chronic neck pain    Hypertension    Depression    Anxiety    Hypothyroidism    Allergic rhinitis    GERD (gastroesophageal reflux disease)    Anemia    Chronic fatigue    Mixed hyperlipidemia    Paresthesias    Hyperlipidemia    Tripping over things    Balance problems    Memory problem    Bilateral carpal tunnel syndrome    Polyneuropathy associated with underlying disease (HCC)    Ulnar neuropathy of both upper extremities    Peroneal neuropathy    Tibial neuropathy    Slurred speech    Other dysphagia    Cerebral ischemia    Abnormal EEG    Nocturnal seizures (HCC)    Obstructive sleep apnea    Localized swelling of both lower legs    Seizure disorder (HCC)    Elevated hemoglobin A1c    Morbid obesity with BMI of 40.0-44.9, adult (HCC)        Current medications are:  Current Outpatient Medications   Medication Sig Dispense Refill    fexofenadine-pseudoephedrine (ALLEGRA-D 24HR) 180-240 MG per extended release tablet Take 1 tablet by mouth daily 30 tablet 0    albuterol sulfate HFA (VENTOLIN HFA) 108 (90 Base) MCG/ACT inhaler INHALE 2 PUFFS EVERY 6 HOURS IF NEEDED FOR WHEEZING 18 g 5    mirabegron (MYRBETRIQ) 50 MG TB24 Take 50 mg by mouth daily 90 tablet 1    hydroCHLOROthiazide (HYDRODIURIL) 25 MG tablet take 1 tablet by mouth daily 90 tablet 1    levothyroxine (SYNTHROID) 125 MCG tablet take 1 tablet by mouth once daily 90 tablet 1    spironolactone (ALDACTONE) 100 MG tablet take 1 tablet by mouth once daily 90 tablet 1    esomeprazole (NEXIUM) 40 MG delayed release capsule Take 1 capsule by mouth 2 times daily 180 capsule 1    metoprolol tartrate (LOPRESSOR) 50 MG tablet Take 1 tablet by mouth 2 times daily 180 tablet 1    potassium chloride (KLOR-CON) 8 MEQ extended release tablet take 4 tablets by mouth once daily 360 tablet 1    loratadine-pseudoephedrine (RA LORATA-D)  MG per extended release tablet take 1 tablet by mouth once daily 90 tablet 0    aspirin EC 81 MG EC tablet Take 1 tablet by mouth daily 90 tablet 1    lidocaine (XYLOCAINE) 5 % ointment Apply topically to affected area bid as needed 50 g 5    OXcarbazepine (TRILEPTAL) 300 MG tablet ONE  TABLET  TWICE  DAILY 60 tablet 5    atorvastatin (LIPITOR) 20 MG tablet take 1 tablet by mouth once daily 30 tablet 5    ibuprofen (ADVIL;MOTRIN) 800 MG tablet Take 1 tablet by mouth every 8 hours as needed for Pain Would use sparingly 30 tablet 0    fluticasone-salmeterol (ADVAIR DISKUS) 250-50 MCG/DOSE AEPB Inhale 1 puff into the lungs every 12 hours RINSE MOUTH AFTER USE 1 Inhaler 5    promethazine (PHENERGAN) 25 MG tablet Take 25 mg by mouth      b complex vitamins capsule Take 1 capsule by mouth daily      lamoTRIgine (LAMICTAL) 200 MG tablet Take 200 mg by mouth daily      traZODone (DESYREL) 300 MG tablet take 1 tablet by mouth at bedtime  0    desvenlafaxine succinate (PRISTIQ) 100 MG TB24 extended release tablet take 1 tablet by mouth once daily  0    prazosin (MINIPRESS) 2 MG capsule Take 2 mg by mouth nightly       busPIRone (BUSPAR) 30 MG tablet Take 30 mg by mouth 3 times daily       Cholecalciferol (VITAMIN D3) 5000 UNITS TABS Take 1 tablet by mouth daily 30 tablet 2    gabapentin (NEURONTIN) 600 MG tablet Take 1 tablet by mouth 3 times daily for 30 days. 90 tablet 2     No current facility-administered medications for this visit. She is allergic to allopurinol; bumex [bumetanide]; colchicine; erythromycin; indocin [indomethacin]; lasix [furosemide]; lisinopril; morphine; norvasc [amlodipine]; tenormin [atenolol]; uloric [febuxostat]; hydrocodone-acetaminophen; and topamax [topiramate]. .    She  reports that she has never smoked. She has never used smokeless tobacco.      Objective:    Vitals:    07/15/20 1003   BP: 130/80   Site: Left Upper Arm   Position: Sitting   Cuff Size: Large Adult   Pulse: 72   Temp: 97.7 °F (36.5 °C)   TempSrc: Tympanic   SpO2: 95%   Weight: 274 lb (124.3 kg)     Body mass index is 44.22 kg/m². Review of Systems   Constitutional: Negative. Negative for appetite change, chills, fatigue and fever. HENT: Positive for congestion, postnasal drip and sore throat (now resolved). Negative for ear pain, rhinorrhea, sinus pressure and sneezing. Respiratory: Positive for cough (dry began yesterday). Negative for shortness of breath and wheezing. Cardiovascular: Negative. Gastrointestinal: Negative. Neurological: Negative for headaches. Physical Exam  Vitals signs and nursing note reviewed. Constitutional:       Appearance: She is well-developed. HENT:      Head: Normocephalic. Jaw: There is normal jaw occlusion.       Right Ear: Tympanic membrane, ear canal and external ear normal.      Left Ear: Tympanic membrane, ear canal and external ear normal.      Nose: Congestion present. Right Turbinates: Swollen (erythema). Left Turbinates: Swollen (erythema). Right Sinus: Frontal sinus tenderness present. No maxillary sinus tenderness. Left Sinus: Frontal sinus tenderness present. No maxillary sinus tenderness. Mouth/Throat:      Lips: Pink. Mouth: Mucous membranes are moist.      Pharynx: Uvula midline. Posterior oropharyngeal erythema present. Eyes:      Pupils: Pupils are equal, round, and reactive to light. Neck:      Musculoskeletal: Normal range of motion and neck supple. Cardiovascular:      Rate and Rhythm: Normal rate and regular rhythm. Heart sounds: Normal heart sounds. Pulmonary:      Effort: Pulmonary effort is normal.      Breath sounds: Normal breath sounds and air entry. Comments: Dry cough noted  Lymphadenopathy:      Cervical: No cervical adenopathy. Skin:     General: Skin is warm and dry. Neurological:      Mental Status: She is alert and oriented to person, place, and time. Psychiatric:         Behavior: Behavior normal.         Thought Content: Thought content normal.       Assessment and Plan:    No results found for this visit on 07/15/20. Diagnosis Orders   1. Cough  Covid-19 Ambulatory   2. Nasal congestion  fexofenadine-pseudoephedrine (ALLEGRA-D 24HR) 180-240 MG per extended release tablet    Covid-19 Ambulatory   3. Person under investigation for COVID-19  Covid-19 Ambulatory     Self quarantine until negative Covid-19 test result received. We will call with Covid-19 test results. Take Delsym or Robitussin OTC as needed for cough per package instructions. I also recommended Flonase and an antihistamine for sinus symptoms. she was also encouraged to use tylenol for pain/fever. Increase water intake. Use cool mist humidifier at bedtime. Use nasal saline flush as needed. Good hand hygiene. she was instructed to return if there is no improvement or symptoms worsen.      The

## 2020-07-15 NOTE — PROGRESS NOTES
Randy Paiz MD   mirabegron (MYRBETRIQ) 50 MG TB24 Take 50 mg by mouth daily Yes Arnav Velasco DO   hydroCHLOROthiazide (HYDRODIURIL) 25 MG tablet take 1 tablet by mouth daily Yes Arnav Velasco DO   levothyroxine (SYNTHROID) 125 MCG tablet take 1 tablet by mouth once daily Yes Arnav Velasco DO   spironolactone (ALDACTONE) 100 MG tablet take 1 tablet by mouth once daily Yes Arnav Velasco DO   esomeprazole (NEXIUM) 40 MG delayed release capsule Take 1 capsule by mouth 2 times daily Yes Arnav Velasco DO   metoprolol tartrate (LOPRESSOR) 50 MG tablet Take 1 tablet by mouth 2 times daily Yes Arnav Velasco DO   potassium chloride (KLOR-CON) 8 MEQ extended release tablet take 4 tablets by mouth once daily Yes Arnav Velasco DO   loratadine-pseudoephedrine (RA LORATA-D)  MG per extended release tablet take 1 tablet by mouth once daily Yes Arnav Velasco DO   aspirin EC 81 MG EC tablet Take 1 tablet by mouth daily Yes Yanet Gray,    lidocaine (XYLOCAINE) 5 % ointment Apply topically to affected area bid as needed Yes Arnav Velasco DO   OXcarbazepine (TRILEPTAL) 300 MG tablet ONE  TABLET  TWICE  DAILY Yes Melany Ramos MD   gabapentin (NEURONTIN) 600 MG tablet Take 1 tablet by mouth 3 times daily for 30 days.  Yes Melany Ramos MD   atorvastatin (LIPITOR) 20 MG tablet take 1 tablet by mouth once daily Yes Arnav Velasco DO   ibuprofen (ADVIL;MOTRIN) 800 MG tablet Take 1 tablet by mouth every 8 hours as needed for Pain Would use sparingly Yes Arnav Velasco DO   fluticasone-salmeterol (ADVAIR DISKUS) 250-50 MCG/DOSE AEPB Inhale 1 puff into the lungs every 12 hours RINSE MOUTH AFTER USE Yes Arnav Velasco DO   promethazine (PHENERGAN) 25 MG tablet Take 25 mg by mouth Yes Historical Provider, MD   b complex vitamins capsule Take 1 capsule by mouth daily Yes Historical Provider, MD   lamoTRIgine (LAMICTAL) 200 MG tablet Take 200 mg by mouth daily Yes 7/15/2020 at 8:02 AM

## 2020-07-15 NOTE — PATIENT INSTRUCTIONS
Patient Education        Learning About Coronavirus [phone])  Coronavirus [phone]): Overview  What is coronavirus (QWHOE-13)? The coronavirus disease (COVID-19) is caused by a virus. It is an illness that was first found in [place], [place], in December 2019. It has since spread worldwide. The virus can cause fever, cough, and trouble breathing. In severe cases, it can cause pneumonia and make it hard to breathe without help. It can cause death. Coronaviruses are a large group of viruses. They cause the common cold. They also cause more serious illnesses like Middle East respiratory syndrome (MERS) and severe acute respiratory syndrome (SARS). COVID-19 is caused by a novel coronavirus. That means it's a new type that has not been seen in people before. This virus spreads person-to-person through droplets from coughing and sneezing. It can also spread when you are close to someone who is infected. And it can spread when you touch something that has the virus on it, such as a doorknob or a tabletop. What can you do to protect yourself from coronavirus (COVID-19)? The best way to protect yourself from getting sick is to:  · Avoid areas where there is an outbreak. · Avoid contact with people who may be infected. · Wash your hands often with soap or alcohol-based hand sanitizers. · Avoid crowds and try to stay at least 6 feet away from other people. · Wash your hands often, especially after you cough or sneeze. Use soap and water, and scrub for at least 20 seconds. If soap and water aren't available, use an alcohol-based hand . · Avoid touching your mouth, nose, and eyes. What can you do to avoid spreading the virus to others? To help avoid spreading the virus to others:  · Cover your mouth with a tissue when you cough or sneeze. Then throw the tissue in the trash. · Use a disinfectant to clean things that you touch often. · Wear a cloth face cover if you have to go to public areas.   · Stay home if you are sick or have been exposed to the virus. Don't go to school, work, or public areas. And don't use public transportation, ride-shares, or taxis unless you have no choice. · If you are sick:  ? Leave your home only if you need to get medical care. But call the doctor's office first so they know you're coming. And wear a face cover. ? Wear the face cover whenever you're around other people. It can help stop the spread of the virus when you cough or sneeze. ? Clean and disinfect your home every day. Use household  and disinfectant wipes or sprays. Take special care to clean things that you grab with your hands. These include doorknobs, remote controls, phones, and handles on your refrigerator and microwave. And don't forget countertops, tabletops, bathrooms, and computer keyboards. When to call for help  [id number] anytime you think you may need emergency care. For example, call if:  · You have severe trouble breathing. (You can't talk at all.)  · You have constant chest pain or pressure. · You are severely dizzy or lightheaded. · You are confused or can't think clearly. · Your face and lips have a blue color. · You pass out (lose consciousness) or are very hard to wake up. Call your doctor now if you develop symptoms such as:  · Shortness of breath. · Fever. · Cough. If you need to get care, call ahead to the doctor's office for instructions before you go. Make sure you wear a face cover to prevent exposing other people to the virus. Where can you get the latest information? The following health organizations are tracking and studying this virus. Their websites contain the most up-to-date information. [place] also learn what to do if you think you may have been exposed to the virus. · U.S. Centers for Disease Control and Prevention (CDC): The CDC provides updated news about the disease and travel advice. The website also tells you how to prevent the spread of infection.  www.cdc.gov  · World Health Organization [facility]): WHO offers information about the virus outbreaks. WHO also has travel advice. www.who.int  Current as of: May 8, 2020               Content Version: 12.5  © 2006-2020 Healthwise, Incorporated. Care instructions adapted under license by [facility] ([facility]). If you have questions about a medical condition or this instruction, always ask your healthcare professional. [address] any warranty or liability for your use of this information. Patient Education        Coronavirus (IAMDE-00): Care Instructions  Overview  The coronavirus disease (COVID-19) is caused by a virus. Symptoms may include a fever, a cough, and shortness of breath. It mainly spreads person-to-person through droplets from coughing and sneezing. The virus also can spread when people are in close contact with someone who is infected. Most people have mild symptoms and can take care of themselves at home. If their symptoms get worse, they may need care in a hospital. There is no medicine to fight the virus. It's important to not spread the virus to others. If you have COVID-19, wear a face cover anytime you are around other people. You need to isolate yourself while you are sick. Your doctor or local public health official will tell you when you no longer need to be isolated. Leave your home only if you need to get medical care. Follow-up care is a key part of your treatment and safety. Be sure to make and go to all appointments, and call your doctor if you are having problems. It's also a good idea to know your test results and keep a list of the medicines you take. How can you care for yourself at home? · Get extra rest. It can help you feel better. · Drink plenty of fluids. This helps replace fluids lost from fever. Fluids also help ease a scratchy throat. Water, soup, fruit juice, and hot tea with lemon are good choices. · Take acetaminophen (such as Tylenol) to reduce a fever.  It may also help with muscle aches. Read and follow all instructions on the label. · Sponge your body with lukewarm water to help with fever. Don't use cold water or ice. · Use petroleum jelly on sore skin. This can help if the skin around your nose and lips becomes sore from rubbing a lot with tissues. Tips for isolation  · Wear a cloth face cover when you are around other people. It can help stop the spread of the virus when you cough or sneeze. · Limit contact with people in your home. If possible, stay in a separate bedroom and use a separate bathroom. · If you have to leave home, avoid crowds and try to stay at least 6 feet away from other people. · Avoid contact with pets and other animals. · Cover your mouth and nose with a tissue when you cough or sneeze. Then throw it in the trash right away. · Wash your hands often, especially after you cough or sneeze. Use soap and water, and scrub for at least 20 seconds. If soap and water aren't available, use an alcohol-based hand . · Don't share personal household items. These include bedding, towels, cups and glasses, and eating utensils. · [address] in the warmest water allowed for the fabric type, and dry it completely. It's okay to wash other people's laundry with yours. · Clean and disinfect your home every day. Use household  and disinfectant wipes or sprays. Take special care to clean things that you grab with your hands. These include doorknobs, remote controls, phones, and handles on your refrigerator and microwave. And don't forget countertops, tabletops, bathrooms, and computer keyboards. When should you call for help? [id number] anytime you think you may need emergency care. For example, call if you have life-threatening symptoms, such as:  · You have severe trouble breathing. (You can't talk at all.)  · You have constant chest pain or pressure. · You are severely dizzy or lightheaded. · You are confused or can't think clearly.   · Your face and lips have a blue color. · You pass out (lose consciousness) or are very hard to wake up. Call your doctor now or seek immediate medical care if:  · You have moderate trouble breathing. (You can't speak a full sentence.)  · You are coughing up blood (more than about 1 teaspoon). · You have signs of low blood pressure. These include feeling lightheaded; being too weak to stand; and having cold, pale, clammy skin. Watch closely for changes in your health, and be sure to contact your doctor if:  · Your symptoms get worse. · You are not getting better as expected. Call before you go to the doctor's office. Follow their instructions. And wear a cloth face cover. Current as of: May 8, 2020               Content Version: 12.5  © 8277-7293 Healthwise, Incorporated. Care instructions adapted under license by [facility] ([facility]). If you have questions about a medical condition or this instruction, always ask your healthcare professional. [place] [address] any warranty or liability for your use of this information. Patient Education        Cough: Care Instructions  Your Care Instructions     A cough is your body's response to something that bothers your throat or airways. Many things can cause a cough. You might cough because of a cold or the flu, bronchitis, or asthma. Smoking, postnasal drip, allergies, and stomach acid that backs up into your throat also can cause coughs. A cough is a symptom, not a disease. Most coughs stop when the cause, such as a cold, goes away. You can take a few steps at home to cough less and feel better. Follow-up care is a key part of your treatment and safety. Be sure to make and go to all appointments, and call your doctor if you are having problems. It's also a good idea to know your test results and keep a list of the medicines you take. How can you care for yourself at home? · Drink lots of water and other fluids.  This helps thin the mucus and soothes a dry or sore throat. Honey or lemon juice in hot water or tea may ease a dry cough. · Take cough medicine as directed by your doctor. · Prop up your head on pillows to help you breathe and ease a dry cough. · Try cough drops to soothe a dry or sore throat. Cough drops don't stop a cough. Medicine-flavored cough drops are no better than candy-flavored drops or hard candy. · Do not smoke. Avoid secondhand smoke. If you need help quitting, talk to your doctor about stop-smoking programs and medicines. These can increase your chances of quitting for good. When should you call for help? [id number] anytime you think you may need emergency care. For example, call if:  · You have severe trouble breathing. Call your doctor now or seek immediate medical care if:  · You cough up blood. · You have new or worse trouble breathing. · You have a new or higher fever. · You have a new rash. Watch closely for changes in your health, and be sure to contact your doctor if:  · You cough more deeply or more often, especially if you notice more mucus or a change in the color of your mucus. · You have new symptoms, such as a sore throat, an earache, or sinus pain. · You do not get better as expected. Where can you learn more? Go to https://Piiku.DITTO.com. org and sign in to your Missingames account. Enter D279 in the [facility] box to learn more about \"Cough: Care Instructions. \"     If you do not have an account, please click on the \"Sign Up Now\" link. Current as of: February 24, 2020               Content Version: 12.5  © 5871-4356 Healthwise, Incorporated. Care instructions adapted under license by [facility] ([facility]). If you have questions about a medical condition or this instruction, always ask your healthcare professional. [place] [address] any warranty or liability for your use of this information.

## 2020-07-20 ENCOUNTER — TELEPHONE (OUTPATIENT)
Dept: NEUROLOGY | Age: 50
End: 2020-07-20

## 2020-07-21 LAB — SARS-COV-2, NAA: NOT DETECTED

## 2020-08-04 ENCOUNTER — OFFICE VISIT (OUTPATIENT)
Dept: CARDIOLOGY | Age: 50
End: 2020-08-04
Payer: COMMERCIAL

## 2020-08-04 VITALS
HEIGHT: 65 IN | HEART RATE: 70 BPM | WEIGHT: 276 LBS | DIASTOLIC BLOOD PRESSURE: 65 MMHG | BODY MASS INDEX: 45.98 KG/M2 | SYSTOLIC BLOOD PRESSURE: 130 MMHG

## 2020-08-04 PROCEDURE — 93000 ELECTROCARDIOGRAM COMPLETE: CPT | Performed by: INTERNAL MEDICINE

## 2020-08-04 PROCEDURE — 99204 OFFICE O/P NEW MOD 45 MIN: CPT | Performed by: INTERNAL MEDICINE

## 2020-08-04 NOTE — PROGRESS NOTES
1 capsule by mouth 2 times daily 180 capsule 1    metoprolol tartrate (LOPRESSOR) 50 MG tablet Take 1 tablet by mouth 2 times daily 180 tablet 1    potassium chloride (KLOR-CON) 8 MEQ extended release tablet take 4 tablets by mouth once daily 360 tablet 1    loratadine-pseudoephedrine (RA LORATA-D)  MG per extended release tablet take 1 tablet by mouth once daily 90 tablet 0    aspirin EC 81 MG EC tablet Take 1 tablet by mouth daily 90 tablet 1    lidocaine (XYLOCAINE) 5 % ointment Apply topically to affected area bid as needed 50 g 5    OXcarbazepine (TRILEPTAL) 300 MG tablet ONE  TABLET  TWICE  DAILY 60 tablet 5    atorvastatin (LIPITOR) 20 MG tablet take 1 tablet by mouth once daily 30 tablet 5    ibuprofen (ADVIL;MOTRIN) 800 MG tablet Take 1 tablet by mouth every 8 hours as needed for Pain Would use sparingly 30 tablet 0    fluticasone-salmeterol (ADVAIR DISKUS) 250-50 MCG/DOSE AEPB Inhale 1 puff into the lungs every 12 hours RINSE MOUTH AFTER USE 1 Inhaler 5    promethazine (PHENERGAN) 25 MG tablet Take 25 mg by mouth      b complex vitamins capsule Take 1 capsule by mouth daily      lamoTRIgine (LAMICTAL) 200 MG tablet Take 200 mg by mouth daily      traZODone (DESYREL) 300 MG tablet take 1 tablet by mouth at bedtime  0    desvenlafaxine succinate (PRISTIQ) 100 MG TB24 extended release tablet take 1 tablet by mouth once daily  0    prazosin (MINIPRESS) 2 MG capsule Take 2 mg by mouth nightly       busPIRone (BUSPAR) 30 MG tablet Take 30 mg by mouth 3 times daily       Cholecalciferol (VITAMIN D3) 5000 UNITS TABS Take 1 tablet by mouth daily 30 tablet 2    gabapentin (NEURONTIN) 600 MG tablet Take 1 tablet by mouth 3 times daily for 30 days. 90 tablet 2     No current facility-administered medications for this visit.       Physical Exam:   Vitals: /65   Pulse 70   Ht 5' 5\" (1.651 m)   Wt 276 lb (125.2 kg)   LMP 05/05/2005   BMI 45.93 kg/m²   General appearance: alert and cooperative with exam  HEENT: Head: Normocephalic, no lesions, without obvious abnormality. Neck: no carotid bruit, no JVD  Lungs: clear to auscultation bilaterally  Heart: regular rate and rhythm, S1, S2 normal, no murmur, click, rub or gallop  Abdomen: soft, non-tender; bowel sounds normal; no masses,  no organomegaly  Extremities: extremities normal, atraumatic, no cyanosis or edema  Neurologic: Mental status: Alert, oriented, thought content appropriate    Labs:  Lab Results   Component Value Date    CHOL 149 02/21/2020    TRIG 179 (H) 02/21/2020    HDL 43 02/21/2020    LDLCHOLESTEROL 70 02/21/2020    VLDL NOT REPORTED (H) 02/21/2020    CHOLHDLRATIO 3.5 02/21/2020       Lab Results   Component Value Date     06/23/2020    K 4.2 06/23/2020    CL 98 06/23/2020    CO2 28 06/23/2020    BUN 11 06/23/2020    CREATININE 0.87 06/23/2020    GLUCOSE 112 (H) 06/23/2020    CALCIUM 8.9 06/23/2020    PROT 7.1 02/21/2020    LABALBU 4.6 02/21/2020    BILITOT 0.27 (L) 02/21/2020    ALKPHOS 141 (H) 02/21/2020    AST 14 02/21/2020    ALT 14 02/21/2020    LABGLOM >60 06/23/2020    GFRAA >60 06/23/2020     EKG: Sinus  Rhythm   Low voltage in precordial leads. Results for orders placed or performed during the hospital encounter of 05/15/19   ECHO Complete 2D W Doppler W Color  Normal left ventricular diameter. Left ventricular systolic function is normal.  Left ventricular ejection fraction 65 %. Grade I (mild) left ventricular diastolic dysfunction. Left atrium is mildly dilated. No significant valvular regurgitation or stenosis seen. Past Medical and Surgical History, Problem List, Allergies, Medications, Labs, Imaging, all reviewed extensively in EMR and with the patient. Assessment:  - Fall, suspect mechanical, she states she tripped on a step, and fell and hit her heard very hard.  Prior to that had no dizziness/lightheadedness/tunnel vision/near syncope  - Seizures- per neuro  - HTN  - DL  - TIA in past  - Falls in past  - SILVIA on CPAP  - Obesity  - Chronic Back pain- gets pain injections    Plan:  - will check 2d Echo to eval for structural heart disease  - if echo low risk, will refer back to neurology as she currently has no S/S to suggest syncope  - if she were to develop any Dizziness/lightheadedness/Near syncope- can then plan for Tilt Table Testing, but currently my differential is leaning more towards a mechanical fall with post concussive symptoms    The patient is to continue heart healthy diet, weight loss and exercise as tolerated. Patient's medications and side effects were discussed. Medication refills were provided if needed. Follow up appointment timing was discussed. All questions and concerns were addressed to patient's satisfaction. The patient is to follow up in 6 months or sooner if necessary. Thank you for allowing me to participate in the care of this patient, please do not hesitate to call if you have any questions. Oxana Posadas DO, [facility] [place], [address] [facility]  [email or web address]  [phone]

## 2020-08-04 NOTE — PATIENT INSTRUCTIONS
Central Scheduling will call you to schedule your ECHO appointment. If you do not receive a call within 48 hours, please call their number at [phone] and push option 1. If you have any questions or concerns, call Cardiology at [phone]. The Cardiopulmonary Testing Facility is located in the basement of [facility] [place]. Please check in for your testing appointment at the [facility] Physical Therapy desk.

## 2020-08-06 RX ORDER — OXCARBAZEPINE 300 MG/1
TABLET, FILM COATED ORAL
Qty: 60 TABLET | Refills: 5 | Status: SHIPPED | OUTPATIENT
Start: 2020-08-06 | End: 2021-02-04

## 2020-08-13 ENCOUNTER — HOSPITAL ENCOUNTER (OUTPATIENT)
Dept: NON INVASIVE DIAGNOSTICS | Age: 50
Discharge: HOME | End: 2020-08-13
Payer: COMMERCIAL

## 2020-08-13 LAB
LV EF: 65 %
LVEF MODALITY: NORMAL

## 2020-08-13 PROCEDURE — 93306 TTE W/DOPPLER COMPLETE: CPT

## 2020-08-18 RX ORDER — GABAPENTIN 600 MG/1
TABLET ORAL
Qty: 90 TABLET | Refills: 1 | Status: SHIPPED | OUTPATIENT
Start: 2020-08-18 | End: 2020-12-15

## 2020-08-18 NOTE — TELEPHONE ENCOUNTER
Last Appt:  5/22/2020  Next Appt:   Visit date not found  Med verified in [facility] completed 08/18/2020

## 2020-08-21 ENCOUNTER — TELEPHONE (OUTPATIENT)
Dept: CARDIOLOGY | Age: 50
End: 2020-08-21

## 2020-08-21 NOTE — LETTER
Cardiology Consultation  [facility] [address], [address] [place]  [phone]      08/22/20       Regarding:  Lyn Herbert  1970      To Whom It May Concern,      Patient is Intermediate Cardiac Risk for planned surgery. Special instructions:  Patient is taking aspirin and can be held for 5-7 days prior to procedure and resumed as soon as surgical bleeding risk has resolved. Please continue other meds.         Thank you,      Vanessa Barrera DO  Cardiology  [facility]    Electronically signed by Vanessa Barrera DO

## 2020-08-22 ENCOUNTER — HOSPITAL ENCOUNTER (OUTPATIENT)
Dept: LAB | Age: 50
Discharge: HOME | End: 2020-08-22
Payer: COMMERCIAL

## 2020-08-22 LAB
ABSOLUTE EOS #: 0.11 K/UL (ref 0–0.44)
ABSOLUTE IMMATURE GRANULOCYTE: 0.08 K/UL (ref 0–0.3)
ABSOLUTE LYMPH #: 2.55 K/UL (ref 1.1–3.7)
ABSOLUTE MONO #: 0.74 K/UL (ref 0.1–1.2)
ALBUMIN SERPL-MCNC: 4.1 G/DL (ref 3.5–5.2)
ALBUMIN/GLOBULIN RATIO: 1.4 (ref 1–2.5)
ALP BLD-CCNC: 146 U/L (ref 35–104)
ALT SERPL-CCNC: 13 U/L (ref 5–33)
ANION GAP SERPL CALCULATED.3IONS-SCNC: 9 MMOL/L (ref 9–17)
AST SERPL-CCNC: 11 U/L
BASOPHILS # BLD: 0 % (ref 0–2)
BASOPHILS ABSOLUTE: 0.04 K/UL (ref 0–0.2)
BILIRUB SERPL-MCNC: 0.22 MG/DL (ref 0.3–1.2)
BUN BLDV-MCNC: 14 MG/DL (ref 6–20)
BUN/CREAT BLD: 14 (ref 9–20)
CALCIUM SERPL-MCNC: 9.4 MG/DL (ref 8.6–10.4)
CHLORIDE BLD-SCNC: 99 MMOL/L (ref 98–107)
CHOLESTEROL/HDL RATIO: 3.8
CHOLESTEROL: 196 MG/DL
CO2: 30 MMOL/L (ref 20–31)
CREAT SERPL-MCNC: 1.03 MG/DL (ref 0.5–0.9)
CREATININE URINE: 168.8 MG/DL (ref 28–217)
DIFFERENTIAL TYPE: ABNORMAL
EOSINOPHILS RELATIVE PERCENT: 1 % (ref 1–4)
ESTIMATED AVERAGE GLUCOSE: 120 MG/DL
GFR AFRICAN AMERICAN: >60 ML/MIN
GFR NON-AFRICAN AMERICAN: 56 ML/MIN
GFR SERPL CREATININE-BSD FRML MDRD: ABNORMAL ML/MIN/{1.73_M2}
GFR SERPL CREATININE-BSD FRML MDRD: ABNORMAL ML/MIN/{1.73_M2}
GLUCOSE BLD-MCNC: 93 MG/DL (ref 70–99)
HBA1C MFR BLD: 5.8 % (ref 4.8–5.9)
HCT VFR BLD CALC: 38.8 % (ref 36.3–47.1)
HDLC SERPL-MCNC: 52 MG/DL
HEMOGLOBIN: 12.3 G/DL (ref 11.9–15.1)
IMMATURE GRANULOCYTES: 1 %
LDL CHOLESTEROL: 118 MG/DL (ref 0–130)
LYMPHOCYTES # BLD: 24 % (ref 24–43)
MCH RBC QN AUTO: 27.4 PG (ref 25.2–33.5)
MCHC RBC AUTO-ENTMCNC: 31.7 G/DL (ref 25.2–33.5)
MCV RBC AUTO: 86.4 FL (ref 82.6–102.9)
MICROALBUMIN/CREAT 24H UR: <12 MG/L
MICROALBUMIN/CREAT UR-RTO: NORMAL MCG/MG CREAT
MONOCYTES # BLD: 7 % (ref 3–12)
NRBC AUTOMATED: 0 PER 100 WBC
PDW BLD-RTO: 15.9 % (ref 11.8–14.4)
PLATELET # BLD: 264 K/UL (ref 138–453)
PLATELET ESTIMATE: ABNORMAL
PMV BLD AUTO: 10.2 FL (ref 8.1–13.5)
POTASSIUM SERPL-SCNC: 4.2 MMOL/L (ref 3.7–5.3)
RBC # BLD: 4.49 M/UL (ref 3.95–5.11)
RBC # BLD: ABNORMAL 10*6/UL
SEG NEUTROPHILS: 67 % (ref 36–65)
SEGMENTED NEUTROPHILS ABSOLUTE COUNT: 6.99 K/UL (ref 1.5–8.1)
SODIUM BLD-SCNC: 138 MMOL/L (ref 135–144)
THYROXINE, FREE: 0.97 NG/DL (ref 0.93–1.7)
TOTAL PROTEIN: 7 G/DL (ref 6.4–8.3)
TRIGL SERPL-MCNC: 131 MG/DL
TSH SERPL DL<=0.05 MIU/L-ACNC: 3.84 MIU/L (ref 0.3–5)
VLDLC SERPL CALC-MCNC: NORMAL MG/DL (ref 1–30)
WBC # BLD: 10.5 K/UL (ref 3.5–11.3)
WBC # BLD: ABNORMAL 10*3/UL

## 2020-08-22 PROCEDURE — 84443 ASSAY THYROID STIM HORMONE: CPT

## 2020-08-22 PROCEDURE — 82043 UR ALBUMIN QUANTITATIVE: CPT

## 2020-08-22 PROCEDURE — 82570 ASSAY OF URINE CREATININE: CPT

## 2020-08-22 PROCEDURE — 84439 ASSAY OF FREE THYROXINE: CPT

## 2020-08-22 PROCEDURE — 80053 COMPREHEN METABOLIC PANEL: CPT

## 2020-08-22 PROCEDURE — 80061 LIPID PANEL: CPT

## 2020-08-22 PROCEDURE — 85025 COMPLETE CBC W/AUTO DIFF WBC: CPT

## 2020-08-22 PROCEDURE — 36415 COLL VENOUS BLD VENIPUNCTURE: CPT

## 2020-08-22 PROCEDURE — 83036 HEMOGLOBIN GLYCOSYLATED A1C: CPT

## 2020-09-01 ENCOUNTER — OFFICE VISIT (OUTPATIENT)
Dept: FAMILY MEDICINE CLINIC | Age: 50
End: 2020-09-01
Payer: COMMERCIAL

## 2020-09-01 ENCOUNTER — HOSPITAL ENCOUNTER (OUTPATIENT)
Dept: GENERAL RADIOLOGY | Age: 50
Discharge: HOME | End: 2020-09-03
Payer: COMMERCIAL

## 2020-09-01 VITALS
RESPIRATION RATE: 18 BRPM | OXYGEN SATURATION: 97 % | HEART RATE: 76 BPM | DIASTOLIC BLOOD PRESSURE: 70 MMHG | WEIGHT: 279 LBS | SYSTOLIC BLOOD PRESSURE: 110 MMHG | HEIGHT: 65 IN | BODY MASS INDEX: 46.48 KG/M2 | TEMPERATURE: 97.8 F

## 2020-09-01 PROCEDURE — 71046 X-RAY EXAM CHEST 2 VIEWS: CPT

## 2020-09-01 PROCEDURE — 99214 OFFICE O/P EST MOD 30 MIN: CPT | Performed by: FAMILY MEDICINE

## 2020-09-01 RX ORDER — LAMOTRIGINE 100 MG/1
150 TABLET ORAL 2 TIMES DAILY
COMMUNITY
End: 2022-08-12 | Stop reason: DRUGHIGH

## 2020-09-01 RX ORDER — NICOTINE POLACRILEX 2 MG/1
LOZENGE ORAL
Qty: 90 TABLET | Refills: 0 | Status: SHIPPED | OUTPATIENT
Start: 2020-09-01 | End: 2021-03-01

## 2020-09-01 ASSESSMENT — ENCOUNTER SYMPTOMS
SORE THROAT: 0
DIARRHEA: 0
CONSTIPATION: 0
SHORTNESS OF BREATH: 0
SINUS PRESSURE: 0
EYE DISCHARGE: 0
NAUSEA: 0
EYE REDNESS: 0
RHINORRHEA: 0
ABDOMINAL PAIN: 0
BACK PAIN: 1
COUGH: 0
WHEEZING: 0
VOMITING: 0
TROUBLE SWALLOWING: 0

## 2020-09-01 ASSESSMENT — PATIENT HEALTH QUESTIONNAIRE - PHQ9
SUM OF ALL RESPONSES TO PHQ QUESTIONS 1-9: 0
SUM OF ALL RESPONSES TO PHQ QUESTIONS 1-9: 0
2. FEELING DOWN, DEPRESSED OR HOPELESS: 0
1. LITTLE INTEREST OR PLEASURE IN DOING THINGS: 0
SUM OF ALL RESPONSES TO PHQ9 QUESTIONS 1 & 2: 0

## 2020-09-01 NOTE — PROGRESS NOTES
2020     Clare Kearns (:  1970) is a 45 y.o. female, here for evaluation of the following medical concerns:    HPI  Patient comes in today for follow up of chronic health issues and for preoperative clearance for upcoming proposed L5-S1 decompression, posterior fusion and removal of L3-4 Hardware. Patient has had significant back pain and just states that this is the worst pain she has had even prior to her other surgeries. Hopefully with surgical intervention they can alleviate some of her pain. She has had cardiac clearance already and will have her chest x-ray this morning for final clearance. She does report that when she fell in March she had injured her head and fell pretty hard. At that time was seen in urgent care and did not have any imaging of the brain performed. She was told at that time she likely had a concussion. Has had persistent left-sided frontal headaches ever since. Sometimes will have visual disturbance and will have flashes of light in her left thigh when the headache is severe. She states she has a daily headache but at times becomes more severe. Is having photophobia and phonophobia when she does have a headache. Has taken over-the-counter Tylenol and Motrin with little relief in her headache symptoms. Would recommend imaging of the brain just to ensure there is no underlying intracranial issue contributing to the ongoing headaches and she seems agreeable with this plan. With regards to her other chronic health conditions she has a known history of diabetes mellitus type 2 which is stable and controlled with dietary efforts. Did encourage continued low-carb/low sugar diet and routine exercise as able to keep this optimally controlled. Has a known history of hypertension her blood pressure is stable controlled on her current medical therapy.   Has a known history of hyperlipidemia and her cholesterol levels have elevated compared to last check but she states for some reason she has not been consistently taking her statin medication. We will get back on this medical therapy more consistently. Does have known depression anxiety which is stable controlled on her current medical therapy. Has known hypothyroidism and her thyroid levels are stable and adequately supplemented on her current thyroid dose. Patient otherwise has no other acute medical concerns at this time. .  Patient's recent lab reports are as follows:    Results for orders placed or performed during the hospital encounter of 08/22/20   T4, Free   Result Value Ref Range    Thyroxine, Free 0.97 0.93 - 1.70 ng/dL   TSH without Reflex   Result Value Ref Range    TSH 3.84 0.30 - 5.00 mIU/L   Lipid Panel   Result Value Ref Range    Cholesterol 196 <200 mg/dL    HDL 52 >40 mg/dL    LDL Cholesterol 118 0 - 130 mg/dL    Chol/HDL Ratio 3.8 <5    Triglycerides 131 <150 mg/dL    VLDL NOT REPORTED 1 - 30 mg/dL   Hemoglobin A1C   Result Value Ref Range    Hemoglobin A1C 5.8 4.8 - 5.9 %    Estimated Avg Glucose 120 mg/dL   Comprehensive Metabolic Panel   Result Value Ref Range    Glucose 93 70 - 99 mg/dL    BUN 14 6 - 20 mg/dL    CREATININE 1.03 (H) 0.50 - 0.90 mg/dL    Bun/Cre Ratio 14 9 - 20    Calcium 9.4 8.6 - 10.4 mg/dL    Sodium 138 135 - 144 mmol/L    Potassium 4.2 3.7 - 5.3 mmol/L    Chloride 99 98 - 107 mmol/L    CO2 30 20 - 31 mmol/L    Anion Gap 9 9 - 17 mmol/L    Alkaline Phosphatase 146 (H) 35 - 104 U/L    ALT 13 5 - 33 U/L    AST 11 <32 U/L    Total Bilirubin 0.22 (L) 0.3 - 1.2 mg/dL    Total Protein 7.0 6.4 - 8.3 g/dL    Alb 4.1 3.5 - 5.2 g/dL    Albumin/Globulin Ratio 1.4 1.0 - 2.5    GFR Non- 56 (L) >60 mL/min    GFR African American >60 >60 mL/min    GFR Comment          GFR Staging NOT REPORTED    CBC Auto Differential   Result Value Ref Range    WBC 10.5 3.5 - 11.3 k/uL    RBC 4.49 3.95 - 5.11 m/uL    Hemoglobin 12.3 11.9 - 15.1 g/dL    Hematocrit 38.8 36.3 - 47.1 %    MCV 86.4 82.6 - 102.9 fL    MCH 27.4 25.2 - 33.5 pg    MCHC 31.7 25.2 - 33.5 g/dL    RDW 15.9 (H) 11.8 - 14.4 %    Platelets 667 704 - 262 k/uL    MPV 10.2 8.1 - 13.5 fL    NRBC Automated 0.0 0.0 per 100 WBC    Differential Type NOT REPORTED     Seg Neutrophils 67 (H) 36 - 65 %    Lymphocytes 24 24 - 43 %    Monocytes 7 3 - 12 %    Eosinophils % 1 1 - 4 %    Basophils 0 0 - 2 %    Immature Granulocytes 1 (H) 0 %    Segs Absolute 6.99 1.50 - 8.10 k/uL    Absolute Lymph # 2.55 1.10 - 3.70 k/uL    Absolute Mono # 0.74 0.10 - 1.20 k/uL    Absolute Eos # 0.11 0.00 - 0.44 k/uL    Basophils Absolute 0.04 0.00 - 0.20 k/uL    Absolute Immature Granulocyte 0.08 0.00 - 0.30 k/uL    WBC Morphology NOT REPORTED     RBC Morphology ANISOCYTOSIS PRESENT     Platelet Estimate NOT REPORTED    Microalbumin, Ur   Result Value Ref Range    Microalb, Ur <12 <21 mg/L    Creatinine, Ur 168.8 28.0 - 217.0 mg/dL    Microalb/Crt. Ratio CANNOT BE CALCULATED <25 mcg/mg creat      Other review of systems are as noted below. Preventative measures are reviewed today. See health maintenance section for complete preventative plan of care. Past Medical History:   Diagnosis Date    Abnormal CT of the abdomen     revealed 2 very small noncalcified pulmonary nodules in the right lung base. Suspect benign progress but repeat CT scan in June 2013 advised.  Allergic rhinitis     Anemia     Anxiety     Asthma     Chronic low back pain     Chronic neck pain     Degenerative joint disease of knee     Bilateral.    Depression     Endometriosis     history of     GERD (gastroesophageal reflux disease)     Gout     Hyperlipidemia     Hypertension     Hypothyroid     Kidney stone     history of     Left shoulder pain     Status post injections.  Leukocytosis     Palpitation     history of     Paresthesias     Generalized    Right knee pain     Status post injections.     Seizure ([address]     \"nocturnal seizures\"     Past Surgical History:   Procedure Laterality Date    ARTHROPLASTY  03/26/1986    5th metatarsals, MTP joints, right and left feet.  CERVICAL FUSION  6/2014    Dr Kendall Stewart, LAPAROSCOPIC  11/14/2011    CYSTOSCOPY  2/8/13    no acute findings    HYSTERECTOMY  05/2005    JOINT REPLACEMENT      KNEE ARTHROSCOPY Left     KNEE ARTHROSCOPY Left 10/16/13    KNEE ARTHROSCOPY Right 02/08/2012    With partial medial and lateral synovectomies and abrasion chondroplasty.  LAPAROSCOPY  03/08/2002    diagnostic with D&C and hysterscopy. [address] SURGERY  10/6/10    Anterior C5-C6, fusion with C-trap allograft and [place] plates, performed by Dr Ene Hubbard.  LUMBAR SPINE SURGERY Left 2/13/08    L4-L5 and L5-S1 foraminotomies and L4 through S1 posterolateral fusion with pedicle screw instrumentation.  NASAL SEPTUM SURGERY      OTHER SURGICAL HISTORY  12/30/2014    spinal cord stimulator paddle electrode, spinal cord stimulator generator. both [employer] specter generator and 32 lead paddle electrode by Dr Dong Daly at [address] [place] [address]  04/16/2015    spinal cord stimulator removed    SPINAL CORD DECOMPRESSION  4/23/12    Lumbar.  TOTAL KNEE ARTHROPLASTY Left 04/2015    [facility] orth[facility]ics    TUBAL LIGATION  04/02/1990    UPPER GASTROINTESTINAL ENDOSCOPY  10/14/2011    Hiatal hernia.     UPPER GASTROINTESTINAL ENDOSCOPY N/A 9/10/2018    gastritis, esophagitis-BRAVO=reflux     Allergies   Allergen Reactions    Allopurinol     Bumex [Bumetanide] Other (See Comments)     Extreme fatigue, nausea, dizziness    Colchicine     Erythromycin     Indocin [Indomethacin]     Lasix [Furosemide] Other (See Comments)     Dizziness, extreme fatigue    Lisinopril     Morphine     Norvasc [Amlodipine]     Tenormin [Atenolol]     Uloric [Febuxostat]     Hydrocodone-Acetaminophen Nausea Only    Topamax [Topiramate] Swelling and Rash       Family History   Problem Relation Age of Onset    Cancer (ALLEGRA-D 24HR) 180-240 MG per extended release tablet Take 1 tablet by mouth daily  Trini Zepeda APRN - CNP   albuterol sulfate HFA (VENTOLIN HFA) 108 (90 Base) MCG/ACT inhaler INHALE 2 PUFFS EVERY 6 HOURS IF NEEDED FOR WHEEZING  Kadi Elizondo MD   hydroCHLOROthiazide (HYDRODIURIL) 25 MG tablet take 1 tablet by mouth daily  Yaent Morataya,    levothyroxine (SYNTHROID) 125 MCG tablet take 1 tablet by mouth once daily  Yanet Gray DO   spironolactone (ALDACTONE) 100 MG tablet take 1 tablet by mouth once daily  Carolina Isaac DO   esomeprazole (NEXIUM) 40 MG delayed release capsule Take 1 capsule by mouth 2 times daily  Yanet Gray DO   metoprolol tartrate (LOPRESSOR) 50 MG tablet Take 1 tablet by mouth 2 times daily  Yanet Gray DO   potassium chloride (KLOR-CON) 8 MEQ extended release tablet take 4 tablets by mouth once daily  Yanet Gray DO   loratadine-pseudoephedrine (RA LORATA-D)  MG per extended release tablet take 1 tablet by mouth once daily  Yanet Gray DO   aspirin EC 81 MG EC tablet Take 1 tablet by mouth daily  Yanet Gray DO   lidocaine (XYLOCAINE) 5 % ointment Apply topically to affected area bid as needed  Yanet Gray DO   atorvastatin (LIPITOR) 20 MG tablet take 1 tablet by mouth once daily  Yanet Gray DO   ibuprofen (ADVIL;MOTRIN) 800 MG tablet Take 1 tablet by mouth every 8 hours as needed for Pain Would use sparingly  Yanet Gray DO   fluticasone-salmeterol (ADVAIR DISKUS) 250-50 MCG/DOSE AEPB Inhale 1 puff into the lungs every 12 hours RINSE MOUTH AFTER USE  Carolina Isaac DO   promethazine (PHENERGAN) 25 MG tablet Take 25 mg by mouth  Historical Provider, MD   b complex vitamins capsule Take 1 capsule by mouth daily  Historical Provider, MD   lamoTRIgine (LAMICTAL) 200 MG tablet Take 200 mg by mouth daily  Historical Provider, MD   traZODone (DESYREL) 300 MG tablet take 1 tablet by mouth at bedtime Historical Provider, MD   desvenlafaxine succinate (PRISTIQ) 100 MG TB24 extended release tablet take 1 tablet by mouth once daily  Historical Provider, MD   prazosin (MINIPRESS) 2 MG capsule Take 2 mg by mouth nightly   Historical Provider, MD   busPIRone (BUSPAR) 30 MG tablet Take 30 mg by mouth 3 times daily   Historical Provider, MD   Cholecalciferol (VITAMIN D3) 5000 UNITS TABS Take 1 tablet by mouth daily  Carrie Bald, DO        Social History     Tobacco Use    Smoking status: Never Smoker    Smokeless tobacco: Never Used   Substance Use Topics    Alcohol use: No        There were no vitals filed for this visit. Estimated body mass index is 45.93 kg/m² as calculated from the following:    Height as of 8/4/20: 5' 5\" (1.651 m). Weight as of 8/4/20: 276 lb (125.2 kg). Physical Exam  Vitals signs and nursing note reviewed. Constitutional:       General: She is not in acute distress. Appearance: Normal appearance. She is well-developed. She is not diaphoretic. HENT:      Head: Normocephalic and atraumatic. Right Ear: Tympanic membrane, ear canal and external ear normal.      Left Ear: Tympanic membrane, ear canal and external ear normal.      Nose: Nose normal.      Mouth/Throat:      Mouth: Mucous membranes are moist.      Pharynx: Oropharynx is clear. No oropharyngeal exudate. Eyes:      General:         Right eye: No discharge. Left eye: No discharge. Conjunctiva/sclera: Conjunctivae normal.      Pupils: Pupils are equal, round, and reactive to light. Neck:      Musculoskeletal: Normal range of motion and neck supple. Thyroid: No thyromegaly. Cardiovascular:      Rate and Rhythm: Normal rate and regular rhythm. Heart sounds: Normal heart sounds. Pulmonary:      Effort: Pulmonary effort is normal.      Breath sounds: Normal breath sounds. No wheezing or rales. Abdominal:      General: Bowel sounds are normal. There is no distension.       Palpations: Abdomen is soft. Tenderness: There is no abdominal tenderness. Lymphadenopathy:      Cervical: No cervical adenopathy. Skin:     General: Skin is warm and dry. Findings: No rash. Neurological:      General: No focal deficit present. Mental Status: She is alert and oriented to person, place, and time. Mental status is at baseline. Cranial Nerves: No cranial nerve deficit. Coordination: Coordination normal.      Gait: Gait normal.   Psychiatric:         Behavior: Behavior normal.         Thought Content: Thought content normal.         Judgment: Judgment normal.           ASSESSMENT/PLAN:  Encounter Diagnoses   Name Primary?  Chronic bilateral low back pain, unspecified whether sciatica present Yes    Preoperative clearance     Chronic daily headache     Traumatic injury of head, subsequent encounter     Type 2 diabetes mellitus without complication, without long-term current use of insulin ([facility])     Essential hypertension     Mixed hyperlipidemia     Current moderate episode of major depressive disorder without prior episode [facility]     Anxiety     Acquired hypothyroidism     Encounter for screening mammogram for malignant neoplasm of breast      Orders Placed This Encounter   Medications    loratadine-pseudoephedrine (RA LORATA-D)  MG per extended release tablet     Sig: take 1 tablet by mouth once daily     Dispense:  90 tablet     Refill:  0     Orders Placed This Encounter   Procedures    GRACIELA KATIE DIGITAL SCREEN BILATERAL     Standing Status:   Future     Standing Expiration Date:   11/1/2021     Order Specific Question:   Reason for exam:     Answer:   screening mammogram    MRI BRAIN W WO CONTRAST     Standing Status:   Future     Standing Expiration Date:   9/1/2021     Order Specific Question:   Reason for exam:     Answer:   Patient fell and hit head in March. Still having residual headache daily, visual disturbance, fatigue.     CBC Auto Differential Standing Status:   Future     Standing Expiration Date:   9/1/2021    Comprehensive Metabolic Panel     Standing Status:   Future     Standing Expiration Date:   9/1/2021    Hemoglobin A1C     Standing Status:   Future     Standing Expiration Date:   9/1/2021    Lipid Panel     Standing Status:   Future     Standing Expiration Date:   9/1/2021     Order Specific Question:   Is Patient Fasting?/# of Hours     Answer:   12 hours    TSH without Reflex     Standing Status:   Future     Standing Expiration Date:   9/1/2021    T4, Free     Standing Status:   Future     Standing Expiration Date:   9/1/2021     At this point we will do an MRI of the brain to ensure there is no residual intracranial abnormality from fall in March. She has had daily persistent headaches which some days are worse than others. May need to consider additional preventative medication to help with the daily headache. Patient is to continue on her current medical therapy. No changes are made at this time. Patient is to continue to follow a low-carb/low sugar/low-fat diet and increase exercise for optimal blood sugar and cholesterol control. Patient is medically stable at this time and is cleared for upcoming proposed L5-S1 decompression/posterior fusion with removal of L3-L4 hardware. Patient is aware to hold aspirin and NSAIDs 1 week prior to surgical intervention. Patient is to return to my office in 6 months duration or sooner if any further problems or symptoms arise. (Please note that portions of this note were completed with a voice recognition program. Efforts were made to edit the dictations but occasionally words are mis-transcribed.)          No follow-ups on file. An electronic signature was used to authenticate this note.     --Arnav Velasco DO on 9/1/2020 at 7:23 AM

## 2020-09-01 NOTE — PATIENT INSTRUCTIONS
Hospital Outpatient Visit on 08/22/2020   Component Date Value Ref Range Status    Thyroxine, Free 08/22/2020 0.97  0.93 - 1.70 ng/dL Final    TSH 08/22/2020 3.84  0.30 - 5.00 mIU/L Final    Cholesterol 08/22/2020 196  <200 mg/dL Final    Comment:    Cholesterol Guidelines:      <200  Desirable   200-240  Borderline      >240  Undesirable         HDL 08/22/2020 52  >40 mg/dL Final    Comment:    HDL Guidelines:    <40     Undesirable   40-59    Borderline    >59     Desirable         LDL Cholesterol 08/22/2020 118  0 - 130 mg/dL Final    Comment:    LDL Guidelines:     <100    Desirable   100-129   Near to/above Desirable   130-159   Borderline      >159   Undesirable     Direct (measured) LDL and calculated LDL are not interchangeable tests.  Chol/HDL Ratio 08/22/2020 3.8  <5 Final            Triglycerides 08/22/2020 131  <150 mg/dL Final    Comment:    Triglyceride Guidelines:     <150   Desirable   150-199  Borderline   200-499  High     >499   Very high   Based on AHA Guidelines for fasting triglyceride, October 2012.          VLDL 08/22/2020 NOT REPORTED  1 - 30 mg/dL Final    Hemoglobin A1C 08/22/2020 5.8  4.8 - 5.9 % Final    Estimated Avg Glucose 08/22/2020 120  mg/dL Final    Glucose 08/22/2020 93  70 - 99 mg/dL Final    BUN 08/22/2020 14  6 - 20 mg/dL Final    CREATININE 08/22/2020 1.03* 0.50 - 0.90 mg/dL Final    Bun/Cre Ratio 08/22/2020 14  9 - 20 Final    Calcium 08/22/2020 9.4  8.6 - 10.4 mg/dL Final    Sodium 08/22/2020 138  135 - 144 mmol/L Final    Potassium 08/22/2020 4.2  3.7 - 5.3 mmol/L Final    Chloride 08/22/2020 99  98 - 107 mmol/L Final    CO2 08/22/2020 30  20 - 31 mmol/L Final    Anion Gap 08/22/2020 9  9 - 17 mmol/L Final    Alkaline Phosphatase 08/22/2020 146* 35 - 104 U/L Final    ALT 08/22/2020 13  5 - 33 U/L Final    AST 08/22/2020 11  <32 U/L Final    Total Bilirubin 08/22/2020 0.22* 0.3 - 1.2 mg/dL Final    Total Protein 08/22/2020 7.0  6.4 - 8.3 g/dL Final    Alb 08/22/2020 4.1  3.5 - 5.2 g/dL Final    Albumin/Globulin Ratio 08/22/2020 1.4  1.0 - 2.5 Final    GFR Non- 08/22/2020 56* >60 mL/min Final    GFR  08/22/2020 >60  >60 mL/min Final    GFR Comment 08/22/2020        Final    Comment: Average GFR for 52-63 years old:   80 mL/min/1.73sq m  Chronic Kidney Disease:   <60 mL/min/1.73sq m  Kidney failure:   <15 mL/min/1.73sq m              eGFR calculated using average adult body mass.  Additional eGFR calculator available at:        [email or web address]            GFR Staging 08/22/2020 NOT REPORTED   Final    WBC 08/22/2020 10.5  3.5 - 11.3 k/uL Final    RBC 08/22/2020 4.49  3.95 - 5.11 m/uL Final    Hemoglobin 08/22/2020 12.3  11.9 - 15.1 g/dL Final    Hematocrit 08/22/2020 38.8  36.3 - 47.1 % Final    MCV 08/22/2020 86.4  82.6 - 102.9 fL Final    MCH 08/22/2020 27.4  25.2 - 33.5 pg Final    MCHC 08/22/2020 31.7  25.2 - 33.5 g/dL Final    RDW 08/22/2020 15.9* 11.8 - 14.4 % Final    Platelets 33/33/0954 264  138 - 453 k/uL Final    MPV 08/22/2020 10.2  8.1 - 13.5 fL Final    NRBC Automated 08/22/2020 0.0  0.0 per 100 WBC Final    Differential Type 08/22/2020 NOT REPORTED   Final    Seg Neutrophils 08/22/2020 67* 36 - 65 % Final    Lymphocytes 08/22/2020 24  24 - 43 % Final    Monocytes 08/22/2020 7  3 - 12 % Final    Eosinophils % 08/22/2020 1  1 - 4 % Final    Basophils 08/22/2020 0  0 - 2 % Final    Immature Granulocytes 08/22/2020 1* 0 % Final    Segs Absolute 08/22/2020 6.99  1.50 - 8.10 k/uL Final    Absolute Lymph # 08/22/2020 2.55  1.10 - 3.70 k/uL Final    Absolute Mono # 08/22/2020 0.74  0.10 - 1.20 k/uL Final    Absolute Eos # 08/22/2020 0.11  0.00 - 0.44 k/uL Final    Basophils Absolute 08/22/2020 0.04  0.00 - 0.20 k/uL Final    Absolute Immature Granulocyte 08/22/2020 0.08  0.00 - 0.30 k/uL Final    WBC Morphology 08/22/2020 NOT REPORTED   Final    RBC Morphology 08/22/2020 ANISOCYTOSIS PRESENT   Final    Platelet Estimate 88/98/7804 NOT REPORTED   Final    Microalb, Ur 08/22/2020 <12  <21 mg/L Final    Creatinine, Ur 08/22/2020 168.8  28.0 - 217.0 mg/dL Final    Microalb/Crt.  Ratio 08/22/2020 CANNOT BE CALCULATED  <25 mcg/mg creat Final

## 2020-09-01 NOTE — PROGRESS NOTES
Reminded patient she is due for second dose of shingles vaccine and a second and third dose of hep B. Verbalizes understanding.

## 2020-09-02 ENCOUNTER — HOSPITAL ENCOUNTER (OUTPATIENT)
Dept: LAB | Age: 50
Discharge: HOME | End: 2020-09-02
Payer: COMMERCIAL

## 2020-09-02 LAB
ANION GAP SERPL CALCULATED.3IONS-SCNC: 10 MMOL/L (ref 9–17)
BUN BLDV-MCNC: 9 MG/DL (ref 6–20)
BUN/CREAT BLD: 9 (ref 9–20)
CALCIUM SERPL-MCNC: 9.4 MG/DL (ref 8.6–10.4)
CHLORIDE BLD-SCNC: 96 MMOL/L (ref 98–107)
CO2: 28 MMOL/L (ref 20–31)
CREAT SERPL-MCNC: 1.02 MG/DL (ref 0.5–0.9)
GFR AFRICAN AMERICAN: >60 ML/MIN
GFR NON-AFRICAN AMERICAN: 57 ML/MIN
GFR SERPL CREATININE-BSD FRML MDRD: ABNORMAL ML/MIN/{1.73_M2}
GFR SERPL CREATININE-BSD FRML MDRD: ABNORMAL ML/MIN/{1.73_M2}
GLUCOSE BLD-MCNC: 129 MG/DL (ref 70–99)
INR BLD: 1
PARTIAL THROMBOPLASTIN TIME: 31.7 SEC (ref 23.9–33.8)
POTASSIUM SERPL-SCNC: 4.1 MMOL/L (ref 3.7–5.3)
PROTHROMBIN TIME: 12.5 SEC (ref 11.5–14.2)
SODIUM BLD-SCNC: 134 MMOL/L (ref 135–144)

## 2020-09-02 PROCEDURE — 36415 COLL VENOUS BLD VENIPUNCTURE: CPT

## 2020-09-02 PROCEDURE — 80048 BASIC METABOLIC PNL TOTAL CA: CPT

## 2020-09-02 PROCEDURE — 87641 MR-STAPH DNA AMP PROBE: CPT

## 2020-09-02 PROCEDURE — 85730 THROMBOPLASTIN TIME PARTIAL: CPT

## 2020-09-02 PROCEDURE — 85610 PROTHROMBIN TIME: CPT

## 2020-09-03 LAB
MRSA, DNA, NASAL: NORMAL
SPECIMEN DESCRIPTION: NORMAL

## 2020-09-11 ENCOUNTER — HOSPITAL ENCOUNTER (OUTPATIENT)
Dept: MRI IMAGING | Age: 50
Discharge: HOME | End: 2020-09-13
Payer: COMMERCIAL

## 2020-09-11 PROCEDURE — 6360000004 HC RX CONTRAST MEDICATION: Performed by: FAMILY MEDICINE

## 2020-09-11 PROCEDURE — 70553 MRI BRAIN STEM W/O & W/DYE: CPT

## 2020-09-11 PROCEDURE — A9579 GAD-BASE MR CONTRAST NOS,1ML: HCPCS | Performed by: FAMILY MEDICINE

## 2020-09-11 RX ADMIN — GADOTERIDOL 15 ML: 279.3 INJECTION, SOLUTION INTRAVENOUS at 11:35

## 2020-10-02 ENCOUNTER — TELEPHONE (OUTPATIENT)
Dept: FAMILY MEDICINE CLINIC | Age: 50
End: 2020-10-02

## 2020-10-02 NOTE — TELEPHONE ENCOUNTER
Home health certification and plan of care done 10/02/2020 on patient for date services 09/26/2020-11/24/2020. Verified medications. Physician time spent is 15 minutes for activities to coordinate services, documenting, medical decision making, and review of reports, treatment plans, and test results.

## 2020-11-09 RX ORDER — HYDROCHLOROTHIAZIDE 25 MG/1
TABLET ORAL
Qty: 90 TABLET | Refills: 1 | Status: SHIPPED | OUTPATIENT
Start: 2020-11-09 | End: 2021-04-13 | Stop reason: SDUPTHER

## 2020-11-09 RX ORDER — LEVOTHYROXINE SODIUM 0.12 MG/1
TABLET ORAL
Qty: 90 TABLET | Refills: 1 | Status: SHIPPED | OUTPATIENT
Start: 2020-11-09 | End: 2021-04-13 | Stop reason: SDUPTHER

## 2020-11-09 RX ORDER — MIRABEGRON 50 MG/1
TABLET, FILM COATED, EXTENDED RELEASE ORAL
Qty: 90 TABLET | Refills: 1 | Status: SHIPPED | OUTPATIENT
Start: 2020-11-09 | End: 2021-04-13 | Stop reason: SDUPTHER

## 2020-11-09 RX ORDER — POTASSIUM CHLORIDE 600 MG/1
TABLET, FILM COATED, EXTENDED RELEASE ORAL
Qty: 360 TABLET | Refills: 1 | Status: SHIPPED | OUTPATIENT
Start: 2020-11-09 | End: 2021-04-13 | Stop reason: SDUPTHER

## 2020-11-09 NOTE — TELEPHONE ENCOUNTER
Ginny Delarosa called requesting a refill of the below medication which has been pended for you:     Requested Prescriptions     Pending Prescriptions Disp Refills    potassium chloride (KLOR-CON) 8 MEQ extended release tablet [Pharmacy Med Name: POTASSIUM CHLORIDE ER 8MEQ TABLETS] 360 tablet 1     Sig: TAKE 4 TABLETS BY MOUTH ONCE DAILY    MYRBETRIQ 50 MG TB24 [Pharmacy Med Name: MYRBETRIQ 50MG TABLETS] 90 tablet 1     Sig: TAKE 1 TABLET BY MOUTH DAILY    levothyroxine (SYNTHROID) 125 MCG tablet [Pharmacy Med Name: LEVOTHYROXINE 0.125MG (125MCG) TAB] 90 tablet 1     Sig: TAKE 1 TABLET BY MOUTH EVERY DAY    hydroCHLOROthiazide (HYDRODIURIL) 25 MG tablet [Pharmacy Med Name: HYDROCHLOROTHIAZIDE 25MG TABLETS] 90 tablet 1     Sig: TAKE 1 TABLET BY MOUTH DAILY       Last Appointment Date: 9/1/2020  Next Appointment Date: 3/5/2021    Allergies   Allergen Reactions    Allopurinol     Bumex [Bumetanide] Other (See Comments)     Extreme fatigue, nausea, dizziness    Colchicine     Erythromycin     Indocin [Indomethacin]     Lasix [Furosemide] Other (See Comments)     Dizziness, extreme fatigue    Lisinopril     Morphine     Norvasc [Amlodipine]     Tenormin [Atenolol]     Uloric [Febuxostat]     Hydrocodone-Acetaminophen Nausea Only    Topamax [Topiramate] Swelling and Rash

## 2020-11-13 ENCOUNTER — TELEPHONE (OUTPATIENT)
Dept: FAMILY MEDICINE CLINIC | Age: 50
End: 2020-11-13

## 2020-11-13 ENCOUNTER — HOSPITAL ENCOUNTER (OUTPATIENT)
Dept: LAB | Age: 50
Discharge: HOME | End: 2020-11-13
Payer: COMMERCIAL

## 2020-11-13 ENCOUNTER — VIRTUAL VISIT (OUTPATIENT)
Dept: PRIMARY CARE CLINIC | Age: 50
End: 2020-11-13
Payer: COMMERCIAL

## 2020-11-13 LAB
-: ABNORMAL
AMORPHOUS: ABNORMAL
BACTERIA: ABNORMAL
BILIRUBIN URINE: NEGATIVE
CASTS UA: ABNORMAL /LPF (ref 0–2)
COLOR: ABNORMAL
COMMENT UA: ABNORMAL
CRYSTALS, UA: ABNORMAL /HPF
EPITHELIAL CELLS UA: ABNORMAL /HPF (ref 0–5)
GLUCOSE URINE: NEGATIVE
KETONES, URINE: NEGATIVE
LEUKOCYTE ESTERASE, URINE: ABNORMAL
MUCUS: ABNORMAL
NITRITE, URINE: POSITIVE
OTHER OBSERVATIONS UA: ABNORMAL
PH UA: 6.5 (ref 5–6)
PROTEIN UA: ABNORMAL
RBC UA: ABNORMAL /HPF (ref 0–4)
RENAL EPITHELIAL, UA: ABNORMAL /HPF
SPECIFIC GRAVITY UA: 1.02 (ref 1.01–1.02)
TRICHOMONAS: ABNORMAL
TURBIDITY: ABNORMAL
URINE HGB: ABNORMAL
UROBILINOGEN, URINE: NORMAL
WBC UA: >100 /HPF (ref 0–4)
YEAST: ABNORMAL

## 2020-11-13 PROCEDURE — 98966 PH1 ASSMT&MGMT NQHP 5-10: CPT | Performed by: NURSE PRACTITIONER

## 2020-11-13 PROCEDURE — 87086 URINE CULTURE/COLONY COUNT: CPT

## 2020-11-13 PROCEDURE — 81001 URINALYSIS AUTO W/SCOPE: CPT

## 2020-11-13 PROCEDURE — 87088 URINE BACTERIA CULTURE: CPT

## 2020-11-13 PROCEDURE — 87186 SC STD MICRODIL/AGAR DIL: CPT

## 2020-11-13 RX ORDER — SULFAMETHOXAZOLE AND TRIMETHOPRIM 400; 80 MG/1; MG/1
1 TABLET ORAL 2 TIMES DAILY
Qty: 14 TABLET | Refills: 0 | Status: SHIPPED | OUTPATIENT
Start: 2020-11-13 | End: 2020-11-20

## 2020-11-13 RX ORDER — PHENAZOPYRIDINE HYDROCHLORIDE 200 MG/1
200 TABLET, FILM COATED ORAL 3 TIMES DAILY PRN
Qty: 9 TABLET | Refills: 0 | Status: SHIPPED | OUTPATIENT
Start: 2020-11-13 | End: 2020-11-16

## 2020-11-13 NOTE — TELEPHONE ENCOUNTER
Called patient and reminded her to complete mammogram and she states her dad just passed away yesterday so she will take care of this later.

## 2020-11-15 LAB
CULTURE: ABNORMAL
Lab: ABNORMAL
SPECIMEN DESCRIPTION: ABNORMAL

## 2020-11-25 NOTE — TELEPHONE ENCOUNTER
Navid Miller called requesting a refill of the below medication which has been pended for you:     Requested Prescriptions     Pending Prescriptions Disp Refills    spironolactone (ALDACTONE) 100 MG tablet [Pharmacy Med Name: SPIRONOLACTONE 100MG TABLETS] 90 tablet 1     Sig: TAKE 1 TABLET BY MOUTH EVERY DAY    esomeprazole ([employer]) 40 MG delayed release capsule [Pharmacy Med Name: ESOMEPRAZOLE MAGNESIUM 40MG DR CAPS] 180 capsule 1     Sig: TAKE 1 CAPSULE BY MOUTH TWICE DAILY    ASPIRIN LOW DOSE 81 MG EC tablet [Pharmacy Med Name: ASPIRIN 81MG EC LOW DOSE TABLETS] 90 tablet 1     Sig: TAKE 1 TABLET BY MOUTH DAILY       Last Appointment Date: 9/1/2020  Next Appointment Date: 3/5/2021    Allergies   Allergen Reactions    Allopurinol     Bumex [Bumetanide] Other (See Comments)     Extreme fatigue, nausea, dizziness    Colchicine     Erythromycin     Indocin [Indomethacin]     Lasix [Furosemide] Other (See Comments)     Dizziness, extreme fatigue    Lisinopril     Morphine     Norvasc [Amlodipine]     Tenormin [Atenolol]     Uloric [Febuxostat]     Hydrocodone-Acetaminophen Nausea Only    Topamax [Topiramate] Swelling and Rash

## 2020-11-28 RX ORDER — ESOMEPRAZOLE MAGNESIUM 40 MG/1
CAPSULE, DELAYED RELEASE ORAL
Qty: 180 CAPSULE | Refills: 1 | Status: SHIPPED | OUTPATIENT
Start: 2020-11-28 | End: 2021-04-13 | Stop reason: SDUPTHER

## 2020-11-28 RX ORDER — ASPIRIN 81 MG/1
TABLET, COATED ORAL
Qty: 90 TABLET | Refills: 1 | Status: SHIPPED | OUTPATIENT
Start: 2020-11-28 | End: 2021-04-13 | Stop reason: SDUPTHER

## 2020-11-28 RX ORDER — SPIRONOLACTONE 100 MG/1
TABLET, FILM COATED ORAL
Qty: 90 TABLET | Refills: 1 | Status: SHIPPED | OUTPATIENT
Start: 2020-11-28 | End: 2021-04-13 | Stop reason: SDUPTHER

## 2020-12-12 ENCOUNTER — OFFICE VISIT (OUTPATIENT)
Dept: PRIMARY CARE CLINIC | Age: 50
End: 2020-12-12
Payer: COMMERCIAL

## 2020-12-12 ENCOUNTER — HOSPITAL ENCOUNTER (OUTPATIENT)
Age: 50
Setting detail: SPECIMEN
Discharge: HOME | End: 2020-12-12
Payer: COMMERCIAL

## 2020-12-12 VITALS
OXYGEN SATURATION: 96 % | TEMPERATURE: 97.9 F | SYSTOLIC BLOOD PRESSURE: 112 MMHG | BODY MASS INDEX: 45 KG/M2 | HEART RATE: 89 BPM | WEIGHT: 280 LBS | HEIGHT: 66 IN | DIASTOLIC BLOOD PRESSURE: 68 MMHG

## 2020-12-12 LAB
-: ABNORMAL
AMORPHOUS: ABNORMAL
BACTERIA: ABNORMAL
BILIRUBIN URINE: NEGATIVE
CASTS UA: ABNORMAL /LPF (ref 0–2)
COLOR: ABNORMAL
COMMENT UA: ABNORMAL
CRYSTALS, UA: ABNORMAL /HPF
EPITHELIAL CELLS UA: ABNORMAL /HPF (ref 0–5)
GLUCOSE URINE: NEGATIVE
KETONES, URINE: NEGATIVE
LEUKOCYTE ESTERASE, URINE: ABNORMAL
MUCUS: ABNORMAL
NITRITE, URINE: POSITIVE
OTHER OBSERVATIONS UA: ABNORMAL
PH UA: 7 (ref 5–6)
PROTEIN UA: NEGATIVE
RBC UA: ABNORMAL /HPF (ref 0–4)
RENAL EPITHELIAL, UA: ABNORMAL /HPF
SPECIFIC GRAVITY UA: 1.01 (ref 1.01–1.02)
TRICHOMONAS: ABNORMAL
TURBIDITY: ABNORMAL
URINE HGB: ABNORMAL
UROBILINOGEN, URINE: NORMAL
WBC UA: >100 /HPF (ref 0–4)
YEAST: ABNORMAL

## 2020-12-12 PROCEDURE — 87186 SC STD MICRODIL/AGAR DIL: CPT

## 2020-12-12 PROCEDURE — 99213 OFFICE O/P EST LOW 20 MIN: CPT | Performed by: NURSE PRACTITIONER

## 2020-12-12 PROCEDURE — 81001 URINALYSIS AUTO W/SCOPE: CPT

## 2020-12-12 PROCEDURE — 87088 URINE BACTERIA CULTURE: CPT

## 2020-12-12 PROCEDURE — 87086 URINE CULTURE/COLONY COUNT: CPT

## 2020-12-12 RX ORDER — SULFAMETHOXAZOLE AND TRIMETHOPRIM 800; 160 MG/1; MG/1
1 TABLET ORAL 2 TIMES DAILY
Qty: 14 TABLET | Refills: 0 | Status: SHIPPED | OUTPATIENT
Start: 2020-12-12 | End: 2020-12-19

## 2020-12-12 ASSESSMENT — ENCOUNTER SYMPTOMS
NAUSEA: 0
SHORTNESS OF BREATH: 0
VOMITING: 0

## 2020-12-12 NOTE — PATIENT INSTRUCTIONS
Urinalysis reviewed with patient and antibiotic options reviewed with pt. Will send in bactrim. Complete full course of antibiotic even if feeling better. Increase water intake to help flush bacteria. Avoid caffeine and alcohol at this time as these can make symptoms worse. Call or return to clinic as needed if these symptoms worsen or fail to improve in the next 48 hours. We will send urine for a culture and if any antibiotic changes are indicated, we will do so at that time. Please schedule follow up with your PCP.

## 2020-12-12 NOTE — PROGRESS NOTES
[address] [place] [address]  Dept: [phone]  Dept Fax: [phone]  Loc: [phone]        CHIEF COMPLAINT       Chief Complaint   Patient presents with    Urinary Frequency     urine is cloudy and strong. Was treated for a UTI 1 month ago, and sx never fully went away. Nurses Notes reviewed and I agree except as noted in the HPI. HISTORY OF PRESENT ILLNESS   Lucia Valente is a 45 y.o. female who presents to [facility] today (12/12/2020) for evaluation of:   Urinary Frequency   This is a new problem. The current episode started 1 to 4 weeks ago (x 1.5 weeks). The problem occurs every urination. The problem has been unchanged. The patient is experiencing no pain. There has been no fever. Associated symptoms include frequency and urgency. Pertinent negatives include no chills, flank pain, hematuria, nausea or vomiting. Associated symptoms comments: Cloudy urine, odor. She has tried nothing for the symptoms. Her past medical history is significant for recurrent UTIs. REVIEW OF SYSTEMS     Review of Systems   Constitutional: Negative for chills and fever. Respiratory: Negative for shortness of breath. Cardiovascular: Negative for chest pain. Gastrointestinal: Negative for nausea and vomiting. Genitourinary: Positive for frequency and urgency. Negative for dysuria, flank pain and hematuria. PAST MEDICAL HISTORY         Diagnosis Date    Abnormal CT of the abdomen     revealed 2 very small noncalcified pulmonary nodules in the right lung base. Suspect benign progress but repeat CT scan in June 2013 advised.     Allergic rhinitis     Anemia     Anxiety     Asthma     Chronic low back pain     Chronic neck pain     Degenerative joint disease of knee     Bilateral.    Depression     Endometriosis     history of     GERD (gastroesophageal reflux disease)     Gout     Hyperlipidemia     Hypertension     Hypothyroid     Kidney stone     history of     Left shoulder pain     Status post injections.  Leukocytosis     Palpitation     history of     Paresthesias     Generalized    Right knee pain     Status post injections.  Seizure ([facility] [address]     \"nocturnal seizures\"       SURGICAL HISTORY     Patient  has a past surgical history that includes Lumbar disc surgery (10/6/10); Lumbar spine surgery (Left, 2/13/08); laparoscopy (03/08/2002); Tubal ligation (04/02/1990); arthroplasty (03/26/1986); Knee arthroscopy (Left); Nasal septum surgery; spinal cord decompression (4/23/12); Cystoscopy (2/8/13); Knee arthroscopy (Left, 10/16/13); Hysterectomy (05/2005); Knee arthroscopy (Right, 02/08/2012); Cholecystectomy, laparoscopic (11/14/2011); cervical fusion (6/2014); other surgical history (12/30/2014); other surgical history (04/16/2015); Total knee arthroplasty (Left, 04/2015); joint replacement; Upper gastrointestinal endoscopy (10/14/2011); and Upper gastrointestinal endoscopy (N/A, 9/10/2018).     CURRENT MEDICATIONS       Outpatient Medications Prior to Visit   Medication Sig Dispense Refill    spironolactone (ALDACTONE) 100 MG tablet TAKE 1 TABLET BY MOUTH EVERY DAY 90 tablet 1    esomeprazole (NEXIUM) 40 MG delayed release capsule TAKE 1 CAPSULE BY MOUTH TWICE DAILY 180 capsule 1    ASPIRIN LOW DOSE 81 MG EC tablet TAKE 1 TABLET BY MOUTH DAILY 90 tablet 1    levothyroxine (SYNTHROID) 125 MCG tablet TAKE 1 TABLET BY MOUTH EVERY DAY 90 tablet 1    hydroCHLOROthiazide (HYDRODIURIL) 25 MG tablet TAKE 1 TABLET BY MOUTH DAILY 90 tablet 1    lamoTRIgine (LAMICTAL) 100 MG tablet Take by mouth 2 times daily 100 mg am and 150 mg pm, may increase to 150 mg twice daily on 9/15/20      loratadine-pseudoephedrine (RA LORATA-D)  MG per extended release tablet take 1 tablet by mouth once daily 90 tablet 0    OXcarbazepine (TRILEPTAL) 300 MG tablet TAKE 1 TABLET BY MOUTH TWICE DAILY 60 tablet 5    albuterol sulfate HFA (VENTOLIN HFA) 108 (90 Base) MCG/ACT inhaler INHALE 2 PUFFS EVERY 6 HOURS IF NEEDED FOR WHEEZING 18 g 5    fluticasone-salmeterol (ADVAIR DISKUS) 250-50 MCG/DOSE AEPB Inhale 1 puff into the lungs every 12 hours RINSE MOUTH AFTER USE 1 Inhaler 5    traZODone (DESYREL) 300 MG tablet take 1 tablet by mouth at bedtime  0    desvenlafaxine succinate (PRISTIQ) 100 MG TB24 extended release tablet take 1 tablet by mouth once daily  0    prazosin (MINIPRESS) 2 MG capsule Take 2 mg by mouth nightly       Cholecalciferol (VITAMIN D3) 5000 UNITS TABS Take 1 tablet by mouth daily 30 tablet 2    potassium chloride (KLOR-CON) 8 MEQ extended release tablet TAKE 4 TABLETS BY MOUTH ONCE DAILY (Patient not taking: Reported on 12/12/2020) 360 tablet 1    MYRBETRIQ 50 MG TB24 TAKE 1 TABLET BY MOUTH DAILY 90 tablet 1    gabapentin (NEURONTIN) 600 MG tablet TAKE 1 TABLET BY MOUTH THREE TIMES DAILY 90 tablet 1    metoprolol tartrate (LOPRESSOR) 50 MG tablet Take 1 tablet by mouth 2 times daily 180 tablet 1    atorvastatin (LIPITOR) 20 MG tablet take 1 tablet by mouth once daily (Patient not taking: Reported on 11/13/2020) 30 tablet 5    busPIRone (BUSPAR) 30 MG tablet Take 30 mg by mouth 3 times daily        No facility-administered medications prior to visit. ALLERGIES     Patient is is allergic to allopurinol; bumex [bumetanide]; colchicine; erythromycin; indocin [indomethacin]; lasix [furosemide]; lisinopril; morphine; norvasc [amlodipine]; tenormin [atenolol]; uloric [febuxostat]; hydrocodone-acetaminophen; and topamax [topiramate].     FAMILY HISTORY     Patient's family history includes Anxiety Disorder in her daughter and daughter; Asthma in her daughter and another family member; Cancer in her mother; Coronary Art Dis in her father; Depression in her daughter and daughter; Diabetes in her paternal grandfather and paternal grandmother; Glaucoma in her father; Heart Disease in her father, paternal grandfather, and paternal grandmother; High Blood Pressure in her paternal grandfather and paternal grandmother; Hypertension in her father; Prostate Cancer in her father; Thyroid Disease in her mother and paternal grandmother. SOCIAL HISTORY     Patient  reports that she has never smoked. She has never used smokeless tobacco. She reports that she does not drink alcohol or use drugs. PHYSICAL EXAM     VITALS  BP: 112/68, Temp: 97.9 °F (36.6 °C), Pulse: 89,  , SpO2: 96 %  Physical Exam  Vitals signs reviewed. Constitutional:       Appearance: Normal appearance. Neck:      Musculoskeletal: Normal range of motion and neck supple. Cardiovascular:      Rate and Rhythm: Normal rate and regular rhythm. Heart sounds: Normal heart sounds. No murmur. Pulmonary:      Effort: Pulmonary effort is normal. No respiratory distress. Breath sounds: Normal breath sounds. No wheezing or rhonchi. Abdominal:      General: Bowel sounds are normal.      Palpations: Abdomen is soft. Tenderness: There is no abdominal tenderness. There is no right CVA tenderness or left CVA tenderness. Musculoskeletal: Normal range of motion. Lymphadenopathy:      Cervical: No cervical adenopathy. Skin:     General: Skin is warm and dry. Capillary Refill: Capillary refill takes less than 2 seconds. Neurological:      General: No focal deficit present. Mental Status: She is alert.          DIAGNOSTIC RESULTS   Labs:No results found for this visit on 12/12/20.    12/12/20 1137     Microscopic Urinalysis   Collected: 12/12/20 1108  Final result    -      Bacteria, UA 1+Abnormal    WBC, UA >100 /HPF Mucus, UA NOT REPORTED   RBC, UA 0 TO 4 /HPF Trichomonas, UA NOT REPORTED   Casts UA NOT REPORTED /LPF Amorphous, UA NOT REPORTED   Crystals, UA NOT REPORTED /HPF Other Observations UA Specimen CulturedAbnormal    Epithelial Cells, UA 5 TO 10 /HPF Yeast, Urine NOT REPORTED Renal Epithelial, UA NOT REPORTED /HPF            12/12/20 1137     Urinalysis Reflex to Culture   Collected: 12/12/20 1108  Final result  Specimen: Urine, clean catch    Color, UA NOT REPORTED pH, UA 7.0High    Turbidity UA NOT REPORTED Protein, UA NEGATIVE   Glucose, UA NEGATIVE Urobilinogen, Urine Normal   Bilirubin, Urine NEGATIVE Nitrite, Urine POSITIVEAbnormal    Ketones, Urine NEGATIVE Leukocyte Esterase, Urine 3+Abnormal    Specific Gravity, UA 1.010 Urinalysis Comments NOT REPORTED   Urine Hgb TRACEAbnormal               IMAGING:        CLINICAL COURSE:     Vitals:    12/12/20 1143   BP: 112/68   Site: Left Upper Arm   Position: Sitting   Pulse: 89   Temp: 97.9 °F (36.6 °C)   TempSrc: Tympanic   SpO2: 96%   Weight: 280 lb (127 kg)   Height: 5' 6\" (1.676 m)           PROCEDURES:  None  FINAL IMPRESSION      1. Cloudy urine         DISPOSITION/PLAN     Patient Instructions   Urinalysis reviewed with patient and antibiotic options reviewed with pt. Will send in bactrim. Complete full course of antibiotic even if feeling better. Increase water intake to help flush bacteria. Avoid caffeine and alcohol at this time as these can make symptoms worse. Call or return to clinic as needed if these symptoms worsen or fail to improve in the next 48 hours. We will send urine for a culture and if any antibiotic changes are indicated, we will do so at that time. Please schedule follow up with your PCP. Orders Placed This Encounter   Procedures    Urinalysis Reflex to Culture     Standing Status:   Future     Number of Occurrences:   1     Standing Expiration Date:   12/12/2021     Order Specific Question:   SPECIFY(EX-CATH,MIDSTREAM,CYSTO,ETC)?      Answer:   midstream     Outpatient Encounter Medications as of 12/12/2020   Medication Sig Dispense Refill    sulfamethoxazole-trimethoprim (BACTRIM DS;SEPTRA DS) 800-160 MG per tablet Take 1 tablet by mouth 2 times daily for 7 days 14 tablet 0    spironolactone (ALDACTONE) 100 MG tablet TAKE 1 TABLET BY MOUTH EVERY DAY 90 tablet 1    esomeprazole (NEXIUM) 40 MG delayed release capsule TAKE 1 CAPSULE BY MOUTH TWICE DAILY 180 capsule 1    ASPIRIN LOW DOSE 81 MG EC tablet TAKE 1 TABLET BY MOUTH DAILY 90 tablet 1    levothyroxine (SYNTHROID) 125 MCG tablet TAKE 1 TABLET BY MOUTH EVERY DAY 90 tablet 1    hydroCHLOROthiazide (HYDRODIURIL) 25 MG tablet TAKE 1 TABLET BY MOUTH DAILY 90 tablet 1    lamoTRIgine (LAMICTAL) 100 MG tablet Take by mouth 2 times daily 100 mg am and 150 mg pm, may increase to 150 mg twice daily on 9/15/20      loratadine-pseudoephedrine (RA LORATA-D)  MG per extended release tablet take 1 tablet by mouth once daily 90 tablet 0    OXcarbazepine (TRILEPTAL) 300 MG tablet TAKE 1 TABLET BY MOUTH TWICE DAILY 60 tablet 5    albuterol sulfate HFA (VENTOLIN HFA) 108 (90 Base) MCG/ACT inhaler INHALE 2 PUFFS EVERY 6 HOURS IF NEEDED FOR WHEEZING 18 g 5    fluticasone-salmeterol (ADVAIR DISKUS) 250-50 MCG/DOSE AEPB Inhale 1 puff into the lungs every 12 hours RINSE MOUTH AFTER USE 1 Inhaler 5    traZODone (DESYREL) 300 MG tablet take 1 tablet by mouth at bedtime  0    desvenlafaxine succinate (PRISTIQ) 100 MG TB24 extended release tablet take 1 tablet by mouth once daily  0    prazosin (MINIPRESS) 2 MG capsule Take 2 mg by mouth nightly       Cholecalciferol (VITAMIN D3) 5000 UNITS TABS Take 1 tablet by mouth daily 30 tablet 2    potassium chloride (KLOR-CON) 8 MEQ extended release tablet TAKE 4 TABLETS BY MOUTH ONCE DAILY (Patient not taking: Reported on 12/12/2020) 360 tablet 1    MYRBETRIQ 50 MG TB24 TAKE 1 TABLET BY MOUTH DAILY 90 tablet 1    gabapentin (NEURONTIN) 600 MG tablet TAKE 1 TABLET BY MOUTH THREE TIMES DAILY 90 tablet 1    metoprolol tartrate (LOPRESSOR) 50 MG tablet Take 1 tablet by mouth 2 times daily 180 tablet 1    atorvastatin (LIPITOR) 20 MG tablet take 1 tablet by mouth once daily (Patient not taking:

## 2020-12-14 LAB
CULTURE: ABNORMAL
Lab: ABNORMAL
SPECIMEN DESCRIPTION: ABNORMAL

## 2020-12-14 RX ORDER — GABAPENTIN 600 MG/1
TABLET ORAL
Qty: 90 TABLET | Refills: 1 | OUTPATIENT
Start: 2020-12-14 | End: 2021-01-13

## 2020-12-14 NOTE — TELEPHONE ENCOUNTER
Left refilled #[id number] on 9/4/20    Left message for patient to see if can wait until Wednesday.      Luca Thompson is requesting a refill on the following medication(s):  Requested Prescriptions     Pending Prescriptions Disp Refills    gabapentin (NEURONTIN) 600 MG tablet 90 tablet 1     Sig: TAKE 1 TABLET BY MOUTH THREE TIMES DAILY       Last Visit Date (If Applicable):  8/8/7603    Next Visit Date:    3/5/2021

## 2020-12-14 NOTE — TELEPHONE ENCOUNTER
Pt has not filled the medication since 09/17/2020. She will need to discuss with PCP about writing the medication. Controlled Substance Monitoring:    Acute and Chronic Pain Monitoring:   RX Monitoring 5/22/2020   Attestation -   Periodic Controlled Substance Monitoring Possible medication side effects, risk of tolerance/dependence & alternative treatments discussed. ;Assessed functional status.    Chronic Pain > 80 MEDD -

## 2020-12-14 NOTE — TELEPHONE ENCOUNTER
Pt states she used to get this from Dr Aby Wallace but states she doesn't see him any more and is requesting this to be filled by [facility].

## 2020-12-15 NOTE — TELEPHONE ENCOUNTER
Notified patient. She states that she takes 600 mg three times a day, but she often forgets the evening dose and she is just now ran out of medication. States she does not want to see Dr. Aby Wallace anymore and didn't even realize that he was the one refilling them (thought that Dr. Jasmine Argueta was still refilling for her), and that she skipped her last appointment with him. Do you want to refill, or would you like to defer back to Dr. Aby Wallace?

## 2020-12-29 RX ORDER — METOPROLOL TARTRATE 50 MG/1
TABLET, FILM COATED ORAL
Qty: 180 TABLET | Refills: 1 | Status: SHIPPED | OUTPATIENT
Start: 2020-12-29 | End: 2021-06-22

## 2021-01-04 ENCOUNTER — TELEPHONE (OUTPATIENT)
Dept: NEUROLOGY | Age: 51
End: 2021-01-04

## 2021-01-04 NOTE — TELEPHONE ENCOUNTER
Last Appt:  5/22/2020  Next Appt:   Visit date not found  Med verified in [facility] 227 completed 10/18/2020

## 2021-01-12 ENCOUNTER — OFFICE VISIT (OUTPATIENT)
Dept: FAMILY MEDICINE CLINIC | Age: 51
End: 2021-01-12
Payer: COMMERCIAL

## 2021-01-12 ENCOUNTER — HOSPITAL ENCOUNTER (OUTPATIENT)
Age: 51
Setting detail: SPECIMEN
Discharge: HOME | End: 2021-01-12
Payer: COMMERCIAL

## 2021-01-12 VITALS
OXYGEN SATURATION: 98 % | BODY MASS INDEX: 45 KG/M2 | HEART RATE: 72 BPM | RESPIRATION RATE: 18 BRPM | DIASTOLIC BLOOD PRESSURE: 80 MMHG | SYSTOLIC BLOOD PRESSURE: 114 MMHG | WEIGHT: 280 LBS | HEIGHT: 66 IN

## 2021-01-12 DIAGNOSIS — R82.90 CLOUDY URINE: Primary | ICD-10-CM

## 2021-01-12 DIAGNOSIS — R29.898 WEAKNESS OF UPPER EXTREMITY: ICD-10-CM

## 2021-01-12 DIAGNOSIS — M62.441 CONTRACTURE OF MUSCLE OF BOTH HANDS: ICD-10-CM

## 2021-01-12 DIAGNOSIS — R56.9 NOCTURNAL SEIZURES (HCC): ICD-10-CM

## 2021-01-12 DIAGNOSIS — M54.10 RADICULOPATHY AFFECTING UPPER EXTREMITY: ICD-10-CM

## 2021-01-12 DIAGNOSIS — M62.442 CONTRACTURE OF MUSCLE OF BOTH HANDS: ICD-10-CM

## 2021-01-12 DIAGNOSIS — R82.90 CLOUDY URINE: ICD-10-CM

## 2021-01-12 DIAGNOSIS — F43.9 SITUATIONAL STRESS: ICD-10-CM

## 2021-01-12 DIAGNOSIS — M54.2 NECK PAIN: ICD-10-CM

## 2021-01-12 LAB
-: ABNORMAL
AMORPHOUS: ABNORMAL
BACTERIA: ABNORMAL
BILIRUBIN URINE: NEGATIVE
CASTS UA: ABNORMAL /LPF (ref 0–2)
COLOR: ABNORMAL
COMMENT UA: ABNORMAL
CRYSTALS, UA: ABNORMAL /HPF
EPITHELIAL CELLS UA: ABNORMAL /HPF (ref 0–5)
GLUCOSE URINE: NEGATIVE
KETONES, URINE: NEGATIVE
LEUKOCYTE ESTERASE, URINE: ABNORMAL
MUCUS: ABNORMAL
NITRITE, URINE: NEGATIVE
OTHER OBSERVATIONS UA: ABNORMAL
PH UA: 7.5 (ref 5–6)
PROTEIN UA: NEGATIVE
RBC UA: ABNORMAL /HPF (ref 0–4)
RENAL EPITHELIAL, UA: ABNORMAL /HPF
SPECIFIC GRAVITY UA: 1.01 (ref 1.01–1.02)
TRICHOMONAS: ABNORMAL
TURBIDITY: ABNORMAL
URINE HGB: NEGATIVE
UROBILINOGEN, URINE: NORMAL
WBC UA: ABNORMAL /HPF (ref 0–4)
YEAST: ABNORMAL

## 2021-01-12 PROCEDURE — 87086 URINE CULTURE/COLONY COUNT: CPT

## 2021-01-12 PROCEDURE — 81001 URINALYSIS AUTO W/SCOPE: CPT

## 2021-01-12 PROCEDURE — 99214 OFFICE O/P EST MOD 30 MIN: CPT | Performed by: FAMILY MEDICINE

## 2021-01-12 RX ORDER — GABAPENTIN 800 MG/1
800 TABLET ORAL 2 TIMES DAILY
Qty: 60 TABLET | Refills: 2 | Status: SHIPPED | OUTPATIENT
Start: 2021-01-12 | End: 2021-04-13 | Stop reason: SDUPTHER

## 2021-01-12 RX ORDER — ATORVASTATIN CALCIUM 20 MG/1
TABLET, FILM COATED ORAL
Qty: 90 TABLET | Refills: 1 | Status: SHIPPED | OUTPATIENT
Start: 2021-01-12 | End: 2021-04-13 | Stop reason: SINTOL

## 2021-01-12 ASSESSMENT — ENCOUNTER SYMPTOMS
VOMITING: 0
NAUSEA: 0
TROUBLE SWALLOWING: 0
SINUS PRESSURE: 0
SHORTNESS OF BREATH: 0
RHINORRHEA: 0
DIARRHEA: 0
WHEEZING: 0
SORE THROAT: 0
EYE DISCHARGE: 0
COUGH: 0
ABDOMINAL PAIN: 0
EYE REDNESS: 0
CONSTIPATION: 0

## 2021-01-12 ASSESSMENT — PATIENT HEALTH QUESTIONNAIRE - PHQ9
SUM OF ALL RESPONSES TO PHQ QUESTIONS 1-9: 2
1. LITTLE INTEREST OR PLEASURE IN DOING THINGS: 0
2. FEELING DOWN, DEPRESSED OR HOPELESS: 2

## 2021-01-12 NOTE — PROGRESS NOTES
2021     Carlene Kearns (:  1970) is a 45 y.o. female, here for evaluation of the following medical concerns:    HPI    Patient comes in today to discuss several acute issues. Patient primarily scheduled the appointment secondary to issues with recurrent cloudy urine that is malodorous. Over the last 3 months she has had to separate episodes of acute infection with just symptoms of cloudy malodorous urine. She has not had any change to her urinary output. Has not had any change to the urinary frequency. Does not get any dysuria abdominal pain or flank pain. No fever or chills. Has not had any blood in her urine. Does have a known history of urinary incontinence but does use Myrbetriq and that has not really change as far as frequency or severity. Was not able to get a urine prior to her visit today but we will assess this with a urinalysis to see if she has recurrent infection. If she does then may need to treat for a more prolonged period of time and reassess with repeat urinalysis after treatment to ensure that it has completely cleared. If she has continued recurrence may need to refer to urology. The other issue that she is having is that she is having increased issues with severe neck pain and starting to notice some mild contracture of the upper extremities. It is very noticeable today that her left hand does somewhat contracted into a flexed position with some flexed into the digits. She has had some issue with this starting to develop in the right upper extremity as well. Is getting radiating pain down into her upper extremities and getting weakness and numbness in the upper extremities. Has had significant neck pain as well. Was seen in the emergency room on January 10 and at that time was given prednisone therapy. She has not really noticed much improvement in her symptoms and notes that the prednisone has made it difficult for her to sleep.   She did have x-rays which show degenerative change but no other acute process. Has known history of degenerative disc disease and so with the contracture weakness and radiculopathy would be concerned that there is some sort of spinal cord impingement and I did suggest getting an MRI of the cervical spine for further evaluation and she is agreeable with this plan. She did states she would like to see a new neurologist and would prefer to see one here locally. Did state that she would like referral at this time. We can make a referral to [facility] for neurologic follow-up. Has known history of peripheral neuropathy and is on gabapentin. She notes that she often forgets to take the third dose in the middle of the day and wonders if she can change the dosing so she could have a twice daily dosing. We could increase her to 800 mg tabs and have her just take it twice a day to see if this would provide her with benefit she seems agreeable with this plan. Patient has had a lot of situational stress as well noting she has had multiple deaths in her family including her father and father-in-law and a cousin within the last 3 months duration has been highly stressful and she is not sure if that is affecting her overall health. She was given Vistaril to help with the acute anxiety and stress and she seems to be doing well with this medical therapy in addition to the BuSpar that is prescribed. Patient otherwise has no other acute medical concerns. Did review patient's med list, allergies, social history, fam history, pmhx and pshx today as noted in the record. Preventative measures are reviewed today. See health maintenance section for complete preventative plan of care. Review of Systems   Constitutional: Negative for chills, fatigue and fever. HENT: Negative for congestion, ear pain, postnasal drip, rhinorrhea, sinus pressure, sore throat and trouble swallowing. Eyes: Negative for discharge and redness. Respiratory: Negative for cough, shortness of breath and wheezing. Cardiovascular: Negative for chest pain. Gastrointestinal: Negative for abdominal pain, constipation, diarrhea, nausea and vomiting. Genitourinary: Negative for dysuria, flank pain, frequency and urgency. Cloudy malodorous urine   Musculoskeletal: Positive for myalgias, neck pain and neck stiffness. Negative for arthralgias. Skin: Negative for rash and wound. Allergic/Immunologic: Negative for environmental allergies. Neurological: Positive for weakness. Negative for dizziness, light-headedness and headaches. Hematological: Negative for adenopathy. Psychiatric/Behavioral: Positive for dysphoric mood and sleep disturbance. The patient is nervous/anxious. Situational stress       Prior to Visit Medications    Medication Sig Taking?  Authorizing Provider   metoprolol tartrate (LOPRESSOR) 50 MG tablet TAKE 1 TABLET BY MOUTH TWICE DAILY Yes Magda Dela Cruz DO   gabapentin (NEURONTIN) 600 MG tablet TAKE 1 TABLET BY MOUTH THREE TIMES DAILY Yes Olivia Burt MD   spironolactone (ALDACTONE) 100 MG tablet TAKE 1 TABLET BY MOUTH EVERY DAY Yes Magda Dela Cruz DO   esomeprazole (NEXIUM) 40 MG delayed release capsule TAKE 1 CAPSULE BY MOUTH TWICE DAILY Yes Magda Dela Cruz DO   ASPIRIN LOW DOSE 81 MG EC tablet TAKE 1 TABLET BY MOUTH DAILY Yes Magda Dela Cruz DO   potassium chloride (KLOR-CON) 8 MEQ extended release tablet TAKE 4 TABLETS BY MOUTH ONCE DAILY Yes Magda Dela Cruz DO   MYRBETRIQ 50 MG TB24 TAKE 1 TABLET BY MOUTH DAILY Yes Magda Dela Cruz DO   levothyroxine (SYNTHROID) 125 MCG tablet TAKE 1 TABLET BY MOUTH EVERY DAY Yes Magda Dela Cruz DO   hydroCHLOROthiazide (HYDRODIURIL) 25 MG tablet TAKE 1 TABLET BY MOUTH DAILY Yes Magda Dela Cruz DO   lamoTRIgine (LAMICTAL) 100 MG tablet Take by mouth 2 times daily 100 mg am and 150 mg pm, may increase to 150 mg twice daily on 9/15/20 Yes Historical Provider, MD   loratadine-pseudoephedrine (RA LORATA-D)  MG per extended release tablet take 1 tablet by mouth once daily Yes Donis Franco DO   OXcarbazepine (TRILEPTAL) 300 MG tablet TAKE 1 TABLET BY MOUTH TWICE DAILY Yes Reba Crisostomo MD   albuterol sulfate HFA (VENTOLIN HFA) 108 (90 Base) MCG/ACT inhaler INHALE 2 PUFFS EVERY 6 HOURS IF NEEDED FOR WHEEZING Yes Lloyd Heard MD   atorvastatin (LIPITOR) 20 MG tablet take 1 tablet by mouth once daily Yes Donis Franco DO   fluticasone-salmeterol (ADVAIR DISKUS) 250-50 MCG/DOSE AEPB Inhale 1 puff into the lungs every 12 hours RINSE MOUTH AFTER USE Yes Donis Franco DO   traZODone (DESYREL) 300 MG tablet take 1 tablet by mouth at bedtime Yes Historical Provider, MD   desvenlafaxine succinate (PRISTIQ) 100 MG TB24 extended release tablet take 1 tablet by mouth once daily Yes Historical Provider, MD   prazosin (MINIPRESS) 2 MG capsule Take 2 mg by mouth nightly  Yes Historical Provider, MD   busPIRone (BUSPAR) 30 MG tablet Take 30 mg by mouth 3 times daily  Yes Historical Provider, MD   Cholecalciferol (VITAMIN D3) 5000 UNITS TABS Take 1 tablet by mouth daily Yes Donis Franco DO        Social History     Tobacco Use    Smoking status: Never Smoker    Smokeless tobacco: Never Used   Substance Use Topics    Alcohol use: No        Vitals:    01/12/21 0749   BP: 114/80   Site: Left Upper Arm   Position: Sitting   Cuff Size: Large Adult   Pulse: 72   Resp: 18   SpO2: 98%   Weight: 280 lb (127 kg)   Height: 5' 6\" (1.676 m)     Estimated body mass index is 45.19 kg/m² as calculated from the following:    Height as of this encounter: 5' 6\" (1.676 m). Weight as of this encounter: 280 lb (127 kg). Physical Exam  Vitals signs and nursing note reviewed. Constitutional:       General: She is not in acute distress. Appearance: She is well-developed. She is not diaphoretic. HENT:      Head: Normocephalic and atraumatic.       Nose: Congestion and rhinorrhea present. Eyes:      Conjunctiva/sclera: Conjunctivae normal.   Neck:      Musculoskeletal: Normal range of motion. Cardiovascular:      Rate and Rhythm: Normal rate and regular rhythm. Pulmonary:      Effort: Pulmonary effort is normal.      Breath sounds: Normal breath sounds. No wheezing, rhonchi or rales. Abdominal:      General: Abdomen is flat. Bowel sounds are normal. There is no distension. Palpations: Abdomen is soft. Tenderness: There is no abdominal tenderness. Musculoskeletal: Normal range of motion. General: Tenderness and deformity present. No swelling. Comments: Visible palmar flexion of the left wrist with thumb tucked in a flexed position and digits wrapped around. Seems to be forming into a contracture. Not as noticeable but slight similar flexion contracture of the right hand developing. Has significant weakness with hand grasp noting that she has less than 50% of expected hand grasp bilaterally. Has weakness with abduction of the upper extremities against resistance noting that she has about 50% of expected strength. Able to move her upper extremities in a normal range of motion. Has increased paravertebral muscular tension and tenderness with palpation down the cervical and upper thoracic spine. Does have pain with rotational movement but is able to move the cervical spine in a normal range of motion. Skin:     General: Skin is warm and dry. Coloration: Skin is not pale. Findings: No erythema or rash. Neurological:      Mental Status: She is alert and oriented to person, place, and time. Cranial Nerves: Cranial nerves are intact. Motor: Weakness present. Psychiatric:         Mood and Affect: Mood is depressed. Behavior: Behavior normal.         Thought Content: Thought content normal.         Judgment: Judgment normal.         ASSESSMENT/PLAN:  Encounter Diagnoses   Name Primary?     Cloudy urine Yes    Neck pain     Radiculopathy affecting upper extremity     Situational stress     Weakness of upper extremity     Contracture of muscle of both hands     Nocturnal seizures (HCC)      Orders Placed This Encounter   Medications    atorvastatin (LIPITOR) 20 MG tablet     Sig: take 1 tablet by mouth once daily     Dispense:  90 tablet     Refill:  1    gabapentin (NEURONTIN) 800 MG tablet     Sig: Take 1 tablet by mouth 2 times daily for 90 days. Dispense:  60 tablet     Refill:  2     Orders Placed This Encounter   Procedures    Culture, Urine     Standing Status:   Future     Standing Expiration Date:   1/12/2022     Order Specific Question:   Specify (ex-cath, midstream, cysto, etc)? Answer:   midstream    MRI CERVICAL SPINE W WO CONTRAST     Standing Status:   Future     Standing Expiration Date:   1/12/2022     Scheduling Instructions:      Wants to have done at [facility]     Order Specific Question:   Reason for exam:     Answer:   neck pain with radiculopathy of bilateral upper extremities, contracture of bilateral upper extremities developing.  Urinalysis With Microscopic     Standing Status:   Future     Standing Expiration Date:   1/12/2022     Order Specific Question:   SPECIFY(EX-CATH,MIDSTREAM,CYSTO,ETC)? Answer:   midstream    External Referral To Neurology     Referral Priority:   Routine     Referral Type:   Eval and Treat     Referral Reason:   Specialty Services Required     Requested Specialty:   Neurology     Number of Visits Requested:   1     Controlled Substance Monitoring:    Acute and Chronic Pain Monitoring:   RX Monitoring 1/12/2021   Attestation -   Periodic Controlled Substance Monitoring Possible medication side effects, risk of tolerance/dependence & alternative treatments discussed. ;No signs of potential drug abuse or diversion identified. ;Assessed functional status.    Chronic Pain > 80 MEDD -       We will check urinalysis with culture and make further recommendations as far as treatment once testing reports are available. Patient does need MRI of the cervical spine for further evaluation of the weakness contraction and radiculopathy of the upper extremities with neck pain. Should finish prednisone but as she is having difficulty tolerating the 40 mg daily dose would cut her down to 20 mg daily to see if she tolerates this better. We will make referral to neurology for continued management of her neuropathy and nocturnal seizures. Did discuss her situational stress. She feels she is doing well with her current regimen but do feel that the stress probably has impacted her overall physical health as well. Did make an adjustment to her gabapentin dose. We will give her higher dose of the gabapentin so that she can dose it twice daily to see if this will provide her with benefit. Patient is to continue on the rest of her current medical therapy. No additional changes are made at this time. Patient is to return to my office in March as scheduled for her routine medical follow-up visit or sooner if any further problems or symptoms arise. (Please note that portions of this note were completed with a voice recognition program. Efforts were made to edit the dictations but occasionally words are mis-transcribed.)        No follow-ups on file. An electronic signature was used to authenticate this note.     --Kalee Springer, DO on 1/12/2021 at 8:04 AM

## 2021-01-13 LAB
CULTURE: NORMAL
Lab: NORMAL
SPECIMEN DESCRIPTION: NORMAL

## 2021-01-20 ENCOUNTER — TELEPHONE (OUTPATIENT)
Dept: FAMILY MEDICINE CLINIC | Age: 51
End: 2021-01-20

## 2021-01-20 DIAGNOSIS — R29.898 WEAKNESS OF UPPER EXTREMITY: ICD-10-CM

## 2021-01-20 DIAGNOSIS — M54.2 NECK PAIN: ICD-10-CM

## 2021-01-20 DIAGNOSIS — M54.10 RADICULOPATHY AFFECTING UPPER EXTREMITY: Primary | ICD-10-CM

## 2021-01-20 DIAGNOSIS — M54.10 RADICULOPATHY AFFECTING UPPER EXTREMITY: ICD-10-CM

## 2021-01-20 NOTE — TELEPHONE ENCOUNTER
Pt calling for results of her MRI, informed pt we did not have results yet and she called them and they told pt she needs to have [facility] push it along as otherwise it could be 2-3 days and pt states she can't wait that long as she has no feeling in her R arm, please advise at above number.

## 2021-01-20 NOTE — TELEPHONE ENCOUNTER
Called over to [facility] and they said MRI C spine was just completed today.  Asked if we could get an expedited reading due to patients symptoms and they said they would pass the message on to the radiologist.

## 2021-01-20 NOTE — TELEPHONE ENCOUNTER
Does not show anything significant. Fusion still looks good. Needs EMG of bilateral upper extremities to determine source of nerve impingement.

## 2021-01-26 ENCOUNTER — HOSPITAL ENCOUNTER (OUTPATIENT)
Dept: NEUROLOGY | Age: 51
Discharge: HOME | End: 2021-01-26
Payer: COMMERCIAL

## 2021-01-26 DIAGNOSIS — M54.2 NECK PAIN: ICD-10-CM

## 2021-01-26 DIAGNOSIS — R29.898 WEAKNESS OF UPPER EXTREMITY: ICD-10-CM

## 2021-01-26 DIAGNOSIS — M54.10 RADICULOPATHY AFFECTING UPPER EXTREMITY: ICD-10-CM

## 2021-01-26 PROCEDURE — 95886 MUSC TEST DONE W/N TEST COMP: CPT

## 2021-01-26 PROCEDURE — 95912 NRV CNDJ TEST 11-12 STUDIES: CPT

## 2021-01-26 NOTE — PROCEDURES
[address]                 [address] [place] [address]                        EMG/NERVE CONDUCTION STUDIES REPORT        PATIENT NAME: Aundra Lefort                     :        1970  MED REC NO:   [id number]                             ROOM:  ACCOUNT NO:   [de-identified]                           ADMIT DATE: 2021  PROVIDER:     Bety Cleveland MD, F.A. A. N    DATE OF EM2021      REFERRING PROVIDER:  Ashwin Hankins DO      TECHNICAL SUMMARY:  The nature, purpose, goals, expectations and process  involved in the procedures of nerve conduction studies and needle  electromyography were reviewed, discussed, explained and verbal consent  was obtained from the patient. The patient's questions were answered  with reference to the above processes and procedures. There were no significant technical difficulties encountered during this  study and nerve conduction studies were performed under temperature  monitoring. CLINICAL DATA:       The patient is 45years old with a history of numbness,  paresthesias involving the hands, more so on the left hand. The patient  has complaints of cramping and mild flexioning of the left hand and neck  pain since last 2020. The purpose of the study is to evaluate for mononeuropathy,  radiculopathy, plexopathy, peripheral polyneuropathy. SUMMARY:        The sensory nerve action potentials of the right and left  radial nerves are within normal limits. Sensory nerve action potentials of the right and left median and ulnar  nerves shows normal amplitude, distal latencies bilaterally. Mixed nerve palmar potentials of the right and left median and ulnar  nerves are within normal limits bilaterally. Compound muscle action potentials of the right median nerve shows normal  amplitude at wrist and mildly low amplitude at right elbow with normal  conduction velocity. Compound muscle action potentials of the left median nerve shows normal  amplitude, distal latency, conduction velocity. Compound muscle action potentials of the right and left ulnar nerves  shows normal amplitude, distal latencies, conduction velocities  bilaterally. Proximal conductions as measured by the F responses are normal in both  median and ulnar nerves. Nerve  Conductions   Results  Were  Personally  Reviewed and  Analysed. Abnormal  Nerve  Conductions  Were  Personally  Repeated,  Verified, reconfirmed  And  Updated as needed  appropriately. Please    See   Wave  Forms   And    Details  Of     Nerve  Conduction   Studies   For  Additional  Information             Extensive   Needle  Electromyography  Was personally  Performed  In right     Upper   Extremity  In  The  Following  Muscles :    Deltoid,  Biceps  Brachii,  Triceps . Pronator  Teres,  Flexor Carpi Ulnaris,    Ext. Digitorum Communis,  FDI (Hand)          Extensive  Needle  Electromyography  Shows  No   Abnormality with :        A) Normal  insertional  Activity. There  Is  Absence  Of   P  Waves,  Fibrillations,  Fasciculations and        Other  Spontaneous   Activity. B) Voluntary  Motor unit  Potentials    Show :    Normal  Effort,  Recruitment, amplitude,  Duration. No Polyphasia  Noted. IMPRESSION:        1. There is electrodiagnostic evidence of mild median mononeuropathy at       right elbow. 2.  Otherwise, there is no definite electrodiagnostic evidence of any        other mononeuropathy, radiculopathy, plexopathy, or peripheral        polyneuropathy involving examined both upper extremities during the        current study. Further clinical correlation and followup recommended. Jacob Cabral MD, [facility]     Board Certified in Neurology  & in  [employer] and Neu[employer] (ABPN). D: 01/26/2021 14:56:49       T: 01/26/2021 15:54:25     [id number]  Job#: [id number]     Doc#: [id number]    CC:    Lake Hodge DO

## 2021-01-26 NOTE — PROGRESS NOTES
EMG/NCS Bilateral    upper Completed    PCP: Donis Franco DO    Ordering: Donis Franco DO    Interpreting Physician: Althea Yeboah.  Joy Toscano, Neurologist    Technician: Anamika Valentino RN

## 2021-01-27 DIAGNOSIS — M62.441 CONTRACTURE OF MUSCLE OF BOTH HANDS: ICD-10-CM

## 2021-01-27 DIAGNOSIS — M54.2 NECK PAIN: Primary | ICD-10-CM

## 2021-01-27 DIAGNOSIS — M62.442 CONTRACTURE OF MUSCLE OF BOTH HANDS: ICD-10-CM

## 2021-01-27 DIAGNOSIS — R29.898 WEAKNESS OF UPPER EXTREMITY: ICD-10-CM

## 2021-01-27 DIAGNOSIS — M54.10 RADICULOPATHY AFFECTING UPPER EXTREMITY: ICD-10-CM

## 2021-02-08 ENCOUNTER — TELEPHONE (OUTPATIENT)
Dept: FAMILY MEDICINE CLINIC | Age: 51
End: 2021-02-08

## 2021-02-08 DIAGNOSIS — R26.89 BALANCE PROBLEMS: ICD-10-CM

## 2021-02-08 DIAGNOSIS — G40.909 SEIZURE DISORDER (HCC): ICD-10-CM

## 2021-02-08 DIAGNOSIS — I67.82 CEREBRAL ISCHEMIA: ICD-10-CM

## 2021-02-08 DIAGNOSIS — R94.01 ABNORMAL EEG: Primary | ICD-10-CM

## 2021-02-08 DIAGNOSIS — R41.3 MEMORY PROBLEM: ICD-10-CM

## 2021-02-08 NOTE — TELEPHONE ENCOUNTER
Patient want to see Neurologist over at [facility] for her seizures and balance disorders. Referral was faxed over.

## 2021-02-23 NOTE — PROGRESS NOTES
Patient is here for follow-up of her GERD. Last time we saw her in March she was doing quite well on Nexium twice a day and Zantac at night. We wanted to do that for 3 months and then see how she was doing. She called in the mid summer in June or July and having 3-week episode of really bad symptoms. She had 48-hour Bravo testing about a year ago and was found to have a DeMeester score of 57. She comes in today and she said after that 3-week. She is gotten better and she is now not having any symptoms or only a couple. She also states that she is gained some weight. About 20 pounds. Her BMI is 46. At this point I would definitely stay on the Zantac at night and the Nexium twice a day. I would stay on this for about 6 months. If she is having no symptoms at that time we may start weaning her back a little. As far as surgery is concerned I think it may be something she needs down the road but with her BMI that high it will be high risk. I think she is better off trying to get her BMI decreased either with diet and exercise or with bariatric surgery. We gave her a flyer on the seminar for bariatric surgery to Dr. Virginia De Anda here in [place]. So at this point we will continue as is with Nexium twice a day Zantac at night and recommend that she work on lifestyle changes such as diet exercise certain foods to stay away from. We gave her a flyer or patient magazine on GERD and lifestyle changes. We also gave her the information to go to the bariatric seminar.
Detail Level: Detailed

## 2021-03-01 RX ORDER — LORATADINE 10 MG
TABLET ORAL
Qty: 90 TABLET | Refills: 0 | Status: SHIPPED | OUTPATIENT
Start: 2021-03-01 | End: 2021-04-13 | Stop reason: SDUPTHER

## 2021-03-01 NOTE — TELEPHONE ENCOUNTER
Ranjeet Burton called requesting a refill of the below medication which has been pended for you:     Requested Prescriptions     Pending Prescriptions Disp Refills    loratadine-pseudoephedrine (WAL-ITIN D 24 HOUR)  MG per extended release tablet [Pharmacy Med Name: Omkar Pod 24 HOUR TABLETS 10'S] 90 tablet 0     Sig: TAKE 1 TABLET BY MOUTH EVERY DAY       Last Appointment Date: 1/12/2021  Next Appointment Date: 3/5/2021    Allergies   Allergen Reactions    Allopurinol     Bumex [Bumetanide] Other (See Comments)     Extreme fatigue, nausea, dizziness    Colchicine     Erythromycin     Indocin [Indomethacin]     Lasix [Furosemide] Other (See Comments)     Dizziness, extreme fatigue    Lisinopril     Morphine     Norvasc [Amlodipine]     Tenormin [Atenolol]     Uloric [Febuxostat]     Hydrocodone-Acetaminophen Nausea Only    Topamax [Topiramate] Swelling and Rash

## 2021-03-08 ENCOUNTER — TELEPHONE (OUTPATIENT)
Dept: FAMILY MEDICINE CLINIC | Age: 51
End: 2021-03-08

## 2021-04-06 ENCOUNTER — TELEPHONE (OUTPATIENT)
Dept: FAMILY MEDICINE CLINIC | Age: 51
End: 2021-04-06

## 2021-04-06 NOTE — TELEPHONE ENCOUNTER
Reminder call of overdue lab work. Patient states she is planning to complete this Sat. Scheduled 6 mo follow up appointment for 4/13/2021.

## 2021-04-09 ENCOUNTER — TELEPHONE (OUTPATIENT)
Dept: FAMILY MEDICINE CLINIC | Age: 51
End: 2021-04-09

## 2021-04-10 ENCOUNTER — HOSPITAL ENCOUNTER (OUTPATIENT)
Dept: LAB | Age: 51
Discharge: HOME | End: 2021-04-10
Payer: COMMERCIAL

## 2021-04-10 DIAGNOSIS — E78.2 MIXED HYPERLIPIDEMIA: ICD-10-CM

## 2021-04-10 DIAGNOSIS — E03.9 ACQUIRED HYPOTHYROIDISM: ICD-10-CM

## 2021-04-10 DIAGNOSIS — E11.9 TYPE 2 DIABETES MELLITUS WITHOUT COMPLICATION, WITHOUT LONG-TERM CURRENT USE OF INSULIN (HCC): ICD-10-CM

## 2021-04-10 LAB
ABSOLUTE EOS #: 0.26 K/UL (ref 0–0.44)
ABSOLUTE IMMATURE GRANULOCYTE: 0.1 K/UL (ref 0–0.3)
ABSOLUTE LYMPH #: 2.79 K/UL (ref 1.1–3.7)
ABSOLUTE MONO #: 0.7 K/UL (ref 0.1–1.2)
ALBUMIN SERPL-MCNC: 4.1 G/DL (ref 3.5–5.2)
ALBUMIN/GLOBULIN RATIO: 1.5 (ref 1–2.5)
ALP BLD-CCNC: 164 U/L (ref 35–104)
ALT SERPL-CCNC: 8 U/L (ref 5–33)
ANION GAP SERPL CALCULATED.3IONS-SCNC: 10 MMOL/L (ref 9–17)
AST SERPL-CCNC: 9 U/L
BASOPHILS # BLD: 1 % (ref 0–2)
BASOPHILS ABSOLUTE: 0.05 K/UL (ref 0–0.2)
BILIRUB SERPL-MCNC: 0.17 MG/DL (ref 0.3–1.2)
BUN BLDV-MCNC: 10 MG/DL (ref 6–20)
BUN/CREAT BLD: 11 (ref 9–20)
CALCIUM SERPL-MCNC: 9.2 MG/DL (ref 8.6–10.4)
CHLORIDE BLD-SCNC: 104 MMOL/L (ref 98–107)
CHOLESTEROL/HDL RATIO: 3
CHOLESTEROL: 140 MG/DL
CO2: 27 MMOL/L (ref 20–31)
CREAT SERPL-MCNC: 0.95 MG/DL (ref 0.5–0.9)
DIFFERENTIAL TYPE: ABNORMAL
EOSINOPHILS RELATIVE PERCENT: 2 % (ref 1–4)
GFR AFRICAN AMERICAN: >60 ML/MIN
GFR NON-AFRICAN AMERICAN: >60 ML/MIN
GFR SERPL CREATININE-BSD FRML MDRD: ABNORMAL ML/MIN/{1.73_M2}
GFR SERPL CREATININE-BSD FRML MDRD: ABNORMAL ML/MIN/{1.73_M2}
GLUCOSE BLD-MCNC: 123 MG/DL (ref 70–99)
HCT VFR BLD CALC: 36 % (ref 36.3–47.1)
HDLC SERPL-MCNC: 47 MG/DL
HEMOGLOBIN: 10.6 G/DL (ref 11.9–15.1)
IMMATURE GRANULOCYTES: 1 %
LDL CHOLESTEROL: 63 MG/DL (ref 0–130)
LYMPHOCYTES # BLD: 25 % (ref 24–43)
MCH RBC QN AUTO: 24.4 PG (ref 25.2–33.5)
MCHC RBC AUTO-ENTMCNC: 29.4 G/DL (ref 25.2–33.5)
MCV RBC AUTO: 82.8 FL (ref 82.6–102.9)
MONOCYTES # BLD: 6 % (ref 3–12)
NRBC AUTOMATED: 0 PER 100 WBC
PDW BLD-RTO: 16.5 % (ref 11.8–14.4)
PLATELET # BLD: 289 K/UL (ref 138–453)
PLATELET ESTIMATE: ABNORMAL
PMV BLD AUTO: 10.2 FL (ref 8.1–13.5)
POTASSIUM SERPL-SCNC: 4 MMOL/L (ref 3.7–5.3)
RBC # BLD: 4.35 M/UL (ref 3.95–5.11)
RBC # BLD: ABNORMAL 10*6/UL
SEG NEUTROPHILS: 65 % (ref 36–65)
SEGMENTED NEUTROPHILS ABSOLUTE COUNT: 7.17 K/UL (ref 1.5–8.1)
SODIUM BLD-SCNC: 141 MMOL/L (ref 135–144)
THYROXINE, FREE: 0.97 NG/DL (ref 0.93–1.7)
TOTAL PROTEIN: 6.8 G/DL (ref 6.4–8.3)
TRIGL SERPL-MCNC: 148 MG/DL
TSH SERPL DL<=0.05 MIU/L-ACNC: 2.16 MIU/L (ref 0.3–5)
VLDLC SERPL CALC-MCNC: NORMAL MG/DL (ref 1–30)
WBC # BLD: 11.1 K/UL (ref 3.5–11.3)
WBC # BLD: ABNORMAL 10*3/UL

## 2021-04-10 PROCEDURE — 36415 COLL VENOUS BLD VENIPUNCTURE: CPT

## 2021-04-10 PROCEDURE — 80053 COMPREHEN METABOLIC PANEL: CPT

## 2021-04-10 PROCEDURE — 83036 HEMOGLOBIN GLYCOSYLATED A1C: CPT

## 2021-04-10 PROCEDURE — 80061 LIPID PANEL: CPT

## 2021-04-10 PROCEDURE — 85025 COMPLETE CBC W/AUTO DIFF WBC: CPT

## 2021-04-10 PROCEDURE — 84443 ASSAY THYROID STIM HORMONE: CPT

## 2021-04-10 PROCEDURE — 84439 ASSAY OF FREE THYROXINE: CPT

## 2021-04-11 LAB
ESTIMATED AVERAGE GLUCOSE: 140 MG/DL
HBA1C MFR BLD: 6.5 % (ref 4–6)

## 2021-04-13 ENCOUNTER — OFFICE VISIT (OUTPATIENT)
Dept: FAMILY MEDICINE CLINIC | Age: 51
End: 2021-04-13
Payer: COMMERCIAL

## 2021-04-13 VITALS
OXYGEN SATURATION: 98 % | HEART RATE: 88 BPM | RESPIRATION RATE: 18 BRPM | SYSTOLIC BLOOD PRESSURE: 120 MMHG | WEIGHT: 283 LBS | HEIGHT: 66 IN | DIASTOLIC BLOOD PRESSURE: 80 MMHG | BODY MASS INDEX: 45.48 KG/M2

## 2021-04-13 DIAGNOSIS — Z13.31 POSITIVE DEPRESSION SCREENING: ICD-10-CM

## 2021-04-13 DIAGNOSIS — J45.909 UNCOMPLICATED ASTHMA, UNSPECIFIED ASTHMA SEVERITY, UNSPECIFIED WHETHER PERSISTENT: ICD-10-CM

## 2021-04-13 DIAGNOSIS — D50.9 IRON DEFICIENCY ANEMIA, UNSPECIFIED IRON DEFICIENCY ANEMIA TYPE: Primary | ICD-10-CM

## 2021-04-13 DIAGNOSIS — E78.2 MIXED HYPERLIPIDEMIA: ICD-10-CM

## 2021-04-13 DIAGNOSIS — E11.9 TYPE 2 DIABETES MELLITUS WITHOUT COMPLICATION, WITHOUT LONG-TERM CURRENT USE OF INSULIN (HCC): ICD-10-CM

## 2021-04-13 DIAGNOSIS — I10 ESSENTIAL (PRIMARY) HYPERTENSION: ICD-10-CM

## 2021-04-13 DIAGNOSIS — E03.9 ACQUIRED HYPOTHYROIDISM: ICD-10-CM

## 2021-04-13 DIAGNOSIS — J30.1 SEASONAL ALLERGIC RHINITIS DUE TO POLLEN: ICD-10-CM

## 2021-04-13 PROCEDURE — G8431 POS CLIN DEPRES SCRN F/U DOC: HCPCS | Performed by: FAMILY MEDICINE

## 2021-04-13 PROCEDURE — 99214 OFFICE O/P EST MOD 30 MIN: CPT | Performed by: FAMILY MEDICINE

## 2021-04-13 RX ORDER — LORATADINE 10 MG
TABLET ORAL
Qty: 90 TABLET | Refills: 1 | Status: SHIPPED | OUTPATIENT
Start: 2021-04-13 | End: 2021-07-09

## 2021-04-13 RX ORDER — ESOMEPRAZOLE MAGNESIUM 40 MG/1
CAPSULE, DELAYED RELEASE ORAL
Qty: 180 CAPSULE | Refills: 1 | Status: SHIPPED | OUTPATIENT
Start: 2021-04-13 | End: 2021-08-12

## 2021-04-13 RX ORDER — SPIRONOLACTONE 100 MG/1
TABLET, FILM COATED ORAL
Qty: 90 TABLET | Refills: 1 | Status: SHIPPED | OUTPATIENT
Start: 2021-04-13 | End: 2021-09-02

## 2021-04-13 RX ORDER — HYDROCHLOROTHIAZIDE 25 MG/1
TABLET ORAL
Qty: 90 TABLET | Refills: 1 | Status: SHIPPED | OUTPATIENT
Start: 2021-04-13 | End: 2021-09-29

## 2021-04-13 RX ORDER — GABAPENTIN 800 MG/1
800 TABLET ORAL 2 TIMES DAILY
Qty: 180 TABLET | Refills: 0 | Status: SHIPPED | OUTPATIENT
Start: 2021-04-13 | End: 2021-07-23

## 2021-04-13 RX ORDER — LEVOTHYROXINE SODIUM 0.12 MG/1
TABLET ORAL
Qty: 90 TABLET | Refills: 1 | Status: SHIPPED | OUTPATIENT
Start: 2021-04-13 | End: 2021-09-29

## 2021-04-13 RX ORDER — HYDROXYZINE HYDROCHLORIDE 25 MG/1
25 TABLET, FILM COATED ORAL 3 TIMES DAILY PRN
COMMUNITY
End: 2022-01-31

## 2021-04-13 RX ORDER — ALBUTEROL SULFATE 90 UG/1
AEROSOL, METERED RESPIRATORY (INHALATION)
Qty: 18 G | Refills: 5 | Status: SHIPPED | OUTPATIENT
Start: 2021-04-13 | End: 2022-04-18

## 2021-04-13 RX ORDER — POTASSIUM CHLORIDE 600 MG/1
TABLET, FILM COATED, EXTENDED RELEASE ORAL
Qty: 360 TABLET | Refills: 1 | Status: SHIPPED | OUTPATIENT
Start: 2021-04-13 | End: 2021-09-02 | Stop reason: SDUPTHER

## 2021-04-13 RX ORDER — FERROUS SULFATE 325(65) MG
325 TABLET ORAL 2 TIMES DAILY
Qty: 180 TABLET | Refills: 1 | Status: SHIPPED | OUTPATIENT
Start: 2021-04-13 | End: 2021-10-13

## 2021-04-13 RX ORDER — ASPIRIN 81 MG/1
TABLET ORAL
Qty: 90 TABLET | Refills: 1 | Status: SHIPPED | OUTPATIENT
Start: 2021-04-13 | End: 2021-09-29

## 2021-04-13 RX ORDER — MONTELUKAST SODIUM 10 MG/1
10 TABLET ORAL DAILY
Qty: 90 TABLET | Refills: 1 | Status: SHIPPED | OUTPATIENT
Start: 2021-04-13 | End: 2021-12-28

## 2021-04-13 RX ORDER — PRAZOSIN HYDROCHLORIDE 5 MG/1
5 CAPSULE ORAL NIGHTLY
COMMUNITY

## 2021-04-13 ASSESSMENT — ENCOUNTER SYMPTOMS
EYE DISCHARGE: 0
TROUBLE SWALLOWING: 0
RHINORRHEA: 1
EYE REDNESS: 0
VOMITING: 0
SINUS PRESSURE: 0
CONSTIPATION: 0
COUGH: 0
ABDOMINAL PAIN: 0
WHEEZING: 0
BACK PAIN: 1
DIARRHEA: 0
NAUSEA: 0
SORE THROAT: 0
SHORTNESS OF BREATH: 0

## 2021-04-13 ASSESSMENT — PATIENT HEALTH QUESTIONNAIRE - PHQ9
SUM OF ALL RESPONSES TO PHQ QUESTIONS 1-9: 10
4. FEELING TIRED OR HAVING LITTLE ENERGY: 3
9. THOUGHTS THAT YOU WOULD BE BETTER OFF DEAD, OR OF HURTING YOURSELF: 0
1. LITTLE INTEREST OR PLEASURE IN DOING THINGS: 0
SUM OF ALL RESPONSES TO PHQ QUESTIONS 1-9: 10
3. TROUBLE FALLING OR STAYING ASLEEP: 0

## 2021-04-13 ASSESSMENT — COLUMBIA-SUICIDE SEVERITY RATING SCALE - C-SSRS
6. HAVE YOU EVER DONE ANYTHING, STARTED TO DO ANYTHING, OR PREPARED TO DO ANYTHING TO END YOUR LIFE?: NO
1. WITHIN THE PAST MONTH, HAVE YOU WISHED YOU WERE DEAD OR WISHED YOU COULD GO TO SLEEP AND NOT WAKE UP?: NO
2. HAVE YOU ACTUALLY HAD ANY THOUGHTS OF KILLING YOURSELF?: NO

## 2021-04-13 NOTE — PROGRESS NOTES
2021     Cyd Boxer Wurster (:  1970) is a 45 y.o. female, here for evaluation of the following medical concerns:    HPI  Patient comes in today for follow up of chronic health issues Patient does state that she just has not felt well but I did make note that her hemoglobin had dropped compared to last check. She has a known history of iron deficiency anemia in the past.  Has had EGDs before which showed gastritis and she notes maybe over the last week or so she has had some increased irritation and indigestion in the epigastric region. Did stop drinking pop and is on Nexium twice daily. Did talk about following a bland diet and we will add an iron supplemental therapy to see if we get her iron levels elevated. Did discuss that we will recheck this in a month. If it still low would consider referral for EGD and colonoscopy evaluation. She did have a Cologuard test last year which was negative. Does not have known family history of colon cancer. She has not had any change in her bowels or blood in her stool. She also notes that her allergic rhinitis is quite flared currently. She is taking Claritin-D at night but still having a lot of itchy watery eyes and runny nose during the day. Did recommend adding Singulair to see if this provides her with benefit. She has a known history of hypertension and her blood pressure is stable and controlled on her current medical therapy. Has a known history of diabetes and her sugars are stable and controlled with her current dietary intake. Did discuss continued low-carb/low sugar diet and increased exercise to keep her blood sugars optimally controlled. Has a known history of hyperlipidemia and her cholesterol levels are stable and adequately controlled on her current statin dose. She states that the Lipitor however causes significant heaviness and pain in her arms and legs and so she did stop it about 3 weeks ago.   Is her cholesterol levels are to Visit Medications    Medication Sig Taking? Authorizing Provider   loratadine-pseudoephedrine (WAL-ITIN D 24 HOUR)  MG per extended release tablet TAKE 1 TABLET BY MOUTH EVERY DAY  Yanet Gray DO   OXcarbazepine (TRILEPTAL) 300 MG tablet TAKE 1 TABLET BY MOUTH TWICE DAILY  Brad Berry MD   atorvastatin (LIPITOR) 20 MG tablet take 1 tablet by mouth once daily  Yanet Gray DO   gabapentin (NEURONTIN) 800 MG tablet Take 1 tablet by mouth 2 times daily for 90 days.   Rene Leonard DO   metoprolol tartrate (LOPRESSOR) 50 MG tablet TAKE 1 TABLET BY MOUTH TWICE DAILY  Rene Leonard DO   spironolactone (ALDACTONE) 100 MG tablet TAKE 1 TABLET BY MOUTH EVERY DAY  Rene Leonard DO   esomeprazole (NEXIUM) 40 MG delayed release capsule TAKE 1 CAPSULE BY MOUTH TWICE DAILY  Rene Leonard DO   ASPIRIN LOW DOSE 81 MG EC tablet TAKE 1 TABLET BY MOUTH DAILY  Rene Leonard DO   potassium chloride (KLOR-CON) 8 MEQ extended release tablet TAKE 4 TABLETS BY MOUTH ONCE DAILY  Rene Leonard DO   MYRBETRIQ 50 MG TB24 TAKE 1 TABLET BY MOUTH DAILY  Rene Leonard DO   levothyroxine (SYNTHROID) 125 MCG tablet TAKE 1 TABLET BY MOUTH EVERY DAY  Rene Leonard DO   hydroCHLOROthiazide (HYDRODIURIL) 25 MG tablet TAKE 1 TABLET BY MOUTH DAILY  Rene Leonard DO   lamoTRIgine (LAMICTAL) 100 MG tablet Take by mouth 2 times daily 100 mg am and 150 mg pm, may increase to 150 mg twice daily on 9/15/20  Historical Provider, MD   albuterol sulfate HFA (VENTOLIN HFA) 108 (90 Base) MCG/ACT inhaler INHALE 2 PUFFS EVERY 6 HOURS IF NEEDED FOR WHEEZING  Max Llanos MD   fluticasone-salmeterol (ADVAIR DISKUS) 250-50 MCG/DOSE AEPB Inhale 1 puff into the lungs every 12 hours RINSE MOUTH AFTER USE  Rene Leonard DO   traZODone (DESYREL) 300 MG tablet take 1 tablet by mouth at bedtime  Historical Provider, MD   desvenlafaxine succinate (PRISTIQ) 100 MG TB24 extended release tablet take 1 tablet by mouth once daily  Historical Provider, MD   prazosin (MINIPRESS) 2 MG capsule Take 2 mg by mouth nightly   Historical Provider, MD   busPIRone (BUSPAR) 30 MG tablet Take 30 mg by mouth 3 times daily   Historical Provider, MD   Cholecalciferol (VITAMIN D3) 5000 UNITS TABS Take 1 tablet by mouth daily  Ralene Cuff, DO        Social History     Tobacco Use    Smoking status: Never Smoker    Smokeless tobacco: Never Used   Substance Use Topics    Alcohol use: No        There were no vitals filed for this visit. Estimated body mass index is 45.19 kg/m² as calculated from the following:    Height as of 1/12/21: 5' 6\" (1.676 m). Weight as of 1/12/21: 280 lb (127 kg). Physical Exam  Vitals signs and nursing note reviewed. Constitutional:       General: She is not in acute distress. Appearance: Normal appearance. She is well-developed. She is not diaphoretic. HENT:      Head: Normocephalic and atraumatic. Right Ear: Tympanic membrane, ear canal and external ear normal.      Left Ear: Tympanic membrane, ear canal and external ear normal.      Nose: Congestion and rhinorrhea present. Mouth/Throat:      Mouth: Mucous membranes are moist.      Comments: Post nasal drainage noted  Eyes:      General:         Right eye: No discharge. Left eye: No discharge. Conjunctiva/sclera: Conjunctivae normal.      Pupils: Pupils are equal, round, and reactive to light. Neck:      Musculoskeletal: Normal range of motion and neck supple. Thyroid: No thyromegaly. Cardiovascular:      Rate and Rhythm: Normal rate and regular rhythm. Heart sounds: Normal heart sounds. Pulmonary:      Effort: Pulmonary effort is normal.      Breath sounds: Normal breath sounds. No wheezing or rales. Abdominal:      General: Bowel sounds are normal. There is no distension. Palpations: Abdomen is soft. Tenderness: There is no abdominal tenderness.    Musculoskeletal:      Comments: Patient had a diabetic foot exam today. No open areas or ulcerations noted. Fine filament testing to the entire dorsal and plantar aspect of the foot reveals good sensation in all areas. No cyanosis. +2/4 pedal pulses, symmetric bilaterally   Lymphadenopathy:      Cervical: No cervical adenopathy. Skin:     General: Skin is warm and dry. Findings: No rash. Neurological:      Mental Status: She is alert and oriented to person, place, and time. Psychiatric:         Behavior: Behavior normal.         Thought Content: Thought content normal.         Judgment: Judgment normal.           ASSESSMENT/PLAN:  Encounter Diagnoses   Name Primary?     Iron deficiency anemia, unspecified iron deficiency anemia type Yes    Essential (primary) hypertension     Seasonal allergic rhinitis due to pollen     Type 2 diabetes mellitus without complication, without long-term current use of insulin (HCC)     Mixed hyperlipidemia     Acquired hypothyroidism     Uncomplicated asthma, unspecified asthma severity, unspecified whether persistent     Positive depression screening      Orders Placed This Encounter   Medications    albuterol sulfate HFA (VENTOLIN HFA) 108 (90 Base) MCG/ACT inhaler     Sig: INHALE 2 PUFFS EVERY 6 HOURS IF NEEDED FOR WHEEZING     Dispense:  18 g     Refill:  5    hydroCHLOROthiazide (HYDRODIURIL) 25 MG tablet     Sig: TAKE 1 TABLET BY MOUTH DAILY     Dispense:  90 tablet     Refill:  1    levothyroxine (SYNTHROID) 125 MCG tablet     Sig: TAKE 1 TABLET BY MOUTH EVERY DAY     Dispense:  90 tablet     Refill:  1    mirabegron (MYRBETRIQ) 50 MG TB24     Sig: TAKE 1 TABLET BY MOUTH DAILY     Dispense:  90 tablet     Refill:  1    potassium chloride (KLOR-CON) 8 MEQ extended release tablet     Sig: TAKE 4 TABLETS BY MOUTH ONCE DAILY     Dispense:  360 tablet     Refill:  1    aspirin (ASPIRIN LOW DOSE) 81 MG EC tablet     Sig: TAKE 1 TABLET BY MOUTH DAILY     Dispense:  90 tablet     Refill:  1  esomeprazole (NEXIUM) 40 MG delayed release capsule     Sig: TAKE 1 CAPSULE BY MOUTH TWICE DAILY     Dispense:  180 capsule     Refill:  1    spironolactone (ALDACTONE) 100 MG tablet     Sig: TAKE 1 TABLET BY MOUTH EVERY DAY     Dispense:  90 tablet     Refill:  1    gabapentin (NEURONTIN) 800 MG tablet     Sig: Take 1 tablet by mouth 2 times daily for 90 days. Dispense:  180 tablet     Refill:  0    loratadine-pseudoephedrine (WAL-ITIN D 24 HOUR)  MG per extended release tablet     Sig: TAKE 1 TABLET BY MOUTH EVERY DAY     Dispense:  90 tablet     Refill:  1    montelukast (SINGULAIR) 10 MG tablet     Sig: Take 1 tablet by mouth daily     Dispense:  90 tablet     Refill:  1    ferrous sulfate (IRON 325) 325 (65 Fe) MG tablet     Sig: Take 1 tablet by mouth 2 times daily     Dispense:  180 tablet     Refill:  1    Handicap Placard MISC     Sig: by Does not apply route Duration 5 years     Dispense:  1 each     Refill:  0     Orders Placed This Encounter   Procedures    CBC Auto Differential     Standing Status:   Future     Standing Expiration Date:   4/13/2022    Comprehensive Metabolic Panel     Standing Status:   Future     Standing Expiration Date:   4/13/2022    Hemoglobin A1C     Standing Status:   Future     Standing Expiration Date:   4/13/2022    Iron and TIBC     Standing Status:   Future     Standing Expiration Date:   4/13/2022     Order Specific Question:   Is Patient Fasting? Answer:   n/a     Order Specific Question:   No of Hours?      Answer:   n/a    Ferritin     Standing Status:   Future     Standing Expiration Date:   4/13/2022    Lipid Panel     Standing Status:   Future     Standing Expiration Date:   4/13/2022     Order Specific Question:   Is Patient Fasting?/# of Hours     Answer:   12 hours    TSH without Reflex     Standing Status:   Future     Standing Expiration Date:   4/13/2022    T4, Free     Standing Status:   Future     Standing Expiration Date:

## 2021-04-13 NOTE — PATIENT INSTRUCTIONS
Hospital Outpatient Visit on 04/10/2021   Component Date Value Ref Range Status    Thyroxine, Free 04/10/2021 0.97  0.93 - 1.70 ng/dL Final    TSH 04/10/2021 2.16  0.30 - 5.00 mIU/L Final    Cholesterol 04/10/2021 140  <200 mg/dL Final    Comment:    Cholesterol Guidelines:      <200  Desirable   200-240  Borderline      >240  Undesirable         HDL 04/10/2021 47  >40 mg/dL Final    Comment:    HDL Guidelines:    <40     Undesirable   40-59    Borderline    >59     Desirable         LDL Cholesterol 04/10/2021 63  0 - 130 mg/dL Final    Comment:    LDL Guidelines:     <100    Desirable   100-129   Near to/above Desirable   130-159   Borderline      >159   Undesirable     Direct (measured) LDL and calculated LDL are not interchangeable tests.  Chol/HDL Ratio 04/10/2021 3.0  <5 Final            Triglycerides 04/10/2021 148  <150 mg/dL Final    Comment:    Triglyceride Guidelines:     <150   Desirable   150-199  Borderline   200-499  High     >499   Very high   Based on AHA Guidelines for fasting triglyceride, October 2012.  VLDL 04/10/2021 NOT REPORTED  1 - 30 mg/dL Final    Hemoglobin A1C 04/10/2021 6.5* 4.0 - 6.0 % Final    Estimated Avg Glucose 04/10/2021 140  mg/dL Final    Comment: The ADA and AACC recommend providing the estimated average glucose result to permit better   patient understanding of their HBA1c result.       Glucose 04/10/2021 123* 70 - 99 mg/dL Final    BUN 04/10/2021 10  6 - 20 mg/dL Final    CREATININE 04/10/2021 0.95* 0.50 - 0.90 mg/dL Final    Bun/Cre Ratio 04/10/2021 11  9 - 20 Final    Calcium 04/10/2021 9.2  8.6 - 10.4 mg/dL Final    Sodium 04/10/2021 141  135 - 144 mmol/L Final    Potassium 04/10/2021 4.0  3.7 - 5.3 mmol/L Final    Chloride 04/10/2021 104  98 - 107 mmol/L Final    CO2 04/10/2021 27  20 - 31 mmol/L Final    Anion Gap 04/10/2021 10  9 - 17 mmol/L Final    Alkaline Phosphatase 04/10/2021 164* 35 - 104 U/L Final    ALT 04/10/2021 8  5 - 33 U/L Final    AST 04/10/2021 9  <32 U/L Final    Total Bilirubin 04/10/2021 0.17* 0.3 - 1.2 mg/dL Final    Total Protein 04/10/2021 6.8  6.4 - 8.3 g/dL Final    Albumin 04/10/2021 4.1  3.5 - 5.2 g/dL Final    Albumin/Globulin Ratio 04/10/2021 1.5  1.0 - 2.5 Final    GFR Non- 04/10/2021 >60  >60 mL/min Final    GFR  04/10/2021 >60  >60 mL/min Final    GFR Comment 04/10/2021        Final    Comment: Average GFR for 52-63 years old:   80 mL/min/1.73sq m  Chronic Kidney Disease:   <60 mL/min/1.73sq m  Kidney failure:   <15 mL/min/1.73sq m              eGFR calculated using average adult body mass.  Additional eGFR calculator available at:        [email or web address]            GFR Staging 04/10/2021 NOT REPORTED   Final    WBC 04/10/2021 11.1  3.5 - 11.3 k/uL Final    RBC 04/10/2021 4.35  3.95 - 5.11 m/uL Final    Hemoglobin 04/10/2021 10.6* 11.9 - 15.1 g/dL Final    Hematocrit 04/10/2021 36.0* 36.3 - 47.1 % Final    MCV 04/10/2021 82.8  82.6 - 102.9 fL Final    MCH 04/10/2021 24.4* 25.2 - 33.5 pg Final    MCHC 04/10/2021 29.4  25.2 - 33.5 g/dL Final    RDW 04/10/2021 16.5* 11.8 - 14.4 % Final    Platelets 60/29/6242 289  138 - 453 k/uL Final    MPV 04/10/2021 10.2  8.1 - 13.5 fL Final    NRBC Automated 04/10/2021 0.0  0.0 per 100 WBC Final    Differential Type 04/10/2021 NOT REPORTED   Final    Seg Neutrophils 04/10/2021 65  36 - 65 % Final    Lymphocytes 04/10/2021 25  24 - 43 % Final    Monocytes 04/10/2021 6  3 - 12 % Final    Eosinophils % 04/10/2021 2  1 - 4 % Final    Basophils 04/10/2021 1  0 - 2 % Final    Immature Granulocytes 04/10/2021 1* 0 % Final    Segs Absolute 04/10/2021 7.17  1.50 - 8.10 k/uL Final    Absolute Lymph # 04/10/2021 2.79  1.10 - 3.70 k/uL Final    Absolute Mono # 04/10/2021 0.70  0.10 - 1.20 k/uL Final    Absolute Eos # 04/10/2021 0.26  0.00 - 0.44 k/uL Final    Basophils Absolute 04/10/2021 0.05  0.00 - 0.20 k/uL Final    Absolute Immature Granulocyte 04/10/2021 0.10  0.00 - 0.30 k/uL Final    WBC Morphology 04/10/2021 NOT REPORTED   Final    RBC Morphology 04/10/2021 ANISOCYTOSIS PRESENT   Final    Platelet Estimate 96/48/8392 NOT REPORTED   Final

## 2021-05-14 ENCOUNTER — HOSPITAL ENCOUNTER (OUTPATIENT)
Dept: LAB | Age: 51
Discharge: HOME | End: 2021-05-14
Payer: COMMERCIAL

## 2021-05-14 DIAGNOSIS — D50.9 IRON DEFICIENCY ANEMIA, UNSPECIFIED IRON DEFICIENCY ANEMIA TYPE: ICD-10-CM

## 2021-05-14 LAB
ABSOLUTE EOS #: 0.23 K/UL (ref 0–0.44)
ABSOLUTE IMMATURE GRANULOCYTE: 0.1 K/UL (ref 0–0.3)
ABSOLUTE LYMPH #: 2.42 K/UL (ref 1.1–3.7)
ABSOLUTE MONO #: 0.51 K/UL (ref 0.1–1.2)
BASOPHILS # BLD: 0 % (ref 0–2)
BASOPHILS ABSOLUTE: 0.04 K/UL (ref 0–0.2)
DIFFERENTIAL TYPE: ABNORMAL
EOSINOPHILS RELATIVE PERCENT: 3 % (ref 1–4)
FERRITIN: 32 UG/L (ref 13–150)
HCT VFR BLD CALC: 38.8 % (ref 36.3–47.1)
HEMOGLOBIN: 11.5 G/DL (ref 11.9–15.1)
IMMATURE GRANULOCYTES: 1 %
IRON SATURATION: 25 % (ref 20–55)
IRON: 82 UG/DL (ref 37–145)
LYMPHOCYTES # BLD: 27 % (ref 24–43)
MCH RBC QN AUTO: 25.7 PG (ref 25.2–33.5)
MCHC RBC AUTO-ENTMCNC: 29.6 G/DL (ref 25.2–33.5)
MCV RBC AUTO: 86.8 FL (ref 82.6–102.9)
MONOCYTES # BLD: 6 % (ref 3–12)
NRBC AUTOMATED: 0 PER 100 WBC
PDW BLD-RTO: 17.7 % (ref 11.8–14.4)
PLATELET # BLD: 229 K/UL (ref 138–453)
PLATELET ESTIMATE: ABNORMAL
PMV BLD AUTO: 10.6 FL (ref 8.1–13.5)
RBC # BLD: 4.47 M/UL (ref 3.95–5.11)
RBC # BLD: ABNORMAL 10*6/UL
SEG NEUTROPHILS: 63 % (ref 36–65)
SEGMENTED NEUTROPHILS ABSOLUTE COUNT: 5.6 K/UL (ref 1.5–8.1)
TOTAL IRON BINDING CAPACITY: 331 UG/DL (ref 250–450)
UNSATURATED IRON BINDING CAPACITY: 249 UG/DL (ref 112–347)
WBC # BLD: 8.9 K/UL (ref 3.5–11.3)
WBC # BLD: ABNORMAL 10*3/UL

## 2021-05-14 PROCEDURE — 85025 COMPLETE CBC W/AUTO DIFF WBC: CPT

## 2021-05-14 PROCEDURE — 83540 ASSAY OF IRON: CPT

## 2021-05-14 PROCEDURE — 36415 COLL VENOUS BLD VENIPUNCTURE: CPT

## 2021-05-14 PROCEDURE — 83550 IRON BINDING TEST: CPT

## 2021-05-14 PROCEDURE — 82728 ASSAY OF FERRITIN: CPT

## 2021-05-17 ENCOUNTER — HOSPITAL ENCOUNTER (OUTPATIENT)
Dept: LAB | Age: 51
Discharge: HOME | End: 2021-05-17
Payer: COMMERCIAL

## 2021-05-17 ENCOUNTER — OFFICE VISIT (OUTPATIENT)
Dept: FAMILY MEDICINE CLINIC | Age: 51
End: 2021-05-17
Payer: COMMERCIAL

## 2021-05-17 VITALS
SYSTOLIC BLOOD PRESSURE: 130 MMHG | HEART RATE: 108 BPM | HEIGHT: 66 IN | OXYGEN SATURATION: 98 % | RESPIRATION RATE: 18 BRPM | TEMPERATURE: 98.3 F | BODY MASS INDEX: 45.8 KG/M2 | WEIGHT: 285 LBS | DIASTOLIC BLOOD PRESSURE: 88 MMHG

## 2021-05-17 DIAGNOSIS — E87.6 HYPOKALEMIA: ICD-10-CM

## 2021-05-17 DIAGNOSIS — R53.83 FATIGUE, UNSPECIFIED TYPE: Primary | ICD-10-CM

## 2021-05-17 DIAGNOSIS — R00.0 TACHYCARDIA: ICD-10-CM

## 2021-05-17 LAB
ANION GAP SERPL CALCULATED.3IONS-SCNC: 11 MMOL/L (ref 9–17)
BUN BLDV-MCNC: 13 MG/DL (ref 6–20)
BUN/CREAT BLD: 14 (ref 9–20)
CALCIUM SERPL-MCNC: 9.3 MG/DL (ref 8.6–10.4)
CHLORIDE BLD-SCNC: 103 MMOL/L (ref 98–107)
CO2: 29 MMOL/L (ref 20–31)
CREAT SERPL-MCNC: 0.91 MG/DL (ref 0.5–0.9)
GFR AFRICAN AMERICAN: >60 ML/MIN
GFR NON-AFRICAN AMERICAN: >60 ML/MIN
GFR SERPL CREATININE-BSD FRML MDRD: ABNORMAL ML/MIN/{1.73_M2}
GFR SERPL CREATININE-BSD FRML MDRD: ABNORMAL ML/MIN/{1.73_M2}
GLUCOSE BLD-MCNC: 86 MG/DL (ref 70–99)
POTASSIUM SERPL-SCNC: 3.9 MMOL/L (ref 3.7–5.3)
SODIUM BLD-SCNC: 143 MMOL/L (ref 135–144)

## 2021-05-17 PROCEDURE — 99214 OFFICE O/P EST MOD 30 MIN: CPT | Performed by: FAMILY MEDICINE

## 2021-05-17 PROCEDURE — 36415 COLL VENOUS BLD VENIPUNCTURE: CPT

## 2021-05-17 PROCEDURE — 93000 ELECTROCARDIOGRAM COMPLETE: CPT | Performed by: FAMILY MEDICINE

## 2021-05-17 PROCEDURE — 80048 BASIC METABOLIC PNL TOTAL CA: CPT

## 2021-05-17 SDOH — ECONOMIC STABILITY: FOOD INSECURITY: WITHIN THE PAST 12 MONTHS, YOU WORRIED THAT YOUR FOOD WOULD RUN OUT BEFORE YOU GOT MONEY TO BUY MORE.: NEVER TRUE

## 2021-05-17 ASSESSMENT — ENCOUNTER SYMPTOMS
DIARRHEA: 0
SHORTNESS OF BREATH: 0
WHEEZING: 0
CHEST TIGHTNESS: 0
ABDOMINAL PAIN: 1
NAUSEA: 1
CONSTIPATION: 0
VOMITING: 0
BACK PAIN: 1

## 2021-05-17 ASSESSMENT — SOCIAL DETERMINANTS OF HEALTH (SDOH): HOW HARD IS IT FOR YOU TO PAY FOR THE VERY BASICS LIKE FOOD, HOUSING, MEDICAL CARE, AND HEATING?: NOT HARD AT ALL

## 2021-05-17 NOTE — PROGRESS NOTES
2021     Lorenzo Kearns (:  1970) is a 45 y.o. female, here for evaluation of the following medical concerns:    HPI  Patient comes in today to discuss ongoing issues with significant fatigue. Patient did have lab work done last week and I did just get the final results today. She did have evidence of anemia and was started on higher dose iron supplement after her last lab draw and her hemoglobin had improved. She states she just has not felt well and she thought that perhaps it was related to her anemia. As her hemoglobin had improved and is close to normal I do not think this is contributing to her significant fatigue. She has been having some palpitations intermittently as well. She states that she often feels this way as well when she gets hypokalemic so she was questioning about her potassium level. She has been under a lot of stress. Has lost multiple family members over the last 6 months duration and is seeing the psychiatrist for management of her psychiatric medication. They apparently had increased her Pristiq to 150 mg daily recently and that could be contributing to her symptoms as it started after that. She does not get short of breath. She does have a known history of obstructive sleep apnea and after her last office visit we did make an adjustment to her pressure setting. After making that adjustment she has done better as far as not snoring and seems to be tolerating the higher pressure better. She did get a new CPAP mask as well which is more of a nasal pillow with ear loops versus a full headgear and seems to be tolerating it very well and feels like she is doing well with this new apparatus. She has had some epigastric pain recently but she did run out of her Nexium for 3 days. Typically has significant GERD but really has not had that just had epigastric pain. She has not had any chest pain. Feels nauseated and has had decreased appetite. No change in her bowels. No other acute medical concerns. Did review patient's med list, allergies, social history, fam history, pmhx and pshx today as noted in the record. Review of Systems   Constitutional: Positive for appetite change and fatigue. Negative for chills and fever. HENT: Negative. Respiratory: Negative for chest tightness, shortness of breath and wheezing. Cardiovascular: Positive for palpitations. Negative for chest pain and leg swelling. Gastrointestinal: Positive for abdominal pain (epigastric in nature) and nausea. Negative for constipation, diarrhea and vomiting. Musculoskeletal: Positive for back pain (chronic and unchanged). Skin: Negative. Psychiatric/Behavioral: Positive for dysphoric mood and sleep disturbance. Prior to Visit Medications    Medication Sig Taking?  Authorizing Provider   OXcarbazepine (TRILEPTAL) 300 MG tablet TAKE 1 TABLET BY MOUTH TWICE DAILY Yes Carl Stout MD   prazosin (MINIPRESS) 5 MG capsule Take 5 mg by mouth nightly Yes Historical Provider, MD   hydrOXYzine (ATARAX) 25 MG tablet Take 25 mg by mouth 3 times daily as needed for Anxiety Yes Historical Provider, MD   albuterol sulfate HFA (VENTOLIN HFA) 108 (90 Base) MCG/ACT inhaler INHALE 2 PUFFS EVERY 6 HOURS IF NEEDED FOR WHEEZING Yes Dangelo Ontiveros DO   hydroCHLOROthiazide (HYDRODIURIL) 25 MG tablet TAKE 1 TABLET BY MOUTH DAILY Yes Dangelo Ontiveros DO   levothyroxine (SYNTHROID) 125 MCG tablet TAKE 1 TABLET BY MOUTH EVERY DAY Yes Dangelo Ontiveros DO   mirabegron (MYRBETRIQ) 50 MG TB24 TAKE 1 TABLET BY MOUTH DAILY Yes Dangelo Ontiveros DO   potassium chloride (KLOR-CON) 8 MEQ extended release tablet TAKE 4 TABLETS BY MOUTH ONCE DAILY Yes Dangelo Ontiveros DO   aspirin (ASPIRIN LOW DOSE) 81 MG EC tablet TAKE 1 TABLET BY MOUTH DAILY Yes Dangelo Ontiveros DO   esomeprazole (NEXIUM) 40 MG delayed release capsule TAKE 1 CAPSULE BY MOUTH TWICE DAILY Yes Dangelo Ontiveros DO   spironolactone (ALDACTONE) 100 MG tablet TAKE 1 TABLET BY MOUTH EVERY DAY Yes Eladia Batch, DO   gabapentin (NEURONTIN) 800 MG tablet Take 1 tablet by mouth 2 times daily for 90 days. Yes Eladia Batch, DO   loratadine-pseudoephedrine (WAL-ITIN D 24 HOUR)  MG per extended release tablet TAKE 1 TABLET BY MOUTH EVERY DAY Yes Yanet Gray, DO   montelukast (SINGULAIR) 10 MG tablet Take 1 tablet by mouth daily Yes Eladia Batch, DO   ferrous sulfate (IRON 325) 325 (65 Fe) MG tablet Take 1 tablet by mouth 2 times daily Yes Eladia Batch, DO   Handicap Placard MISC by Does not apply route Duration 5 years Yes Eladia Batch, DO   metoprolol tartrate (LOPRESSOR) 50 MG tablet TAKE 1 TABLET BY MOUTH TWICE DAILY Yes Eladia Batch, DO   lamoTRIgine (LAMICTAL) 100 MG tablet Take by mouth 2 times daily 100 mg am and 150 mg pm, may increase to 150 mg twice daily on 9/15/20 Yes Historical Provider, MD   fluticasone-salmeterol (ADVAIR DISKUS) 250-50 MCG/DOSE AEPB Inhale 1 puff into the lungs every 12 hours RINSE MOUTH AFTER USE Yes Eladia Batch, DO   traZODone (DESYREL) 300 MG tablet take 1 tablet by mouth at bedtime Yes Historical Provider, MD   desvenlafaxine succinate (PRISTIQ) 100 MG TB24 extended release tablet take 1 tablet by mouth once daily Yes Historical Provider, MD   Cholecalciferol (VITAMIN D3) 5000 UNITS TABS Take 1 tablet by mouth daily Yes Eladia Batch, DO        Social History     Tobacco Use    Smoking status: Never Smoker    Smokeless tobacco: Never Used   Substance Use Topics    Alcohol use: No        Vitals:    05/17/21 1130   BP: 130/88   Site: Left Upper Arm   Position: Sitting   Cuff Size: Large Adult   Pulse: 108   Resp: 18   Temp: 98.3 °F (36.8 °C)   TempSrc: Tympanic   SpO2: 98%   Weight: 285 lb (129.3 kg)   Height: 5' 6\" (1.676 m)     Estimated body mass index is 46 kg/m² as calculated from the following:    Height as of this encounter: 5' 6\" (1.676 m).     Weight as of this encounter: 285 lb (129.3 kg). Physical Exam  Vitals and nursing note reviewed. Constitutional:       General: She is not in acute distress. Appearance: Normal appearance. She is well-developed. She is not diaphoretic. HENT:      Head: Normocephalic and atraumatic. Right Ear: Tympanic membrane, ear canal and external ear normal.      Left Ear: Tympanic membrane, ear canal and external ear normal.      Nose: Nose normal.      Mouth/Throat:      Mouth: Mucous membranes are moist.      Pharynx: Oropharynx is clear. No oropharyngeal exudate. Eyes:      General:         Right eye: No discharge. Left eye: No discharge. Conjunctiva/sclera: Conjunctivae normal.      Pupils: Pupils are equal, round, and reactive to light. Neck:      Thyroid: No thyromegaly. Cardiovascular:      Rate and Rhythm: Regular rhythm. Tachycardia present. Heart sounds: Normal heart sounds. Pulmonary:      Effort: Pulmonary effort is normal.      Breath sounds: Normal breath sounds. No wheezing or rales. Abdominal:      General: Bowel sounds are normal. There is no distension. Palpations: Abdomen is soft. Tenderness: There is abdominal tenderness (epigastric tenderness ). Musculoskeletal:      Cervical back: Normal range of motion and neck supple. Lymphadenopathy:      Cervical: No cervical adenopathy. Skin:     General: Skin is warm and dry. Findings: No rash. Neurological:      Mental Status: She is alert and oriented to person, place, and time. Psychiatric:         Behavior: Behavior normal.         Thought Content: Thought content normal.         Judgment: Judgment normal.         ASSESSMENT/PLAN:  Encounter Diagnoses   Name Primary?  Fatigue, unspecified type Yes    Hypokalemia     Tachycardia      No orders of the defined types were placed in this encounter.     Orders Placed This Encounter   Procedures    Basic Metabolic Panel     Standing Status:   Future     Number of Occurrences:   1     Standing Expiration Date:   5/17/2022    EKG 12 Lead     Order Specific Question:   Reason for Exam?     Answer: Tachycardia     EKG is performed today which shows sinus tachycardia. We will check a BMP to make sure that her potassium level is within normal limits. We can decrease her iron supplemental therapy as her iron now is within normal limits back down to 1 pill daily to make sure this is not causing any of the upper GI irritation. Should stay on the Nexium as previously ordered. I suspect that the high-dose Pristiq may be contributing to her symptoms as symptoms started after she went up on this dose. She is on a particularly high dose of Pristiq and so this could be contributing to her overall symptoms. She is going to contact her psychiatrist to discuss med dosing. If they do lower the dose down to 100 mg daily I do want her to let me know if she has improvement in her symptoms after adjustment of the dose for a few days. If she has persistent issues may consider referral to cardiology due to the palpitations and fatigue. Patient is to continue on the rest of her current medical therapy. No additional changes are made at this time. Patient is to continue with use of her CPAP. She is compliant with its use and is getting ongoing medical benefit. Patient is to follow-up in my office as scheduled for her routine medical follow-up visit or sooner if any further problems or symptoms arise. (Please note that portions of this note were completed with a voice recognition program. Efforts were made to edit the dictations but occasionally words are mis-transcribed.)        No follow-ups on file. An electronic signature was used to authenticate this note.     --Maura To, DO on 5/17/2021 at 1:15 PM

## 2021-06-01 RX ORDER — POTASSIUM CHLORIDE 600 MG/1
TABLET, FILM COATED, EXTENDED RELEASE ORAL
Qty: 360 TABLET | Refills: 1 | OUTPATIENT
Start: 2021-06-01

## 2021-06-01 RX ORDER — GABAPENTIN 800 MG/1
TABLET ORAL
Qty: 60 TABLET | OUTPATIENT
Start: 2021-06-01

## 2021-06-22 RX ORDER — METOPROLOL TARTRATE 50 MG/1
TABLET, FILM COATED ORAL
Qty: 180 TABLET | Refills: 1 | Status: SHIPPED | OUTPATIENT
Start: 2021-06-22 | End: 2022-01-25

## 2021-06-22 NOTE — TELEPHONE ENCOUNTER
Anish Saldana called requesting a refill of the below medication which has been pended for you:     Requested Prescriptions     Pending Prescriptions Disp Refills    metoprolol tartrate (LOPRESSOR) 50 MG tablet [Pharmacy Med Name: METOPROLOL TARTRATE 50MG TABLETS] 180 tablet 1     Sig: TAKE 1 TABLET BY MOUTH TWICE DAILY       Last Appointment Date: 5/17/2021  Next Appointment Date: 10/14/2021    Allergies   Allergen Reactions    Allopurinol Other (See Comments)     Other reaction(s): nausea    Bumex [Bumetanide] Other (See Comments)     Extreme fatigue, nausea, dizziness    Colchicine     Erythromycin     Indocin [Indomethacin]     Lasix [Furosemide] Other (See Comments)     Dizziness, extreme fatigue    Lisinopril     Morphine     Norvasc [Amlodipine]     Tenormin [Atenolol]     Uloric [Febuxostat]     Hydrocodone-Acetaminophen Nausea Only    Topamax [Topiramate] Swelling and Rash MRI results was answered and results note, please inform patient that there is no significant stenosis or narrowing but she has arthritis. She can be referred to physical therapy or back specialist if symptoms are persisting  Thanks

## 2021-07-09 RX ORDER — LORATADINE 10 MG
TABLET ORAL
Qty: 90 TABLET | Refills: 0 | Status: SHIPPED | OUTPATIENT
Start: 2021-07-09 | End: 2021-11-05 | Stop reason: CLARIF

## 2021-07-09 NOTE — TELEPHONE ENCOUNTER
Checked with [facility] [address]. They do not show an active script on file for the [address]      Jesse Cardenas called requesting a refill of the below medication which has been pended for you:     Requested Prescriptions     Pending Prescriptions Disp Refills    WAL-ITIN D 24 HOUR  MG per extended release tablet [Pharmacy Med Name: Kalpana Tariq 24 HOUR TABLETS 15S] 90 tablet 1     Sig: TAKE 1 TABLET BY MOUTH EVERY DAY       Last Appointment Date: 5/17/2021  Next Appointment Date: 10/14/2021    Allergies   Allergen Reactions    Allopurinol Other (See Comments)     Other reaction(s): nausea    Bumex [Bumetanide] Other (See Comments)     Extreme fatigue, nausea, dizziness    Colchicine     Erythromycin     Indocin [Indomethacin]     Lasix [Furosemide] Other (See Comments)     Dizziness, extreme fatigue    Lisinopril     Morphine     Norvasc [Amlodipine]     Tenormin [Atenolol]     Uloric [Febuxostat]     Hydrocodone-Acetaminophen Nausea Only    Topamax [Topiramate] Swelling and Rash

## 2021-07-21 ENCOUNTER — PATIENT MESSAGE (OUTPATIENT)
Dept: FAMILY MEDICINE CLINIC | Age: 51
End: 2021-07-21

## 2021-07-21 NOTE — TELEPHONE ENCOUNTER
From: Hui Montelongo  To: Cary Estrella DO  Sent: 7/21/2021 7:54 AM EDT  Subject: Prescription Question    Would oR Ramirez be an option for me?

## 2021-08-12 RX ORDER — ESOMEPRAZOLE MAGNESIUM 40 MG/1
CAPSULE, DELAYED RELEASE ORAL
Qty: 180 CAPSULE | Refills: 1 | OUTPATIENT
Start: 2021-08-12

## 2021-08-12 RX ORDER — ESOMEPRAZOLE MAGNESIUM 40 MG/1
CAPSULE, DELAYED RELEASE ORAL
Qty: 180 CAPSULE | Refills: 1 | Status: SHIPPED | OUTPATIENT
Start: 2021-08-12 | End: 2021-11-17

## 2021-08-12 RX ORDER — ESOMEPRAZOLE MAGNESIUM 40 MG/1
CAPSULE, DELAYED RELEASE ORAL
Qty: 180 CAPSULE | Refills: 1 | Status: CANCELLED | OUTPATIENT
Start: 2021-08-12

## 2021-08-12 NOTE — TELEPHONE ENCOUNTER
Halina Gilmore called requesting a refill of the below medication which has been pended for you: Patient has enough to last until appointment.     Requested Prescriptions     Pending Prescriptions Disp Refills    esomeprazole (Novogenie) 40 MG delayed release capsule [Pharmacy Med Name: ESOMEPRAZOLE MAGNESIUM 40MG DR CAPS] 180 capsule 1     Sig: TAKE 1 CAPSULE BY MOUTH TWICE DAILY       Last Appointment Date: 5/17/2021  Next Appointment Date: 9/9/2021    Allergies   Allergen Reactions    Allopurinol Other (See Comments)     Other reaction(s): nausea    Bumex [Bumetanide] Other (See Comments)     Extreme fatigue, nausea, dizziness    Colchicine     Erythromycin     Indocin [Indomethacin]     Lasix [Furosemide] Other (See Comments)     Dizziness, extreme fatigue    Lisinopril     Morphine     Norvasc [Amlodipine]     Tenormin [Atenolol]     Uloric [Febuxostat]     Hydrocodone-Acetaminophen Nausea Only    Topamax [Topiramate] Swelling and Rash

## 2021-08-12 NOTE — TELEPHONE ENCOUNTER
Latasha Carrizales called requesting a refill of the below medication which has been pended for you:     Requested Prescriptions     Pending Prescriptions Disp Refills    esomeprazole ([employer]) 40 MG delayed release capsule [Pharmacy Med Name: ESOMEPRAZOLE MAGNESIUM 40MG DR CAPS] 180 capsule 1     Sig: TAKE 1 CAPSULE BY MOUTH TWICE DAILY       Last Appointment Date: 5/17/2021  Next Appointment Date: 9/9/2021    Allergies   Allergen Reactions    Allopurinol Other (See Comments)     Other reaction(s): nausea    Bumex [Bumetanide] Other (See Comments)     Extreme fatigue, nausea, dizziness    Colchicine     Erythromycin     Indocin [Indomethacin]     Lasix [Furosemide] Other (See Comments)     Dizziness, extreme fatigue    Lisinopril     Morphine     Norvasc [Amlodipine]     Tenormin [Atenolol]     Uloric [Febuxostat]     Hydrocodone-Acetaminophen Nausea Only    Topamax [Topiramate] Swelling and Rash

## 2021-09-01 ENCOUNTER — HOSPITAL ENCOUNTER (OUTPATIENT)
Dept: LAB | Age: 51
Discharge: HOME | End: 2021-09-01
Payer: COMMERCIAL

## 2021-09-01 DIAGNOSIS — E03.9 ACQUIRED HYPOTHYROIDISM: ICD-10-CM

## 2021-09-01 DIAGNOSIS — D50.9 IRON DEFICIENCY ANEMIA, UNSPECIFIED IRON DEFICIENCY ANEMIA TYPE: ICD-10-CM

## 2021-09-01 DIAGNOSIS — E11.9 TYPE 2 DIABETES MELLITUS WITHOUT COMPLICATION, WITHOUT LONG-TERM CURRENT USE OF INSULIN (HCC): ICD-10-CM

## 2021-09-01 DIAGNOSIS — I10 ESSENTIAL (PRIMARY) HYPERTENSION: ICD-10-CM

## 2021-09-01 DIAGNOSIS — E78.2 MIXED HYPERLIPIDEMIA: ICD-10-CM

## 2021-09-01 LAB
ABSOLUTE EOS #: 0.28 K/UL (ref 0–0.44)
ABSOLUTE IMMATURE GRANULOCYTE: 0.24 K/UL (ref 0–0.3)
ABSOLUTE LYMPH #: 2.74 K/UL (ref 1.1–3.7)
ABSOLUTE MONO #: 0.57 K/UL (ref 0.1–1.2)
ALBUMIN SERPL-MCNC: 4.1 G/DL (ref 3.5–5.2)
ALBUMIN/GLOBULIN RATIO: 1.5 (ref 1–2.5)
ALP BLD-CCNC: 159 U/L (ref 35–104)
ALT SERPL-CCNC: 20 U/L (ref 5–33)
ANION GAP SERPL CALCULATED.3IONS-SCNC: 12 MMOL/L (ref 9–17)
AST SERPL-CCNC: 17 U/L
BASOPHILS # BLD: 1 % (ref 0–2)
BASOPHILS ABSOLUTE: 0.07 K/UL (ref 0–0.2)
BILIRUB SERPL-MCNC: 0.22 MG/DL (ref 0.3–1.2)
BUN BLDV-MCNC: 10 MG/DL (ref 6–20)
BUN/CREAT BLD: 11 (ref 9–20)
CALCIUM SERPL-MCNC: 9.3 MG/DL (ref 8.6–10.4)
CHLORIDE BLD-SCNC: 101 MMOL/L (ref 98–107)
CHOLESTEROL/HDL RATIO: 3.9
CHOLESTEROL: 188 MG/DL
CO2: 27 MMOL/L (ref 20–31)
CREAT SERPL-MCNC: 0.95 MG/DL (ref 0.5–0.9)
DIFFERENTIAL TYPE: ABNORMAL
EOSINOPHILS RELATIVE PERCENT: 3 % (ref 1–4)
ESTIMATED AVERAGE GLUCOSE: 131 MG/DL
FERRITIN: 85 UG/L (ref 13–150)
GFR AFRICAN AMERICAN: >60 ML/MIN
GFR NON-AFRICAN AMERICAN: >60 ML/MIN
GFR SERPL CREATININE-BSD FRML MDRD: ABNORMAL ML/MIN/{1.73_M2}
GFR SERPL CREATININE-BSD FRML MDRD: ABNORMAL ML/MIN/{1.73_M2}
GLUCOSE BLD-MCNC: 124 MG/DL (ref 70–99)
HBA1C MFR BLD: 6.2 % (ref 4–6)
HCT VFR BLD CALC: 40.3 % (ref 36.3–47.1)
HDLC SERPL-MCNC: 48 MG/DL
HEMOGLOBIN: 12.5 G/DL (ref 11.9–15.1)
IMMATURE GRANULOCYTES: 2 %
IRON SATURATION: 22 % (ref 20–55)
IRON: 61 UG/DL (ref 37–145)
LDL CHOLESTEROL: 111 MG/DL (ref 0–130)
LYMPHOCYTES # BLD: 25 % (ref 24–43)
MCH RBC QN AUTO: 28 PG (ref 25.2–33.5)
MCHC RBC AUTO-ENTMCNC: 31 G/DL (ref 25.2–33.5)
MCV RBC AUTO: 90.2 FL (ref 82.6–102.9)
MONOCYTES # BLD: 5 % (ref 3–12)
NRBC AUTOMATED: 0 PER 100 WBC
PDW BLD-RTO: 15.3 % (ref 11.8–14.4)
PLATELET # BLD: 241 K/UL (ref 138–453)
PLATELET ESTIMATE: ABNORMAL
PMV BLD AUTO: 10 FL (ref 8.1–13.5)
POTASSIUM SERPL-SCNC: 3.5 MMOL/L (ref 3.7–5.3)
RBC # BLD: 4.47 M/UL (ref 3.95–5.11)
RBC # BLD: ABNORMAL 10*6/UL
SEG NEUTROPHILS: 64 % (ref 36–65)
SEGMENTED NEUTROPHILS ABSOLUTE COUNT: 7.24 K/UL (ref 1.5–8.1)
SODIUM BLD-SCNC: 140 MMOL/L (ref 135–144)
THYROXINE, FREE: 1.35 NG/DL (ref 0.93–1.7)
TOTAL IRON BINDING CAPACITY: 281 UG/DL (ref 250–450)
TOTAL PROTEIN: 6.8 G/DL (ref 6.4–8.3)
TRIGL SERPL-MCNC: 145 MG/DL
TSH SERPL DL<=0.05 MIU/L-ACNC: 2.13 MIU/L (ref 0.3–5)
UNSATURATED IRON BINDING CAPACITY: 220 UG/DL (ref 112–347)
VLDLC SERPL CALC-MCNC: NORMAL MG/DL (ref 1–30)
WBC # BLD: 11.1 K/UL (ref 3.5–11.3)
WBC # BLD: ABNORMAL 10*3/UL

## 2021-09-01 PROCEDURE — 84439 ASSAY OF FREE THYROXINE: CPT

## 2021-09-01 PROCEDURE — 83550 IRON BINDING TEST: CPT

## 2021-09-01 PROCEDURE — 82728 ASSAY OF FERRITIN: CPT

## 2021-09-01 PROCEDURE — 83540 ASSAY OF IRON: CPT

## 2021-09-01 PROCEDURE — 80061 LIPID PANEL: CPT

## 2021-09-01 PROCEDURE — 36415 COLL VENOUS BLD VENIPUNCTURE: CPT

## 2021-09-01 PROCEDURE — 85025 COMPLETE CBC W/AUTO DIFF WBC: CPT

## 2021-09-01 PROCEDURE — 83036 HEMOGLOBIN GLYCOSYLATED A1C: CPT

## 2021-09-01 PROCEDURE — 80053 COMPREHEN METABOLIC PANEL: CPT

## 2021-09-01 PROCEDURE — 84443 ASSAY THYROID STIM HORMONE: CPT

## 2021-09-02 ENCOUNTER — OFFICE VISIT (OUTPATIENT)
Dept: FAMILY MEDICINE CLINIC | Age: 51
End: 2021-09-02
Payer: COMMERCIAL

## 2021-09-02 VITALS
DIASTOLIC BLOOD PRESSURE: 76 MMHG | SYSTOLIC BLOOD PRESSURE: 118 MMHG | OXYGEN SATURATION: 96 % | RESPIRATION RATE: 16 BRPM | HEIGHT: 66 IN | BODY MASS INDEX: 45.48 KG/M2 | HEART RATE: 92 BPM | WEIGHT: 283 LBS

## 2021-09-02 DIAGNOSIS — M54.2 NECK PAIN: ICD-10-CM

## 2021-09-02 DIAGNOSIS — M25.511 ACUTE PAIN OF RIGHT SHOULDER: Primary | ICD-10-CM

## 2021-09-02 DIAGNOSIS — E66.01 MORBID OBESITY WITH BMI OF 45.0-49.9, ADULT (HCC): ICD-10-CM

## 2021-09-02 DIAGNOSIS — E78.2 MIXED HYPERLIPIDEMIA: ICD-10-CM

## 2021-09-02 DIAGNOSIS — Z12.31 SCREENING MAMMOGRAM, ENCOUNTER FOR: ICD-10-CM

## 2021-09-02 DIAGNOSIS — E11.9 TYPE 2 DIABETES MELLITUS WITHOUT COMPLICATION, WITHOUT LONG-TERM CURRENT USE OF INSULIN (HCC): ICD-10-CM

## 2021-09-02 DIAGNOSIS — E03.9 ACQUIRED HYPOTHYROIDISM: ICD-10-CM

## 2021-09-02 DIAGNOSIS — E87.6 HYPOKALEMIA: ICD-10-CM

## 2021-09-02 PROCEDURE — 99214 OFFICE O/P EST MOD 30 MIN: CPT | Performed by: FAMILY MEDICINE

## 2021-09-02 PROCEDURE — 20610 DRAIN/INJ JOINT/BURSA W/O US: CPT | Performed by: FAMILY MEDICINE

## 2021-09-02 RX ORDER — POTASSIUM CHLORIDE 600 MG/1
TABLET, FILM COATED, EXTENDED RELEASE ORAL
Qty: 450 TABLET | Refills: 1 | Status: SHIPPED | OUTPATIENT
Start: 2021-09-02 | End: 2022-04-15

## 2021-09-02 RX ORDER — TRIAMCINOLONE ACETONIDE 40 MG/ML
40 INJECTION, SUSPENSION INTRA-ARTICULAR; INTRAMUSCULAR ONCE
Status: COMPLETED | OUTPATIENT
Start: 2021-09-02 | End: 2021-09-02

## 2021-09-02 RX ORDER — SEMAGLUTIDE 0.25 MG/.5ML
0.25 INJECTION, SOLUTION SUBCUTANEOUS
Qty: 2 ML | Refills: 0 | Status: SHIPPED | OUTPATIENT
Start: 2021-09-02 | End: 2021-09-23 | Stop reason: SDUPTHER

## 2021-09-02 RX ADMIN — TRIAMCINOLONE ACETONIDE 40 MG: 40 INJECTION, SUSPENSION INTRA-ARTICULAR; INTRAMUSCULAR at 10:22

## 2021-09-02 ASSESSMENT — ENCOUNTER SYMPTOMS
NAUSEA: 0
EYE REDNESS: 0
RHINORRHEA: 0
DIARRHEA: 0
CONSTIPATION: 0
EYE DISCHARGE: 0
VOMITING: 0
TROUBLE SWALLOWING: 0
SHORTNESS OF BREATH: 0
SINUS PRESSURE: 0
ABDOMINAL PAIN: 0
BACK PAIN: 0
SORE THROAT: 0
WHEEZING: 0
COUGH: 0

## 2021-09-02 NOTE — PROGRESS NOTES
2021     Campbell Kearns (:  1970) is a 45 y.o. female, here for evaluation of the following medical concerns:    HPI  Patient comes in today secondary to increased issues with neck pain and right shoulder pain. This has been going on for the last couple of months duration. We had done an MRI of her neck in January and it did not show any evidence of spinal stenosis or neuroforaminal narrowing. She states that over the last couple of months she has had pain to the right side of the neck radiating down into her right upper extremity. Has pain in the shoulder. Did not have any injury to the area. States that she has limited mobility with abduction of the shoulder and with any movement it does seem to cause her pain. She is on gabapentin has tried to take anti-inflammatories but just not getting a whole lot of improvement. Does not lay on her side. She also notes she would like to try something other than Trulicity to help with her weight management. We had put her on Trulicity due to her diabetes mellitus but it did cause her to have significant hypoglycemia. I did advise her that we could perhaps try to go will be, as this does come in a lower dose pen and perhaps with a slow titration she might be able to tolerate this. She is agreeable to trying this. I did review her recent lab work which showed that her blood sugars are stable and controlled with her current dietary intake. She does continue to struggle with her weight and has tried various diets without significant improvement. She has tried a low-carb and low calorie diet and just does not seem to be getting any improvement as far as her weight loss. She has done this for the last 3 to 4 months duration and has not lost any weight and also has been doing increased exercise. With this in mind do think that trying something like ago V might be helpful as far as helping with her weight loss efforts.   Does have a known history of hyperlipidemia and her cholesterol levels have elevated somewhat compared to last check. Did encourage continued lipid-lowering diet to keep this optimally controlled. Her potassium level is low so we did talk about adding additional potassium supplement to her regimen. She has been having some muscle cramping and leg pain. Patient otherwise has no other acute medical concerns. Did review patient's med list, allergies, social history,pmhx and pshx today as noted in the record. Review of Systems   Constitutional: Positive for fatigue. Negative for chills and fever. HENT: Negative for congestion, ear pain, postnasal drip, rhinorrhea, sinus pressure, sore throat and trouble swallowing. Eyes: Negative for discharge and redness. Respiratory: Negative for cough, shortness of breath and wheezing. Cardiovascular: Negative for chest pain. Gastrointestinal: Negative for abdominal pain, constipation, diarrhea, nausea and vomiting. Genitourinary: Negative for dysuria, flank pain, frequency and urgency. Musculoskeletal: Positive for arthralgias, myalgias, neck pain and neck stiffness. Negative for back pain, gait problem and joint swelling. Skin: Negative for rash and wound. Allergic/Immunologic: Negative for environmental allergies. Neurological: Positive for weakness. Negative for dizziness, light-headedness, numbness and headaches. Hematological: Negative for adenopathy. Psychiatric/Behavioral: Negative. Prior to Visit Medications    Medication Sig Taking?  Authorizing Provider   metoprolol tartrate (LOPRESSOR) 50 MG tablet TAKE 1 TABLET BY MOUTH TWICE DAILY  Mallorie Curtis, DO   esomeprazole ([address]) 40 MG delayed release capsule TAKE 1 CAPSULE BY MOUTH TWICE DAILY  Mallorie Curtis, DO   gabapentin (NEURONTIN) 800 MG tablet TAKE 1 TABLET BY MOUTH TWICE DAILY  MD DAMIÁN Goldstein 24 HOUR  MG per extended release tablet TAKE 1 TABLET BY MOUTH EVERY DAY  [facility] [place], [place] - MD RODRIGO   OXcarbazepine (TRILEPTAL) 300 MG tablet TAKE 1 TABLET BY MOUTH TWICE DAILY  Malachi Grewal MD   prazosin (MINIPRESS) 5 MG capsule Take 5 mg by mouth nightly  Historical Provider, MD   hydrOXYzine (ATARAX) 25 MG tablet Take 25 mg by mouth 3 times daily as needed for Anxiety  Historical Provider, MD   albuterol sulfate HFA (VENTOLIN HFA) 108 (90 Base) MCG/ACT inhaler INHALE 2 PUFFS EVERY 6 HOURS IF NEEDED FOR WHEEZING  Yanet Gray DO   hydroCHLOROthiazide (HYDRODIURIL) 25 MG tablet TAKE 1 TABLET BY MOUTH DAILY  Johana Fuelling, DO   levothyroxine (SYNTHROID) 125 MCG tablet TAKE 1 TABLET BY MOUTH EVERY DAY  Johana Fuelling, DO   mirabegron (MYRBETRIQ) 50 MG TB24 TAKE 1 TABLET BY MOUTH DAILY  Johana Fuelling, DO   potassium chloride (KLOR-CON) 8 MEQ extended release tablet TAKE 4 TABLETS BY MOUTH ONCE DAILY  Johana Fuelling, DO   aspirin (ASPIRIN LOW DOSE) 81 MG EC tablet TAKE 1 TABLET BY MOUTH DAILY  Johana Fuelling, DO   spironolactone (ALDACTONE) 100 MG tablet TAKE 1 TABLET BY MOUTH EVERY DAY  Yanet Gray DO   montelukast (SINGULAIR) 10 MG tablet Take 1 tablet by mouth daily  Yanet Gray DO   ferrous sulfate (IRON 325) 325 (65 Fe) MG tablet Take 1 tablet by mouth 2 times daily  Yanet Gray DO   Handicap Placard MISC by Does not apply route Duration 5 years  Yanet Gray DO   lamoTRIgine (LAMICTAL) 100 MG tablet Take by mouth 2 times daily 100 mg am and 150 mg pm, may increase to 150 mg twice daily on 9/15/20  Historical Provider, MD   fluticasone-salmeterol (ADVAIR DISKUS) 250-50 MCG/DOSE AEPB Inhale 1 puff into the lungs every 12 hours RINSE MOUTH AFTER USE  Johana Fuelling, DO   traZODone (DESYREL) 300 MG tablet take 1 tablet by mouth at bedtime  Historical Provider, MD   desvenlafaxine succinate (PRISTIQ) 100 MG TB24 extended release tablet take 1 tablet by mouth once daily  Historical Provider, MD   Cholecalciferol (VITAMIN D3) 5000 UNITS TABS Take hypothyroidism     Mixed hyperlipidemia     Screening mammogram, encounter for      Orders Placed This Encounter   Medications    Semaglutide-Weight Management (WEGOVY) 0.25 MG/0.5ML SOAJ SC injection     Sig: Inject 0.25 mg into the skin every 7 days     Dispense:  2 mL     Refill:  0    potassium chloride (KLOR-CON) 8 MEQ extended release tablet     Sig: TAKE 5 TABLETS BY MOUTH ONCE DAILY     Dispense:  450 tablet     Refill:  1    triamcinolone acetonide (KENALOG-40) injection 40 mg     Orders Placed This Encounter   Procedures    GRACIELA KATIE DIGITAL SCREEN BILATERAL     Standing Status:   Future     Standing Expiration Date:   11/2/2022     Order Specific Question:   Reason for exam:     Answer:   screening    AZ ARTHROCENTESIS ASPIR&/INJ MAJOR JT/BURSA W/O US     Procedure Note    PREOP DIAGNOSIS:  [x] Right []  Left    [] Bilateral Shoulder Pain    POSTOP DIAGNOSIS:  [x] Right []  Left    [] Bilateral Shoulder Pain    OPERATION:  [x] Right []  Left    [] Bilateral INTRA-ARTICULAR Glenohumeral Injection    PROCEDURE:  After sterile prep, a anterior approach was used. 40 mg of Kenalog and 2 cc of 1% Xylocaine injected intra-articularly without incident. Pre- and post neurovascular status verified. No complications noted. Informed consent and time out was completed prior to procedure    Did increase her potassium supplement due to the hypokalemia. We will start her on Wegovy which will be a lower dose to start so hopefully she would have less issues with hypoglycemia. Did encourage continued low-carb/low sugar diet in order to keep this optimally controlled. Patient is to continue on the rest of her current medical therapy. No additional changes are made at this time. Patient is to return to my office as scheduled for routine medical follow-up visit or sooner if any further problems or symptoms arise.     (Please note that portions of this note were completed with a voice recognition program. Efforts were made to edit the dictations but occasionally words are mis-transcribed.)      No follow-ups on file. An electronic signature was used to authenticate this note.     --Willard Canales,  on 9/2/2021 at 7:14 AM

## 2021-09-07 ENCOUNTER — HOSPITAL ENCOUNTER (OUTPATIENT)
Dept: MAMMOGRAPHY | Age: 51
Discharge: HOME | End: 2021-09-09
Payer: COMMERCIAL

## 2021-09-07 VITALS — HEIGHT: 66 IN | WEIGHT: 285 LBS | BODY MASS INDEX: 45.8 KG/M2

## 2021-09-07 DIAGNOSIS — Z12.31 SCREENING MAMMOGRAM, ENCOUNTER FOR: ICD-10-CM

## 2021-09-07 PROCEDURE — 77063 BREAST TOMOSYNTHESIS BI: CPT

## 2021-09-13 ENCOUNTER — PATIENT MESSAGE (OUTPATIENT)
Dept: FAMILY MEDICINE CLINIC | Age: 51
End: 2021-09-13

## 2021-09-13 DIAGNOSIS — M25.511 ACUTE PAIN OF RIGHT SHOULDER: Primary | ICD-10-CM

## 2021-09-13 NOTE — TELEPHONE ENCOUNTER
From: Asif Gregorio  To: Peña Dwyer DO  Sent: 9/13/2021 2:46 PM EDT  Subject: Visit Follow-Up Question    Im am having alot of shoulder pain still only its making my arm hurt really bad and i can barely lift it. I am also having pain in lower cervical and upper thorasic area,to the point its effecting my sleep. Is it possible that the nerve pain is coming from that area?

## 2021-09-20 RX ORDER — GABAPENTIN 800 MG/1
TABLET ORAL
Qty: 180 TABLET | Refills: 0 | OUTPATIENT
Start: 2021-09-20 | End: 2021-12-19

## 2021-09-20 NOTE — TELEPHONE ENCOUNTER
Navid Mohan called requesting a refill of the below medication which has been pended for you: declined as refill to soon as she was given 90 day supply 7/23/2021    Requested Prescriptions     Pending Prescriptions Disp Refills    gabapentin (NEURONTIN) 800 MG tablet [Pharmacy Med Name: GABAPENTIN 800MG TABLETS] 180 tablet 0     Sig: TAKE 1 TABLET BY MOUTH TWICE DAILY       Last Appointment Date: 9/29/2019  Next Appointment Date: Visit date not found    Allergies   Allergen Reactions    Allopurinol Other (See Comments)     Other reaction(s): nausea    Bumex [Bumetanide] Other (See Comments)     Extreme fatigue, nausea, dizziness    Colchicine     Erythromycin     Indocin [Indomethacin]     Lasix [Furosemide] Other (See Comments)     Dizziness, extreme fatigue    Lisinopril     Morphine     Norvasc [Amlodipine]     Tenormin [Atenolol]     Uloric [Febuxostat]     Hydrocodone-Acetaminophen Nausea Only    Topamax [Topiramate] Swelling and Rash

## 2021-09-21 RX ORDER — GABAPENTIN 800 MG/1
800 TABLET ORAL 2 TIMES DAILY
Qty: 180 TABLET | Refills: 0 | OUTPATIENT
Start: 2021-09-21 | End: 2021-12-20

## 2021-09-21 NOTE — TELEPHONE ENCOUNTER
Yoel Cline called requesting a refill of the below medication which has been pended for you: Spoke with patient and pharmacy, patient should have another month worth of medication. Requested Prescriptions     Pending Prescriptions Disp Refills    gabapentin (NEURONTIN) 800 MG tablet 180 tablet 0     Sig: Take 1 tablet by mouth 2 times daily for 90 days.        Last Appointment Date: 9/2/2021  Next Appointment Date: Visit date not found    Allergies   Allergen Reactions    Allopurinol Other (See Comments)     Other reaction(s): nausea    Bumex [Bumetanide] Other (See Comments)     Extreme fatigue, nausea, dizziness    Colchicine     Erythromycin     Indocin [Indomethacin]     Lasix [Furosemide] Other (See Comments)     Dizziness, extreme fatigue    Lisinopril     Morphine     Norvasc [Amlodipine]     Tenormin [Atenolol]     Uloric [Febuxostat]     Hydrocodone-Acetaminophen Nausea Only    Topamax [Topiramate] Swelling and Rash

## 2021-09-23 ENCOUNTER — HOSPITAL ENCOUNTER (OUTPATIENT)
Dept: LAB | Age: 51
Discharge: HOME | End: 2021-09-23
Payer: COMMERCIAL

## 2021-09-23 ENCOUNTER — E-VISIT (OUTPATIENT)
Dept: FAMILY MEDICINE CLINIC | Age: 51
End: 2021-09-23
Payer: COMMERCIAL

## 2021-09-23 ENCOUNTER — TELEPHONE (OUTPATIENT)
Dept: FAMILY MEDICINE CLINIC | Age: 51
End: 2021-09-23

## 2021-09-23 DIAGNOSIS — R30.0 DYSURIA: ICD-10-CM

## 2021-09-23 DIAGNOSIS — N30.00 ACUTE CYSTITIS WITHOUT HEMATURIA: Primary | ICD-10-CM

## 2021-09-23 LAB
-: ABNORMAL
AMORPHOUS: ABNORMAL
BACTERIA: ABNORMAL
BILIRUBIN URINE: NEGATIVE
CASTS UA: ABNORMAL /LPF (ref 0–2)
COLOR: ABNORMAL
COMMENT UA: ABNORMAL
CRYSTALS, UA: ABNORMAL /HPF
EPITHELIAL CELLS UA: ABNORMAL /HPF (ref 0–5)
GLUCOSE URINE: ABNORMAL
KETONES, URINE: NEGATIVE
LEUKOCYTE ESTERASE, URINE: ABNORMAL
MUCUS: ABNORMAL
NITRITE, URINE: POSITIVE
OTHER OBSERVATIONS UA: ABNORMAL
PH UA: 5.5 (ref 5–6)
PROTEIN UA: NEGATIVE
RBC UA: ABNORMAL /HPF (ref 0–4)
RENAL EPITHELIAL, UA: ABNORMAL /HPF
SPECIFIC GRAVITY UA: 1 (ref 1.01–1.02)
TRICHOMONAS: ABNORMAL
TURBIDITY: ABNORMAL
URINE HGB: ABNORMAL
UROBILINOGEN, URINE: NORMAL
WBC UA: ABNORMAL /HPF (ref 0–4)
YEAST: ABNORMAL

## 2021-09-23 PROCEDURE — 87186 SC STD MICRODIL/AGAR DIL: CPT

## 2021-09-23 PROCEDURE — 81001 URINALYSIS AUTO W/SCOPE: CPT

## 2021-09-23 PROCEDURE — 99422 OL DIG E/M SVC 11-20 MIN: CPT | Performed by: FAMILY MEDICINE

## 2021-09-23 PROCEDURE — 87086 URINE CULTURE/COLONY COUNT: CPT

## 2021-09-23 PROCEDURE — 87088 URINE BACTERIA CULTURE: CPT

## 2021-09-23 RX ORDER — PHENAZOPYRIDINE HYDROCHLORIDE 200 MG/1
200 TABLET, FILM COATED ORAL 3 TIMES DAILY PRN
Qty: 6 TABLET | Refills: 0 | Status: SHIPPED | OUTPATIENT
Start: 2021-09-23 | End: 2021-09-25

## 2021-09-23 RX ORDER — NITROFURANTOIN 25; 75 MG/1; MG/1
100 CAPSULE ORAL 2 TIMES DAILY
Qty: 14 CAPSULE | Refills: 0 | Status: SHIPPED | OUTPATIENT
Start: 2021-09-23 | End: 2022-01-05 | Stop reason: ALTCHOICE

## 2021-09-23 RX ORDER — SEMAGLUTIDE 0.25 MG/.5ML
0.25 INJECTION, SOLUTION SUBCUTANEOUS
Qty: 2 ML | Refills: 0 | Status: SHIPPED | OUTPATIENT
Start: 2021-09-23 | End: 2021-10-22 | Stop reason: SDUPTHER

## 2021-09-23 NOTE — PROGRESS NOTES
Patient with urinary symptoms and did come in to get a urinalysis which is consistent with a UTI. Will treat with antibiotic and pyridium to help clear her symptoms. Advised to increase water intake. 11-20 minutes were spent on the digital evaluation and management of this patient.

## 2021-09-23 NOTE — TELEPHONE ENCOUNTER
Spoke with [facility], they are not able to supply medication due to being on backorder. Spoke with [facility], they do have medication available at this time but they have been having a hard time with supplier keeping it in stock as well. Patient okay with picking up medication from clinic. New prescription sent per verbal order Dr. Jimenez Darden. Script canceled at [facility].

## 2021-09-25 LAB
CULTURE: ABNORMAL
Lab: ABNORMAL
SPECIMEN DESCRIPTION: ABNORMAL

## 2021-09-27 DIAGNOSIS — M25.511 BILATERAL SHOULDER PAIN, UNSPECIFIED CHRONICITY: Primary | ICD-10-CM

## 2021-09-27 DIAGNOSIS — M25.512 BILATERAL SHOULDER PAIN, UNSPECIFIED CHRONICITY: Primary | ICD-10-CM

## 2021-09-27 NOTE — TELEPHONE ENCOUNTER
Faxed letter to Dr Leander Grimaldo at [facility] at [phone], pt aware.
Pt was in on 8/4/2020 and spoke with  during that time about her having surgery coming up and needing cardiac clearance and she said the dr agreered to do so at that visit and even ordered and echo to make sure. Pt is now saying nothing is noted and they won't schedule the surgery. Pt wants to know if clearance could be generated from this visit.
Sure letter provided in epic.    Marky
No

## 2021-09-29 RX ORDER — LEVOTHYROXINE SODIUM 0.12 MG/1
TABLET ORAL
Qty: 90 TABLET | Refills: 1 | Status: SHIPPED | OUTPATIENT
Start: 2021-09-29 | End: 2022-08-12 | Stop reason: SDUPTHER

## 2021-09-29 RX ORDER — HYDROCHLOROTHIAZIDE 25 MG/1
TABLET ORAL
Qty: 90 TABLET | Refills: 1 | Status: SHIPPED | OUTPATIENT
Start: 2021-09-29 | End: 2022-06-20

## 2021-09-29 RX ORDER — ASPIRIN 81 MG/1
TABLET ORAL
Qty: 90 TABLET | Refills: 1 | Status: SHIPPED | OUTPATIENT
Start: 2021-09-29 | End: 2022-06-27

## 2021-09-29 NOTE — TELEPHONE ENCOUNTER
Latasha Carrizales called requesting a refill of the below medication which has been pended for you:     Requested Prescriptions     Pending Prescriptions Disp Refills    hydroCHLOROthiazide (HYDRODIURIL) 25 MG tablet [Pharmacy Med Name: HYDROCHLOROTHIAZIDE 25MG TABLETS] 90 tablet 1     Sig: TAKE 1 TABLET BY MOUTH DAILY    aspirin (ASPIRIN LOW DOSE) 81 MG EC tablet [Pharmacy Med Name: ASPIRIN 81MG EC LOW DOSE  TABLETS] 90 tablet 1     Sig: TAKE 1 TABLET BY MOUTH DAILY    levothyroxine (SYNTHROID) 125 MCG tablet [Pharmacy Med Name: LEVOTHYROXINE 0.125MG (125MCG) TAB] 90 tablet 1     Sig: TAKE 1 TABLET BY MOUTH EVERY DAY    mirabegron (Marny Alhaji) 50 MG TB24 [Pharmacy Med Name: Marny Alhaji 50MG TABLETS] 90 tablet 1     Sig: TAKE 1 TABLET BY MOUTH DAILY       Last Appointment Date: 09/02/2021  Next Appointment Date: due in March    Allergies   Allergen Reactions    Allopurinol Other (See Comments)     Other reaction(s): nausea    Bumex [Bumetanide] Other (See Comments)     Extreme fatigue, nausea, dizziness    Colchicine     Erythromycin     Indocin [Indomethacin]     Lasix [Furosemide] Other (See Comments)     Dizziness, extreme fatigue    Lisinopril     Morphine     Norvasc [Amlodipine]     Tenormin [Atenolol]     Uloric [Febuxostat]     Hydrocodone-Acetaminophen Nausea Only    Topamax [Topiramate] Swelling and Rash

## 2021-10-04 ENCOUNTER — OFFICE VISIT (OUTPATIENT)
Dept: ORTHOPEDIC SURGERY | Age: 51
End: 2021-10-04
Payer: COMMERCIAL

## 2021-10-04 ENCOUNTER — HOSPITAL ENCOUNTER (OUTPATIENT)
Dept: GENERAL RADIOLOGY | Age: 51
Discharge: HOME | End: 2021-10-06
Payer: COMMERCIAL

## 2021-10-04 VITALS
DIASTOLIC BLOOD PRESSURE: 82 MMHG | WEIGHT: 285 LBS | SYSTOLIC BLOOD PRESSURE: 126 MMHG | HEART RATE: 94 BPM | BODY MASS INDEX: 45.8 KG/M2 | HEIGHT: 66 IN

## 2021-10-04 DIAGNOSIS — M25.512 BILATERAL SHOULDER PAIN, UNSPECIFIED CHRONICITY: ICD-10-CM

## 2021-10-04 DIAGNOSIS — M19.019 ARTHRITIS OF SHOULDER: ICD-10-CM

## 2021-10-04 DIAGNOSIS — M54.2 CERVICAL SPINE PAIN: Primary | ICD-10-CM

## 2021-10-04 DIAGNOSIS — M25.511 BILATERAL SHOULDER PAIN, UNSPECIFIED CHRONICITY: ICD-10-CM

## 2021-10-04 PROCEDURE — 73030 X-RAY EXAM OF SHOULDER: CPT

## 2021-10-04 PROCEDURE — 99203 OFFICE O/P NEW LOW 30 MIN: CPT | Performed by: ORTHOPAEDIC SURGERY

## 2021-10-04 RX ORDER — METHYLPHENIDATE HYDROCHLORIDE 10 MG/1
10 TABLET ORAL 3 TIMES DAILY
COMMUNITY
End: 2022-01-05

## 2021-10-04 NOTE — PROGRESS NOTES
Orthopedic Office Note  [facility] [address]le [address]  [address], [address]  [facility] [address] [phone]      CHIEF COMPLAINT:    Chief Complaint   Patient presents with    Shoulder Pain     mable shoulder pain       HISTORY OF PRESENT ILLNESS:      The patient is a 45 y.o. female  who presents today for evaluation of cervical pain that has been present for many months. In 2012 was her last cervical surgery where she had a decompression fusion. At that time she has what she describes as a pinching nerve pain more in the right periscapular and paraspinal muscles. She feels as though this pain is returned. She has very minimal pain in the lateral deltoid region. She has recently undergone lumbar spine surgery by Dr. Derrick Mahoney. Past Medical History:    Past Medical History:   Diagnosis Date    Abnormal CT of the abdomen     revealed 2 very small noncalcified pulmonary nodules in the right lung base. Suspect benign progress but repeat CT scan in June 2013 advised.  Allergic rhinitis     Anemia     Anxiety     Asthma     Chronic low back pain     Chronic neck pain     Degenerative joint disease of knee     Bilateral.    Depression     Endometriosis     history of     GERD (gastroesophageal reflux disease)     Gout     Hyperlipidemia     Hypertension     Hypothyroid     Kidney stone     history of     Left shoulder pain     Status post injections.  Leukocytosis     Palpitation     history of     Paresthesias     Generalized    Right knee pain     Status post injections.  Seizure ([address]     \"nocturnal seizures\"       Past Surgical History:    Past Surgical History:   Procedure Laterality Date    ARTHROPLASTY  03/26/1986    5th metatarsals, MTP joints, right and left feet.     CERVICAL FUSION  6/2014    Dr Maite Powell, LAPAROSCOPIC  11/14/2011    CYSTOSCOPY  2/8/13    no acute findings    HYSTERECTOMY  05/2005    JOINT REPLACEMENT      KNEE ARTHROSCOPY Left     KNEE ARTHROSCOPY Left 10/16/13    KNEE ARTHROSCOPY Right 02/08/2012    With partial medial and lateral synovectomies and abrasion chondroplasty.  LAPAROSCOPY  03/08/2002    diagnostic with D&C and hysterscopy. [address] SURGERY  10/6/10    Anterior C5-C6, fusion with C-trap allograft and [place] plates, performed by Dr Akosua Mendez.  LUMBAR FUSION  09/26/2020    L2-S1    LUMBAR SPINE SURGERY Left 2/13/08    L4-L5 and L5-S1 foraminotomies and L4 through S1 posterolateral fusion with pedicle screw instrumentation.  NASAL SEPTUM SURGERY      OTHER SURGICAL HISTORY  12/30/2014    spinal cord stimulator paddle electrode, spinal cord stimulator generator. both Wayger specter generator and 32 lead paddle electrode by Dr Marielos Lazaro at [address] [place] [address]  04/16/2015    spinal cord stimulator removed   [address] DECOMPRESSION  4/23/12    Lumbar.  TOTAL KNEE ARTHROPLASTY Left 04/2015    [facility]    TUBAL LIGATION  04/02/1990    UPPER GASTROINTESTINAL ENDOSCOPY  10/14/2011    Hiatal hernia.  UPPER GASTROINTESTINAL ENDOSCOPY N/A 9/10/2018    gastritis, esophagitis-BRAVO=reflux       Medications Prior to Admission:   Current Outpatient Medications   Medication Sig Dispense Refill    methylphenidate (RITALIN) 10 MG tablet Take 10 mg by mouth 3 times daily.       hydroCHLOROthiazide (HYDRODIURIL) 25 MG tablet TAKE 1 TABLET BY MOUTH DAILY 90 tablet 1    aspirin (ASPIRIN LOW DOSE) 81 MG EC tablet TAKE 1 TABLET BY MOUTH DAILY 90 tablet 1    levothyroxine (SYNTHROID) 125 MCG tablet TAKE 1 TABLET BY MOUTH EVERY DAY 90 tablet 1    mirabegron (MYRBETRIQ) 50 MG TB24 TAKE 1 TABLET BY MOUTH DAILY 90 tablet 1    nitrofurantoin, macrocrystal-monohydrate, (MACROBID) 100 MG capsule Take 1 capsule by mouth 2 times daily 14 capsule 0    Semaglutide-Weight Management (WEGOVY) 0.25 MG/0.5ML SOAJ SC injection Inject 0.25 mg into the skin every 7 days 2 mL 0    potassium chloride (KLOR-CON) 8 MEQ extended release tablet TAKE 5 TABLETS BY MOUTH ONCE DAILY 450 tablet 1    esomeprazole (NEXIUM) 40 MG delayed release capsule TAKE 1 CAPSULE BY MOUTH TWICE DAILY 180 capsule 1    gabapentin (NEURONTIN) 800 MG tablet TAKE 1 TABLET BY MOUTH TWICE DAILY 180 tablet 0    WAL-ITIN D 24 HOUR  MG per extended release tablet TAKE 1 TABLET BY MOUTH EVERY DAY 90 tablet 0    metoprolol tartrate (LOPRESSOR) 50 MG tablet TAKE 1 TABLET BY MOUTH TWICE DAILY 180 tablet 1    prazosin (MINIPRESS) 5 MG capsule Take 5 mg by mouth nightly      hydrOXYzine (ATARAX) 25 MG tablet Take 25 mg by mouth 3 times daily as needed for Anxiety      albuterol sulfate HFA (VENTOLIN HFA) 108 (90 Base) MCG/ACT inhaler INHALE 2 PUFFS EVERY 6 HOURS IF NEEDED FOR WHEEZING 18 g 5    montelukast (SINGULAIR) 10 MG tablet Take 1 tablet by mouth daily 90 tablet 1    ferrous sulfate (IRON 325) 325 (65 Fe) MG tablet Take 1 tablet by mouth 2 times daily 180 tablet 1    lamoTRIgine (LAMICTAL) 100 MG tablet Take by mouth 2 times daily 100 mg am and 150 mg pm, may increase to 150 mg twice daily on 9/15/20      fluticasone-salmeterol (ADVAIR DISKUS) 250-50 MCG/DOSE AEPB Inhale 1 puff into the lungs every 12 hours RINSE MOUTH AFTER USE 1 Inhaler 5    traZODone (DESYREL) 300 MG tablet take 1 tablet by mouth at bedtime  0    desvenlafaxine succinate (PRISTIQ) 100 MG TB24 extended release tablet take 1 tablet by mouth once daily  0    Cholecalciferol (VITAMIN D3) 5000 UNITS TABS Take 1 tablet by mouth daily 30 tablet 2     No current facility-administered medications for this visit.        Allergies:  Allopurinol, Bumex [bumetanide], Colchicine, Erythromycin, Indocin [indomethacin], Lasix [furosemide], Lisinopril, Morphine, Norvasc [amlodipine], Tenormin [atenolol], Uloric [febuxostat], Hydrocodone-acetaminophen, and Topamax [topiramate]    Social History:   Social History     Tobacco Use   Smoking Status Never Smoker   Smokeless Tobacco Never Used     Social History     Substance and Sexual Activity   Alcohol Use No     Social History     Substance and Sexual Activity   Drug Use No       Family History:  Family History   Problem Relation Age of Onset    Cancer Mother         melanoma    Thyroid Disease Mother     Coronary Art Dis Father         coronary artery bypass grafting x4    Hypertension Father     Glaucoma Father     Prostate Cancer Father     Heart Disease Father     Heart Disease Paternal Grandmother     High Blood Pressure Paternal Grandmother     Diabetes Paternal Grandmother     Thyroid Disease Paternal Grandmother     Heart Disease Paternal Grandfather     High Blood Pressure Paternal Grandfather     Diabetes Paternal Grandfather     Asthma Daughter     Depression Daughter     Anxiety Disorder Daughter     Depression Daughter     Anxiety Disorder Daughter     Asthma Other         sibling         REVIEW OF SYSTEMS:  Please see the ROS form attached to today's encounter. I have reviewed it with the patient during the visit. All other systems were reviewed and are negative. PHYSICAL EXAM:  Examination today of her right shoulder reveals 150 degrees of active forward flexion compared with 160 degrees of left shoulder active forward flexion. She has pain but no weakness with resisted rotator cuff testing. She has normal sensation in the upper extremities with 5 out of 5 strength. Cervical range of motion is painful. Radiology:  X-rays of her right shoulder from today were reviewed and show moderate glenohumeral joint arthritis. X-rays of her left shoulder reviewed from today and are unremarkable    ASSESSMENT/PLAN:  1. Cervical spine pain    2. Arthritis of shoulder        Clinically her symptoms are more consistent with cervical pain as opposed to shoulder pain.   Given her history of prior

## 2021-10-13 RX ORDER — FERROUS SULFATE 325(65) MG
TABLET ORAL
Qty: 180 TABLET | Refills: 1 | Status: SHIPPED | OUTPATIENT
Start: 2021-10-13 | End: 2022-05-16

## 2021-10-13 NOTE — TELEPHONE ENCOUNTER
Pepe called requesting a refill of the below medication which has been pended for you:     Requested Prescriptions     Pending Prescriptions Disp Refills    FEROSUL 325 (65 Fe) MG tablet [Pharmacy Med Name: FERROUS SULFATE 325MG (5GR) TABS] 180 tablet 1     Sig: TAKE 1 TABLET BY MOUTH TWICE DAILY       Last Appointment Date: 9/23/2021  Next Appointment Date: due in March    Allergies   Allergen Reactions    Allopurinol Other (See Comments)     Other reaction(s): nausea    Bumex [Bumetanide] Other (See Comments)     Extreme fatigue, nausea, dizziness    Colchicine     Erythromycin     Indocin [Indomethacin]     Lasix [Furosemide] Other (See Comments)     Dizziness, extreme fatigue    Lisinopril     Morphine     Norvasc [Amlodipine]     Tenormin [Atenolol]     Uloric [Febuxostat]     Hydrocodone-Acetaminophen Nausea Only    Topamax [Topiramate] Swelling and Rash

## 2021-10-15 ENCOUNTER — HOSPITAL ENCOUNTER (OUTPATIENT)
Dept: MRI IMAGING | Age: 51
Discharge: HOME | End: 2021-10-17
Payer: COMMERCIAL

## 2021-10-15 DIAGNOSIS — M54.2 CERVICAL SPINE PAIN: ICD-10-CM

## 2021-10-22 RX ORDER — SEMAGLUTIDE 0.25 MG/.5ML
0.25 INJECTION, SOLUTION SUBCUTANEOUS
Qty: 2 ML | Refills: 5 | Status: SHIPPED | OUTPATIENT
Start: 2021-10-22 | End: 2021-11-01 | Stop reason: DRUGHIGH

## 2021-10-22 NOTE — TELEPHONE ENCOUNTER
Aura Hurst called requesting a refill of the below medication which has been pended for you:     Requested Prescriptions     Pending Prescriptions Disp Refills    Semaglutide-Weight Management (WEGOVY) 0.25 MG/0.5ML SOAJ SC injection 2 mL 5     Sig: Inject 0.25 mg into the skin every 7 days       Last Appointment Date: 9/23/2021  Next Appointment Date: due in March    Allergies   Allergen Reactions    Allopurinol Other (See Comments)     Other reaction(s): nausea    Bumex [Bumetanide] Other (See Comments)     Extreme fatigue, nausea, dizziness    Colchicine     Erythromycin     Indocin [Indomethacin]     Lasix [Furosemide] Other (See Comments)     Dizziness, extreme fatigue    Lisinopril     Morphine     Norvasc [Amlodipine]     Tenormin [Atenolol]     Uloric [Febuxostat]     Hydrocodone-Acetaminophen Nausea Only    Topamax [Topiramate] Swelling and Rash

## 2021-10-27 ENCOUNTER — PATIENT MESSAGE (OUTPATIENT)
Dept: FAMILY MEDICINE CLINIC | Age: 51
End: 2021-10-27

## 2021-10-27 DIAGNOSIS — E11.9 TYPE 2 DIABETES MELLITUS WITHOUT COMPLICATION, WITHOUT LONG-TERM CURRENT USE OF INSULIN (HCC): Primary | ICD-10-CM

## 2021-10-27 RX ORDER — BLOOD-GLUCOSE METER
1 KIT MISCELLANEOUS DAILY
Qty: 1 KIT | Refills: 0 | Status: SHIPPED | OUTPATIENT
Start: 2021-10-27 | End: 2022-01-31

## 2021-10-27 RX ORDER — LANCETS 30 GAUGE
1 EACH MISCELLANEOUS DAILY
Qty: 100 EACH | Refills: 5 | Status: SHIPPED | OUTPATIENT
Start: 2021-10-27

## 2021-10-27 RX ORDER — GLUCOSAMINE HCL/CHONDROITIN SU 500-400 MG
CAPSULE ORAL
Qty: 100 STRIP | Refills: 3 | Status: SHIPPED | OUTPATIENT
Start: 2021-10-27

## 2021-10-27 NOTE — TELEPHONE ENCOUNTER
From: Dulce Lim  To: Maggi Dillon DO  Sent: 10/27/2021 2:58 PM EDT  Subject: Prescription Question    Before i go up dosage on wegovy next month wouldyou be able to prescribe a glucose monitor so i can make sure i don't go too low? Most of the time i feel ok. I started ritilin so i also want to make sure it doesn't go too high also. Thank you!

## 2021-11-01 ENCOUNTER — TELEPHONE (OUTPATIENT)
Dept: FAMILY MEDICINE CLINIC | Age: 51
End: 2021-11-01

## 2021-11-01 RX ORDER — SEMAGLUTIDE 0.5 MG/.5ML
0.5 INJECTION, SOLUTION SUBCUTANEOUS
Qty: 2 ML | Refills: 0 | Status: SHIPPED | OUTPATIENT
Start: 2021-11-01 | End: 2022-01-05

## 2021-11-01 RX ORDER — GABAPENTIN 800 MG/1
800 TABLET ORAL 2 TIMES DAILY
Qty: 180 TABLET | Refills: 0 | Status: SHIPPED | OUTPATIENT
Start: 2021-11-01 | End: 2022-02-02

## 2021-11-01 NOTE — TELEPHONE ENCOUNTER
Sherrlyn Olszewski called requesting a refill of the below medication which has been pended for you: insurance is requiring patient step up on her [facility] CARMEN script in order for them to pay, so I did load next dosage. Neurontin last filled 7/23/2021 #180    Requested Prescriptions     Pending Prescriptions Disp Refills    gabapentin (NEURONTIN) 800 MG tablet 180 tablet 0     Sig: Take 1 tablet by mouth 2 times daily for 90 days.     Semaglutide-Weight Management (WEGOVY) 0.5 MG/0.5ML SOAJ SC injection 2 mL 0     Sig: Inject 0.5 mg into the skin every 7 days       Last Appointment Date: 9/23/2021  Next Appointment Date: Visit date not found    Allergies   Allergen Reactions    Allopurinol Other (See Comments)     Other reaction(s): nausea    Bumex [Bumetanide] Other (See Comments)     Extreme fatigue, nausea, dizziness    Colchicine     Erythromycin     Indocin [Indomethacin]     Lasix [Furosemide] Other (See Comments)     Dizziness, extreme fatigue    Lisinopril     Morphine     Norvasc [Amlodipine]     Tenormin [Atenolol]     Uloric [Febuxostat]     Hydrocodone-Acetaminophen Nausea Only    Topamax [Topiramate] Swelling and Rash

## 2021-11-01 NOTE — TELEPHONE ENCOUNTER
----- Message from Marilu Collins sent at 10/28/2021  1:10 PM EDT -----  Subject: Message to Provider    QUESTIONS  Information for Provider? Patient is calling regarding prescription? Wegovy. Patient is stating a pre [place] is needed. Also patient has question   regarding dosage  ---------------------------------------------------------------------------  --------------  CALL BACK INFO  What is the best way for the office to contact you? OK to leave message on   voicemail  Preferred Call Back Phone Number? [phone]  ---------------------------------------------------------------------------  --------------  SCRIPT ANSWERS  Relationship to Patient?  Self

## 2021-11-01 NOTE — TELEPHONE ENCOUNTER
----- Message from Tyrone Charles sent at 10/28/2021  1:10 PM EDT -----  Subject: Message to Provider    QUESTIONS  Information for Provider? Patient is calling regarding prescription? Wegovy. Patient is stating a pre [place] is needed. Also patient has question   regarding dosage  ---------------------------------------------------------------------------  --------------  CALL BACK INFO  What is the best way for the office to contact you? OK to leave message on   voicemail  Preferred Call Back Phone Number? [phone]  ---------------------------------------------------------------------------  --------------  SCRIPT ANSWERS  Relationship to Patient?  Self

## 2021-11-05 ENCOUNTER — PATIENT MESSAGE (OUTPATIENT)
Dept: FAMILY MEDICINE CLINIC | Age: 51
End: 2021-11-05

## 2021-11-05 RX ORDER — FEXOFENADINE HCL AND PSEUDOEPHEDRINE HCI 180; 240 MG/1; MG/1
1 TABLET, EXTENDED RELEASE ORAL DAILY
Qty: 90 TABLET | Refills: 1 | Status: SHIPPED | OUTPATIENT
Start: 2021-11-05 | End: 2022-04-15

## 2021-11-05 NOTE — TELEPHONE ENCOUNTER
From: Gibson Ramos  To: Mikayla Oscar DO  Sent: 11/5/2021 10:55 AM EDT  Subject: Prescription Question    My insurance no longer covers claritin D but will cover Allegra D. Can you change me over? My bursitis in my hip is really bothering me. [place] in [place] is there any OTC that will help? Thank you!

## 2021-11-17 RX ORDER — ESOMEPRAZOLE MAGNESIUM 40 MG/1
CAPSULE, DELAYED RELEASE ORAL
Qty: 180 CAPSULE | Refills: 0 | Status: SHIPPED | OUTPATIENT
Start: 2021-11-17 | End: 2022-02-17

## 2021-11-17 NOTE — TELEPHONE ENCOUNTER
Ruel Kruger called requesting a refill of the below medication which has been pended for you:     Requested Prescriptions     Pending Prescriptions Disp Refills    esomeprazole ([employer]) 40 MG delayed release capsule [Pharmacy Med Name: ESOMEPRAZOLE MAGNESIUM 40MG DR CAPS] 180 capsule 0     Sig: TAKE ONE CAPSULE BY MOUTH TWICE DAILY       Last Appointment Date: 9/23/2021  Next Appointment Date: due in March    Allergies   Allergen Reactions    Allopurinol Other (See Comments)     Other reaction(s): nausea    Bumex [Bumetanide] Other (See Comments)     Extreme fatigue, nausea, dizziness    Colchicine     Erythromycin     Indocin [Indomethacin]     Lasix [Furosemide] Other (See Comments)     Dizziness, extreme fatigue    Lisinopril     Morphine     Norvasc [Amlodipine]     Tenormin [Atenolol]     Uloric [Febuxostat]     Hydrocodone-Acetaminophen Nausea Only    Topamax [Topiramate] Swelling and Rash

## 2021-12-06 ENCOUNTER — PATIENT MESSAGE (OUTPATIENT)
Dept: FAMILY MEDICINE CLINIC | Age: 51
End: 2021-12-06

## 2021-12-28 ENCOUNTER — OFFICE VISIT (OUTPATIENT)
Dept: FAMILY MEDICINE CLINIC | Age: 51
End: 2021-12-28
Payer: COMMERCIAL

## 2021-12-28 VITALS
HEIGHT: 66 IN | BODY MASS INDEX: 46.45 KG/M2 | SYSTOLIC BLOOD PRESSURE: 120 MMHG | OXYGEN SATURATION: 97 % | TEMPERATURE: 98.4 F | WEIGHT: 289 LBS | DIASTOLIC BLOOD PRESSURE: 70 MMHG | HEART RATE: 88 BPM

## 2021-12-28 DIAGNOSIS — B37.2 YEAST DERMATITIS: Primary | ICD-10-CM

## 2021-12-28 DIAGNOSIS — N95.1 HOT FLASH, MENOPAUSAL: ICD-10-CM

## 2021-12-28 PROCEDURE — 99214 OFFICE O/P EST MOD 30 MIN: CPT | Performed by: NURSE PRACTITIONER

## 2021-12-28 RX ORDER — NYSTATIN 100000 [USP'U]/G
POWDER TOPICAL
Qty: 1 EACH | Refills: 1 | Status: SHIPPED | OUTPATIENT
Start: 2021-12-28 | End: 2022-01-31

## 2021-12-28 RX ORDER — MONTELUKAST SODIUM 10 MG/1
10 TABLET ORAL DAILY
Qty: 90 TABLET | Refills: 0 | Status: SHIPPED | OUTPATIENT
Start: 2021-12-28 | End: 2022-04-15

## 2021-12-28 RX ORDER — DEXTROAMPHETAMINE SACCHARATE, AMPHETAMINE ASPARTATE MONOHYDRATE, DEXTROAMPHETAMINE SULFATE AND AMPHETAMINE SULFATE 6.25; 6.25; 6.25; 6.25 MG/1; MG/1; MG/1; MG/1
CAPSULE, EXTENDED RELEASE ORAL
COMMUNITY
Start: 2021-12-22 | End: 2022-01-05

## 2021-12-28 NOTE — PROGRESS NOTES
JAN [address]  [address] [facility], [id number]  [phone]      HPI:     Pt presents to the clinic for evaluation of a rash that is on her lower abdomen around the hysterectomy scar. She had her hysterectomy about 15 years ago. The scar goes from hip to hip. At the ends of the scar the skin will pull apart and open on the ends to superficial cuts. She has to put antibacterial ointment on the area when this happens which helps. Today she has developed a rash which started a few days ago. The rash is the entire length of the scar. The rash burns and itches. She denies drainage. She would like to have her hormone levels drawn as she has been having hot flashes. Her hysterectomy was in 2005. Current Outpatient Medications   Medication Sig Dispense Refill    nystatin (MYCOSTATIN) 431270 UNIT/GM powder Apply 3 times daily. 1 each 1    amphetamine-dextroamphetamine (ADDERALL XR) 25 MG extended release capsule       montelukast (SINGULAIR) 10 MG tablet TAKE 1 TABLET BY MOUTH DAILY 90 tablet 0    esomeprazole (NEXIUM) 40 MG delayed release capsule TAKE ONE CAPSULE BY MOUTH TWICE DAILY 180 capsule 0    fexofenadine-pseudoephedrine (ALLEGRA-D 24HR) 180-240 MG per extended release tablet Take 1 tablet by mouth daily 90 tablet 1    gabapentin (NEURONTIN) 800 MG tablet Take 1 tablet by mouth 2 times daily for 90 days. 180 tablet 0    Semaglutide-Weight Management (WEGOVY) 0.5 MG/0.5ML SOAJ SC injection Inject 0.5 mg into the skin every 7 days 2 mL 0    glucose monitoring (FREESTYLE FREEDOM) kit 1 kit by Does not apply route daily 1 kit 0    Lancets MISC 1 each by Does not apply route daily 100 each 5    blood glucose monitor strips Test 1 time daily and prn 100 strip 3    FEROSUL 325 (65 Fe) MG tablet TAKE 1 TABLET BY MOUTH TWICE DAILY 180 tablet 1    methylphenidate (RITALIN) 10 MG tablet Take 10 mg by mouth 3 times daily.       hydroCHLOROthiazide (HYDRODIURIL) 25 MG tablet TAKE 1 TABLET BY MOUTH DAILY 90 tablet 1    aspirin (ASPIRIN LOW DOSE) 81 MG EC tablet TAKE 1 TABLET BY MOUTH DAILY 90 tablet 1    levothyroxine (SYNTHROID) 125 MCG tablet TAKE 1 TABLET BY MOUTH EVERY DAY 90 tablet 1    mirabegron (MYRBETRIQ) 50 MG TB24 TAKE 1 TABLET BY MOUTH DAILY 90 tablet 1    nitrofurantoin, macrocrystal-monohydrate, (MACROBID) 100 MG capsule Take 1 capsule by mouth 2 times daily 14 capsule 0    potassium chloride (KLOR-CON) 8 MEQ extended release tablet TAKE 5 TABLETS BY MOUTH ONCE DAILY 450 tablet 1    metoprolol tartrate (LOPRESSOR) 50 MG tablet TAKE 1 TABLET BY MOUTH TWICE DAILY 180 tablet 1    prazosin (MINIPRESS) 5 MG capsule Take 5 mg by mouth nightly      hydrOXYzine (ATARAX) 25 MG tablet Take 25 mg by mouth 3 times daily as needed for Anxiety      albuterol sulfate HFA (VENTOLIN HFA) 108 (90 Base) MCG/ACT inhaler INHALE 2 PUFFS EVERY 6 HOURS IF NEEDED FOR WHEEZING 18 g 5    lamoTRIgine (LAMICTAL) 100 MG tablet Take by mouth 2 times daily 100 mg am and 150 mg pm, may increase to 150 mg twice daily on 9/15/20      fluticasone-salmeterol (ADVAIR DISKUS) 250-50 MCG/DOSE AEPB Inhale 1 puff into the lungs every 12 hours RINSE MOUTH AFTER USE 1 Inhaler 5    traZODone (DESYREL) 300 MG tablet take 1 tablet by mouth at bedtime  0    desvenlafaxine succinate (PRISTIQ) 100 MG TB24 extended release tablet take 1 tablet by mouth once daily  0    Cholecalciferol (VITAMIN D3) 5000 UNITS TABS Take 1 tablet by mouth daily 30 tablet 2     No current facility-administered medications for this visit.      Allergies   Allergen Reactions    Allopurinol Other (See Comments)     Other reaction(s): nausea    Bumex [Bumetanide] Other (See Comments)     Extreme fatigue, nausea, dizziness    Colchicine     Erythromycin     Indocin [Indomethacin]     Lasix [Furosemide] Other (See Comments)     Dizziness, extreme fatigue    Lisinopril     Morphine     Norvasc [Amlodipine]     Tenormin [Atenolol]     Uloric [Febuxostat]     Hydrocodone-Acetaminophen Nausea Only    Topamax [Topiramate] Swelling and Rash       All patients pastmedical, surgical, social and family history has been reviewed. Subjective:      Review of Systems   Constitutional: Negative for activity change, appetite change, fatigue and fever. Cardiovascular: Negative for chest pain and palpitations. Endocrine:        Hot flashes   Skin: Positive for wound. Objective:      Physical Exam  Vitals and nursing note reviewed. Constitutional:       Appearance: Normal appearance. She is obese. HENT:      Head: Normocephalic and atraumatic. Abdominal:          Comments: Erythremic rash noted along scar    Neurological:      General: No focal deficit present. Mental Status: She is alert and oriented to person, place, and time. Assessment:       Diagnosis Orders   1. Yeast dermatitis     2. Hot flash, menopausal  Follicle Stimulating Hormone    Luteinizing Hormone    Estradiol       Plan: Will start nystatin powder to affected area  Keep area clean and dry. She Is to pat dry after showers  Discussed with patient that she did not need labs to confirm menopause however patient is requesting labs be ordered  Pt to return for appt with [facility]  Pt to return PRN   No follow-ups on file. Orders Placed This Encounter   Procedures    Follicle Stimulating Hormone     Standing Status:   Future     Standing Expiration Date:   12/28/2022    Luteinizing Hormone     Standing Status:   Future     Standing Expiration Date:   12/28/2022    Estradiol     Standing Status:   Future     Standing Expiration Date:   12/28/2022     Orders Placed This Encounter   Medications    nystatin (MYCOSTATIN) 603564 UNIT/GM powder     Sig: Apply 3 times daily. Dispense:  1 each     Refill:  1       Patient given educational materials - see patient instructions. All patient questionsanswered. Pt voiced understanding.  Reviewed health maintenance.      Electronically signed by KENROY Espinoza - CNP, CNP on 12/28/2021 at 9:45 PM

## 2021-12-28 NOTE — TELEPHONE ENCOUNTER
Dr Alexa Gaviria patient. Dr Alexa Gaviria is out of the office until 1/3/2022.        Mirela Yu called requesting a refill of the below medication which has been pended for you:     Requested Prescriptions     Pending Prescriptions Disp Refills    montelukast (SINGULAIR) 10 MG tablet [Pharmacy Med Name: MONTELUKAST 10MG TABLETS] 90 tablet 1     Sig: TAKE 1 TABLET BY MOUTH DAILY       Last Appointment Date: 9/23/2021  Next Appointment Date: 1/24/2022    Allergies   Allergen Reactions    Allopurinol Other (See Comments)     Other reaction(s): nausea    Bumex [Bumetanide] Other (See Comments)     Extreme fatigue, nausea, dizziness    Colchicine     Erythromycin     Indocin [Indomethacin]     Lasix [Furosemide] Other (See Comments)     Dizziness, extreme fatigue    Lisinopril     Morphine     Norvasc [Amlodipine]     Tenormin [Atenolol]     Uloric [Febuxostat]     Hydrocodone-Acetaminophen Nausea Only    Topamax [Topiramate] Swelling and Rash

## 2021-12-29 NOTE — TELEPHONE ENCOUNTER
From: Autumn Neville  To: Dr. Sherin Snellen: 12/6/2021 7:04 AM EST  Subject: Prescription Question    My CPAP has been recalled and it could be at least a year before they send a new one. I have to have a machine. The insurance company said there was a certain type of rx you could send in for me to get a different one. Please let me know if there is anything I can do. Thank you!

## 2022-01-05 ENCOUNTER — HOSPITAL ENCOUNTER (OUTPATIENT)
Age: 52
Setting detail: SPECIMEN
Discharge: HOME | End: 2022-01-05
Payer: COMMERCIAL

## 2022-01-05 ENCOUNTER — VIRTUAL VISIT (OUTPATIENT)
Dept: FAMILY MEDICINE CLINIC | Age: 52
End: 2022-01-05
Payer: COMMERCIAL

## 2022-01-05 DIAGNOSIS — R06.02 SOB (SHORTNESS OF BREATH): ICD-10-CM

## 2022-01-05 DIAGNOSIS — R06.02 SOB (SHORTNESS OF BREATH): Primary | ICD-10-CM

## 2022-01-05 DIAGNOSIS — J45.21 MILD INTERMITTENT ASTHMA WITH ACUTE EXACERBATION: ICD-10-CM

## 2022-01-05 PROCEDURE — U0003 INFECTIOUS AGENT DETECTION BY NUCLEIC ACID (DNA OR RNA); SEVERE ACUTE RESPIRATORY SYNDROME CORONAVIRUS 2 (SARS-COV-2) (CORONAVIRUS DISEASE [COVID-19]), AMPLIFIED PROBE TECHNIQUE, MAKING USE OF HIGH THROUGHPUT TECHNOLOGIES AS DESCRIBED BY CMS-2020-01-R: HCPCS

## 2022-01-05 PROCEDURE — U0005 INFEC AGEN DETEC AMPLI PROBE: HCPCS

## 2022-01-05 PROCEDURE — 99214 OFFICE O/P EST MOD 30 MIN: CPT | Performed by: FAMILY MEDICINE

## 2022-01-05 RX ORDER — DEXAMETHASONE 6 MG/1
6 TABLET ORAL
Qty: 10 TABLET | Refills: 0 | Status: SHIPPED | OUTPATIENT
Start: 2022-01-05 | End: 2022-01-15

## 2022-01-05 ASSESSMENT — ENCOUNTER SYMPTOMS
CHEST TIGHTNESS: 1
COUGH: 0
DIARRHEA: 0
VOMITING: 0
TROUBLE SWALLOWING: 0
WHEEZING: 1
SHORTNESS OF BREATH: 1
SORE THROAT: 1
CONSTIPATION: 0
RHINORRHEA: 0
EYE REDNESS: 0
EYE DISCHARGE: 0
SINUS PAIN: 0
ABDOMINAL PAIN: 0
NAUSEA: 0
SINUS PRESSURE: 0

## 2022-01-05 NOTE — PROGRESS NOTES
2022    TELEHEALTH EVALUATION -- Audio/Visual (During JVBSI-92 public health emergency)    HPI:    Juvenal Dailey (:  1970) has requested an audio/video evaluation for the following concern(s):    Shortness of Breath  This is a new problem. The current episode started yesterday. The problem has been unchanged. Associated symptoms include headaches, a sore throat and wheezing. Pertinent negatives include no abdominal pain, chest pain, ear pain, fever, neck pain, rash, rhinorrhea or vomiting. Associated symptoms comments: Tired lightheaded and wheezing. No nausea or vomiting. No known exposure to covid. . Treatments tried: albuterol. The treatment provided moderate relief. Did review patient's med list, allergies, social history,pmhx and pshx today as noted in the record. Review of Systems   Constitutional: Negative for chills, fatigue and fever. HENT: Positive for sore throat. Negative for congestion, ear pain, postnasal drip, rhinorrhea, sinus pressure, sinus pain and trouble swallowing. Eyes: Negative for discharge and redness. Respiratory: Positive for chest tightness, shortness of breath and wheezing. Negative for cough. Cardiovascular: Negative for chest pain. Gastrointestinal: Negative for abdominal pain, constipation, diarrhea, nausea and vomiting. Genitourinary: Negative for dysuria, flank pain, frequency and urgency. Musculoskeletal: Negative for arthralgias, myalgias and neck pain. Skin: Negative for rash and wound. Allergic/Immunologic: Negative for environmental allergies. Neurological: Positive for headaches. Negative for dizziness, weakness and light-headedness. Hematological: Negative for adenopathy. Psychiatric/Behavioral: Negative. Prior to Visit Medications    Medication Sig Taking? Authorizing Provider   nystatin (MYCOSTATIN) 371521 UNIT/GM powder Apply 3 times daily.  Yes Ger Priya, APRN - CNP   montelukast (SINGULAIR) 10 MG tablet TAKE 1 TABLET BY MOUTH DAILY Yes Anh Richards, DO   esomeprazole (NEXIUM) 40 MG delayed release capsule TAKE ONE CAPSULE BY MOUTH TWICE DAILY Yes Pierce Boas, DO   fexofenadine-pseudoephedrine (ALLEGRA-D 24HR) 180-240 MG per extended release tablet Take 1 tablet by mouth daily Yes Pierce Boas, DO   gabapentin (NEURONTIN) 800 MG tablet Take 1 tablet by mouth 2 times daily for 90 days.  Yes Pierce Boas, DO   Lancets MISC 1 each by Does not apply route daily Yes Pierce Boas, DO   blood glucose monitor strips Test 1 time daily and prn Yes Pierce Boas, DO   FEROSUL 325 (65 Fe) MG tablet TAKE 1 TABLET BY MOUTH TWICE DAILY Yes Pierce Boas, DO   hydroCHLOROthiazide (HYDRODIURIL) 25 MG tablet TAKE 1 TABLET BY MOUTH DAILY Yes Pierce Boas, DO   aspirin (ASPIRIN LOW DOSE) 81 MG EC tablet TAKE 1 TABLET BY MOUTH DAILY Yes Pierce Boas, DO   levothyroxine (SYNTHROID) 125 MCG tablet TAKE 1 TABLET BY MOUTH EVERY DAY Yes Pierce Boas, DO   mirabegron (MYRBETRIQ) 50 MG TB24 TAKE 1 TABLET BY MOUTH DAILY Yes Pierce Boas, DO   potassium chloride (KLOR-CON) 8 MEQ extended release tablet TAKE 5 TABLETS BY MOUTH ONCE DAILY Yes Pierce Boas, DO   metoprolol tartrate (LOPRESSOR) 50 MG tablet TAKE 1 TABLET BY MOUTH TWICE DAILY Yes Yanet Gray,    prazosin (MINIPRESS) 5 MG capsule Take 5 mg by mouth nightly Yes Historical Provider, MD   hydrOXYzine (ATARAX) 25 MG tablet Take 25 mg by mouth 3 times daily as needed for Anxiety Yes Historical Provider, MD   albuterol sulfate HFA (VENTOLIN HFA) 108 (90 Base) MCG/ACT inhaler INHALE 2 PUFFS EVERY 6 HOURS IF NEEDED FOR WHEEZING Yes Pierce Boas, DO   lamoTRIgine (LAMICTAL) 100 MG tablet Take 150 mg by mouth 2 times daily  Yes Historical Provider, MD   fluticasone-salmeterol (ADVAIR DISKUS) 250-50 MCG/DOSE AEPB Inhale 1 puff into the lungs every 12 hours RINSE MOUTH AFTER USE Yes Pierce Boas, DO traZODone (DESYREL) 300 MG tablet take 1 tablet by mouth at bedtime Yes Historical Provider, MD   desvenlafaxine succinate (PRISTIQ) 100 MG TB24 extended release tablet take 1 tablet by mouth once daily Yes Historical Provider, MD   Cholecalciferol (VITAMIN D3) 5000 UNITS TABS Take 1 tablet by mouth daily Yes Meena Javier DO   glucose monitoring (FREESTYLE FREEDOM) kit 1 kit by Does not apply route daily  Meena Javier, DO       Social History     Tobacco Use    Smoking status: Never Smoker    Smokeless tobacco: Never Used   Vaping Use    Vaping Use: Never used   Substance Use Topics    Alcohol use: No    Drug use: No        Allergies   Allergen Reactions    Allopurinol Other (See Comments)     Other reaction(s): nausea    Bumex [Bumetanide] Other (See Comments)     Extreme fatigue, nausea, dizziness    Colchicine     Erythromycin     Indocin [Indomethacin]     Lasix [Furosemide] Other (See Comments)     Dizziness, extreme fatigue    Lisinopril     Morphine     Norvasc [Amlodipine]     Tenormin [Atenolol]     Uloric [Febuxostat]     Hydrocodone-Acetaminophen Nausea Only    Topamax [Topiramate] Swelling and Rash   ,   Past Medical History:   Diagnosis Date    Abnormal CT of the abdomen     revealed 2 very small noncalcified pulmonary nodules in the right lung base. Suspect benign progress but repeat CT scan in June 2013 advised.  Allergic rhinitis     Anemia     Anxiety     Asthma     Chronic low back pain     Chronic neck pain     Degenerative joint disease of knee     Bilateral.    Depression     Endometriosis     history of     GERD (gastroesophageal reflux disease)     Gout     Hyperlipidemia     Hypertension     Hypothyroid     Kidney stone     history of     Left shoulder pain     Status post injections.  Leukocytosis     Palpitation     history of     Paresthesias     Generalized    Right knee pain     Status post injections.     Seizure ([address] \"nocturnal seizures\"   ,   Past Surgical History:   Procedure Laterality Date    ARTHROPLASTY  03/26/1986    5th metatarsals, MTP joints, right and left feet.  CERVICAL FUSION  6/2014    Dr Lamont Vasquez, LAPAROSCOPIC  11/14/2011    CYSTOSCOPY  2/8/13    no acute findings    HYSTERECTOMY  05/2005    JOINT REPLACEMENT      KNEE ARTHROSCOPY Left     KNEE ARTHROSCOPY Left 10/16/13    KNEE ARTHROSCOPY Right 02/08/2012    With partial medial and lateral synovectomies and abrasion chondroplasty.  LAPAROSCOPY  03/08/2002    diagnostic with D&C and hysterscopy. [address] SURGERY  10/6/10    Anterior C5-C6, fusion with C-trap allograft and [facility] plates, performed by Dr Lachelle Saravia.  LUMBAR FUSION  09/26/2020    L2-S1    LUMBAR SPINE SURGERY Left 2/13/08    L4-L5 and L5-S1 foraminotomies and L4 through S1 posterolateral fusion with pedicle screw instrumentation.  NASAL SEPTUM SURGERY      OTHER SURGICAL HISTORY  12/30/2014    spinal cord stimulator paddle electrode, spinal cord stimulator generator. both [facility] specter generator and 32 lead paddle electrode by Dr Namita Rogers at [address] [place] [address]  04/16/2015    spinal cord stimulator removed   [address] DECOMPRESSION  4/23/12    Lumbar.  TOTAL KNEE ARTHROPLASTY Left 04/2015    [facility] orth[facility]ics    TUBAL LIGATION  04/02/1990    UPPER GASTROINTESTINAL ENDOSCOPY  10/14/2011    Hiatal hernia.  UPPER GASTROINTESTINAL ENDOSCOPY N/A 9/10/2018    gastritis, esophagitis-BRAVO=reflux       Physical Exam  Vitals and nursing note reviewed. Constitutional:       General: She is not in acute distress. Appearance: Normal appearance. HENT:      Head: Normocephalic and atraumatic. Right Ear: External ear normal.      Left Ear: External ear normal.      Nose: Nose normal. No congestion or rhinorrhea.       Mouth/Throat:      Mouth: Mucous membranes are moist.   Eyes:      General: Right eye: No discharge. Left eye: No discharge. Extraocular Movements: Extraocular movements intact. Conjunctiva/sclera: Conjunctivae normal.   Pulmonary:      Effort: Pulmonary effort is normal.   Musculoskeletal:      Cervical back: Normal range of motion. Skin:     Findings: No erythema or rash. Neurological:      Mental Status: She is alert and oriented to person, place, and time. Mental status is at baseline. ASSESSMENT/PLAN:  Encounter Diagnoses   Name Primary?  SOB (shortness of breath)     Mild intermittent asthma with acute exacerbation Yes     Orders Placed This Encounter   Medications    dexamethasone (DECADRON) 6 MG tablet     Sig: Take 1 tablet by mouth daily (with breakfast) for 10 days     Dispense:  10 tablet     Refill:  0       Orders Placed This Encounter   Procedures    COVID-19     Standing Status:   Future     Standing Expiration Date:   1/5/2023     Scheduling Instructions:      1) Due to current limited availability of the COVID-19 test, tests will be prioritized based on responses to questions above. Testing may be delayed due to volume. 2) Print and instruct patient to adhere to CDC home isolation program. (Link Above)              3) Set up or refer patient for a monitoring program.              4) Have patient sign up for and leverage MyChart (if not previously done). Order Specific Question:   Is this test for diagnosis or screening? Answer:   Diagnosis of ill patient     Order Specific Question:   Symptomatic for COVID-19 as defined by CDC? Answer:   Yes     Order Specific Question:   Date of Symptom Onset     Answer:   1/4/2022     Order Specific Question:   Hospitalized for COVID-19? Answer:   No     Order Specific Question:   Admitted to ICU for COVID-19? Answer:   No     Order Specific Question:   Employed in healthcare setting?      Answer:   Yes     Order Specific Question:   Resident in a congregate (group) care setting? Answer:   No     Order Specific Question:   Pregnant? Answer:   No     Order Specific Question:   Previously tested for COVID-19? Answer:   Yes     Will order covid testing. In interim patient is to quarantine until testing reports are available    Increase fluids and rest    Tylenol 1-2 tabs po q4h prn    Can use mucinex or mucinex DM or comparable for congestion or cough. Return  if no improvement in symptoms or if any further symptoms arise. .    No follow-ups on file. Yani Navarrete is a 45 y.o. female being evaluated by a Virtual Visit (video visit) encounter to address concerns as mentioned above. A caregiver was present when appropriate. Due to this being a TeleHealth encounter (During [facility] public health emergency), evaluation of the following organ systems was limited: Vitals/Constitutional/EENT/Resp/CV/GI//MS/Neuro/Skin/Heme-Lymph-Imm. Pursuant to the emergency declaration under the [address] [place] [address] authority and the [employer] and [employer] Act, this Virtual Visit was conducted with patient's (and/or legal guardian's) consent, to reduce the patient's risk of exposure to COVID-19 and provide necessary medical care. The patient (and/or legal guardian) has also been advised to contact this office for worsening conditions or problems, and seek emergency medical treatment and/or call 911 if deemed necessary. Patient identification was verified at the start of the visit: Yes    Total time spent on this encounter: Not billed by time    Services were provided through a video synchronous discussion virtually to substitute for in-person clinic visit. Patient was in their home setting on their mobile device and I was in my office on a secured video interface. --Chris Larose DO on 1/5/2022 at 1:28 PM    An electronic signature was used to authenticate this note.

## 2022-01-06 LAB
SARS-COV-2: ABNORMAL
SARS-COV-2: DETECTED
SOURCE: ABNORMAL

## 2022-01-17 ENCOUNTER — PATIENT MESSAGE (OUTPATIENT)
Dept: FAMILY MEDICINE CLINIC | Age: 52
End: 2022-01-17

## 2022-01-17 RX ORDER — AMOXICILLIN 875 MG/1
875 TABLET, COATED ORAL 2 TIMES DAILY
Qty: 20 TABLET | Refills: 0 | Status: SHIPPED | OUTPATIENT
Start: 2022-01-17 | End: 2022-01-31

## 2022-01-17 NOTE — TELEPHONE ENCOUNTER
From: Kwaku Pennington  To: Dr. Katlyn Carranza: 1/17/2022 1:30 PM EST  Subject: Sinus infection    I tried to do an evisit but it wouldnt let me set it up. Covid is over but I have alot of pain and pressure and congestion. Pretty sure its a sinus infection.

## 2022-01-18 ENCOUNTER — HOSPITAL ENCOUNTER (OUTPATIENT)
Dept: LAB | Age: 52
Discharge: HOME | End: 2022-01-18
Payer: COMMERCIAL

## 2022-01-18 DIAGNOSIS — N95.1 HOT FLASH, MENOPAUSAL: ICD-10-CM

## 2022-01-18 PROCEDURE — 83002 ASSAY OF GONADOTROPIN (LH): CPT

## 2022-01-18 PROCEDURE — 82670 ASSAY OF TOTAL ESTRADIOL: CPT

## 2022-01-18 PROCEDURE — 83001 ASSAY OF GONADOTROPIN (FSH): CPT

## 2022-01-18 PROCEDURE — 36415 COLL VENOUS BLD VENIPUNCTURE: CPT

## 2022-01-20 LAB
ESTRADIOL LEVEL: <5 PG/ML (ref 27–314)
FOLLICLE STIMULATING HORMONE: 46.7 U/L (ref 1.7–21.5)
LH: 24.7 U/L (ref 1–95.6)

## 2022-01-25 RX ORDER — METOPROLOL TARTRATE 50 MG/1
TABLET, FILM COATED ORAL
Qty: 180 TABLET | Refills: 1 | Status: SHIPPED | OUTPATIENT
Start: 2022-01-25 | End: 2022-07-18

## 2022-01-31 ENCOUNTER — OFFICE VISIT (OUTPATIENT)
Dept: FAMILY MEDICINE CLINIC | Age: 52
End: 2022-01-31
Payer: COMMERCIAL

## 2022-01-31 VITALS
HEIGHT: 66 IN | HEART RATE: 80 BPM | TEMPERATURE: 98.1 F | OXYGEN SATURATION: 98 % | SYSTOLIC BLOOD PRESSURE: 118 MMHG | DIASTOLIC BLOOD PRESSURE: 80 MMHG | BODY MASS INDEX: 46.45 KG/M2 | WEIGHT: 289 LBS

## 2022-01-31 DIAGNOSIS — Z00.00 ROUTINE GENERAL MEDICAL EXAMINATION AT A HEALTH CARE FACILITY: ICD-10-CM

## 2022-01-31 DIAGNOSIS — Z80.3 FAMILY HISTORY OF BREAST CANCER: ICD-10-CM

## 2022-01-31 DIAGNOSIS — H53.9 VISUAL DISTURBANCE: ICD-10-CM

## 2022-01-31 DIAGNOSIS — R26.81 UNSTABLE GAIT: ICD-10-CM

## 2022-01-31 DIAGNOSIS — E11.9 TYPE 2 DIABETES MELLITUS WITHOUT COMPLICATION, WITHOUT LONG-TERM CURRENT USE OF INSULIN (HCC): Primary | ICD-10-CM

## 2022-01-31 DIAGNOSIS — R05.9 COUGH: ICD-10-CM

## 2022-01-31 DIAGNOSIS — E03.9 ACQUIRED HYPOTHYROIDISM: ICD-10-CM

## 2022-01-31 DIAGNOSIS — M24.542 CONTRACTURE, LEFT HAND: ICD-10-CM

## 2022-01-31 PROCEDURE — 99214 OFFICE O/P EST MOD 30 MIN: CPT | Performed by: FAMILY MEDICINE

## 2022-01-31 RX ORDER — ZOSTER VACCINE RECOMBINANT, ADJUVANTED 50 MCG/0.5
0.5 KIT INTRAMUSCULAR SEE ADMIN INSTRUCTIONS
Qty: 0.5 ML | Refills: 0 | Status: SHIPPED | OUTPATIENT
Start: 2022-01-31 | End: 2022-04-26

## 2022-01-31 RX ORDER — BENZONATATE 100 MG/1
100 CAPSULE ORAL 3 TIMES DAILY PRN
Qty: 30 CAPSULE | Refills: 1 | Status: SHIPPED | OUTPATIENT
Start: 2022-01-31 | End: 2022-05-24

## 2022-01-31 RX ORDER — PREDNISONE 20 MG/1
TABLET ORAL
Qty: 15 TABLET | Refills: 0 | Status: SHIPPED | OUTPATIENT
Start: 2022-01-31 | End: 2022-04-26

## 2022-01-31 RX ORDER — LOTEPREDNOL ETABONATE 5 MG/ML
SUSPENSION/ DROPS OPHTHALMIC
COMMUNITY
Start: 2022-01-27 | End: 2022-08-12

## 2022-01-31 ASSESSMENT — ENCOUNTER SYMPTOMS
DIARRHEA: 0
RHINORRHEA: 0
SORE THROAT: 0
EYE DISCHARGE: 0
WHEEZING: 0
TROUBLE SWALLOWING: 0
CONSTIPATION: 0
VOMITING: 0
NAUSEA: 0
ABDOMINAL PAIN: 0
COUGH: 1
EYE REDNESS: 0
SHORTNESS OF BREATH: 0
SINUS PRESSURE: 0

## 2022-01-31 NOTE — PROGRESS NOTES
2022     Maria Esther Kearns (:  1970) is a 45 y.o. female, here for evaluation of the following medical concerns:    HPI  Patient comes in today for routine general physical exam and to discuss several health concerns. Patient has a known family history of breast cancer noting that her paternal grandmother and 3 paternal aunts all have had breast cancer and she would like to have genetic testing regarding whether or not she has underlying genetic risk for breast cancer. We can make referral to the  for further opinion. She has had worsening neurologic symptoms recently. She states in the last month she has had a lot of issues with her memory. There is a significant family history of Alzheimer's dementia so this is bothersome to her. She states that she has been seeing her eye specialist Dr. Jimenez Real regarding some visual disturbance noting that she is getting black spots in her eye and also noticing a gray haze across her visual field. Dr. Jimenez Real had recommended further neurologic opinion and recommended that she get her thyroid levels checked. She does also have very unstable gait noting that she staggers quite a bit when she walks and does not know why she does this. She does not feel on balance but just cannot stay balanced with ambulation. She also notes that she holds her right hand in a contracted position and will pill her arm in to an abducted contracted position. This is becoming more more prominent and she states often she will have to manually open her hand from a contracted position. With this in mind I did recommend further neurologic opinion and MRI imaging of the brain. She does have a known history of diabetes mellitus type 2 which has been relatively stable and controlled with her current medical regimen. Did encourage continued low-carb/low sugar diet in order to keep this optimally controlled. Also has had persistent cough since having Covid recently.   Cough is very dry in nature. She has not had any shortness of breath. Did have Decadron during her acute course of COVID and really did not find that this helped much with the cough. She has not had any fever or chills. No significant sinus congestion or drainage. Is not using her Advair inhaler and I did suggest getting her back on her Advair inhaler and perhaps doing another course of steroid to see if we can help with any respiratory inflammation. Otherwise today no other acute medical concerns at this time. Did review patient's med list, allergies, social history, fam history, pmhx and pshx today as noted in the record. Preventative measures are reviewed today. See health maintenance section for complete preventative plan of care. Review of Systems   Constitutional: Negative for chills, fatigue and fever. HENT: Negative for congestion, ear pain, postnasal drip, rhinorrhea, sinus pressure, sore throat and trouble swallowing. Eyes: Positive for visual disturbance. Negative for discharge and redness. Respiratory: Positive for cough. Negative for shortness of breath and wheezing. Cardiovascular: Negative for chest pain. Gastrointestinal: Negative for abdominal pain, constipation, diarrhea, nausea and vomiting. Genitourinary: Negative for dysuria, flank pain, frequency and urgency. Musculoskeletal: Positive for gait problem (gait instability). Negative for arthralgias, myalgias and neck pain. Skin: Negative for rash and wound. Allergic/Immunologic: Negative for environmental allergies. Neurological: Positive for weakness and numbness. Negative for dizziness, light-headedness and headaches. Memory changes   Hematological: Negative for adenopathy. Psychiatric/Behavioral: Negative. Prior to Visit Medications    Medication Sig Taking?  Authorizing Provider   metoprolol tartrate (LOPRESSOR) 50 MG tablet TAKE 1 TABLET BY MOUTH TWICE DAILY  Consuelo Rivers, DO   amoxicillin fluticasone-salmeterol (ADVAIR DISKUS) 250-50 MCG/DOSE AEPB Inhale 1 puff into the lungs every 12 hours RINSE MOUTH AFTER USE  Pierce Boas, DO   traZODone (DESYREL) 300 MG tablet take 1 tablet by mouth at bedtime  Historical Provider, MD   desvenlafaxine succinate (PRISTIQ) 100 MG TB24 extended release tablet take 1 tablet by mouth once daily  Historical Provider, MD   Cholecalciferol (VITAMIN D3) 5000 UNITS TABS Take 1 tablet by mouth daily  Pierce Boas, DO        Social History     Tobacco Use    Smoking status: Never Smoker    Smokeless tobacco: Never Used   Substance Use Topics    Alcohol use: No        There were no vitals filed for this visit. Estimated body mass index is 47.36 kg/m² as calculated from the following:    Height as of 12/28/21: 5' 5.5\" (1.664 m). Weight as of 12/28/21: 289 lb (131.1 kg). Physical Exam  Vitals and nursing note reviewed. Constitutional:       General: She is not in acute distress. Appearance: Normal appearance. She is well-developed. She is not diaphoretic. HENT:      Head: Normocephalic and atraumatic. Right Ear: Tympanic membrane, ear canal and external ear normal.      Left Ear: Tympanic membrane, ear canal and external ear normal.      Nose: Nose normal.      Mouth/Throat:      Mouth: Mucous membranes are moist.      Pharynx: Oropharynx is clear. No oropharyngeal exudate. Eyes:      General:         Right eye: No discharge. Left eye: No discharge. Conjunctiva/sclera: Conjunctivae normal.      Pupils: Pupils are equal, round, and reactive to light. Neck:      Thyroid: No thyromegaly. Cardiovascular:      Rate and Rhythm: Normal rate and regular rhythm. Heart sounds: Normal heart sounds. Pulmonary:      Effort: Pulmonary effort is normal.      Breath sounds: Normal breath sounds. No wheezing or rales. Comments: Dry persistent cough with inhalation and exhalation.   Abdominal:      General: Bowel sounds are normal. There is no distension. Palpations: Abdomen is soft. Tenderness: There is no abdominal tenderness. Musculoskeletal:      Cervical back: Normal range of motion and neck supple. Comments: Left hand is held in a gripped position and arm is flexed and abducted. Patient had a diabetic foot exam today. No open areas or ulcerations noted. Fine filament testing to the entire dorsal and plantar aspect of the foot reveals good sensation in all areas. No cyanosis. +2/4 pedal pulses, symmetric bilaterally     Lymphadenopathy:      Cervical: No cervical adenopathy. Skin:     General: Skin is warm and dry. Findings: No rash. Neurological:      Mental Status: She is alert and oriented to person, place, and time. Psychiatric:         Behavior: Behavior normal.         Thought Content: Thought content normal.         Judgment: Judgment normal.         ASSESSMENT/PLAN:  Encounter Diagnoses   Name Primary?     Routine general medical examination at a health care facility     Type 2 diabetes mellitus without complication, without long-term current use of insulin ([facility]) Yes    Visual disturbance     Unstable gait     Contracture, left hand     Family history of breast cancer     Acquired hypothyroidism     Cough      Orders Placed This Encounter   Medications    zoster recombinant adjuvanted vaccine (SHINGRIX) 50 MCG/0.5ML SUSR injection     Sig: Inject 0.5 mLs into the muscle See Admin Instructions 1 dose now and repeat in 2-6 months     Dispense:  0.5 mL     Refill:  0    fluticasone-salmeterol (ADVAIR DISKUS) 250-50 MCG/DOSE AEPB     Sig: Inhale 1 puff into the lungs every 12 hours RINSE MOUTH AFTER USE     Dispense:  1 each     Refill:  5    benzonatate (TESSALON PERLES) 100 MG capsule     Sig: Take 1 capsule by mouth 3 times daily as needed for Cough     Dispense:  30 capsule     Refill:  1    predniSONE (DELTASONE) 20 MG tablet     Si bid for 5 days, 1 qd for 5 days Dispense:  15 tablet     Refill:  0     Orders Placed This Encounter   Procedures    MRI BRAIN W WO CONTRAST     Standing Status:   Future     Standing Expiration Date:   1/31/2023     Scheduling Instructions:      [facility]     Order Specific Question:   Reason for exam:     Answer:   Visual changes, hand contracture and unstable gait. Rule out MS lesions.  Microalbumin, Ur     Standing Status:   Future     Standing Expiration Date:   1/31/2023    TSH without Reflex     Standing Status:   Future     Standing Expiration Date:   1/31/2023    T4, Free     Standing Status:   Future     Standing Expiration Date:   1/31/2023    T3, Free     Standing Status:   Future     Standing Expiration Date:   1/31/2023   [address] [place] [address], [facility]     Referral Priority:   Routine     Referral Type:   Eval and Treat     Referral Reason:   Specialty Services Required     Referred to Provider:   Priscilla Johnson     Requested Specialty:   Genetic Counselor     Number of Visits Requested:   1    External Referral To Neurology     Referral Priority:   Routine     Referral Type:   Eval and Treat     Referral Reason:   Specialty Services Required     Requested Specialty:   Neurology     Number of Visits Requested:   1     DIABETES FOOT EXAM     Will have patient get an MRI of the brain with and without contrast and then make referral to another neurologist for opinion. She has seen 2 different neurologist but she states that they just really are not listening to her complaints and getting to the root cause of the problem. I did suggest that some of her medications could be causing some of the neurologic symptoms and memory issues but she has been on them for several years and she notes that the symptoms really have been more prominent just recently. Patient will be referred to the genetic specialist regarding her known family history of breast cancer.     Patient is given an oral steroid course to help with the acute respiratory inflammation and should start back on her Advair as previously ordered. I also gave her some Tessalon to help with the dry persistent cough. Patient is to have thyroid labs checked as ordered. This was recommended to be reassessed by the optometrist due to the visual disturbance. Patient is to continue on the rest of her current medical therapy. No additional changes are made at this time. Patient is to continue to follow a low-carb/low sugar/low-fat diet and increase exercise for optimal blood sugar and cholesterol control. Patient is to return to my office in 6 months duration or sooner if any further problems or symptoms arise. (Please note that portions of this note were completed with a voice recognition program. Efforts were made to edit the dictations but occasionally words are mis-transcribed.)        No follow-ups on file. An electronic signature was used to authenticate this note.     --Stewart Lock, DO on 1/31/2022 at 7:35 AM

## 2022-02-01 DIAGNOSIS — R26.81 UNSTABLE GAIT: ICD-10-CM

## 2022-02-01 DIAGNOSIS — H53.9 VISUAL DISTURBANCE: Primary | ICD-10-CM

## 2022-02-01 NOTE — PROGRESS NOTES
Order for BMP placed under the direction of Dr Vanessa Pete.  Creat needed for ordered MRI brain with & without contrast.

## 2022-02-04 ENCOUNTER — HOSPITAL ENCOUNTER (OUTPATIENT)
Dept: LAB | Age: 52
Discharge: HOME | End: 2022-02-04
Payer: COMMERCIAL

## 2022-02-04 DIAGNOSIS — R26.81 UNSTABLE GAIT: ICD-10-CM

## 2022-02-04 DIAGNOSIS — E11.9 TYPE 2 DIABETES MELLITUS WITHOUT COMPLICATION, WITHOUT LONG-TERM CURRENT USE OF INSULIN (HCC): ICD-10-CM

## 2022-02-04 DIAGNOSIS — H53.9 VISUAL DISTURBANCE: ICD-10-CM

## 2022-02-04 DIAGNOSIS — E03.9 ACQUIRED HYPOTHYROIDISM: ICD-10-CM

## 2022-02-04 LAB
ANION GAP SERPL CALCULATED.3IONS-SCNC: 10 MMOL/L (ref 9–17)
BUN BLDV-MCNC: 14 MG/DL (ref 6–20)
BUN/CREAT BLD: 16 (ref 9–20)
CALCIUM SERPL-MCNC: 8.8 MG/DL (ref 8.6–10.4)
CHLORIDE BLD-SCNC: 100 MMOL/L (ref 98–107)
CO2: 27 MMOL/L (ref 20–31)
CREAT SERPL-MCNC: 0.86 MG/DL (ref 0.5–0.9)
GFR AFRICAN AMERICAN: >60 ML/MIN
GFR NON-AFRICAN AMERICAN: >60 ML/MIN
GFR SERPL CREATININE-BSD FRML MDRD: ABNORMAL ML/MIN/{1.73_M2}
GFR SERPL CREATININE-BSD FRML MDRD: ABNORMAL ML/MIN/{1.73_M2}
GLUCOSE BLD-MCNC: 169 MG/DL (ref 70–99)
POTASSIUM SERPL-SCNC: 3.9 MMOL/L (ref 3.7–5.3)
SODIUM BLD-SCNC: 137 MMOL/L (ref 135–144)
TSH SERPL DL<=0.05 MIU/L-ACNC: 0.73 MIU/L (ref 0.3–5)

## 2022-02-04 PROCEDURE — 84439 ASSAY OF FREE THYROXINE: CPT

## 2022-02-04 PROCEDURE — 80048 BASIC METABOLIC PNL TOTAL CA: CPT

## 2022-02-04 PROCEDURE — 84481 FREE ASSAY (FT-3): CPT

## 2022-02-04 PROCEDURE — 82570 ASSAY OF URINE CREATININE: CPT

## 2022-02-04 PROCEDURE — 84443 ASSAY THYROID STIM HORMONE: CPT

## 2022-02-04 PROCEDURE — 36415 COLL VENOUS BLD VENIPUNCTURE: CPT

## 2022-02-04 PROCEDURE — 82043 UR ALBUMIN QUANTITATIVE: CPT

## 2022-02-05 LAB
CREATININE URINE: 84.7 MG/DL (ref 28–217)
MICROALBUMIN/CREAT 24H UR: <12 MG/L
MICROALBUMIN/CREAT UR-RTO: NORMAL MCG/MG CREAT
T3 FREE: 2.15 PG/ML (ref 2.02–4.43)
THYROXINE, FREE: 1.24 NG/DL (ref 0.93–1.7)

## 2022-02-14 ENCOUNTER — TELEPHONE (OUTPATIENT)
Dept: FAMILY MEDICINE CLINIC | Age: 52
End: 2022-02-14

## 2022-02-14 NOTE — TELEPHONE ENCOUNTER
Patient notified of brain MRI results. Is scheduled at [facility] for neurology referral with Dr Kurtis Joel in April.

## 2022-02-14 NOTE — TELEPHONE ENCOUNTER
[facility] again as report has not been received. States they will re-fax report.

## 2022-02-14 NOTE — TELEPHONE ENCOUNTER
Contacted patient. Informed her we have not received results. She states she had it completed 2/9/2022. Advised her we would obtain report and let her know once Dr Yasemin Timmons reviewed. Verbalizes understanding. [id number] [facility] for report. They are to be faxing it to us.

## 2022-02-17 RX ORDER — ESOMEPRAZOLE MAGNESIUM 40 MG/1
CAPSULE, DELAYED RELEASE ORAL
Qty: 180 CAPSULE | Refills: 1 | Status: SHIPPED | OUTPATIENT
Start: 2022-02-17 | End: 2022-06-20

## 2022-02-17 NOTE — TELEPHONE ENCOUNTER
Corrine Vazquez called requesting a refill of the below medication which has been pended for you:     Requested Prescriptions     Pending Prescriptions Disp Refills    esomeprazole (DotProduct) 40 MG delayed release capsule [Pharmacy Med Name: ESOMEPRAZOLE MAGNESIUM 40MG DR CAPS] 180 capsule 0     Sig: TAKE 1 CAPSULE BY MOUTH TWICE DAILY       Last Appointment Date: 1/31/2022  Next Appointment Date: 8/2/2022    Allergies   Allergen Reactions    Allopurinol Other (See Comments)     Other reaction(s): nausea    Bumex [Bumetanide] Other (See Comments)     Extreme fatigue, nausea, dizziness    Colchicine     Erythromycin     Indocin [Indomethacin]     Lasix [Furosemide] Other (See Comments)     Dizziness, extreme fatigue    Lisinopril     Morphine     Norvasc [Amlodipine]     Tenormin [Atenolol]     Uloric [Febuxostat]     Hydrocodone-Acetaminophen Nausea Only    Topamax [Topiramate] Swelling and Rash

## 2022-02-28 ENCOUNTER — PATIENT MESSAGE (OUTPATIENT)
Dept: FAMILY MEDICINE CLINIC | Age: 52
End: 2022-02-28

## 2022-02-28 ENCOUNTER — INITIAL CONSULT (OUTPATIENT)
Dept: ONCOLOGY | Age: 52
End: 2022-02-28
Payer: COMMERCIAL

## 2022-02-28 DIAGNOSIS — Z80.8 FAMILY HISTORY OF MALIGNANT MELANOMA: ICD-10-CM

## 2022-02-28 DIAGNOSIS — U09.9 POST-COVID CHRONIC COUGH: Primary | ICD-10-CM

## 2022-02-28 DIAGNOSIS — R05.3 POST-COVID CHRONIC COUGH: Primary | ICD-10-CM

## 2022-02-28 DIAGNOSIS — Z80.3 FAMILY HISTORY OF BREAST CANCER: Primary | ICD-10-CM

## 2022-02-28 PROCEDURE — 96040 PR GENETIC COUNSELING, EACH 30 MIN: CPT | Performed by: GENETIC COUNSELOR, MS

## 2022-02-28 NOTE — PROGRESS NOTES
3 [facility]   Hereditary Cancer Risk Assessment     Name: Aleshia Baird   YOB: 1970   Date of Consultation: 2/28/22     Ms. Kearns was seen at the [facility] for genetic counseling on 2/28/22. Ms. Tad Benito was referred by Loan Buitrago DO due to her family history of Breast cancer. PERSONAL HISTORY   Ms. Tad Benito is a 45 y.o.  female with no personal history of cancer. Ms. Tad Benito reports menarche at age 14, first child at age 24, and underwent menopause at age 27 subsequent to a total hysterectomy and bilateral salpingo oophorectomy. Ms. Tad Benito reports annual mammograms. She has never had a breast MRI or required a breast biopsy. FAMILY HISTORY  Ms. Tad Benito has 2 daughters. She has one maternal half brother and two paternal half sisters. Ms. Cleopatra Kimble mother is living at age 76 with a history of melanoma in situ removed from her calf at age 22. She also reports two maternal aunts with melanoma skin cancers diagnosed at unknown ages. .     Ms. Kearns's father is living at age 76; however, there is no information known regarding his medical history. She relates that at least three paternal aunts and her grandmother have had breast cancer. There is limited contact with her paternal family and there are no details regarding their diagnosis or ages. Ms. Tad Benito reports [facility] ancestry and denies any known Ashkenazi Faith heritage. RISK ASSESSMENT   We discussed that approximately 5-10% of cancers are due to a hereditary gene mutation which causes an increased risk for certain cancers. Hereditary cancers are typically diagnosed at younger ages (under age 46y) and occur in multiple generations of a family. Multiple individuals with the same type of cancer (example: breast or colorectal) or uncommon cancers (example: ovarian, pancreatic, male breast cancer) are also features of hereditary cancers.      In summary, Ms. Luan Stroud meets the [address] [place] [address] (NCCN) guidelines for genetic testing of the BRCA1/2 genes based on her having a total of 3+ paternal relative (3 aunts and grandmother) with breast cancer. The NCCN guidelines also recognize that an individual's personal and/or family history may be explained by more than one inherited cancer syndrome. Thus, a multi-gene panel may be more efficient, more cost effecting, and increases the yield of detecting a hereditary mutation which would impact medical management. Given her personal and/or family history, we recommend testing for the following genes at minimum: VICKY, CHEK2, NBN, and PALB2. DISCUSSION  We discussed that the BRCA1/2 genes are the most common genes associated with hereditary breast and ovarian cancer. We also discussed that genetic testing is available for multiple other genes related to hereditary cancer. Some of these genes are known to carry a significant increased risk for several cancers including colon, breast, uterine, ovarian, stomach, and pancreatic cancer, while some of these genes are believed to have a moderate increased risk for breast and other cancers. We discussed the possibility of finding a mutation in genes with limited information to guide medical management, as well we as the possibility of identifying variants of uncertain significance (VUS). We discussed the risks, benefits, and limitations of genetic testing. Possible test results were discussed as well as potential screening and prevention strategies. Specifically, we discussed increased breast cancer surveillance by mammogram and breast MRI as well as the option of prophylactic mastectomy. We discussed the recommendation for prophylactic oophorectomy for results which suggest an increased risk for ovarian cancer. Lastly, we discussed that the results of Ms. Kearns's genetic testing may be beneficial in defining her risk for cancer as well as for her

## 2022-03-01 NOTE — TELEPHONE ENCOUNTER
From: Tabby Cuevas  Sent: 3/1/2022 9:19 AM EST  To: [facility] [place] Clinical Staff  Subject: Lung xray    Yes, i have been coughing nonstop. The inhalers arent controlling it very well anymore. The coughing is starting to really hurt my back.

## 2022-03-02 ENCOUNTER — HOSPITAL ENCOUNTER (OUTPATIENT)
Dept: GENERAL RADIOLOGY | Age: 52
Discharge: HOME | End: 2022-03-04
Payer: COMMERCIAL

## 2022-03-02 ENCOUNTER — TELEPHONE (OUTPATIENT)
Dept: FAMILY MEDICINE CLINIC | Age: 52
End: 2022-03-02

## 2022-03-02 DIAGNOSIS — U09.9 POST-COVID CHRONIC COUGH: ICD-10-CM

## 2022-03-02 DIAGNOSIS — R05.3 POST-COVID CHRONIC COUGH: ICD-10-CM

## 2022-03-02 PROCEDURE — 71046 X-RAY EXAM CHEST 2 VIEWS: CPT

## 2022-03-02 NOTE — TELEPHONE ENCOUNTER
Spoke with patient regarding her results of her chest x ray and she questions what she can do about this reactive cough since having COVID 1/2/2022. She is doing her Advair Diskus twice daily everyday, with Albuterol inhaler PRN. Did a dose of Prednisone tablets and Tessalon Perles without much relief. What other suggestions do you have?

## 2022-03-02 NOTE — TELEPHONE ENCOUNTER
Might want to increase the advair temporarily to 500/50 mcg dose bid for a month to see if this resolves any acute respiratory inflammation

## 2022-03-03 NOTE — TELEPHONE ENCOUNTER
Patient notified of recommendation. Verbalizes understanding. Please send script to [facility].

## 2022-04-15 ENCOUNTER — TELEPHONE (OUTPATIENT)
Dept: ONCOLOGY | Age: 52
End: 2022-04-15

## 2022-04-15 RX ORDER — LORATADINE 10 MG
TABLET ORAL
Qty: 90 TABLET | Refills: 1 | Status: SHIPPED | OUTPATIENT
Start: 2022-04-15

## 2022-04-15 RX ORDER — POTASSIUM CHLORIDE 600 MG/1
TABLET, FILM COATED, EXTENDED RELEASE ORAL
Qty: 450 TABLET | Refills: 1 | Status: SHIPPED | OUTPATIENT
Start: 2022-04-15 | End: 2022-11-01 | Stop reason: SDUPTHER

## 2022-04-15 RX ORDER — MONTELUKAST SODIUM 10 MG/1
10 TABLET ORAL DAILY
Qty: 90 TABLET | Refills: 1 | Status: SHIPPED | OUTPATIENT
Start: 2022-04-15 | End: 2022-11-01 | Stop reason: SDUPTHER

## 2022-04-15 NOTE — TELEPHONE ENCOUNTER
3 [facility]   Hereditary Cancer Risk Assessment     Name: Maya Jamison  YOB: 1970    HISTORY   Ms. Jasmine Sung was seen for genetic counseling at the request of Julienne Espino DO due to her family history of various cancers. At that time, Ms. Jasmine Sung chose to pursue genetic testing via the CancerNext Expanded + RNA gene panel. These results were discussed with Ms. Jasmine Sung via telephone. A summary of Ms. Kearns's results and recommendations are below. RESULTS  [facility] [employer] CancerNext-Expanded Panel + RNAinsight: NEGATIVE - NO CLINICALLY SIGNIFICANT MUTATIONS DETECTED   This panel included the analysis of 77 genes associated with hereditary cancer including: AIP, ALK, APC, VICKY, BAP1, BARD1, BLM, BMPR1A, BRCA1, BRCA2, BRIP1, CDC73, CDH1, CDK4, CDKN1B, CDKN2A, CHEK2, CTNNA1, DICER1, EGFR, EGLN1, EPCAM, FANCC, FH, FLCN, GALNT12, GREM1, HOXB13, KIF1B, KIT1, LZTR1, MAX, MEN1, MET, MITF, MLH1, MSH2, MSH3, MSH6, MUTYH, NBN, NF1, NF2, NTHL1, PALB2, PDGFRA, PHOX2B, PMS2, POLD1, POLE, POT1, DTICE1I, PTCH1, PTEN, RAD51C, RAD51D, RB1, RECQL, RET, SDHA, SDHAF2, SDHB, SDHC, ,SDHD, SMAD4, SMARCA4, SMARCB1, SMARCE1, STK11, SUFU, NKAE588, TP53, TSC1, TSC2, VHL, and XRCC2. In addition, no clinically relevant aberrant RNA transcripts were detected in select genes. Please refer to genetic test report for technical details. We discussed that Ms. Kearns's negative test result greatly reduces the likelihood that she carries a hereditary gene mutation. However, it is possible that her family history of cancer is due to a hereditary mutation which she did not inherit. It is also possible that her family history of cancer may be due to a gene for which testing was not performed or which has yet to be discovered. RECOMMENDATIONS  1) While Ms. Kearns does not carry a known hereditary gene mutation, her risk for breast cancer may still be elevated due to her remaining family history of breast cancer. Based on Ms. Kearns's personal risk factors and family history of breast cancer, her estimated lifetime risk for breast cancer is 9-14% according to the [employer] risk model. At this level of risk, she should resume annual mammograms or breast cancer screening as directed by her physicians. 2) Ms. Lizzette Toledo should continue general population cancer screening guidelines as directed by her physicians. RECOMMENDATIONS FOR FAMILY MEMBERS   1) Genetic testing is not recommended for Ms. Kearns's children based on her negative test results. However, this recommendation does not take into consideration any family history of cancer in their paternal family. 2) It is possible that the cancers in Ms. Kearns's family are due to a hereditary gene mutation that she did not inherit. Therefore, her relatives (particularly those with a current or previous cancer diagnosis) may consider genetic counseling and testing to clarify this possibility. Relatives may contact the [facility] [employer] at [phone] or locate a genetic counselor at www. [email or web address]. [employer].     3) We encourage Ms. Kearns's relatives to discuss their family history of cancer with their physicians to determine the most appropriate cancer screening recommendations. Ms. Akin Vasquez female relatives may benefit from a formal breast cancer risk assessment by the Jodine Dena or [employer] risk models to determine if additional breast cancer screening is warranted. SUMMARY & PLAN   1) Ms. Akin Vasquez genetic test results are negative meaning there were no clinically significant mutations detected in the 77 genes analyzed. 2) There are no changes in medical management for Ms. Kearns based on her negative genetic test results. 3) We encourage Ms. Kearns to contact us every 1-2 years to determine if there are any new genetic testing or research options available. 4) We encourage Ms. Kearns to contact us with updates to her personal and/or familys cancer history as this information may alter our assessment and/or recommendations. The [address] [place] [address] would be glad to offer our assistance should you have any questions or concerns about this information. Please feel free to contact us at [phone]. Lisha Perez.  Leanne Cooney MS, [facility]   Licensed Genetic Counselor         CC:  Ms. Anette Daniels

## 2022-04-15 NOTE — TELEPHONE ENCOUNTER
Cristina Cisneros called requesting a refill of the below medication which has been pended for you:     Requested Prescriptions     Pending Prescriptions Disp Refills    montelukast (SINGULAIR) 10 MG tablet [Pharmacy Med Name: MONTELUKAST 10MG TABLETS] 90 tablet 0     Sig: TAKE 1 TABLET BY MOUTH DAILY       Last Appointment Date: 1/31/2021  Next Appointment Date: 8/2/2022    Allergies   Allergen Reactions    Allopurinol Other (See Comments)     Other reaction(s): nausea    Bumex [Bumetanide] Other (See Comments)     Extreme fatigue, nausea, dizziness    Colchicine     Erythromycin     Indocin [Indomethacin]     Lasix [Furosemide] Other (See Comments)     Dizziness, extreme fatigue    Lisinopril     Morphine     Norvasc [Amlodipine]     Tenormin [Atenolol]     Uloric [Febuxostat]     Hydrocodone-Acetaminophen Nausea Only    Topamax [Topiramate] Swelling and Rash

## 2022-04-15 NOTE — TELEPHONE ENCOUNTER
Annia Dias called requesting a refill of the below medication which has been pended for you:     Requested Prescriptions     Pending Prescriptions Disp Refills    potassium chloride (KLOR-CON) 8 MEQ extended release tablet [Pharmacy Med Name: POTASSIUM CHLORIDE ER 8MEQ TABLETS] 450 tablet 1     Sig: TAKE 5 TABLETS BY MOUTH EVERY DAY    WAL-ITIN D 24 HOUR  MG per extended release tablet [Pharmacy Med Name: [place] John Paul 24 HOUR TABLETS 15S] 90 tablet 1     Sig: TAKE 1 TABLET BY MOUTH EVERY DAY       Last Appointment Date: 1/31/2022  Next Appointment Date: 8/2/2022    Allergies   Allergen Reactions    Allopurinol Other (See Comments)     Other reaction(s): nausea    Bumex [Bumetanide] Other (See Comments)     Extreme fatigue, nausea, dizziness    Colchicine     Erythromycin     Indocin [Indomethacin]     Lasix [Furosemide] Other (See Comments)     Dizziness, extreme fatigue    Lisinopril     Morphine     Norvasc [Amlodipine]     Tenormin [Atenolol]     Uloric [Febuxostat]     Hydrocodone-Acetaminophen Nausea Only    Topamax [Topiramate] Swelling and Rash

## 2022-04-15 NOTE — TELEPHONE ENCOUNTER
Left message for patient to return call to clarify allergy med allegra d (on med list) or claritin d (requested)    Navid Mohan called requesting a refill of the below medication which has been pended for you:     Requested Prescriptions     Pending Prescriptions Disp Refills    potassium chloride (KLOR-CON) 8 MEQ extended release tablet [Pharmacy Med Name: POTASSIUM CHLORIDE ER 8MEQ TABLETS] 450 tablet 1     Sig: TAKE 5 TABLETS BY MOUTH EVERY DAY    WAL-ITIN D 24 HOUR  MG per extended release tablet [Pharmacy Med Name: [place] Daily 24 HOUR TABLETS 15S] 90 tablet 1     Sig: TAKE 1 TABLET BY MOUTH EVERY DAY       Last Appointment Date: 1/31/2022  Next Appointment Date: 8/2/2022    Allergies   Allergen Reactions    Allopurinol Other (See Comments)     Other reaction(s): nausea    Bumex [Bumetanide] Other (See Comments)     Extreme fatigue, nausea, dizziness    Colchicine     Erythromycin     Indocin [Indomethacin]     Lasix [Furosemide] Other (See Comments)     Dizziness, extreme fatigue    Lisinopril     Morphine     Norvasc [Amlodipine]     Tenormin [Atenolol]     Uloric [Febuxostat]     Hydrocodone-Acetaminophen Nausea Only    Topamax [Topiramate] Swelling and Rash

## 2022-04-18 DIAGNOSIS — J45.909 UNCOMPLICATED ASTHMA, UNSPECIFIED ASTHMA SEVERITY, UNSPECIFIED WHETHER PERSISTENT: ICD-10-CM

## 2022-04-18 RX ORDER — ALBUTEROL SULFATE 90 UG/1
AEROSOL, METERED RESPIRATORY (INHALATION)
Qty: 8.5 G | Refills: 0 | Status: SHIPPED | OUTPATIENT
Start: 2022-04-18 | End: 2022-05-23

## 2022-04-18 NOTE — TELEPHONE ENCOUNTER
Halina Gilmore called requesting a refill of the below medication which has been pended for you:     Requested Prescriptions     Pending Prescriptions Disp Refills    albuterol sulfate  (90 Base) MCG/ACT inhaler [Pharmacy Med Name: ALBUTEROL HFA INH (200 PUFFS)8.5GM] 8.5 g 0     Sig: INHALE 2 PUFFS BY MOUTH EVERY 6 HOURS AS NEEDED FOR WHEEZING       Last Appointment Date: 1/31/2022  Next Appointment Date: 8/2/2022    Allergies   Allergen Reactions    Allopurinol Other (See Comments)     Other reaction(s): nausea    Bumex [Bumetanide] Other (See Comments)     Extreme fatigue, nausea, dizziness    Colchicine     Erythromycin     Indocin [Indomethacin]     Lasix [Furosemide] Other (See Comments)     Dizziness, extreme fatigue    Lisinopril     Morphine     Norvasc [Amlodipine]     Tenormin [Atenolol]     Uloric [Febuxostat]     Hydrocodone-Acetaminophen Nausea Only    Topamax [Topiramate] Swelling and Rash

## 2022-04-26 ENCOUNTER — OFFICE VISIT (OUTPATIENT)
Dept: PRIMARY CARE CLINIC | Age: 52
End: 2022-04-26
Payer: COMMERCIAL

## 2022-04-26 VITALS
RESPIRATION RATE: 18 BRPM | SYSTOLIC BLOOD PRESSURE: 138 MMHG | OXYGEN SATURATION: 98 % | HEART RATE: 102 BPM | HEIGHT: 66 IN | WEIGHT: 293 LBS | BODY MASS INDEX: 47.09 KG/M2 | TEMPERATURE: 97 F | DIASTOLIC BLOOD PRESSURE: 74 MMHG

## 2022-04-26 DIAGNOSIS — J01.40 ACUTE NON-RECURRENT PANSINUSITIS: Primary | ICD-10-CM

## 2022-04-26 PROCEDURE — 99214 OFFICE O/P EST MOD 30 MIN: CPT | Performed by: FAMILY MEDICINE

## 2022-04-26 RX ORDER — AMOXICILLIN AND CLAVULANATE POTASSIUM 875; 125 MG/1; MG/1
1 TABLET, FILM COATED ORAL 2 TIMES DAILY
Qty: 20 TABLET | Refills: 0 | Status: SHIPPED | OUTPATIENT
Start: 2022-04-26 | End: 2022-05-06

## 2022-04-26 ASSESSMENT — ENCOUNTER SYMPTOMS
SINUS COMPLAINT: 1
COUGH: 1
DIARRHEA: 0
RHINORRHEA: 1
SORE THROAT: 1
CONSTIPATION: 0
SINUS PRESSURE: 1
ABDOMINAL PAIN: 0
WHEEZING: 0
SINUS PAIN: 1
EYE REDNESS: 0
NAUSEA: 0
VOMITING: 0
TROUBLE SWALLOWING: 0
SHORTNESS OF BREATH: 0
EYE DISCHARGE: 0

## 2022-04-26 ASSESSMENT — PATIENT HEALTH QUESTIONNAIRE - PHQ9
SUM OF ALL RESPONSES TO PHQ QUESTIONS 1-9: 0
SUM OF ALL RESPONSES TO PHQ QUESTIONS 1-9: 0
10. IF YOU CHECKED OFF ANY PROBLEMS, HOW DIFFICULT HAVE THESE PROBLEMS MADE IT FOR YOU TO DO YOUR WORK, TAKE CARE OF THINGS AT HOME, OR GET ALONG WITH OTHER PEOPLE: 0
6. FEELING BAD ABOUT YOURSELF - OR THAT YOU ARE A FAILURE OR HAVE LET YOURSELF OR YOUR FAMILY DOWN: 0
3. TROUBLE FALLING OR STAYING ASLEEP: 0
SUM OF ALL RESPONSES TO PHQ QUESTIONS 1-9: 0
7. TROUBLE CONCENTRATING ON THINGS, SUCH AS READING THE NEWSPAPER OR WATCHING TELEVISION: 0
SUM OF ALL RESPONSES TO PHQ QUESTIONS 1-9: 0
2. FEELING DOWN, DEPRESSED OR HOPELESS: 0
4. FEELING TIRED OR HAVING LITTLE ENERGY: 0
5. POOR APPETITE OR OVEREATING: 0
8. MOVING OR SPEAKING SO SLOWLY THAT OTHER PEOPLE COULD HAVE NOTICED. OR THE OPPOSITE, BEING SO FIGETY OR RESTLESS THAT YOU HAVE BEEN MOVING AROUND A LOT MORE THAN USUAL: 0

## 2022-04-26 NOTE — PROGRESS NOTES
2022     Loree Kearns (:  1970) is a 45 y.o. female, here for evaluation of the following medical concerns:    Sinus Problem  This is a new problem. The current episode started 1 to 4 weeks ago (has had for about a month). The problem has been gradually worsening since onset. There has been no fever. Associated symptoms include congestion, coughing (slight), ear pain, headaches, sinus pressure and a sore throat. Pertinent negatives include no chills, neck pain or shortness of breath. Treatments tried: ibuprofen. The treatment provided no relief. Did review patient's med list, allergies, social history,pmhx and pshx today as noted in the record. Review of Systems   Constitutional: Positive for fatigue. Negative for chills and fever. HENT: Positive for congestion, ear pain, postnasal drip, rhinorrhea, sinus pressure, sinus pain and sore throat. Negative for trouble swallowing. Eyes: Negative for discharge and redness. Respiratory: Positive for cough (slight). Negative for shortness of breath and wheezing. Cardiovascular: Negative for chest pain. Gastrointestinal: Negative for abdominal pain, constipation, diarrhea, nausea and vomiting. Genitourinary: Negative for dysuria, flank pain, frequency and urgency. Musculoskeletal: Negative for arthralgias, myalgias and neck pain. Skin: Negative for rash and wound. Allergic/Immunologic: Negative for environmental allergies. Neurological: Positive for headaches. Negative for dizziness, weakness and light-headedness. Hematological: Negative for adenopathy. Psychiatric/Behavioral: Negative. Prior to Visit Medications    Medication Sig Taking?  Authorizing Provider   albuterol sulfate  (90 Base) MCG/ACT inhaler INHALE 2 PUFFS BY MOUTH EVERY 6 HOURS AS NEEDED FOR WHEEZING Yes Ruben Valles DO   potassium chloride (KLOR-CON) 8 MEQ extended release tablet TAKE 5 TABLETS BY MOUTH EVERY DAY Yes Ruben Valles DO WAL-ITIN D 24 HOUR  MG per extended release tablet TAKE 1 TABLET BY MOUTH EVERY DAY Yes Charlene Medicine, DO   montelukast (SINGULAIR) 10 MG tablet TAKE 1 TABLET BY MOUTH DAILY Yes Charlene Medicine, DO   fluticasone-salmeterol (ADVAIR DISKUS) 500-50 MCG/DOSE diskus inhaler Inhale 1 puff into the lungs every 12 hours Yes Charlene Medicine, DO   esomeprazole (NEXIUM) 40 MG delayed release capsule TAKE 1 CAPSULE BY MOUTH TWICE DAILY Yes Yanet Gray, DO   gabapentin (NEURONTIN) 800 MG tablet TAKE 1 TABLET BY MOUTH TWICE DAILY Yes Charlene Medicine, DO   loteprednol (LOTEMAX) 0.5 % ophthalmic suspension SHAKE LIQUID AND INSTILL 1 DROP IN BOTH EYES FOUR TIMES DAILY FOR 1 WEEK Yes Historical Provider, MD   benzonatate (TESSALON PERLES) 100 MG capsule Take 1 capsule by mouth 3 times daily as needed for Cough Yes Charlene Medicine, DO   metoprolol tartrate (LOPRESSOR) 50 MG tablet TAKE 1 TABLET BY MOUTH TWICE DAILY Yes Charlene Medicine, DO   Lancets MISC 1 each by Does not apply route daily Yes Charlene Medicine, DO   blood glucose monitor strips Test 1 time daily and prn Yes Charlene Medicine, DO   FEROSUL 325 (65 Fe) MG tablet TAKE 1 TABLET BY MOUTH TWICE DAILY Yes Charlene Medicine, DO   hydroCHLOROthiazide (HYDRODIURIL) 25 MG tablet TAKE 1 TABLET BY MOUTH DAILY Yes Charlene Medicine, DO   aspirin (ASPIRIN LOW DOSE) 81 MG EC tablet TAKE 1 TABLET BY MOUTH DAILY Yes Charlene Medicine, DO   levothyroxine (SYNTHROID) 125 MCG tablet TAKE 1 TABLET BY MOUTH EVERY DAY Yes Yanet Gray, DO   mirabegron (MYRBETRIQ) 50 MG TB24 TAKE 1 TABLET BY MOUTH DAILY Yes Charlene Medicine, DO   prazosin (MINIPRESS) 5 MG capsule Take 5 mg by mouth nightly Yes Historical Provider, MD   lamoTRIgine (LAMICTAL) 100 MG tablet Take 150 mg by mouth 2 times daily  Yes Historical Provider, MD   traZODone (DESYREL) 300 MG tablet take 1 tablet by mouth at bedtime Yes Historical Provider, MD   desvenlafaxine succinate (PRISTIQ) 100 MG TB24 extended release tablet take 1 tablet by mouth once daily Yes Historical Provider, MD   Cholecalciferol (VITAMIN D3) 5000 UNITS TABS Take 1 tablet by mouth daily Yes Maryann Quarlse DO        Social History     Tobacco Use    Smoking status: Never Smoker    Smokeless tobacco: Never Used   Substance Use Topics    Alcohol use: No        Vitals:    04/26/22 1929   BP: 138/74   Site: Left Upper Arm   Position: Sitting   Cuff Size: Medium Adult   Pulse: 102   Resp: 18   Temp: 97 °F (36.1 °C)   SpO2: 98%   Weight: 294 lb (133.4 kg)   Height: 5' 6\" (1.676 m)     Estimated body mass index is 47.45 kg/m² as calculated from the following:    Height as of this encounter: 5' 6\" (1.676 m). Weight as of this encounter: 294 lb (133.4 kg). Physical Exam  Vitals and nursing note reviewed. Constitutional:       General: She is not in acute distress. Appearance: Normal appearance. She is well-developed. She is not diaphoretic. HENT:      Head: Normocephalic and atraumatic. Right Ear: External ear normal.      Left Ear: External ear normal.      Ears:      Comments: TMs dull with fluid behind the TM     Nose: Congestion and rhinorrhea present. Comments: Maxillary and frontal sinus tenderness with palpation bilaterally     Mouth/Throat:      Pharynx: No posterior oropharyngeal erythema. Comments: Post nasal drainage noted  Eyes:      General: No scleral icterus. Right eye: No discharge. Left eye: No discharge. Conjunctiva/sclera: Conjunctivae normal.      Pupils: Pupils are equal, round, and reactive to light. Neck:      Thyroid: No thyromegaly. Cardiovascular:      Rate and Rhythm: Normal rate and regular rhythm. Heart sounds: Normal heart sounds. Pulmonary:      Effort: Pulmonary effort is normal. No respiratory distress. Breath sounds: Normal breath sounds. No wheezing. Musculoskeletal:      Cervical back: Normal range of motion and neck supple. Lymphadenopathy:      Cervical: Cervical adenopathy present. Skin:     General: Skin is warm and dry. Findings: No rash. Neurological:      Mental Status: She is alert and oriented to person, place, and time. Psychiatric:         Behavior: Behavior normal.         Thought Content: Thought content normal.         Judgment: Judgment normal.         ASSESSMENT/PLAN:    Encounter Diagnosis   Name Primary?  Acute non-recurrent pansinusitis Yes       Orders Placed This Encounter   Medications    amoxicillin-clavulanate (AUGMENTIN) 875-125 MG per tablet     Sig: Take 1 tablet by mouth 2 times daily for 10 days     Dispense:  20 tablet     Refill:  0     Tylenol/Motrin prn    Increase fluids and rest    Can use mucinex or mucinex DM or comparable for congestion or cough. Return  if no improvement in symptoms or if any further symptoms arise. No follow-ups on file. An electronic signature was used to authenticate this note.     --Srinivasan Chase,  on 4/26/2022 at 7:41 PM

## 2022-05-02 RX ORDER — GABAPENTIN 800 MG/1
TABLET ORAL
Qty: 180 TABLET | Refills: 0 | Status: SHIPPED | OUTPATIENT
Start: 2022-05-02 | End: 2022-07-29

## 2022-05-02 NOTE — TELEPHONE ENCOUNTER
Annia Dias called requesting a refill of the below medication which has been pended for you: last filled 2/3/2022 #180    Requested Prescriptions     Pending Prescriptions Disp Refills    gabapentin (NEURONTIN) 800 MG tablet [Pharmacy Med Name: GABAPENTIN 800MG TABLETS] 180 tablet 0     Sig: TAKE 1 TABLET BY MOUTH TWICE DAILY       Last Appointment Date: 4/26/2022  Next Appointment Date: 8/2/2022    Allergies   Allergen Reactions    Allopurinol Other (See Comments)     Other reaction(s): nausea    Bumex [Bumetanide] Other (See Comments)     Extreme fatigue, nausea, dizziness    Colchicine     Erythromycin     Indocin [Indomethacin]     Lasix [Furosemide] Other (See Comments)     Dizziness, extreme fatigue    Lisinopril     Morphine     Norvasc [Amlodipine]     Tenormin [Atenolol]     Uloric [Febuxostat]     Hydrocodone-Acetaminophen Nausea Only    Topamax [Topiramate] Swelling and Rash

## 2022-05-16 ENCOUNTER — HOSPITAL ENCOUNTER (OUTPATIENT)
Dept: LAB | Age: 52
Discharge: HOME | End: 2022-05-16
Payer: COMMERCIAL

## 2022-05-16 ENCOUNTER — E-VISIT (OUTPATIENT)
Dept: FAMILY MEDICINE CLINIC | Age: 52
End: 2022-05-16
Payer: COMMERCIAL

## 2022-05-16 DIAGNOSIS — R35.0 URINARY FREQUENCY: Primary | ICD-10-CM

## 2022-05-16 DIAGNOSIS — R35.0 URINARY FREQUENCY: ICD-10-CM

## 2022-05-16 LAB
-: ABNORMAL
BACTERIA: ABNORMAL
BILIRUBIN URINE: ABNORMAL
COMMENT UA: ABNORMAL
EPITHELIAL CELLS UA: ABNORMAL /HPF (ref 0–5)
GLUCOSE URINE: ABNORMAL
KETONES, URINE: NEGATIVE
LEUKOCYTE ESTERASE, URINE: ABNORMAL
NITRITE, URINE: POSITIVE
OTHER OBSERVATIONS UA: ABNORMAL
PH UA: 5 (ref 5–6)
PROTEIN UA: ABNORMAL
RBC UA: ABNORMAL /HPF (ref 0–4)
SPECIFIC GRAVITY UA: 1.02 (ref 1.01–1.02)
URINE HGB: NEGATIVE
UROBILINOGEN, URINE: ABNORMAL
WBC UA: ABNORMAL /HPF (ref 0–4)

## 2022-05-16 PROCEDURE — 87088 URINE BACTERIA CULTURE: CPT

## 2022-05-16 PROCEDURE — 87186 SC STD MICRODIL/AGAR DIL: CPT

## 2022-05-16 PROCEDURE — 99422 OL DIG E/M SVC 11-20 MIN: CPT | Performed by: FAMILY MEDICINE

## 2022-05-16 PROCEDURE — 81001 URINALYSIS AUTO W/SCOPE: CPT

## 2022-05-16 PROCEDURE — 87086 URINE CULTURE/COLONY COUNT: CPT

## 2022-05-16 RX ORDER — FERROUS SULFATE 325(65) MG
TABLET ORAL
Qty: 180 TABLET | Refills: 1 | Status: SHIPPED | OUTPATIENT
Start: 2022-05-16

## 2022-05-16 RX ORDER — PHENAZOPYRIDINE HYDROCHLORIDE 100 MG/1
100 TABLET, FILM COATED ORAL 3 TIMES DAILY PRN
Qty: 6 TABLET | Refills: 0 | Status: SHIPPED | OUTPATIENT
Start: 2022-05-16 | End: 2022-05-18

## 2022-05-16 RX ORDER — NITROFURANTOIN 25; 75 MG/1; MG/1
100 CAPSULE ORAL 2 TIMES DAILY
Qty: 14 CAPSULE | Refills: 0 | Status: SHIPPED | OUTPATIENT
Start: 2022-05-16 | End: 2022-06-30

## 2022-05-16 NOTE — PROGRESS NOTES
Patient with symptoms suggestive of acute UTI. Will have her obtain a urine specimen and a culture. Did send in scripts for macrobid and pyridium that she can start after obtaining the urinalysis and culture. 11-20 minutes were spent on the digital evaluation and management of this patient.

## 2022-05-18 LAB
CULTURE: ABNORMAL
SPECIMEN DESCRIPTION: ABNORMAL

## 2022-05-23 DIAGNOSIS — J45.909 UNCOMPLICATED ASTHMA, UNSPECIFIED ASTHMA SEVERITY, UNSPECIFIED WHETHER PERSISTENT: ICD-10-CM

## 2022-05-23 RX ORDER — ALBUTEROL SULFATE 90 UG/1
AEROSOL, METERED RESPIRATORY (INHALATION)
Qty: 8.5 G | Refills: 2 | Status: SHIPPED | OUTPATIENT
Start: 2022-05-23 | End: 2022-08-12

## 2022-05-23 NOTE — TELEPHONE ENCOUNTER
Aubree Bennett called requesting a refill of the below medication which has been pended for you:     Requested Prescriptions     Pending Prescriptions Disp Refills    albuterol sulfate  (90 Base) MCG/ACT inhaler [Pharmacy Med Name: ALBUTEROL HFA INH (200 PUFFS)8.5GM] 8.5 g 0     Sig: INHALE 2 PUFFS BY MOUTH EVERY 6 HOURS AS NEEDED FOR WHEEZING       Last Appointment Date: 5/16/2022  Next Appointment Date: 5/24/2022    Allergies   Allergen Reactions    Allopurinol Other (See Comments)     Other reaction(s): nausea    Bumex [Bumetanide] Other (See Comments)     Extreme fatigue, nausea, dizziness    Colchicine     Erythromycin     Indocin [Indomethacin]     Lasix [Furosemide] Other (See Comments)     Dizziness, extreme fatigue    Lisinopril     Morphine     Norvasc [Amlodipine]     Tenormin [Atenolol]     Uloric [Febuxostat]     Hydrocodone-Acetaminophen Nausea Only    Topamax [Topiramate] Swelling and Rash

## 2022-05-24 ENCOUNTER — OFFICE VISIT (OUTPATIENT)
Dept: FAMILY MEDICINE CLINIC | Age: 52
End: 2022-05-24
Payer: COMMERCIAL

## 2022-05-24 ENCOUNTER — HOSPITAL ENCOUNTER (OUTPATIENT)
Dept: LAB | Age: 52
Discharge: HOME | End: 2022-05-24
Payer: COMMERCIAL

## 2022-05-24 VITALS
DIASTOLIC BLOOD PRESSURE: 84 MMHG | OXYGEN SATURATION: 98 % | WEIGHT: 289 LBS | BODY MASS INDEX: 46.45 KG/M2 | HEIGHT: 66 IN | RESPIRATION RATE: 16 BRPM | SYSTOLIC BLOOD PRESSURE: 132 MMHG | HEART RATE: 102 BPM

## 2022-05-24 DIAGNOSIS — K14.6 TONGUE SORE: ICD-10-CM

## 2022-05-24 DIAGNOSIS — B37.0 ORAL THRUSH: Primary | ICD-10-CM

## 2022-05-24 DIAGNOSIS — Z23 NEED FOR TETANUS BOOSTER: ICD-10-CM

## 2022-05-24 DIAGNOSIS — Z01.84 IMMUNITY STATUS TESTING: ICD-10-CM

## 2022-05-24 LAB — RUBV IGG SER QL: 34.2 IU/ML

## 2022-05-24 PROCEDURE — 86765 RUBEOLA ANTIBODY: CPT

## 2022-05-24 PROCEDURE — 90715 TDAP VACCINE 7 YRS/> IM: CPT | Performed by: FAMILY MEDICINE

## 2022-05-24 PROCEDURE — 99214 OFFICE O/P EST MOD 30 MIN: CPT | Performed by: FAMILY MEDICINE

## 2022-05-24 PROCEDURE — 86735 MUMPS ANTIBODY: CPT

## 2022-05-24 PROCEDURE — 36415 COLL VENOUS BLD VENIPUNCTURE: CPT

## 2022-05-24 PROCEDURE — 90471 IMMUNIZATION ADMIN: CPT | Performed by: FAMILY MEDICINE

## 2022-05-24 PROCEDURE — 86762 RUBELLA ANTIBODY: CPT

## 2022-05-24 RX ORDER — LIDOCAINE HYDROCHLORIDE 20 MG/ML
5 SOLUTION OROPHARYNGEAL 4 TIMES DAILY PRN
Qty: 100 ML | Refills: 0 | Status: SHIPPED | OUTPATIENT
Start: 2022-05-24 | End: 2022-09-21

## 2022-05-24 RX ORDER — FLUCONAZOLE 100 MG/1
100 TABLET ORAL DAILY
Qty: 7 TABLET | Refills: 0 | Status: SHIPPED | OUTPATIENT
Start: 2022-05-24 | End: 2022-05-31

## 2022-05-24 RX ORDER — AMITRIPTYLINE HYDROCHLORIDE 10 MG/1
TABLET, FILM COATED ORAL
COMMUNITY
Start: 2022-03-02 | End: 2022-09-21

## 2022-05-24 RX ORDER — ARIPIPRAZOLE 2 MG/1
TABLET ORAL
COMMUNITY
Start: 2022-05-06 | End: 2022-08-12

## 2022-05-24 RX ORDER — NYSTATIN 100000 [USP'U]/G
POWDER TOPICAL
COMMUNITY
Start: 2022-04-15 | End: 2022-08-17

## 2022-05-24 SDOH — ECONOMIC STABILITY: FOOD INSECURITY: WITHIN THE PAST 12 MONTHS, THE FOOD YOU BOUGHT JUST DIDN'T LAST AND YOU DIDN'T HAVE MONEY TO GET MORE.: NEVER TRUE

## 2022-05-24 SDOH — ECONOMIC STABILITY: FOOD INSECURITY: WITHIN THE PAST 12 MONTHS, YOU WORRIED THAT YOUR FOOD WOULD RUN OUT BEFORE YOU GOT MONEY TO BUY MORE.: NEVER TRUE

## 2022-05-24 ASSESSMENT — ENCOUNTER SYMPTOMS
TROUBLE SWALLOWING: 0
NAUSEA: 0
RHINORRHEA: 0
SINUS PRESSURE: 0
SORE THROAT: 0
WHEEZING: 0
ABDOMINAL PAIN: 0
EYE DISCHARGE: 0
CONSTIPATION: 0
VOMITING: 0
EYE REDNESS: 0
SHORTNESS OF BREATH: 0
COUGH: 0
DIARRHEA: 0

## 2022-05-24 ASSESSMENT — PATIENT HEALTH QUESTIONNAIRE - PHQ9
SUM OF ALL RESPONSES TO PHQ QUESTIONS 1-9: 2
4. FEELING TIRED OR HAVING LITTLE ENERGY: 1
5. POOR APPETITE OR OVEREATING: 1
2. FEELING DOWN, DEPRESSED OR HOPELESS: 0
3. TROUBLE FALLING OR STAYING ASLEEP: 0
1. LITTLE INTEREST OR PLEASURE IN DOING THINGS: 0
SUM OF ALL RESPONSES TO PHQ QUESTIONS 1-9: 2
SUM OF ALL RESPONSES TO PHQ9 QUESTIONS 1 & 2: 0
7. TROUBLE CONCENTRATING ON THINGS, SUCH AS READING THE NEWSPAPER OR WATCHING TELEVISION: 0
6. FEELING BAD ABOUT YOURSELF - OR THAT YOU ARE A FAILURE OR HAVE LET YOURSELF OR YOUR FAMILY DOWN: 0
SUM OF ALL RESPONSES TO PHQ QUESTIONS 1-9: 2
9. THOUGHTS THAT YOU WOULD BE BETTER OFF DEAD, OR OF HURTING YOURSELF: 0
SUM OF ALL RESPONSES TO PHQ QUESTIONS 1-9: 2
8. MOVING OR SPEAKING SO SLOWLY THAT OTHER PEOPLE COULD HAVE NOTICED. OR THE OPPOSITE, BEING SO FIGETY OR RESTLESS THAT YOU HAVE BEEN MOVING AROUND A LOT MORE THAN USUAL: 0

## 2022-05-24 ASSESSMENT — SOCIAL DETERMINANTS OF HEALTH (SDOH): HOW HARD IS IT FOR YOU TO PAY FOR THE VERY BASICS LIKE FOOD, HOUSING, MEDICAL CARE, AND HEATING?: NOT VERY HARD

## 2022-05-24 NOTE — PROGRESS NOTES
2022     Boston Kearns (:  1970) is a 47 y.o. 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 Patient will be doing  for [address] [place] [address] and does need a medical clearance form. Will need MMR titers as well as an updated Tdap. Otherwise is medically cleared. She does note that she has had soreness and mitten ulcerative lesion to the left undersurface of the tongue. She states that since COVID she has had issues with irritation to that side of the tongue. Does seem to wax and wane. She notes that she did lose her sense of taste with COVID and that has not completely come back. She does note that sometimes it is difficult for her to eat due to the soreness to the side of the tongue. Unsure what is contributing to the issue. She does not feel like her tongue is swelling to the point where she has difficulty swallowing or breathing but she does sometimes feel like it is swollen. No other related issues. .  Patient's recent lab reports are as follows:    Lab Results   Component Value Date    WBC 11.1 2021    RBC 4.47 2021    HGB 12.5 2021    HCT 40.3 2021    MCV 90.2 2021    MCH 28.0 2021    MCHC 31.0 2021    RDW 15.3 2021     2021    MPV 10.0 2021       Lab Results   Component Value Date    CHOL 188 2021    HDL 48 2021    CHOLHDLRATIO 3.9 2021    TRIG 145 2021    VLDL NOT REPORTED 2021       Lab Results   Component Value Date    TSH 0.73 2022       Lab Results   Component Value Date     2022    K 3.9 2022     2022    CO2 27 2022    BUN 14 2022    CREATININE 0.86 2022    GLUCOSE 169 2022    CALCIUM 8.8 2022       Lab Results   Component Value Date    LABA1C 6.2 2021         Other review of systems are as noted below. Preventative measures are reviewed today. See health maintenance section for complete preventative plan of care. Did review patient's med list, allergies, social history, fam history, pmhx and pshx today as noted in the record. Review of Systems   Constitutional: Negative for chills, fatigue and fever. HENT: Positive for mouth sores. Negative for congestion, ear pain, postnasal drip, rhinorrhea, sinus pressure, sore throat and trouble swallowing. Eyes: Negative for discharge and redness. Respiratory: Negative for cough, shortness of breath and wheezing. Cardiovascular: Negative for chest pain. Gastrointestinal: Negative for abdominal pain, constipation, diarrhea, nausea and vomiting. Genitourinary: Negative for dysuria, flank pain, frequency and urgency. Musculoskeletal: Negative for arthralgias, myalgias and neck pain. Skin: Negative for rash and wound. Allergic/Immunologic: Negative for environmental allergies. Neurological: Negative for dizziness, weakness, light-headedness and headaches. Hematological: Negative for adenopathy. Psychiatric/Behavioral: Negative. Prior to Visit Medications    Medication Sig Taking?  Authorizing Provider   albuterol sulfate  (90 Base) MCG/ACT inhaler INHALE 2 PUFFS BY MOUTH EVERY 6 HOURS AS NEEDED FOR WHEEZING  Jasbir Glass DO   FEROSUL 325 (65 Fe) MG tablet TAKE 1 TABLET BY MOUTH TWICE DAILY  Jasbir Glass DO   nitrofurantoin, macrocrystal-monohydrate, (MACROBID) 100 MG capsule Take 1 capsule by mouth 2 times daily  Yanet Gray DO   gabapentin (NEURONTIN) 800 MG tablet TAKE 1 TABLET BY MOUTH TWICE DAILY  Jasbir Glass DO   potassium chloride (KLOR-CON) 8 MEQ extended release tablet TAKE 5 TABLETS BY MOUTH EVERY DAY  Yanet Gray DO   WAL-ITIN D 24 HOUR  MG per extended release tablet TAKE 1 TABLET BY MOUTH EVERY DAY  Yanet Gray DO   montelukast (SINGULAIR) 10 MG tablet TAKE 1 TABLET BY MOUTH DAILY  Chad Yang DO   fluticasone-salmeterol (ADVAIR DISKUS) 500-50 MCG/DOSE diskus inhaler Inhale 1 puff into the lungs every 12 hours  Yanet Gray DO   esomeprazole (NEXIUM) 40 MG delayed release capsule TAKE 1 CAPSULE BY MOUTH TWICE DAILY  Yanet Gray DO   loteprednol (LOTEMAX) 0.5 % ophthalmic suspension SHAKE LIQUID AND INSTILL 1 DROP IN BOTH EYES FOUR TIMES DAILY FOR 1 WEEK  Historical Provider, MD   benzonatate (TESSALON PERLES) 100 MG capsule Take 1 capsule by mouth 3 times daily as needed for Cough  Chad Yang DO   metoprolol tartrate (LOPRESSOR) 50 MG tablet TAKE 1 TABLET BY MOUTH TWICE DAILY  Chad Yang DO   Lancets MISC 1 each by Does not apply route daily  Chad Yang DO   blood glucose monitor strips Test 1 time daily and prn  Yanet Gray DO   hydroCHLOROthiazide (HYDRODIURIL) 25 MG tablet TAKE 1 TABLET BY MOUTH DAILY  Chad Yang DO   aspirin (ASPIRIN LOW DOSE) 81 MG EC tablet TAKE 1 TABLET BY MOUTH DAILY  Chad Yang DO   levothyroxine (SYNTHROID) 125 MCG tablet TAKE 1 TABLET BY MOUTH EVERY DAY  Yanet Gray DO   mirabegron (MYRBETRIQ) 50 MG TB24 TAKE 1 TABLET BY MOUTH DAILY  Chad Yang DO   prazosin (MINIPRESS) 5 MG capsule Take 5 mg by mouth nightly  Historical Provider, MD   lamoTRIgine (LAMICTAL) 100 MG tablet Take 150 mg by mouth 2 times daily   Historical Provider, MD   traZODone (DESYREL) 300 MG tablet take 1 tablet by mouth at bedtime  Historical Provider, MD   desvenlafaxine succinate (PRISTIQ) 100 MG TB24 extended release tablet take 1 tablet by mouth once daily  Historical Provider, MD   Cholecalciferol (VITAMIN D3) 5000 UNITS TABS Take 1 tablet by mouth daily  Chad Yang DO        Social History     Tobacco Use    Smoking status: Never Smoker    Smokeless tobacco: Never Used   Substance Use Topics    Alcohol use: No        There were no vitals filed for this visit. Estimated body mass index is 47.45 kg/m² as calculated from the following:    Height as of 4/26/22: 5' 6\" (1.676 m). Weight as of 4/26/22: 294 lb (133.4 kg). Physical Exam  Vitals and nursing note reviewed. Constitutional:       General: She is not in acute distress. Appearance: Normal appearance. She is well-developed. She is not diaphoretic. HENT:      Head: Normocephalic and atraumatic. Right Ear: Tympanic membrane, ear canal and external ear normal.      Left Ear: Tympanic membrane, ear canal and external ear normal.      Nose: Nose normal.      Mouth/Throat:      Mouth: Mucous membranes are moist.      Pharynx: Oropharynx is clear. No oropharyngeal exudate. Comments: Does have white exudative coating to the tongue. Left side of tongue without evidence of ulceration today, but anterior portion of the lateral surface appears irritated and inflamed. Eyes:      General:         Right eye: No discharge. Left eye: No discharge. Conjunctiva/sclera: Conjunctivae normal.      Pupils: Pupils are equal, round, and reactive to light. Neck:      Thyroid: No thyromegaly. Cardiovascular:      Rate and Rhythm: Normal rate and regular rhythm. Heart sounds: Normal heart sounds. Pulmonary:      Effort: Pulmonary effort is normal.      Breath sounds: Normal breath sounds. No wheezing or rales. Abdominal:      General: Bowel sounds are normal. There is no distension. Palpations: Abdomen is soft. Tenderness: There is no abdominal tenderness. Musculoskeletal:      Cervical back: Normal range of motion and neck supple. Lymphadenopathy:      Cervical: No cervical adenopathy. Skin:     General: Skin is warm and dry. Findings: No rash. Neurological:      Mental Status: She is alert and oriented to person, place, and time. Psychiatric:         Behavior: Behavior normal.         Thought Content:  Thought content normal.         Judgment: Judgment normal. ASSESSMENT/PLAN:  Encounter Diagnoses   Name Primary?  Oral thrush Yes    Tongue sore     Need for tetanus booster     Immunity status testing      Orders Placed This Encounter   Medications    fluconazole (DIFLUCAN) 100 MG tablet     Sig: Take 1 tablet by mouth daily for 7 days     Dispense:  7 tablet     Refill:  0    lidocaine viscous hcl (XYLOCAINE) 2 % SOLN solution     Sig: Take 5 mLs by mouth 4 times daily as needed for Irritation     Dispense:  100 mL     Refill:  0     Orders Placed This Encounter   Procedures    Tdap, BOOSTRIX, (age 8 yrs+), IM    Rubeola Antibody IgG     Standing Status:   Future     Standing Expiration Date:   5/24/2023    Rubella Antibody, IgG     Standing Status:   Future     Standing Expiration Date:   5/24/2023    Mumps Antibody, IgG     Standing Status:   Future     Standing Expiration Date:   5/24/2023     Did order lab testing as noted to check MMR titers as well as we will give her an updated Tdap. Per requirements on the form she should not need a Mantoux test.  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 Patient is given Diflucan to clear the oral thrush and did give her some topical lidocaine solution to use on the lateral aspect of the tongue as needed for increased irritation. If she does not see this area clear with the Diflucan then we will get her in to see ENT for further opinion. Return  if no improvement in symptoms or if any further symptoms arise. (Please note that portions of this note were completed with a voice recognition program. Efforts were made to edit the dictations but occasionally words are mis-transcribed.)          No follow-ups on file. An electronic signature was used to authenticate this note.     --Augustina Bob,  on 5/24/2022 at 7:28 AM

## 2022-05-25 ENCOUNTER — HOSPITAL ENCOUNTER (OUTPATIENT)
Dept: CT IMAGING | Age: 52
Discharge: HOME | End: 2022-05-27
Payer: COMMERCIAL

## 2022-05-25 DIAGNOSIS — J32.0 OROANTRAL FISTULA: ICD-10-CM

## 2022-05-25 DIAGNOSIS — J32.0 CHRONIC MAXILLARY SINUSITIS: ICD-10-CM

## 2022-05-25 LAB
MEASLES ANTIBODY IGG: 1.33
MUV IGG SER QL: 5.04

## 2022-05-25 PROCEDURE — 70486 CT MAXILLOFACIAL W/O DYE: CPT

## 2022-06-06 ENCOUNTER — E-VISIT (OUTPATIENT)
Dept: FAMILY MEDICINE CLINIC | Age: 52
End: 2022-06-06
Payer: COMMERCIAL

## 2022-06-06 DIAGNOSIS — B36.0 TINEA VERSICOLOR: Primary | ICD-10-CM

## 2022-06-06 PROCEDURE — 99422 OL DIG E/M SVC 11-20 MIN: CPT | Performed by: FAMILY MEDICINE

## 2022-06-06 RX ORDER — KETOCONAZOLE 20 MG/ML
SHAMPOO TOPICAL
Qty: 120 ML | Refills: 1 | Status: SHIPPED | OUTPATIENT
Start: 2022-06-06 | End: 2022-09-21

## 2022-06-06 RX ORDER — TERBINAFINE HYDROCHLORIDE 250 MG/1
250 TABLET ORAL DAILY
Qty: 14 TABLET | Refills: 0 | Status: SHIPPED | OUTPATIENT
Start: 2022-06-06 | End: 2022-06-20

## 2022-06-06 NOTE — PROGRESS NOTES
Patient with evidence of tinea versicolor per symptoms and images. Scripts sent for oral lamisil and nizoral shampoo to utilize as body wash. If no improvement, should follow up in the office for further evaluation. 11-20 minutes were spent on the digital evaluation and management of this patient.

## 2022-06-20 RX ORDER — HYDROCHLOROTHIAZIDE 25 MG/1
TABLET ORAL
Qty: 90 TABLET | Refills: 1 | Status: SHIPPED | OUTPATIENT
Start: 2022-06-20

## 2022-06-20 RX ORDER — ESOMEPRAZOLE MAGNESIUM 40 MG/1
CAPSULE, DELAYED RELEASE ORAL
Qty: 180 CAPSULE | Refills: 0 | Status: SHIPPED | OUTPATIENT
Start: 2022-06-20

## 2022-06-20 NOTE — TELEPHONE ENCOUNTER
[facility] called requesting a refill of the below medication which has been pended for you:     Requested Prescriptions     Pending Prescriptions Disp Refills    esomeprazole (Veam Video) 40 MG delayed release capsule [Pharmacy Med Name: ESOMEPRAZOLE MAGNESIUM 40MG DR CAPS] 180 capsule 0     Sig: TAKE 1 CAPSULE BY MOUTH TWICE DAILY       Last Appointment Date: 6/6/2022  Next Appointment Date: 6/30/2022    Allergies   Allergen Reactions    Allopurinol Other (See Comments)     Other reaction(s): nausea    Bumex [Bumetanide] Other (See Comments)     Extreme fatigue, nausea, dizziness    Colchicine     Erythromycin     Indocin [Indomethacin]     Lasix [Furosemide] Other (See Comments)     Dizziness, extreme fatigue    Lisinopril     Morphine     Norvasc [Amlodipine]     Tenormin [Atenolol]     Uloric [Febuxostat]     Hydrocodone-Acetaminophen Nausea Only    Topamax [Topiramate] Swelling and Rash

## 2022-06-20 NOTE — TELEPHONE ENCOUNTER
Khushbu Palma called requesting a refill of the below medication which has been pended for you:     Requested Prescriptions     Pending Prescriptions Disp Refills    hydroCHLOROthiazide (HYDRODIURIL) 25 MG tablet [Pharmacy Med Name: HYDROCHLOROTHIAZIDE 25MG TABLETS] 90 tablet 1     Sig: TAKE 1 TABLET BY MOUTH DAILY       Last Appointment Date: 6/6/2022  Next Appointment Date: 6/30/2022    Allergies   Allergen Reactions    Allopurinol Other (See Comments)     Other reaction(s): nausea    Bumex [Bumetanide] Other (See Comments)     Extreme fatigue, nausea, dizziness    Colchicine     Erythromycin     Indocin [Indomethacin]     Lasix [Furosemide] Other (See Comments)     Dizziness, extreme fatigue    Lisinopril     Morphine     Norvasc [Amlodipine]     Tenormin [Atenolol]     Uloric [Febuxostat]     Hydrocodone-Acetaminophen Nausea Only    Topamax [Topiramate] Swelling and Rash

## 2022-06-27 RX ORDER — ASPIRIN 81 MG/1
TABLET ORAL
Qty: 90 TABLET | Refills: 1 | Status: SHIPPED | OUTPATIENT
Start: 2022-06-27 | End: 2022-11-01 | Stop reason: SDUPTHER

## 2022-06-27 NOTE — TELEPHONE ENCOUNTER
Nicholas Valderrama called requesting a refill of the below medication which has been pended for you:     Requested Prescriptions     Pending Prescriptions Disp Refills    mirabegron (MYRBETRIQ) 50 MG TB24 [Pharmacy Med Name: MYRBETRIQ 50MG TABLETS] 90 tablet 1     Sig: TAKE 1 TABLET BY MOUTH DAILY    aspirin (ASPIRIN LOW DOSE) 81 MG EC tablet [Pharmacy Med Name: ASPIRIN 81MG EC LOW DOSE TABLETS] 90 tablet 1     Sig: TAKE 1 TABLET BY MOUTH DAILY       Last Appointment Date: 6/6/2022  Next Appointment Date: 6/30/2022    Allergies   Allergen Reactions    Allopurinol Other (See Comments)     Other reaction(s): nausea    Bumex [Bumetanide] Other (See Comments)     Extreme fatigue, nausea, dizziness    Colchicine     Erythromycin     Indocin [Indomethacin]     Lasix [Furosemide] Other (See Comments)     Dizziness, extreme fatigue    Lisinopril     Morphine     Norvasc [Amlodipine]     Tenormin [Atenolol]     Uloric [Febuxostat]     Hydrocodone-Acetaminophen Nausea Only    Topamax [Topiramate] Swelling and Rash

## 2022-06-30 ENCOUNTER — OFFICE VISIT (OUTPATIENT)
Dept: FAMILY MEDICINE CLINIC | Age: 52
End: 2022-06-30
Payer: COMMERCIAL

## 2022-06-30 VITALS
HEIGHT: 66 IN | OXYGEN SATURATION: 97 % | SYSTOLIC BLOOD PRESSURE: 124 MMHG | DIASTOLIC BLOOD PRESSURE: 88 MMHG | RESPIRATION RATE: 16 BRPM | BODY MASS INDEX: 46.28 KG/M2 | WEIGHT: 288 LBS | HEART RATE: 80 BPM

## 2022-06-30 DIAGNOSIS — J32.0 CHRONIC MAXILLARY SINUSITIS: Primary | ICD-10-CM

## 2022-06-30 DIAGNOSIS — J34.2 DEVIATED NASAL SEPTUM: ICD-10-CM

## 2022-06-30 DIAGNOSIS — Z01.818 PREOPERATIVE CLEARANCE: ICD-10-CM

## 2022-06-30 DIAGNOSIS — J34.3 HYPERTROPHY OF NASAL TURBINATES: ICD-10-CM

## 2022-06-30 DIAGNOSIS — E11.9 TYPE 2 DIABETES MELLITUS WITHOUT COMPLICATION, WITHOUT LONG-TERM CURRENT USE OF INSULIN (HCC): ICD-10-CM

## 2022-06-30 DIAGNOSIS — J32.0 OROANTRAL FISTULA: ICD-10-CM

## 2022-06-30 DIAGNOSIS — B37.0 ORAL THRUSH: ICD-10-CM

## 2022-06-30 DIAGNOSIS — I10 PRIMARY HYPERTENSION: ICD-10-CM

## 2022-06-30 LAB
BASOPHILS ABSOLUTE: NORMAL
BASOPHILS RELATIVE PERCENT: NORMAL
BUN BLDV-MCNC: 9 MG/DL
CALCIUM SERPL-MCNC: 9.3 MG/DL
CHLORIDE BLD-SCNC: 103 MMOL/L
CO2: 26 MMOL/L
CREAT SERPL-MCNC: 0.96 MG/DL
EOSINOPHILS ABSOLUTE: NORMAL
EOSINOPHILS RELATIVE PERCENT: NORMAL
GFR CALCULATED: >60
GLUCOSE BLD-MCNC: 144 MG/DL
HCT VFR BLD CALC: 38.5 % (ref 36–46)
HEMOGLOBIN: 12.6 G/DL (ref 12–16)
INR BLD: 0.93
LYMPHOCYTES ABSOLUTE: NORMAL
LYMPHOCYTES RELATIVE PERCENT: NORMAL
MCH RBC QN AUTO: 28.3 PG
MCHC RBC AUTO-ENTMCNC: 32.7 G/DL
MCV RBC AUTO: 86.5 FL
MONOCYTES ABSOLUTE: NORMAL
MONOCYTES RELATIVE PERCENT: NORMAL
NEUTROPHILS ABSOLUTE: NORMAL
NEUTROPHILS RELATIVE PERCENT: NORMAL
PLATELET # BLD: 212 K/ΜL
PMV BLD AUTO: 10 FL
POTASSIUM SERPL-SCNC: 4 MMOL/L
PROTIME: 9.8 SECONDS
RBC # BLD: NORMAL 10*6/UL
SODIUM BLD-SCNC: 140 MMOL/L
WBC # BLD: 9.4 10^3/ML

## 2022-06-30 PROCEDURE — 99214 OFFICE O/P EST MOD 30 MIN: CPT | Performed by: FAMILY MEDICINE

## 2022-06-30 RX ORDER — FLUCONAZOLE 100 MG/1
100 TABLET ORAL DAILY
Qty: 30 TABLET | Refills: 0 | Status: SHIPPED | OUTPATIENT
Start: 2022-06-30 | End: 2022-07-30

## 2022-06-30 ASSESSMENT — ENCOUNTER SYMPTOMS
ABDOMINAL PAIN: 0
SORE THROAT: 0
RHINORRHEA: 0
SINUS PAIN: 1
SINUS PRESSURE: 1
WHEEZING: 0
EYE DISCHARGE: 0
COUGH: 0
DIARRHEA: 0
CONSTIPATION: 0
BACK PAIN: 1
TROUBLE SWALLOWING: 0
SHORTNESS OF BREATH: 0
NAUSEA: 0
VOMITING: 0
EYE REDNESS: 0

## 2022-06-30 NOTE — PROGRESS NOTES
2022     Chandana Kearns (:  1970) is a 47 y.o. female, here for evaluation of the following medical concerns:    HPI  Patient comes in today for preoperative clearance for upcoming sinus surgery. Patient has been having ongoing issues and symptoms of chronic sinusitis and has decided to proceed with surgical intervention. With regards to her chronic health conditions she has a known history of diabetes mellitus type 2 which is stable and controlled with her current medical regimen. Has a known history of hypertension and her blood pressure remained stable and controlled on her current medical therapy. She does state that she has had irritation to her oral mucosa again. Previously had thrush that we did treat with Lamisil and Diflucan therapy. It always seems to clear but then seems to subsequently come back. Did discuss doing a more prolonged course of Diflucan to see if we can get this to clear. .  Patient's recent lab reports are as follows:    Lab Results   Component Value Date/Time    WBC 11.1 2021 07:40 AM    RBC 4.47 2021 07:40 AM    HGB 12.5 2021 07:40 AM    HCT 40.3 2021 07:40 AM    MCV 90.2 2021 07:40 AM    MCH 28.0 2021 07:40 AM    MCHC 31.0 2021 07:40 AM    RDW 15.3 2021 07:40 AM     2021 07:40 AM    MPV 10.0 2021 07:40 AM       Lab Results   Component Value Date/Time    CHOL 188 2021 07:40 AM    HDL 48 2021 07:40 AM    CHOLHDLRATIO 3.9 2021 07:40 AM    TRIG 145 2021 07:40 AM    VLDL NOT REPORTED 2021 07:40 AM       Lab Results   Component Value Date/Time    TSH 0.73 2022 03:23 PM       Lab Results   Component Value Date/Time     2022 03:23 PM    K 3.9 2022 03:23 PM     2022 03:23 PM    CO2 27 2022 03:23 PM    BUN 14 2022 03:23 PM    CREATININE 0.86 2022 03:23 PM    GLUCOSE 169 2022 03:23 PM    CALCIUM 8.8 2022 03:23 PM Lab Results   Component Value Date/Time    LABA1C 6.2 09/01/2021 07:40 AM         Other review of systems are as noted below. Preventative measures are reviewed today. See health maintenance section for complete preventative plan of care. Did review patient's med list, allergies, social history, fam history, pmhx and pshx today as noted in the record. Review of Systems   Constitutional: Negative for chills, fatigue and fever. HENT: Positive for congestion, mouth sores, sinus pressure and sinus pain. Negative for ear pain, postnasal drip, rhinorrhea, sore throat and trouble swallowing. Eyes: Negative for discharge and redness. Respiratory: Negative for cough, shortness of breath and wheezing. Cardiovascular: Negative for chest pain. Gastrointestinal: Negative for abdominal pain, constipation, diarrhea, nausea and vomiting. Genitourinary: Negative for dysuria, flank pain, frequency and urgency. Musculoskeletal: Positive for back pain (chronic) and myalgias. Negative for arthralgias and neck pain. Skin: Positive for rash (skin folds of lower abdomen). Negative for wound. Allergic/Immunologic: Negative for environmental allergies. Neurological: Negative for dizziness, weakness, light-headedness and headaches. Hematological: Negative for adenopathy. Psychiatric/Behavioral: Negative. Prior to Visit Medications    Medication Sig Taking? Authorizing Provider   mirabegron (MYRBETRIQ) 50 MG TB24 TAKE 1 TABLET BY MOUTH DAILY  Rose Marie Case, DO   aspirin (ASPIRIN LOW DOSE) 81 MG EC tablet TAKE 1 TABLET BY MOUTH DAILY  Rose Marie Case, DO   hydroCHLOROthiazide (HYDRODIURIL) 25 MG tablet TAKE 1 TABLET BY MOUTH DAILY  Rose Marie Case, DO   esomeprazole (NEXIUM) 40 MG delayed release capsule TAKE 1 CAPSULE BY MOUTH TWICE DAILY  Yanet Gray, DO   ketoconazole (NIZORAL) 2 % shampoo Use to shampoo hair and skin twice weekly for 6 weeks.  Keep lather on for 10 minutes before rinsing.   Rivers Albert, DO   amitriptyline (ELAVIL) 10 MG tablet TAKE 1 TABLET BY MOUTH AT BEDTIME  Historical Provider, MD   ARIPiprazole (ABILIFY) 2 MG tablet TAKE 1 TABLET BY MOUTH DAILY  Historical Provider, MD   NYSTATIN 190290 UNIT/GM powder APPLY TO [facility]  Historical Provider, MD   lidocaine viscous hcl (XYLOCAINE) 2 % SOLN solution Take 5 mLs by mouth 4 times daily as needed for Irritation  Rivers Albert, DO   albuterol sulfate  (90 Base) MCG/ACT inhaler INHALE 2 PUFFS BY MOUTH EVERY 6 HOURS AS NEEDED FOR WHEEZING  Rivers Albert, DO   FEROSUL 325 (65 Fe) MG tablet TAKE 1 TABLET BY MOUTH TWICE DAILY  Rivers Albert, DO   nitrofurantoin, macrocrystal-monohydrate, (MACROBID) 100 MG capsule Take 1 capsule by mouth 2 times daily  Yanet Gray DO   gabapentin (NEURONTIN) 800 MG tablet TAKE 1 TABLET BY MOUTH TWICE DAILY  Rivers Albert, DO   potassium chloride (KLOR-CON) 8 MEQ extended release tablet TAKE 5 TABLETS BY MOUTH EVERY DAY  Yanet Gray DO   WAL-ITIN D 24 HOUR  MG per extended release tablet TAKE 1 TABLET BY MOUTH EVERY DAY  Yanet Gray DO   montelukast (SINGULAIR) 10 MG tablet TAKE 1 TABLET BY MOUTH DAILY  Rivers Albert, DO   fluticasone-salmeterol (ADVAIR DISKUS) 500-50 MCG/DOSE diskus inhaler Inhale 1 puff into the lungs every 12 hours  Yanet Gray DO   loteprednol (LOTEMAX) 0.5 % ophthalmic suspension SHAKE LIQUID AND INSTILL 1 DROP IN BOTH EYES FOUR TIMES DAILY FOR 1 WEEK  Historical Provider, MD   metoprolol tartrate (LOPRESSOR) 50 MG tablet TAKE 1 TABLET BY MOUTH TWICE DAILY  Rivers Albert, DO   Lancets MISC 1 each by Does not apply route daily  Rivers Albert, DO   blood glucose monitor strips Test 1 time daily and prn  Rivers Albert, DO   levothyroxine (SYNTHROID) 125 MCG tablet TAKE 1 TABLET BY MOUTH EVERY DAY  Yanet Gray DO   prazosin (MINIPRESS) 5 MG capsule Take 5 mg by mouth Abdominal:      General: Bowel sounds are normal. There is no distension. Palpations: Abdomen is soft. Tenderness: There is no abdominal tenderness. Musculoskeletal:      Cervical back: Normal range of motion and neck supple. Lymphadenopathy:      Cervical: No cervical adenopathy. Skin:     General: Skin is warm and dry. Findings: No rash. Neurological:      Mental Status: She is alert and oriented to person, place, and time. Psychiatric:         Behavior: Behavior normal.         Thought Content: Thought content normal.         Judgment: Judgment normal.           ASSESSMENT/PLAN:  Encounter Diagnoses   Name Primary?  Chronic maxillary sinusitis Yes    Oroantral fistula     Deviated nasal septum     Hypertrophy of nasal turbinates     Preoperative clearance     Type 2 diabetes mellitus without complication, without long-term current use of insulin (HCC)     Primary hypertension      Orders Placed This Encounter   Medications    fluconazole (DIFLUCAN) 100 MG tablet     Sig: Take 1 tablet by mouth daily     Dispense:  30 tablet     Refill:  0     No orders of the defined types were placed in this encounter. Patient has had persistent thrush. Have done 10-day courses of Diflucan and this seems to improve it but then within 3 days it returns. She also has ongoing chronic Candida to the folds of the skin in her lower abdomen. Did discuss doing a more prolonged course of Diflucan. Would initially start with a 2-week course and if she does not seem to have improvement with that would continue with an additional 2-week duration course. Prescription for 30 tabs as written. I did review her preoperative labs that were provided after her visit was complete. BMP was normal except for elevated glucose of 144, CBC was essentially within normal limits, PT/INR and PTT were not within normal limits and EKG showed sinus rhythm with low QRS voltage in the precordial leads.     After evaluation today and review of preoperative testing patient is medically cleared for upcoming proposed surgical intervention. We will send clearance to the ENT provider. Patient is to hold her aspirin 2 weeks prior to and 2 weeks after surgery. Patient is to continue on the rest of her current medical therapy. No additional changes are made at this time. Patient is to return to my office in August or sooner if any further problems or symptoms arise. (Please note that portions of this note were completed with a voice recognition program. Efforts were made to edit the dictations but occasionally words are mis-transcribed.)        No follow-ups on file. An electronic signature was used to authenticate this note.     --Richiener Matter, DO on 6/30/2022 at 7:20 AM

## 2022-06-30 NOTE — PATIENT INSTRUCTIONS
Hospital Outpatient Visit on 05/24/2022   Component Date Value Ref Range Status    Mumps IgG 05/24/2022 5.04  >1.09 Final    Comment:    Interpretation:  IMMUNE     Reference Range:  <0.91         Not Immune  0.91-1.09     Equivocal  >1.09         Immune         Rubella Antibody, IGG 05/24/2022 34.2  IU/mL Final    Comment:             REFERENCE RANGE:  <5.0       NON-REACTIVE (non-immune)  5.0 TO 9.9 EQUIVOCAL  >=10.0     REACTIVE     (immune)            MEASLES ANTIBODY IGG 05/24/2022 1.33  >1.09 Final    Comment:    Interpretation:  IMMUNE     Reference Range:  <0.91         Not Immune  0.91-1.09     Equivocal  >1.09         Immune

## 2022-07-18 RX ORDER — METOPROLOL TARTRATE 50 MG/1
TABLET, FILM COATED ORAL
Qty: 180 TABLET | Refills: 1 | Status: SHIPPED | OUTPATIENT
Start: 2022-07-18 | End: 2022-11-01 | Stop reason: SDUPTHER

## 2022-07-18 NOTE — TELEPHONE ENCOUNTER
Kaitlin Sanches called requesting a refill of the below medication which has been pended for you:     Requested Prescriptions     Pending Prescriptions Disp Refills    metoprolol tartrate (LOPRESSOR) 50 MG tablet [Pharmacy Med Name: METOPROLOL TARTRATE 50MG TABLETS] 180 tablet 1     Sig: TAKE 1 TABLET BY MOUTH TWICE DAILY       Last Appointment Date: 6/30/2022  Next Appointment Date: 8/2/2022    Allergies   Allergen Reactions    Allopurinol Other (See Comments)     Other reaction(s): nausea    Bumex [Bumetanide] Other (See Comments)     Extreme fatigue, nausea, dizziness    Colchicine     Erythromycin     Indocin [Indomethacin]     Lasix [Furosemide] Other (See Comments)     Dizziness, extreme fatigue    Lisinopril     Morphine     Norvasc [Amlodipine]     Tenormin [Atenolol]     Uloric [Febuxostat]     Hydrocodone-Acetaminophen Nausea Only    Topamax [Topiramate] Swelling and Rash

## 2022-07-29 RX ORDER — GABAPENTIN 800 MG/1
TABLET ORAL
Qty: 180 TABLET | Refills: 0 | Status: SHIPPED | OUTPATIENT
Start: 2022-07-29 | End: 2022-08-12 | Stop reason: SDUPTHER

## 2022-07-29 NOTE — TELEPHONE ENCOUNTER
Charolet Hamman called requesting a refill of the below medication which has been pended for you:     Requested Prescriptions     Pending Prescriptions Disp Refills    gabapentin (NEURONTIN) 800 MG tablet [Pharmacy Med Name: GABAPENTIN 800MG TABLETS] 180 tablet 0     Sig: TAKE 1 TABLET BY MOUTH TWICE DAILY       Last Appointment Date: 6/30/2022  Next Appointment Date: 8/12/2022    Allergies   Allergen Reactions    Allopurinol Other (See Comments)     Other reaction(s): nausea    Bumex [Bumetanide] Other (See Comments)     Extreme fatigue, nausea, dizziness    Colchicine     Erythromycin     Indocin [Indomethacin]     Lasix [Furosemide] Other (See Comments)     Dizziness, extreme fatigue    Lisinopril     Morphine     Norvasc [Amlodipine]     Tenormin [Atenolol]     Uloric [Febuxostat]     Hydrocodone-Acetaminophen Nausea Only    Topamax [Topiramate] Swelling and Rash

## 2022-08-11 DIAGNOSIS — J45.909 UNCOMPLICATED ASTHMA, UNSPECIFIED ASTHMA SEVERITY, UNSPECIFIED WHETHER PERSISTENT: ICD-10-CM

## 2022-08-12 ENCOUNTER — OFFICE VISIT (OUTPATIENT)
Dept: FAMILY MEDICINE CLINIC | Age: 52
End: 2022-08-12
Payer: COMMERCIAL

## 2022-08-12 VITALS
BODY MASS INDEX: 45.32 KG/M2 | WEIGHT: 282 LBS | DIASTOLIC BLOOD PRESSURE: 64 MMHG | RESPIRATION RATE: 18 BRPM | SYSTOLIC BLOOD PRESSURE: 108 MMHG | TEMPERATURE: 98.3 F | HEIGHT: 66 IN | HEART RATE: 72 BPM | OXYGEN SATURATION: 98 %

## 2022-08-12 DIAGNOSIS — Z12.31 ENCOUNTER FOR SCREENING MAMMOGRAM FOR MALIGNANT NEOPLASM OF BREAST: ICD-10-CM

## 2022-08-12 DIAGNOSIS — B37.0 ORAL THRUSH: ICD-10-CM

## 2022-08-12 DIAGNOSIS — E11.9 TYPE 2 DIABETES MELLITUS WITHOUT COMPLICATION, WITHOUT LONG-TERM CURRENT USE OF INSULIN (HCC): Primary | ICD-10-CM

## 2022-08-12 DIAGNOSIS — E78.2 MIXED HYPERLIPIDEMIA: ICD-10-CM

## 2022-08-12 DIAGNOSIS — G63 POLYNEUROPATHY ASSOCIATED WITH UNDERLYING DISEASE (HCC): ICD-10-CM

## 2022-08-12 DIAGNOSIS — E03.9 ACQUIRED HYPOTHYROIDISM: ICD-10-CM

## 2022-08-12 DIAGNOSIS — I10 PRIMARY HYPERTENSION: ICD-10-CM

## 2022-08-12 DIAGNOSIS — B35.6 TINEA CRURIS: ICD-10-CM

## 2022-08-12 PROCEDURE — 99214 OFFICE O/P EST MOD 30 MIN: CPT | Performed by: FAMILY MEDICINE

## 2022-08-12 PROCEDURE — G8417 CALC BMI ABV UP PARAM F/U: HCPCS | Performed by: FAMILY MEDICINE

## 2022-08-12 PROCEDURE — 3017F COLORECTAL CA SCREEN DOC REV: CPT | Performed by: FAMILY MEDICINE

## 2022-08-12 PROCEDURE — 1036F TOBACCO NON-USER: CPT | Performed by: FAMILY MEDICINE

## 2022-08-12 PROCEDURE — 2022F DILAT RTA XM EVC RTNOPTHY: CPT | Performed by: FAMILY MEDICINE

## 2022-08-12 PROCEDURE — 3046F HEMOGLOBIN A1C LEVEL >9.0%: CPT | Performed by: FAMILY MEDICINE

## 2022-08-12 PROCEDURE — G8427 DOCREV CUR MEDS BY ELIG CLIN: HCPCS | Performed by: FAMILY MEDICINE

## 2022-08-12 RX ORDER — LEVOTHYROXINE SODIUM 0.12 MG/1
TABLET ORAL
Qty: 90 TABLET | Refills: 1 | Status: SHIPPED | OUTPATIENT
Start: 2022-08-12

## 2022-08-12 RX ORDER — ALBUTEROL SULFATE 90 UG/1
AEROSOL, METERED RESPIRATORY (INHALATION)
Qty: 8.5 G | Refills: 2 | Status: SHIPPED | OUTPATIENT
Start: 2022-08-12

## 2022-08-12 RX ORDER — DESVENLAFAXINE 50 MG/1
TABLET, EXTENDED RELEASE ORAL
COMMUNITY
Start: 2022-07-15 | End: 2022-09-21

## 2022-08-12 RX ORDER — TERBINAFINE HYDROCHLORIDE 250 MG/1
250 TABLET ORAL DAILY
Qty: 30 TABLET | Refills: 0 | Status: SHIPPED | OUTPATIENT
Start: 2022-08-12 | End: 2022-09-11

## 2022-08-12 RX ORDER — CLOTRIMAZOLE 10 MG/1
10 LOZENGE ORAL; TOPICAL
Qty: 50 TABLET | Refills: 0 | Status: SHIPPED | OUTPATIENT
Start: 2022-08-12 | End: 2022-08-22

## 2022-08-12 RX ORDER — FLUTICASONE PROPIONATE AND SALMETEROL 500; 50 UG/1; UG/1
1 POWDER RESPIRATORY (INHALATION) EVERY 12 HOURS
Qty: 60 EACH | Refills: 2 | Status: SHIPPED | OUTPATIENT
Start: 2022-08-12

## 2022-08-12 RX ORDER — LAMOTRIGINE 150 MG/1
TABLET ORAL
COMMUNITY
Start: 2022-07-18

## 2022-08-12 RX ORDER — OXYBUTYNIN CHLORIDE 15 MG/1
15 TABLET, EXTENDED RELEASE ORAL DAILY
Qty: 90 TABLET | Refills: 1 | Status: SHIPPED | OUTPATIENT
Start: 2022-08-12 | End: 2022-09-21

## 2022-08-12 RX ORDER — GABAPENTIN 800 MG/1
800 TABLET ORAL 3 TIMES DAILY
Qty: 270 TABLET | Refills: 0 | Status: SHIPPED | OUTPATIENT
Start: 2022-08-12 | End: 2022-11-12

## 2022-08-12 ASSESSMENT — ENCOUNTER SYMPTOMS
EYE REDNESS: 0
SINUS PAIN: 1
NAUSEA: 0
ABDOMINAL PAIN: 0
CONSTIPATION: 0
SHORTNESS OF BREATH: 0
COUGH: 0
TROUBLE SWALLOWING: 0
EYE DISCHARGE: 0
SINUS PRESSURE: 1
DIARRHEA: 0
SORE THROAT: 0
RHINORRHEA: 1
VOMITING: 0
WHEEZING: 0

## 2022-08-12 NOTE — TELEPHONE ENCOUNTER
Zo Vásquez called requesting a refill of the below medication which has been pended for you: faby wanting 90 day script    Requested Prescriptions     Refused Prescriptions Disp Refills    Cholecalciferol (VITAMIN D3) 125 MCG (5000 UT) TABS 30 tablet 2     Sig: Take 1 tablet by mouth in the morning.        Last Appointment Date: 8/12/2022  Next Appointment Date: 2/13/2023    Allergies   Allergen Reactions    Allopurinol Other (See Comments)     Other reaction(s): nausea    Bumex [Bumetanide] Other (See Comments)     Extreme fatigue, nausea, dizziness    Colchicine     Erythromycin     Indocin [Indomethacin]     Lasix [Furosemide] Other (See Comments)     Dizziness, extreme fatigue    Lisinopril     Morphine     Norvasc [Amlodipine]     Tenormin [Atenolol]     Uloric [Febuxostat]     Hydrocodone-Acetaminophen Nausea Only    Topamax [Topiramate] Swelling and Rash

## 2022-08-12 NOTE — PROGRESS NOTES
2022     Kary Ahumada Wurster (:  1970) is a 47 y.o. female, here for evaluation of the following medical concerns:    HPI  Patient comes in today for follow up of chronic health issues Patient did undergo attempt at nasal sinus surgery at [facility] and ultimately as her blood pressure elevated significantly and they could not get it under control while she was under anesthesia they had to abort the procedure and was not able to finish. As a result she does have to proceed with additional surgical intervention and will be seeing another ENT for opinion. Her current ENT wants to have her additional surgery done at another facility at a larger hospital to help make sure that she does not have any complications during the surgery. She does have a known history of diabetes mellitus type 2 and has not had a hemoglobin A1c prior to her visit. Blood sugars have been relatively stable and controlled with recent checks but will check with updated lab testing. Has a known history of hypertension and her blood pressure is stable and controlled on her current medical therapy. Has a known history of hypothyroidism and her thyroid levels have been relatively stable controlled on her current medical regimen. Does have known hyperlipidemia and her cholesterol levels have been relatively stable and controlled on her current medical regimen. She has had continued issues with oral thrush and skin irritation to the inguinal region from fungal and yeast pathogens. We did place her on a 1 month course of Diflucan she got somewhat better but just did not completely clear. Would consider alternate antifungal treatment to see if we get this to clear. Patient does note that her neuropathy is worsening as well. She is getting burning stinging and increasing numbness to her hands and lower extremities. patient otherwise has no other acute medical concerns. .  Patient's recent lab reports are as follows: Results for orders placed or performed in visit on 83/86/48   Basic Metabolic Panel   Result Value Ref Range    Sodium 140 mmol/L    Chloride 103 mmol/L    Potassium 4.0 mmol/L    BUN 9 mg/dL    Creatinine 0.96     Glucose 144 mg/dL    CO2 26 mmol/L    Calcium 9.3 mg/dL    Gfr Calculated >60    CBC   Result Value Ref Range    WBC 9.4 10^3/mL    Hemoglobin 12.6 12.0 - 16.0 g/dL    Hematocrit 38.5 36 - 46 %    Platelets 505 K/µL    Neutrophils %      Lymphocytes %      Monocytes %      Eosinophils %      Basophils %      Neutrophils Absolute      Lymphocytes Absolute      Monocytes Absolute      Eosinophils Absolute      Basophils Absolute      RBC      MCV 86.5 fL    MCH 28.3 pg    MCHC 32.7 g/dL    MPV 10.0 fL   Protime-INR   Result Value Ref Range    Protime 9.8 seconds    INR 0.93       Other review of systems are as noted below. Preventative measures are reviewed today. See health maintenance section for complete preventative plan of care. Did review patient's med list, allergies, social history, fam history, pmhx and pshx today as noted in the record. Review of Systems   Constitutional:  Negative for chills, fatigue and fever. HENT:  Positive for congestion, postnasal drip, rhinorrhea, sinus pressure and sinus pain. Negative for ear pain, sore throat and trouble swallowing. Irritation of the tongue with recurrent exudate developing   Eyes:  Negative for discharge and redness. Respiratory:  Negative for cough, shortness of breath and wheezing. Cardiovascular:  Negative for chest pain. Gastrointestinal:  Negative for abdominal pain, constipation, diarrhea, nausea and vomiting. Genitourinary:  Negative for dysuria, flank pain, frequency and urgency. Musculoskeletal:  Negative for arthralgias, myalgias and neck pain. Skin:  Positive for rash. Negative for wound. Allergic/Immunologic: Negative for environmental allergies. Neurological:  Positive for numbness.  Negative for dizziness, weakness, light-headedness and headaches. Hematological:  Negative for adenopathy. Psychiatric/Behavioral: Negative. Prior to Visit Medications    Medication Sig Taking? Authorizing Provider   gabapentin (NEURONTIN) 800 MG tablet TAKE 1 TABLET BY MOUTH TWICE DAILY  Katie Finley DO   metoprolol tartrate (LOPRESSOR) 50 MG tablet TAKE 1 TABLET BY MOUTH TWICE DAILY  Katie Finley,    mirabegron (MYRBETRIQ) 50 MG TB24 TAKE 1 TABLET BY MOUTH DAILY  Katie Finley,    aspirin (ASPIRIN LOW DOSE) 81 MG EC tablet TAKE 1 TABLET BY MOUTH DAILY  Katie Finley,    hydroCHLOROthiazide (HYDRODIURIL) 25 MG tablet TAKE 1 TABLET BY MOUTH DAILY  Katie Finley,    esomeprazole (NEXIUM) 40 MG delayed release capsule TAKE 1 CAPSULE BY MOUTH TWICE DAILY  Yanet Gray DO   ketoconazole (NIZORAL) 2 % shampoo Use to shampoo hair and skin twice weekly for 6 weeks. Keep lather on for 10 minutes before rinsing.   Katie Finley DO   amitriptyline (ELAVIL) 10 MG tablet TAKE 1 TABLET BY MOUTH AT BEDTIME  Historical Provider, MD   ARIPiprazole (ABILIFY) 2 MG tablet TAKE 1 TABLET BY MOUTH DAILY  Patient not taking: Reported on 6/30/2022  Historical Provider, MD   NYSTATIN [id number] UNIT/GM powder APPLY TO [facility]  Historical Provider, MD   lidocaine viscous hcl (XYLOCAINE) 2 % SOLN solution Take 5 mLs by mouth 4 times daily as needed for Irritation  Katie Finley DO   albuterol sulfate  (90 Base) MCG/ACT inhaler INHALE 2 PUFFS BY MOUTH EVERY 6 HOURS AS NEEDED FOR WHEEZING  Katie Finley DO   FEROSUL 325 (65 Fe) MG tablet TAKE 1 TABLET BY MOUTH TWICE DAILY  Katie Finley DO   potassium chloride (KLOR-CON) 8 MEQ extended release tablet TAKE 5 TABLETS BY MOUTH EVERY DAY  Yanet Gray DO   WAL-ITIN D 24 HOUR  MG per extended release tablet TAKE 1 TABLET BY MOUTH EVERY DAY  Yanet Gray DO   montelukast (SINGULAIR) 10 MG tablet TAKE 1 TABLET BY MOUTH DAILY  Nimo Stockton DO   fluticasone-salmeterol (ADVAIR DISKUS) 500-50 MCG/DOSE diskus inhaler Inhale 1 puff into the lungs every 12 hours  Yanet Gray DO   loteprednol (LOTEMAX) 0.5 % ophthalmic suspension SHAKE LIQUID AND INSTILL 1 DROP IN BOTH EYES FOUR TIMES DAILY FOR 1 WEEK  Patient not taking: Reported on 6/30/2022  Historical Provider, MD   Lancets MISC 1 each by Does not apply route daily  Nimo Stockton DO   blood glucose monitor strips Test 1 time daily and prn  Yanet Gray DO   levothyroxine (SYNTHROID) 125 MCG tablet TAKE 1 TABLET BY MOUTH EVERY DAY  Yanet Gray DO   prazosin (MINIPRESS) 5 MG capsule Take 5 mg by mouth nightly  Historical Provider, MD   lamoTRIgine (LAMICTAL) 100 MG tablet Take 150 mg by mouth 2 times daily   Historical Provider, MD   traZODone (DESYREL) 300 MG tablet take 1 tablet by mouth at bedtime  Historical Provider, MD   desvenlafaxine succinate (PRISTIQ) 100 MG TB24 extended release tablet take 1 tablet by mouth once daily  Historical Provider, MD   Cholecalciferol (VITAMIN D3) 5000 UNITS TABS Take 1 tablet by mouth daily  Nimo Stockton DO        Social History     Tobacco Use    Smoking status: Never    Smokeless tobacco: Never   Substance Use Topics    Alcohol use: No        There were no vitals filed for this visit. Estimated body mass index is 46.48 kg/m² as calculated from the following:    Height as of 6/30/22: 5' 6\" (1.676 m). Weight as of 6/30/22: 288 lb (130.6 kg). Physical Exam  Vitals and nursing note reviewed. Constitutional:       General: She is not in acute distress. Appearance: Normal appearance. She is well-developed. She is not diaphoretic. HENT:      Head: Normocephalic and atraumatic.       Right Ear: Tympanic membrane, ear canal and external ear normal.      Left Ear: Tympanic membrane, ear canal and external ear normal.      Nose: Nose normal.      Mouth/Throat:      Mouth: Mucous membranes are moist.      Pharynx: Oropharyngeal exudate present. Comments: White exudative coating on the tongue surface  Eyes:      General:         Right eye: No discharge. Left eye: No discharge. Conjunctiva/sclera: Conjunctivae normal.      Pupils: Pupils are equal, round, and reactive to light. Neck:      Thyroid: No thyromegaly. Cardiovascular:      Rate and Rhythm: Normal rate and regular rhythm. Heart sounds: Normal heart sounds. Pulmonary:      Effort: Pulmonary effort is normal.      Breath sounds: Normal breath sounds. No wheezing or rales. Abdominal:      General: Bowel sounds are normal. There is no distension. Palpations: Abdomen is soft. Tenderness: There is no abdominal tenderness. Musculoskeletal:      Cervical back: Normal range of motion and neck supple. Lymphadenopathy:      Cervical: No cervical adenopathy. Skin:     General: Skin is warm and dry. Findings: No rash. Neurological:      Mental Status: She is alert and oriented to person, place, and time. Psychiatric:         Behavior: Behavior normal.         Thought Content: Thought content normal.         Judgment: Judgment normal.         ASSESSMENT/PLAN:    Encounter Diagnoses   Name Primary? Type 2 diabetes mellitus without complication, without long-term current use of insulin (HCC) Yes    Primary hypertension     Acquired hypothyroidism     Mixed hyperlipidemia     Oral thrush     Tinea cruris     Encounter for screening mammogram for malignant neoplasm of breast     Polyneuropathy associated with underlying disease ([facility] [address]      Orders Placed This Encounter   Medications    gabapentin (NEURONTIN) 800 MG tablet     Sig: Take 1 tablet by mouth in the morning and 1 tablet at noon and 1 tablet before bedtime. Do all this for 92 days. Dispense:  270 tablet     Refill:  0    oxybutynin (DITROPAN XL) 15 MG extended release tablet     Sig: Take 1 tablet by mouth in the morning. Dispense:  90 tablet     Refill:  1    terbinafine (LAMISIL) 250 MG tablet     Sig: Take 1 tablet by mouth in the morning. Dispense:  30 tablet     Refill:  0    clotrimazole (MYCELEX) 10 MG jennie     Sig: Take 1 tablet by mouth 5 times daily for 10 days     Dispense:  50 tablet     Refill:  0     Orders Placed This Encounter   Procedures    GRACIELA KATIE DIGITAL SCREEN BILATERAL     Standing Status:   Future     Standing Expiration Date:   10/12/2023     Order Specific Question:   Reason for exam:     Answer:   screening mammogram    CBC with Auto Differential     Standing Status:   Future     Standing Expiration Date:   8/12/2023    Comprehensive Metabolic Panel     Standing Status:   Future     Standing Expiration Date:   8/12/2023    Hemoglobin A1C     Standing Status:   Future     Standing Expiration Date:   8/12/2023    Lipid Panel     Standing Status:   Future     Standing Expiration Date:   8/12/2023     Order Specific Question:   Is Patient Fasting?/# of Hours     Answer:   12 hours    TSH     Standing Status:   Future     Standing Expiration Date:   8/12/2023    T4, Free     Standing Status:   Future     Standing Expiration Date:   8/12/2023    Microalbumin, Ur     Standing Status:   Future     Standing Expiration Date:   8/12/2023     Scheduling Instructions:       To be done in 6 months    Hemoglobin A1C     Standing Status:   Future     Standing Expiration Date:   8/12/2023    Lipid Panel     Standing Status:   Future     Standing Expiration Date:   8/12/2023     Order Specific Question:   Is Patient Fasting?/# of Hours     Answer:   12    CBC with Auto Differential     Standing Status:   Future     Standing Expiration Date:   8/12/2023    Comprehensive Metabolic Panel     Standing Status:   Future     Standing Expiration Date:   8/12/2023    T4, Free     Standing Status:   Future     Standing Expiration Date:   8/12/2023    TSH     Standing Status:   Future     Standing Expiration Date:   8/12/2023 Patient is given Lamisil treatment in addition to Mycelex troches to see if we can get the yeast and fungal infections to clear. Patient is to increase her gabapentin to 3 times daily dosing to see if this will help with some of her underlying neuropathy pain and discomfort. Patient is changed to Ditropan to give this will help with her urinary symptoms. She has been having some ongoing issues with urinary incontinence and frequency despite being on Myrbetriq. Patient is to continue on the rest of her current medical therapy. No additional changes are made at this time. Patient is to have lab work done as ordered and will make further intervention as necessary based on test reports. Did also order repeat labs in 6 months duration as well. Patient is to continue to follow a low-carb/low sugar/low-fat diet and increase exercise for optimal blood sugar and cholesterol control. Patient is to return to my office in 6 months duration or sooner if any further problems or symptoms arise. (Please note that portions of this note were completed with a voice recognition program. Efforts were made to edit the dictations but occasionally words are mis-transcribed.)    No follow-ups on file. An electronic signature was used to authenticate this note.     --aYnet Trent, DO on 8/12/2022 at 7:45 AM

## 2022-08-12 NOTE — TELEPHONE ENCOUNTER
Will send at [address] today.     Duglas Pill called requesting a refill of the below medication which has been pended for you:     Requested Prescriptions     Pending Prescriptions Disp Refills    albuterol sulfate HFA (PROVENTIL;VENTOLIN;PROAIR) 108 (90 Base) MCG/ACT inhaler [Pharmacy Med Name: ALBUTEROL HFA INH (200 PUFFS)8.5GM] 8.5 g 2     Sig: INHALE 2 PUFFS BY MOUTH EVERY 6 HOURS AS NEEDED FOR WHEEZING       Last Appointment Date: 6/30/2022  Next Appointment Date: 8/12/2022    Allergies   Allergen Reactions    Allopurinol Other (See Comments)     Other reaction(s): nausea    Bumex [Bumetanide] Other (See Comments)     Extreme fatigue, nausea, dizziness    Colchicine     Erythromycin     Indocin [Indomethacin]     Lasix [Furosemide] Other (See Comments)     Dizziness, extreme fatigue    Lisinopril     Morphine     Norvasc [Amlodipine]     Tenormin [Atenolol]     Uloric [Febuxostat]     Hydrocodone-Acetaminophen Nausea Only    Topamax [Topiramate] Swelling and Rash

## 2022-08-15 ENCOUNTER — HOSPITAL ENCOUNTER (OUTPATIENT)
Dept: LAB | Age: 52
Discharge: HOME | End: 2022-08-15
Payer: COMMERCIAL

## 2022-08-15 DIAGNOSIS — E78.2 MIXED HYPERLIPIDEMIA: ICD-10-CM

## 2022-08-15 DIAGNOSIS — E03.9 ACQUIRED HYPOTHYROIDISM: ICD-10-CM

## 2022-08-15 DIAGNOSIS — E11.9 TYPE 2 DIABETES MELLITUS WITHOUT COMPLICATION, WITHOUT LONG-TERM CURRENT USE OF INSULIN (HCC): ICD-10-CM

## 2022-08-15 LAB
ABSOLUTE EOS #: 0.28 K/UL (ref 0–0.44)
ABSOLUTE IMMATURE GRANULOCYTE: 0.1 K/UL (ref 0–0.3)
ABSOLUTE LYMPH #: 2.89 K/UL (ref 1.1–3.7)
ABSOLUTE MONO #: 0.5 K/UL (ref 0.1–1.2)
ALBUMIN SERPL-MCNC: 4 G/DL (ref 3.5–5.2)
ALBUMIN/GLOBULIN RATIO: 1.5 (ref 1–2.5)
ALP BLD-CCNC: 147 U/L (ref 35–104)
ALT SERPL-CCNC: 32 U/L (ref 5–33)
ANION GAP SERPL CALCULATED.3IONS-SCNC: 12 MMOL/L (ref 9–17)
AST SERPL-CCNC: 45 U/L
BASOPHILS # BLD: 0 % (ref 0–2)
BASOPHILS ABSOLUTE: 0.04 K/UL (ref 0–0.2)
BILIRUB SERPL-MCNC: 0.25 MG/DL (ref 0.3–1.2)
BUN BLDV-MCNC: 9 MG/DL (ref 6–20)
BUN/CREAT BLD: 10 (ref 9–20)
CALCIUM SERPL-MCNC: 9.3 MG/DL (ref 8.6–10.4)
CHLORIDE BLD-SCNC: 103 MMOL/L (ref 98–107)
CHOLESTEROL/HDL RATIO: 6
CHOLESTEROL: 210 MG/DL
CO2: 25 MMOL/L (ref 20–31)
CREAT SERPL-MCNC: 0.89 MG/DL (ref 0.5–0.9)
EOSINOPHILS RELATIVE PERCENT: 3 % (ref 1–4)
ESTIMATED AVERAGE GLUCOSE: 140 MG/DL
GFR AFRICAN AMERICAN: >60 ML/MIN
GFR NON-AFRICAN AMERICAN: >60 ML/MIN
GFR SERPL CREATININE-BSD FRML MDRD: ABNORMAL ML/MIN/{1.73_M2}
GLUCOSE BLD-MCNC: 152 MG/DL (ref 70–99)
HBA1C MFR BLD: 6.5 % (ref 4–6)
HCT VFR BLD CALC: 40.8 % (ref 36.3–47.1)
HDLC SERPL-MCNC: 35 MG/DL
HEMOGLOBIN: 13.3 G/DL (ref 11.9–15.1)
IMMATURE GRANULOCYTES: 1 %
LDL CHOLESTEROL: 106 MG/DL (ref 0–130)
LYMPHOCYTES # BLD: 30 % (ref 24–43)
MCH RBC QN AUTO: 28.4 PG (ref 25.2–33.5)
MCHC RBC AUTO-ENTMCNC: 32.6 G/DL (ref 25.2–33.5)
MCV RBC AUTO: 87.2 FL (ref 82.6–102.9)
MONOCYTES # BLD: 5 % (ref 3–12)
NRBC AUTOMATED: 0 PER 100 WBC
PDW BLD-RTO: 15.4 % (ref 11.8–14.4)
PLATELET # BLD: 242 K/UL (ref 138–453)
PMV BLD AUTO: 10.8 FL (ref 8.1–13.5)
POTASSIUM SERPL-SCNC: 4 MMOL/L (ref 3.7–5.3)
RBC # BLD: 4.68 M/UL (ref 3.95–5.11)
RBC # BLD: ABNORMAL 10*6/UL
SEG NEUTROPHILS: 61 % (ref 36–65)
SEGMENTED NEUTROPHILS ABSOLUTE COUNT: 5.9 K/UL (ref 1.5–8.1)
SODIUM BLD-SCNC: 140 MMOL/L (ref 135–144)
THYROXINE, FREE: 0.97 NG/DL (ref 0.93–1.7)
TOTAL PROTEIN: 6.7 G/DL (ref 6.4–8.3)
TRIGL SERPL-MCNC: 344 MG/DL
TSH SERPL DL<=0.05 MIU/L-ACNC: 3.92 UIU/ML (ref 0.3–5)
WBC # BLD: 9.7 K/UL (ref 3.5–11.3)

## 2022-08-15 PROCEDURE — 80053 COMPREHEN METABOLIC PANEL: CPT

## 2022-08-15 PROCEDURE — 84439 ASSAY OF FREE THYROXINE: CPT

## 2022-08-15 PROCEDURE — 85025 COMPLETE CBC W/AUTO DIFF WBC: CPT

## 2022-08-15 PROCEDURE — 83036 HEMOGLOBIN GLYCOSYLATED A1C: CPT

## 2022-08-15 PROCEDURE — 36415 COLL VENOUS BLD VENIPUNCTURE: CPT

## 2022-08-15 PROCEDURE — 80061 LIPID PANEL: CPT

## 2022-08-15 PROCEDURE — 84443 ASSAY THYROID STIM HORMONE: CPT

## 2022-08-17 RX ORDER — NYSTATIN 100000 [USP'U]/G
POWDER TOPICAL
Qty: 60 G | Refills: 0 | Status: SHIPPED | OUTPATIENT
Start: 2022-08-17

## 2022-08-17 NOTE — TELEPHONE ENCOUNTER
oRsey Sauceda called requesting a refill of the below medication which has been pended for you:     Requested Prescriptions     Pending Prescriptions Disp Refills    NYSTATIN [id number] UNIT/GM powder [Pharmacy Med Name: Hussein Rodriguez PWDR 100,000 60GM] 60 g 1     Sig: APPLY TO [facility]       Last Appointment Date: 08/12/2022  Next Appointment Date: 02/13/2023    Allergies   Allergen Reactions    Allopurinol Other (See Comments)     Other reaction(s): nausea    Bumex [Bumetanide] Other (See Comments)     Extreme fatigue, nausea, dizziness    Colchicine     Erythromycin     Indocin [Indomethacin]     Lasix [Furosemide] Other (See Comments)     Dizziness, extreme fatigue    Lisinopril     Morphine     Norvasc [Amlodipine]     Tenormin [Atenolol]     Uloric [Febuxostat]     Hydrocodone-Acetaminophen Nausea Only    Topamax [Topiramate] Swelling and Rash

## 2022-09-15 ENCOUNTER — E-VISIT (OUTPATIENT)
Dept: FAMILY MEDICINE CLINIC | Age: 52
End: 2022-09-15
Payer: COMMERCIAL

## 2022-09-15 DIAGNOSIS — R30.0 DYSURIA: Primary | ICD-10-CM

## 2022-09-15 PROCEDURE — 99422 OL DIG E/M SVC 11-20 MIN: CPT | Performed by: FAMILY MEDICINE

## 2022-09-15 RX ORDER — NITROFURANTOIN 25; 75 MG/1; MG/1
100 CAPSULE ORAL 2 TIMES DAILY
Qty: 14 CAPSULE | Refills: 0 | Status: SHIPPED | OUTPATIENT
Start: 2022-09-15 | End: 2022-09-21

## 2022-09-15 NOTE — PROGRESS NOTES
Patient with symptoms of a UTI. Will have her obtain a urinalysis with culture and also will send in antibiotic for her to start after she obtains urine specimen. 11-20 minutes were spent on the digital evaluation and management of this patient.

## 2022-09-16 ENCOUNTER — HOSPITAL ENCOUNTER (OUTPATIENT)
Dept: LAB | Age: 52
Discharge: HOME | End: 2022-09-16
Payer: COMMERCIAL

## 2022-09-16 DIAGNOSIS — R30.0 DYSURIA: ICD-10-CM

## 2022-09-16 LAB
BACTERIA: ABNORMAL
BILIRUBIN URINE: NEGATIVE
EPITHELIAL CELLS UA: ABNORMAL /HPF (ref 0–5)
GLUCOSE URINE: NEGATIVE
KETONES, URINE: NEGATIVE
LEUKOCYTE ESTERASE, URINE: NEGATIVE
NITRITE, URINE: POSITIVE
PH UA: 6 (ref 5–6)
PROTEIN UA: NEGATIVE
RBC UA: ABNORMAL /HPF (ref 0–4)
SPECIFIC GRAVITY UA: 1.01 (ref 1.01–1.02)
URINE HGB: ABNORMAL
UROBILINOGEN, URINE: NORMAL
WBC UA: ABNORMAL /HPF (ref 0–4)

## 2022-09-16 PROCEDURE — 87086 URINE CULTURE/COLONY COUNT: CPT

## 2022-09-16 PROCEDURE — 81001 URINALYSIS AUTO W/SCOPE: CPT

## 2022-09-17 LAB
CULTURE: NORMAL
SPECIMEN DESCRIPTION: NORMAL

## 2022-09-21 ENCOUNTER — OFFICE VISIT (OUTPATIENT)
Dept: PRIMARY CARE CLINIC | Age: 52
End: 2022-09-21
Payer: COMMERCIAL

## 2022-09-21 VITALS
TEMPERATURE: 99 F | DIASTOLIC BLOOD PRESSURE: 70 MMHG | BODY MASS INDEX: 45.32 KG/M2 | OXYGEN SATURATION: 97 % | HEIGHT: 66 IN | SYSTOLIC BLOOD PRESSURE: 151 MMHG | HEART RATE: 88 BPM | WEIGHT: 282 LBS | RESPIRATION RATE: 18 BRPM

## 2022-09-21 DIAGNOSIS — J32.9 CHRONIC SINUSITIS, UNSPECIFIED LOCATION: Primary | ICD-10-CM

## 2022-09-21 PROCEDURE — 1036F TOBACCO NON-USER: CPT | Performed by: NURSE PRACTITIONER

## 2022-09-21 PROCEDURE — G8427 DOCREV CUR MEDS BY ELIG CLIN: HCPCS | Performed by: NURSE PRACTITIONER

## 2022-09-21 PROCEDURE — G8417 CALC BMI ABV UP PARAM F/U: HCPCS | Performed by: NURSE PRACTITIONER

## 2022-09-21 PROCEDURE — 3017F COLORECTAL CA SCREEN DOC REV: CPT | Performed by: NURSE PRACTITIONER

## 2022-09-21 PROCEDURE — 99213 OFFICE O/P EST LOW 20 MIN: CPT | Performed by: NURSE PRACTITIONER

## 2022-09-21 RX ORDER — AMOXICILLIN AND CLAVULANATE POTASSIUM 875; 125 MG/1; MG/1
1 TABLET, FILM COATED ORAL 2 TIMES DAILY
Qty: 20 TABLET | Refills: 0 | Status: SHIPPED | OUTPATIENT
Start: 2022-09-21 | End: 2022-10-01

## 2022-09-21 ASSESSMENT — ENCOUNTER SYMPTOMS
SHORTNESS OF BREATH: 0
SINUS COMPLAINT: 1
SORE THROAT: 0
COUGH: 1
RHINORRHEA: 0
WHEEZING: 0
CHEST TIGHTNESS: 0
SINUS PRESSURE: 0

## 2022-09-21 NOTE — PROGRESS NOTES
[facility]             [address], [address]                        Telephone [phone]             Fax [phone]     Oxana Vallejo  1970  JYM:[id number]   Date of visit:  9/21/2022    Subjective:    Oxana Vallejo is a 47 y.o.  female who presents to [facility] today (9/21/2022) for evaluation of:    Chief Complaint   Patient presents with    Other     Thick nasal mucus. Started after surgery in July. Cough started 1 month ago. chills       Sinus Problem  This is a chronic problem. The current episode started more than 1 month ago (worse last 2 weeks). The problem has been gradually worsening since onset. There has been no fever. She is experiencing no pain. Associated symptoms include chills, congestion and coughing. Pertinent negatives include no headaches, shortness of breath, sinus pressure or sore throat. (Thick yellow/green mucus with sinus congestion and \"excessive\" amount of postnasal drainage) Treatments tried: mucinex DM, sinus rinse 4 times daily, bactroban ointment in nares. The treatment provided no relief.      She has the following problem list:  Patient Active Problem List   Diagnosis    Iron deficiency anemia    Chronic low back pain    Chronic neck pain    Hypertension    Depression    Anxiety    Hypothyroidism    Allergic rhinitis    GERD (gastroesophageal reflux disease)    Anemia    Chronic fatigue    Mixed hyperlipidemia    Paresthesias    Hyperlipidemia    Tripping over things    Balance problems    Memory problem    Bilateral carpal tunnel syndrome    Polyneuropathy associated with underlying disease (HCC)    Ulnar neuropathy of both upper extremities    Peroneal neuropathy    Tibial neuropathy    Slurred speech    Other dysphagia    Cerebral ischemia    Abnormal EEG    Nocturnal seizures (HCC)    Obstructive sleep apnea    Localized swelling of both lower legs Seizure disorder (HCC)    Elevated hemoglobin A1c    Morbid obesity with BMI of 40.0-44.9, adult ([facility])        Current medications are:  Current Outpatient Medications   Medication Sig Dispense Refill    mupirocin (BACTROBAN) 2 % ointment Applied intranasally bilaterally 2 times daily      amoxicillin-clavulanate (AUGMENTIN) 875-125 MG per tablet Take 1 tablet by mouth 2 times daily for 10 days 20 tablet 0    NYSTATIN [id number] UNIT/GM powder APPLY TO THE AFFECTED AREA THREE TIMES DAILY 60 g 0    albuterol sulfate HFA (PROVENTIL;VENTOLIN;PROAIR) 108 (90 Base) MCG/ACT inhaler INHALE 2 PUFFS BY MOUTH EVERY 6 HOURS AS NEEDED FOR WHEEZING 8.5 g 2    lamoTRIgine (LAMICTAL) 150 MG tablet TAKE 1 TABLET BY MOUTH TWICE DAILY      gabapentin (NEURONTIN) 800 MG tablet Take 1 tablet by mouth in the morning and 1 tablet at noon and 1 tablet before bedtime. Do all this for 92 days. 270 tablet 0    fluticasone-salmeterol (ADVAIR) 500-50 MCG/ACT AEPB diskus inhaler Inhale 1 puff into the lungs in the morning and 1 puff in the evening. 60 each 2    levothyroxine (SYNTHROID) 125 MCG tablet TAKE 1 TABLET BY MOUTH EVERY DAY 90 tablet 1    Cholecalciferol (VITAMIN D3) 125 MCG (5000 UT) TABS Take 1 tablet by mouth in the morning.  90 tablet 0    metoprolol tartrate (LOPRESSOR) 50 MG tablet TAKE 1 TABLET BY MOUTH TWICE DAILY 180 tablet 1    aspirin (ASPIRIN LOW DOSE) 81 MG EC tablet TAKE 1 TABLET BY MOUTH DAILY 90 tablet 1    hydroCHLOROthiazide (HYDRODIURIL) 25 MG tablet TAKE 1 TABLET BY MOUTH DAILY 90 tablet 1    esomeprazole (NEXIUM) 40 MG delayed release capsule TAKE 1 CAPSULE BY MOUTH TWICE DAILY 180 capsule 0    FEROSUL 325 (65 Fe) MG tablet TAKE 1 TABLET BY MOUTH TWICE DAILY 180 tablet 1    potassium chloride (KLOR-CON) 8 MEQ extended release tablet TAKE 5 TABLETS BY MOUTH EVERY  tablet 1    WAL-ITIN D 24 HOUR  MG per extended release tablet TAKE 1 TABLET BY MOUTH EVERY DAY 90 tablet 1    montelukast (SINGULAIR) 10 MG tablet TAKE 1 TABLET BY MOUTH DAILY 90 tablet 1    Lancets MISC 1 each by Does not apply route daily 100 each 5    blood glucose monitor strips Test 1 time daily and prn 100 strip 3    prazosin (MINIPRESS) 5 MG capsule Take 5 mg by mouth nightly      traZODone (DESYREL) 300 MG tablet take 1 tablet by mouth at bedtime  0     No current facility-administered medications for this visit. She is allergic to allopurinol, bumex [bumetanide], colchicine, erythromycin, indocin [indomethacin], lasix [furosemide], lisinopril, morphine, norvasc [amlodipine], tenormin [atenolol], uloric [febuxostat], hydrocodone-acetaminophen, and topamax [topiramate]. .    She  reports that she has never smoked. She has never used smokeless tobacco.      Objective:    Vitals:    09/21/22 1657   BP: (!) 151/70   Site: Left Upper Arm   Position: Sitting   Cuff Size: Large Adult   Pulse: 88   Resp: 18   Temp: 99 °F (37.2 °C)   TempSrc: Tympanic   SpO2: 97%   Weight: 282 lb (127.9 kg)   Height: 5' 6\" (1.676 m)     Body mass index is 45.52 kg/m². Review of Systems   Constitutional:  Positive for chills. Negative for fatigue and fever. HENT:  Positive for congestion and postnasal drip. Negative for rhinorrhea, sinus pressure and sore throat. Respiratory:  Positive for cough. Negative for chest tightness, shortness of breath and wheezing. Cardiovascular: Negative. Neurological:  Negative for headaches. Physical Exam  Vitals and nursing note reviewed. Constitutional:       Appearance: Normal appearance. She is well-developed. HENT:      Head: Normocephalic. Jaw: There is normal jaw occlusion. Nose: Congestion present. Right Sinus: No maxillary sinus tenderness or frontal sinus tenderness. Left Sinus: No maxillary sinus tenderness or frontal sinus tenderness. Mouth/Throat:      Lips: Pink. Mouth: Mucous membranes are moist.      Pharynx: Oropharynx is clear. Uvula midline.    Eyes:      Pupils: Pupils are equal, round, and reactive to light. Cardiovascular:      Rate and Rhythm: Normal rate and regular rhythm. Heart sounds: Normal heart sounds. Pulmonary:      Effort: Pulmonary effort is normal.      Breath sounds: Normal breath sounds and air entry. Comments: Dry cough noted  Musculoskeletal:      Cervical back: Normal range of motion and neck supple. Lymphadenopathy:      Cervical: No cervical adenopathy. Skin:     General: Skin is warm and dry. Neurological:      General: No focal deficit present. Mental Status: She is alert and oriented to person, place, and time. Psychiatric:         Behavior: Behavior normal.         Thought Content: Thought content normal.       Assessment and Plan:    No results found for this visit on 09/21/22. Diagnosis Orders   1. Chronic sinusitis, unspecified location  amoxicillin-clavulanate (AUGMENTIN) 875-125 MG per tablet        Complete full course of antibiotic. Continue to use nasal rinse as directed by ENT. Follow up with ENT if symptoms persist or worsen. The use, risks, benefits, and side effects of prescribed or recommended medications were discussed. All questions were answered and the patient/caregiver voiced understanding. No orders of the defined types were placed in this encounter.         Electronically signed by KENROY Levine CNP on 9/21/22 at 5:07 PM EDT

## 2022-10-03 ENCOUNTER — HOSPITAL ENCOUNTER (OUTPATIENT)
Dept: MAMMOGRAPHY | Age: 52
Discharge: HOME | End: 2022-10-05
Payer: COMMERCIAL

## 2022-10-03 DIAGNOSIS — Z12.31 ENCOUNTER FOR SCREENING MAMMOGRAM FOR MALIGNANT NEOPLASM OF BREAST: ICD-10-CM

## 2022-10-03 PROCEDURE — 77063 BREAST TOMOSYNTHESIS BI: CPT

## 2022-11-02 RX ORDER — POTASSIUM CHLORIDE 600 MG/1
TABLET, FILM COATED, EXTENDED RELEASE ORAL
Qty: 450 TABLET | Refills: 1 | Status: SHIPPED | OUTPATIENT
Start: 2022-11-02

## 2022-11-02 RX ORDER — ASPIRIN 81 MG/1
TABLET ORAL
Qty: 90 TABLET | Refills: 1 | Status: SHIPPED | OUTPATIENT
Start: 2022-11-02

## 2022-11-02 RX ORDER — MONTELUKAST SODIUM 10 MG/1
10 TABLET ORAL DAILY
Qty: 90 TABLET | Refills: 1 | Status: SHIPPED | OUTPATIENT
Start: 2022-11-02

## 2022-11-02 RX ORDER — METOPROLOL TARTRATE 50 MG/1
50 TABLET, FILM COATED ORAL 2 TIMES DAILY
Qty: 180 TABLET | Refills: 1 | Status: SHIPPED | OUTPATIENT
Start: 2022-11-02

## 2022-11-02 NOTE — TELEPHONE ENCOUNTER
Jeramie Brown called requesting a refill of the below medication which has been pended for you:     Requested Prescriptions     Pending Prescriptions Disp Refills    montelukast (SINGULAIR) 10 MG tablet 90 tablet 1     Sig: Take 1 tablet by mouth daily       Last Appointment Date: 8/12/2022  Next Appointment Date: 2/13/2023    Allergies   Allergen Reactions    Allopurinol Other (See Comments)     Other reaction(s): nausea    Bumex [Bumetanide] Other (See Comments)     Extreme fatigue, nausea, dizziness    Colchicine     Erythromycin     Indocin [Indomethacin]     Lasix [Furosemide] Other (See Comments)     Dizziness, extreme fatigue    Lisinopril     Morphine     Norvasc [Amlodipine]     Tenormin [Atenolol]     Uloric [Febuxostat]     Hydrocodone-Acetaminophen Nausea Only    Topamax [Topiramate] Swelling and Rash

## 2022-11-02 NOTE — TELEPHONE ENCOUNTER
Fiona Yusuf called requesting a refill of the below medication which has been pended for you:     Requested Prescriptions     Pending Prescriptions Disp Refills    potassium chloride (KLOR-CON) 8 MEQ extended release tablet 450 tablet 1     Sig: TAKE 5 TABLETS BY MOUTH EVERY DAY    aspirin (ASPIRIN LOW DOSE) 81 MG EC tablet 90 tablet 1     Sig: TAKE 1 TABLET BY MOUTH DAILY    metoprolol tartrate (LOPRESSOR) 50 MG tablet 180 tablet 1       Last Appointment Date: 9/15/2022  Next Appointment Date: 2/13/2023    Allergies   Allergen Reactions    Allopurinol Other (See Comments)     Other reaction(s): nausea    Bumex [Bumetanide] Other (See Comments)     Extreme fatigue, nausea, dizziness    Colchicine     Erythromycin     Indocin [Indomethacin]     Lasix [Furosemide] Other (See Comments)     Dizziness, extreme fatigue    Lisinopril     Morphine     Norvasc [Amlodipine]     Tenormin [Atenolol]     Uloric [Febuxostat]     Hydrocodone-Acetaminophen Nausea Only    Topamax [Topiramate] Swelling and Rash

## 2022-11-08 ENCOUNTER — TELEPHONE (OUTPATIENT)
Dept: OTOLARYNGOLOGY | Age: 52
End: 2022-11-08

## 2022-11-08 NOTE — TELEPHONE ENCOUNTER
Several messages left on machine asking that Prema Carrasco call me prior to appt due to the fact it is not something Dr. Kitty Goldman would be able to finish or correct as she is leaving in January  for one year.

## 2022-11-11 ENCOUNTER — HOSPITAL ENCOUNTER (EMERGENCY)
Age: 52
Discharge: HOME | End: 2022-11-11
Attending: EMERGENCY MEDICINE
Payer: COMMERCIAL

## 2022-11-11 ENCOUNTER — APPOINTMENT (OUTPATIENT)
Dept: CT IMAGING | Age: 52
End: 2022-11-11
Payer: COMMERCIAL

## 2022-11-11 VITALS
TEMPERATURE: 99.7 F | HEIGHT: 66 IN | DIASTOLIC BLOOD PRESSURE: 85 MMHG | HEART RATE: 86 BPM | BODY MASS INDEX: 45.8 KG/M2 | RESPIRATION RATE: 16 BRPM | OXYGEN SATURATION: 99 % | WEIGHT: 285 LBS | SYSTOLIC BLOOD PRESSURE: 154 MMHG

## 2022-11-11 DIAGNOSIS — M54.10 RADICULAR LOW BACK PAIN: Primary | ICD-10-CM

## 2022-11-11 LAB
ABSOLUTE EOS #: 0.26 K/UL (ref 0–0.44)
ABSOLUTE IMMATURE GRANULOCYTE: 0.12 K/UL (ref 0–0.3)
ABSOLUTE LYMPH #: 2.99 K/UL (ref 1.1–3.7)
ABSOLUTE MONO #: 0.99 K/UL (ref 0.1–1.2)
ALBUMIN SERPL-MCNC: 4.2 G/DL (ref 3.5–5.2)
ALBUMIN/GLOBULIN RATIO: 1.7 (ref 1–2.5)
ALP BLD-CCNC: 165 U/L (ref 35–104)
ALT SERPL-CCNC: 37 U/L (ref 5–33)
ANION GAP SERPL CALCULATED.3IONS-SCNC: 11 MMOL/L (ref 9–17)
AST SERPL-CCNC: 51 U/L
BACTERIA: ABNORMAL
BASOPHILS # BLD: 0 % (ref 0–2)
BASOPHILS ABSOLUTE: 0.05 K/UL (ref 0–0.2)
BILIRUB SERPL-MCNC: 0.2 MG/DL (ref 0.3–1.2)
BILIRUBIN URINE: NEGATIVE
BUN BLDV-MCNC: 13 MG/DL (ref 6–20)
BUN/CREAT BLD: 16 (ref 9–20)
CALCIUM SERPL-MCNC: 9.5 MG/DL (ref 8.6–10.4)
CHLORIDE BLD-SCNC: 100 MMOL/L (ref 98–107)
CO2: 29 MMOL/L (ref 20–31)
CREAT SERPL-MCNC: 0.82 MG/DL (ref 0.5–0.9)
EOSINOPHILS RELATIVE PERCENT: 2 % (ref 1–4)
EPITHELIAL CELLS UA: ABNORMAL /HPF (ref 0–5)
GFR SERPL CREATININE-BSD FRML MDRD: >60 ML/MIN/1.73M2
GLUCOSE BLD-MCNC: 101 MG/DL (ref 70–99)
GLUCOSE URINE: NEGATIVE
HCT VFR BLD CALC: 38.4 % (ref 36.3–47.1)
HEMOGLOBIN: 12.8 G/DL (ref 11.9–15.1)
IMMATURE GRANULOCYTES: 1 %
KETONES, URINE: NEGATIVE
LEUKOCYTE ESTERASE, URINE: NEGATIVE
LIPASE: 20 U/L (ref 13–60)
LYMPHOCYTES # BLD: 24 % (ref 24–43)
MCH RBC QN AUTO: 28.8 PG (ref 25.2–33.5)
MCHC RBC AUTO-ENTMCNC: 33.3 G/DL (ref 25.2–33.5)
MCV RBC AUTO: 86.5 FL (ref 82.6–102.9)
MONOCYTES # BLD: 8 % (ref 3–12)
NITRITE, URINE: NEGATIVE
NRBC AUTOMATED: 0 PER 100 WBC
PDW BLD-RTO: 14.9 % (ref 11.8–14.4)
PH UA: 6 (ref 5–6)
PLATELET # BLD: 234 K/UL (ref 138–453)
PMV BLD AUTO: 10.3 FL (ref 8.1–13.5)
POTASSIUM SERPL-SCNC: 3.9 MMOL/L (ref 3.7–5.3)
PROTEIN UA: NEGATIVE
RBC # BLD: 4.44 M/UL (ref 3.95–5.11)
RBC # BLD: ABNORMAL 10*6/UL
RBC UA: ABNORMAL /HPF (ref 0–4)
SEG NEUTROPHILS: 65 % (ref 36–65)
SEGMENTED NEUTROPHILS ABSOLUTE COUNT: 8.24 K/UL (ref 1.5–8.1)
SODIUM BLD-SCNC: 140 MMOL/L (ref 135–144)
SPECIFIC GRAVITY UA: 1.01 (ref 1.01–1.02)
TOTAL PROTEIN: 6.7 G/DL (ref 6.4–8.3)
URINE HGB: ABNORMAL
UROBILINOGEN, URINE: NORMAL
WBC # BLD: 12.7 K/UL (ref 3.5–11.3)
WBC UA: ABNORMAL /HPF (ref 0–4)

## 2022-11-11 PROCEDURE — 83690 ASSAY OF LIPASE: CPT

## 2022-11-11 PROCEDURE — 6360000002 HC RX W HCPCS: Performed by: EMERGENCY MEDICINE

## 2022-11-11 PROCEDURE — 80053 COMPREHEN METABOLIC PANEL: CPT

## 2022-11-11 PROCEDURE — 99284 EMERGENCY DEPT VISIT MOD MDM: CPT

## 2022-11-11 PROCEDURE — 81001 URINALYSIS AUTO W/SCOPE: CPT

## 2022-11-11 PROCEDURE — 2580000003 HC RX 258: Performed by: EMERGENCY MEDICINE

## 2022-11-11 PROCEDURE — 96374 THER/PROPH/DIAG INJ IV PUSH: CPT

## 2022-11-11 PROCEDURE — 74176 CT ABD & PELVIS W/O CONTRAST: CPT

## 2022-11-11 PROCEDURE — 96361 HYDRATE IV INFUSION ADD-ON: CPT

## 2022-11-11 PROCEDURE — 6370000000 HC RX 637 (ALT 250 FOR IP): Performed by: EMERGENCY MEDICINE

## 2022-11-11 PROCEDURE — 96375 TX/PRO/DX INJ NEW DRUG ADDON: CPT

## 2022-11-11 PROCEDURE — 36415 COLL VENOUS BLD VENIPUNCTURE: CPT

## 2022-11-11 PROCEDURE — 85025 COMPLETE CBC W/AUTO DIFF WBC: CPT

## 2022-11-11 RX ORDER — SODIUM CHLORIDE 0.9 % (FLUSH) 0.9 %
3 SYRINGE (ML) INJECTION EVERY 8 HOURS
Status: DISCONTINUED | OUTPATIENT
Start: 2022-11-11 | End: 2022-11-11 | Stop reason: HOSPADM

## 2022-11-11 RX ORDER — ONDANSETRON 2 MG/ML
4 INJECTION INTRAMUSCULAR; INTRAVENOUS ONCE
Status: COMPLETED | OUTPATIENT
Start: 2022-11-11 | End: 2022-11-11

## 2022-11-11 RX ORDER — 0.9 % SODIUM CHLORIDE 0.9 %
1000 INTRAVENOUS SOLUTION INTRAVENOUS ONCE
Status: COMPLETED | OUTPATIENT
Start: 2022-11-11 | End: 2022-11-11

## 2022-11-11 RX ORDER — PREDNISONE 50 MG/1
50 TABLET ORAL DAILY
Qty: 5 TABLET | Refills: 0 | Status: SHIPPED | OUTPATIENT
Start: 2022-11-11 | End: 2022-11-16

## 2022-11-11 RX ORDER — KETOROLAC TROMETHAMINE 30 MG/ML
30 INJECTION, SOLUTION INTRAMUSCULAR; INTRAVENOUS ONCE
Status: COMPLETED | OUTPATIENT
Start: 2022-11-11 | End: 2022-11-11

## 2022-11-11 RX ADMIN — KETOROLAC TROMETHAMINE 30 MG: 30 INJECTION, SOLUTION INTRAMUSCULAR at 17:37

## 2022-11-11 RX ADMIN — ONDANSETRON 4 MG: 2 INJECTION INTRAMUSCULAR; INTRAVENOUS at 17:22

## 2022-11-11 RX ADMIN — PREDNISONE 50 MG: 20 TABLET ORAL at 18:57

## 2022-11-11 RX ADMIN — SODIUM CHLORIDE 1000 ML: 9 INJECTION, SOLUTION INTRAVENOUS at 17:21

## 2022-11-11 ASSESSMENT — PAIN DESCRIPTION - LOCATION
LOCATION: ABDOMEN
LOCATION: ABDOMEN

## 2022-11-11 ASSESSMENT — PAIN DESCRIPTION - ORIENTATION
ORIENTATION: LEFT;LOWER
ORIENTATION: LEFT;LOWER

## 2022-11-11 ASSESSMENT — PAIN SCALES - GENERAL
PAINLEVEL_OUTOF10: 8
PAINLEVEL_OUTOF10: 8
PAINLEVEL_OUTOF10: 6

## 2022-11-11 ASSESSMENT — LIFESTYLE VARIABLES: HOW OFTEN DO YOU HAVE A DRINK CONTAINING ALCOHOL: NEVER

## 2022-11-11 NOTE — ED PROVIDER NOTES
04/2015); joint replacement; Upper gastrointestinal endoscopy (10/14/2011); Upper gastrointestinal endoscopy (N/A, 09/10/2018); lumbar fusion (09/26/2020); Ovary removal; Nasal septoplasty w/ turbinoplasty (Bilateral, 07/18/2022); and Septoplasty (07/20/2022). CURRENT MEDICATIONS       Previous Medications    ALBUTEROL SULFATE HFA (PROVENTIL;VENTOLIN;PROAIR) 108 (90 BASE) MCG/ACT INHALER    INHALE 2 PUFFS BY MOUTH EVERY 6 HOURS AS NEEDED FOR WHEEZING    ASPIRIN (ASPIRIN LOW DOSE) 81 MG EC TABLET    TAKE 1 TABLET BY MOUTH DAILY    BLOOD GLUCOSE MONITOR STRIPS    Test 1 time daily and prn    CHOLECALCIFEROL (VITAMIN D3) 125 MCG (5000 UT) TABS    Take 1 tablet by mouth in the morning. ESOMEPRAZOLE (NEXIUM) 40 MG DELAYED RELEASE CAPSULE    TAKE 1 CAPSULE BY MOUTH TWICE DAILY    FEROSUL 325 (65 FE) MG TABLET    TAKE 1 TABLET BY MOUTH TWICE DAILY    FLUTICASONE-SALMETEROL (ADVAIR) 500-50 MCG/ACT AEPB DISKUS INHALER    Inhale 1 puff into the lungs in the morning and 1 puff in the evening. GABAPENTIN (NEURONTIN) 800 MG TABLET    Take 1 tablet by mouth in the morning and 1 tablet at noon and 1 tablet before bedtime. Do all this for 92 days.     HYDROCHLOROTHIAZIDE (HYDRODIURIL) 25 MG TABLET    TAKE 1 TABLET BY MOUTH DAILY    LAMOTRIGINE (LAMICTAL) 150 MG TABLET    TAKE 1 TABLET BY MOUTH TWICE DAILY    LANCETS MISC    1 each by Does not apply route daily    LEVOTHYROXINE (SYNTHROID) 125 MCG TABLET    TAKE 1 TABLET BY MOUTH EVERY DAY    METOPROLOL TARTRATE (LOPRESSOR) 50 MG TABLET    Take 1 tablet by mouth 2 times daily    MONTELUKAST (SINGULAIR) 10 MG TABLET    Take 1 tablet by mouth daily    MUPIROCIN (BACTROBAN) 2 % OINTMENT    Applied intranasally bilaterally 2 times daily    NYSTATIN 399100 UNIT/GM POWDER    APPLY TO THE AFFECTED AREA THREE TIMES DAILY    POTASSIUM CHLORIDE (KLOR-CON) 8 MEQ EXTENDED RELEASE TABLET    TAKE 5 TABLETS BY MOUTH EVERY DAY    PRAZOSIN (MINIPRESS) 5 MG CAPSULE    Take 5 mg by mouth nightly    TRAZODONE (DESYREL) 300 MG TABLET    take 1 tablet by mouth at bedtime    WAL-ITIN D 24 HOUR  MG PER EXTENDED RELEASE TABLET    TAKE 1 TABLET BY MOUTH EVERY DAY       ALLERGIES     is allergic to allopurinol, bumex [bumetanide], colchicine, erythromycin, indocin [indomethacin], lasix [furosemide], lisinopril, morphine, norvasc [amlodipine], tenormin [atenolol], uloric [febuxostat], hydrocodone-acetaminophen, and topamax [topiramate]. FAMILY HISTORY     She indicated that her mother is alive. She indicated that her father is alive. She indicated that both of her sisters are alive. She indicated that both of her brothers are alive. She indicated that her maternal grandmother is . She indicated that her maternal grandfather is . She indicated that her paternal grandmother is . She indicated that her paternal grandfather is . She indicated that both of her daughters are alive. She indicated that the status of her paternal aunt is unknown. She indicated that the status of her other is unknown.     family history includes Anxiety Disorder in her daughter and daughter; Asthma in her daughter and another family member; Breast Cancer in her paternal aunt and paternal grandmother; Cancer in her mother; Coronary Art Dis in her father; Depression in her daughter and daughter; Diabetes in her paternal grandfather and paternal grandmother; Glaucoma in her father; Heart Disease in her father, paternal grandfather, and paternal grandmother; High Blood Pressure in her paternal grandfather and paternal grandmother; Hypertension in her father; Prostate Cancer in her father; Thyroid Disease in her mother and paternal grandmother. SOCIAL HISTORY      reports that she has never smoked. She has never used smokeless tobacco. She reports that she does not drink alcohol and does not use drugs.     PHYSICAL EXAM     INITIAL VITALS:  height is 5' 6\" (1.676 m) and weight is 285 lb (129.3 kg). Her tympanic temperature is 99.7 °F (37.6 °C). Her blood pressure is 174/85 (abnormal) and her pulse is 98. Her respiration is 18 and oxygen saturation is 97%. Physical Exam  Vitals reviewed. Constitutional:       General: She is not in acute distress. Appearance: She is well-developed. HENT:      Head: Normocephalic and atraumatic. Eyes:      Conjunctiva/sclera: Conjunctivae normal.      Pupils: Pupils are equal, round, and reactive to light. Neck:      Trachea: No tracheal deviation. Cardiovascular:      Rate and Rhythm: Normal rate and regular rhythm. Pulmonary:      Effort: No respiratory distress. Breath sounds: Normal breath sounds. Abdominal:      General: Bowel sounds are normal. There is no distension. Palpations: Abdomen is soft. Tenderness: There is no abdominal tenderness. Musculoskeletal:         General: No tenderness. Normal range of motion. Cervical back: Normal range of motion and neck supple. Skin:     General: Skin is warm and dry. Neurological:      Mental Status: She is alert and oriented to person, place, and time. Psychiatric:         Behavior: Behavior normal.         Thought Content: Thought content normal.         Judgment: Judgment normal.         DIFFERENTIAL DIAGNOSIS/ MDM:   Evaluation for intra-abdominal pathology has been initiated. I will CAT scan of her abdomen pelvis stone protocol. DIAGNOSTIC RESULTS     EKG:  None    RADIOLOGY:   CT ABDOMEN PELVIS WO CONTRAST Additional Contrast? None    Result Date: 11/11/2022  EXAMINATION: CT OF THE ABDOMEN AND PELVIS WITHOUT CONTRAST, 11/11/2022 5:47 pm TECHNIQUE: CT of the abdomen and pelvis was performed without the administration of intravenous contrast. Multiplanar reformatted images are provided for review. Automated exposure control, iterative reconstruction, and/or weight based adjustment of the mA/kV was utilized to reduce the radiation dose to as low as reasonably achievable. COMPARISON: 05/14/2019 HISTORY: ORDERING SYSTEM PROVIDED HISTORY:  LLQ pain TECHNOLOGIST PROVIDED HISTORY: LLQ pain Is the patient pregnant? No Reason for Exam:  LLQ pain for couple weeks; hx of kidney stones. FINDINGS: Lower Chest: Stable to slightly decreased size of a left lower lobe pulmonary nodule measuring up to 3-4 mm. Lung bases are otherwise clear. Organs: The liver is decreased in attenuation. Status post cholecystectomy. Areas of low attenuation within the spleen are unchanged and likely benign. The unenhanced liver, spleen, pancreas and adrenal glands are otherwise without new acute focal abnormality. Nonobstructing bilateral renal calculi the largest measuring up to 8 mm. No hydronephrosis or perinephric stranding. GI/Bowel: No dilated loops of bowel or bowel wall thickening. No free air. The appendix is normal.  Scattered colonic diverticula without acute diverticulitis. Pelvis: Status post hysterectomy. The bladder is decompressed. No pathologically enlarged adenopathy or free fluid. Peritoneum/Retroperitoneum: Aorta is normal in caliber. No pathologically enlarged adenopathy. No ascites or drainable fluid collection. Bones/Soft Tissues: Postsurgical changes are noted in the lower lumbar spine. No acute findings in the bones or soft tissues. 1. No acute abdominal or pelvic abnormality on this unenhanced study. 2. Nonobstructing bilateral renal calculi. 3. Hepatic steatosis.          LABS:  Results for orders placed or performed during the hospital encounter of 11/11/22   CBC with Diff   Result Value Ref Range    WBC 12.7 (H) 3.5 - 11.3 k/uL    RBC 4.44 3.95 - 5.11 m/uL    Hemoglobin 12.8 11.9 - 15.1 g/dL    Hematocrit 38.4 36.3 - 47.1 %    MCV 86.5 82.6 - 102.9 fL    MCH 28.8 25.2 - 33.5 pg    MCHC 33.3 25.2 - 33.5 g/dL    RDW 14.9 (H) 11.8 - 14.4 %    Platelets 881 137 - 656 k/uL    MPV 10.3 8.1 - 13.5 fL    NRBC Automated 0.0 0.0 per 100 WBC    Seg Neutrophils 65 36 - 65 % Lymphocytes 24 24 - 43 %    Monocytes 8 3 - 12 %    Eosinophils % 2 1 - 4 %    Basophils 0 0 - 2 %    Immature Granulocytes 1 (H) 0 %    Segs Absolute 8.24 (H) 1.50 - 8.10 k/uL    Absolute Lymph # 2.99 1.10 - 3.70 k/uL    Absolute Mono # 0.99 0.10 - 1.20 k/uL    Absolute Eos # 0.26 0.00 - 0.44 k/uL    Basophils Absolute 0.05 0.00 - 0.20 k/uL    Absolute Immature Granulocyte 0.12 0.00 - 0.30 k/uL    RBC Morphology ANISOCYTOSIS PRESENT    CMP   Result Value Ref Range    Glucose 101 (H) 70 - 99 mg/dL    BUN 13 6 - 20 mg/dL    Creatinine 0.82 0.50 - 0.90 mg/dL    Est, Glom Filt Rate >60 >60 mL/min/1.73m2    Bun/Cre Ratio 16 9 - 20    Calcium 9.5 8.6 - 10.4 mg/dL    Sodium 140 135 - 144 mmol/L    Potassium 3.9 3.7 - 5.3 mmol/L    Chloride 100 98 - 107 mmol/L    CO2 29 20 - 31 mmol/L    Anion Gap 11 9 - 17 mmol/L    Alkaline Phosphatase 165 (H) 35 - 104 U/L    ALT 37 (H) 5 - 33 U/L    AST 51 (H) <32 U/L    Total Bilirubin 0.2 (L) 0.3 - 1.2 mg/dL    Total Protein 6.7 6.4 - 8.3 g/dL    Albumin 4.2 3.5 - 5.2 g/dL    Albumin/Globulin Ratio 1.7 1.0 - 2.5   Lipase   Result Value Ref Range    Lipase 20 13 - 60 U/L   Urinalysis with Reflex to Culture    Specimen: Urine, clean catch   Result Value Ref Range    Glucose, Ur NEGATIVE NEGATIVE    Bilirubin Urine NEGATIVE NEGATIVE    Ketones, Urine NEGATIVE NEGATIVE    Specific [place], UA 1.010 1.010 - 1.025    Urine Hgb TRACE (A) NEGATIVE    pH, UA 6.0 5.0 - 6.0    Protein, UA NEGATIVE NEGATIVE    Urobilinogen, Urine Normal Normal    Nitrite, Urine NEGATIVE NEGATIVE    Leukocyte Esterase, Urine NEGATIVE NEGATIVE   Microscopic Urinalysis   Result Value Ref Range    WBC, UA 0 TO 4 0 - 4 /HPF    RBC, UA None 0 - 4 /HPF    Epithelial Cells UA 0 TO 4 0 - 5 /HPF    Bacteria, UA TRACE (A) None       EMERGENCY DEPARTMENT COURSE:     Thepatient was given the following medications:  Orders Placed This Encounter   Medications    sodium chloride flush 0.9 % injection 3 mL    ketorolac (TORADOL) injection 30 mg    ondansetron (ZOFRAN) injection 4 mg    0.9 % sodium chloride bolus    predniSONE (DELTASONE) 50 MG tablet     Sig: Take 1 tablet by mouth daily for 5 days     Dispense:  5 tablet     Refill:  0    predniSONE (DELTASONE) tablet 50 mg        Vitals:    Vitals:    11/11/22 1635   BP: (!) 174/85   Pulse: 98   Resp: 18   Temp: 99.7 °F (37.6 °C)   TempSrc: Tympanic   SpO2: 97%   Weight: 285 lb (129.3 kg)   Height: 5' 6\" (1.676 m)     -------------------------  BP: (!) 174/85, Temp: 99.7 °F (37.6 °C), Heart Rate: 98, Resp: 18      Re-evaluation Notes  On reevaluation patient is unchanged. Work-up is negative except of LFTs are elevated I cannot account for this. Patient denies drinking alcohol. I will try steroids as she is confident that this is radicular in terms of etiology. I directed her back to her personal physician. CONSULTS:  None    CRITICAL CARE:   None    PROCEDURES:  None    FINAL IMPRESSION      1.  Radicular low back pain          DISPOSITION/PLAN   DISPOSITION Decision To Discharge 11/11/2022 06:31:47 PM      Condition on Disposition  Stable    PATIENT REFERRED TO:  Portia Bradley DO  [address] [facility]  [phone]    Schedule an appointment as soon as possible for a visit in 3 days      DISCHARGE MEDICATIONS:  [unfilled]    (Please note that portions of this note were completed with a voice recognitionprogram.  Efforts were made to edit the dictations but occasionally words are mis-transcribed.)    Tony Leahy MD MD, F.A.C.E.P, F.A.A.E.M  Emergency Physician Attending          Tony Leahy MD  11/11/22 9437

## 2022-12-12 ENCOUNTER — PATIENT MESSAGE (OUTPATIENT)
Dept: FAMILY MEDICINE CLINIC | Age: 52
End: 2022-12-12

## 2022-12-12 NOTE — TELEPHONE ENCOUNTER
From: Marylu Guardado  To: Dr. Chakraborty [employer]onden[employer]ational: 12/12/2022 3:43 PM EST  Subject: Myerbetric    Can you please send my myrbetric prescription to [facility]? Its not on my med list anymore. Thank you!

## 2022-12-13 RX ORDER — ESOMEPRAZOLE MAGNESIUM 40 MG/1
CAPSULE, DELAYED RELEASE ORAL
Qty: 180 CAPSULE | Refills: 1 | Status: SHIPPED | OUTPATIENT
Start: 2022-12-13 | End: 2022-12-15 | Stop reason: SDUPTHER

## 2022-12-15 RX ORDER — ESOMEPRAZOLE MAGNESIUM 40 MG/1
CAPSULE, DELAYED RELEASE ORAL
Qty: 180 CAPSULE | Refills: 1 | Status: SHIPPED | OUTPATIENT
Start: 2022-12-15

## 2022-12-20 ENCOUNTER — OFFICE VISIT (OUTPATIENT)
Dept: PRIMARY CARE CLINIC | Age: 52
End: 2022-12-20
Payer: COMMERCIAL

## 2022-12-20 VITALS
RESPIRATION RATE: 18 BRPM | BODY MASS INDEX: 46.61 KG/M2 | SYSTOLIC BLOOD PRESSURE: 114 MMHG | OXYGEN SATURATION: 96 % | DIASTOLIC BLOOD PRESSURE: 72 MMHG | HEART RATE: 91 BPM | WEIGHT: 290 LBS | HEIGHT: 66 IN | TEMPERATURE: 99.1 F

## 2022-12-20 DIAGNOSIS — J32.0 LEFT MAXILLARY SINUSITIS: Primary | ICD-10-CM

## 2022-12-20 DIAGNOSIS — R06.2 WHEEZING: ICD-10-CM

## 2022-12-20 PROCEDURE — G8484 FLU IMMUNIZE NO ADMIN: HCPCS | Performed by: FAMILY MEDICINE

## 2022-12-20 PROCEDURE — 3078F DIAST BP <80 MM HG: CPT | Performed by: FAMILY MEDICINE

## 2022-12-20 PROCEDURE — 3074F SYST BP LT 130 MM HG: CPT | Performed by: FAMILY MEDICINE

## 2022-12-20 PROCEDURE — 99213 OFFICE O/P EST LOW 20 MIN: CPT | Performed by: FAMILY MEDICINE

## 2022-12-20 PROCEDURE — G8417 CALC BMI ABV UP PARAM F/U: HCPCS | Performed by: FAMILY MEDICINE

## 2022-12-20 PROCEDURE — 1036F TOBACCO NON-USER: CPT | Performed by: FAMILY MEDICINE

## 2022-12-20 PROCEDURE — G8427 DOCREV CUR MEDS BY ELIG CLIN: HCPCS | Performed by: FAMILY MEDICINE

## 2022-12-20 PROCEDURE — 3017F COLORECTAL CA SCREEN DOC REV: CPT | Performed by: FAMILY MEDICINE

## 2022-12-20 RX ORDER — HYDROXYZINE PAMOATE 25 MG/1
CAPSULE ORAL
COMMUNITY
Start: 2022-12-15

## 2022-12-20 RX ORDER — FLUTICASONE PROPIONATE AND SALMETEROL 500; 50 UG/1; UG/1
1 POWDER RESPIRATORY (INHALATION) EVERY 12 HOURS
Qty: 60 EACH | Refills: 0 | Status: SHIPPED | OUTPATIENT
Start: 2022-12-20

## 2022-12-20 RX ORDER — AMOXICILLIN AND CLAVULANATE POTASSIUM 875; 125 MG/1; MG/1
1 TABLET, FILM COATED ORAL 2 TIMES DAILY
Qty: 20 TABLET | Refills: 0 | Status: SHIPPED | OUTPATIENT
Start: 2022-12-20 | End: 2022-12-30

## 2022-12-20 RX ORDER — DESVENLAFAXINE 100 MG/1
TABLET, EXTENDED RELEASE ORAL
COMMUNITY
Start: 2022-12-12

## 2022-12-20 NOTE — PROGRESS NOTES
[facility]             [address] [facility], [place], [address]                        Telephone [phone]             Fax [phone]       Judy Andino  :  1970  Age:  47 y.o. MRN:  [id number]  Date of visit:  2022       Assessment & Plan:    1. Left maxillary sinusitis  - amoxicillin-clavulanate (AUGMENTIN) 875-125 MG per tablet; Take 1 tablet by mouth 2 times daily for 10 days  Dispense: 20 tablet; Refill: 0    She was encouraged to keep her scheduled ENT appointment. 2. Wheezing  She requested a refill of Advair:  - fluticasone-salmeterol (ADVAIR) 500-50 MCG/ACT AEPB diskus inhaler; Inhale 1 puff into the lungs in the morning and 1 puff in the evening. Dispense: 60 each; Refill: 0        Subjective:    Judy Andino is a 47 y.o. female who presents to [facility] today (2022) for evaluation of: Other, Sinus Problem (Started 2022 - nasal congestion/ pain, headache, frontal head pain. - Has been having nasal issues and pain septum since surgery in July.), and Otalgia (Started 2022- left ear pain. Constant throbbing in ear.)      She states that she had sinus surgery in 2022, and she has had problems with sinus infections intermittently since the surgery. She states that she began having symptoms again about 3 days ago. She reports pressure in her face. She reports chills. She is uncertain if she has had a fever. She also reports pain in the left ear.     Current medications are:  Current Outpatient Medications   Medication Sig Dispense Refill    esomeprazole (NEXIUM) 40 MG delayed release capsule TAKE 1 CAPSULE BY MOUTH TWICE DAILY 180 capsule 1    mirabegron (MYRBETRIQ) 50 MG TB24 TAKE 1 TABLET BY MOUTH DAILY 90 tablet 1    potassium chloride (KLOR-CON) 8 MEQ extended release tablet TAKE 5 TABLETS BY MOUTH EVERY  tablet 1    aspirin (ASPIRIN LOW DOSE) 81 MG EC tablet TAKE 1 TABLET BY MOUTH DAILY 90 tablet 1    metoprolol tartrate (LOPRESSOR) 50 MG tablet Take 1 tablet by mouth 2 times daily 180 tablet 1    montelukast (SINGULAIR) 10 MG tablet Take 1 tablet by mouth daily 90 tablet 1    mupirocin (BACTROBAN) 2 % ointment Applied intranasally bilaterally 2 times daily      NYSTATIN 981753 UNIT/GM powder APPLY TO THE AFFECTED AREA THREE TIMES DAILY 60 g 0    albuterol sulfate HFA (PROVENTIL;VENTOLIN;PROAIR) 108 (90 Base) MCG/ACT inhaler INHALE 2 PUFFS BY MOUTH EVERY 6 HOURS AS NEEDED FOR WHEEZING 8.5 g 2    lamoTRIgine (LAMICTAL) 150 MG tablet TAKE 1 TABLET BY MOUTH TWICE DAILY      fluticasone-salmeterol (ADVAIR) 500-50 MCG/ACT AEPB diskus inhaler Inhale 1 puff into the lungs in the morning and 1 puff in the evening. 60 each 2    levothyroxine (SYNTHROID) 125 MCG tablet TAKE 1 TABLET BY MOUTH EVERY DAY 90 tablet 1    Cholecalciferol (VITAMIN D3) 125 MCG (5000 UT) TABS Take 1 tablet by mouth in the morning. 90 tablet 0    hydroCHLOROthiazide (HYDRODIURIL) 25 MG tablet TAKE 1 TABLET BY MOUTH DAILY 90 tablet 1    FEROSUL 325 (65 Fe) MG tablet TAKE 1 TABLET BY MOUTH TWICE DAILY 180 tablet 1    WAL-ITIN D 24 HOUR  MG per extended release tablet TAKE 1 TABLET BY MOUTH EVERY DAY 90 tablet 1    Lancets MISC 1 each by Does not apply route daily 100 each 5    blood glucose monitor strips Test 1 time daily and prn 100 strip 3    prazosin (MINIPRESS) 5 MG capsule Take 5 mg by mouth nightly      traZODone (DESYREL) 300 MG tablet take 1 tablet by mouth at bedtime  0    desvenlafaxine succinate (PRISTIQ) 100 MG TB24 extended release tablet take 1 tablet by mouth daily      hydrOXYzine pamoate (VISTARIL) 25 MG capsule take 1 capsule by mouth twice a day if needed for anxiety      gabapentin (NEURONTIN) 800 MG tablet Take 1 tablet by mouth in the morning and 1 tablet at noon and 1 tablet before bedtime. Do all this for 92 days.  270 tablet 0     No current facility-administered medications for this visit. She is allergic to morphine, allopurinol, bumex [bumetanide], colchicine, furosemide, indocin [indomethacin], uloric [febuxostat], and hydrocodone-acetaminophen. She has the following problem list:  Patient Active Problem List   Diagnosis    Iron deficiency anemia    Chronic low back pain    Chronic neck pain    Hypertension    Depression    Anxiety    Hypothyroidism    Allergic rhinitis    GERD (gastroesophageal reflux disease)    Anemia    Chronic fatigue    Mixed hyperlipidemia    Paresthesias    Hyperlipidemia    Tripping over things    Balance problems    Memory problem    Bilateral carpal tunnel syndrome    Polyneuropathy associated with underlying disease (HCC)    Ulnar neuropathy of both upper extremities    Peroneal neuropathy    Tibial neuropathy    Slurred speech    Other dysphagia    Cerebral ischemia    Abnormal EEG    Nocturnal seizures (HCC)    Obstructive sleep apnea    Localized swelling of both lower legs    Seizure disorder (HCC)    Elevated hemoglobin A1c    Morbid obesity with BMI of 40.0-44.9, adult ([facility] [address]        She  reports that she has never smoked. She has never used smokeless tobacco.      Objective:    Vitals:    12/20/22 1212   BP: 114/72   Site: Left Upper Arm   Position: Sitting   Cuff Size: Large Adult   Pulse: 91   Resp: 18   Temp: 99.1 °F (37.3 °C)   TempSrc: Tympanic   SpO2: 96%   Weight: 290 lb (131.5 kg)   Height: 5' 6\" (1.676 m)      SpO2: 96 %       Body mass index is 46.81 kg/m². Well-nourished, well-developed female with severe obesity, alert and cooperative. The tympanic membranes are clear bilaterally. Oropharynx has no erythema. There is no exudate. There is moderate tenderness over the frontal and maxillary sinuses on the left. Neck supple. No adenopathy. Chest:  Normal expansion. Clear to auscultation. No rales, rhonchi, or wheezing. Respirations are not labored.    Heart sounds are normal.  Regular rate and rhythm without murmur, gallop or rub.           (Please note that portions of this note were completed with a voice-recognition program. Efforts were made to edit the dictation but occasionally words are mis-transcribed.)

## 2023-01-09 ENCOUNTER — PATIENT MESSAGE (OUTPATIENT)
Dept: FAMILY MEDICINE CLINIC | Age: 53
End: 2023-01-09

## 2023-01-10 RX ORDER — OXYBUTYNIN CHLORIDE 10 MG/1
10 TABLET, EXTENDED RELEASE ORAL DAILY
Qty: 90 TABLET | Refills: 1 | Status: SHIPPED | OUTPATIENT
Start: 2023-01-10

## 2023-01-10 NOTE — TELEPHONE ENCOUNTER
From: Cynthia Hernandez  Sent: 1/9/2023 2:49 PM EST  To: [facility] [place] Clinical Staff  Subject: New insurance    I apologize in advance again. I will need to change my myrbetiq to the oxybutinin if possible. The myrbetriq is over $300.      Thanks again this should be my last message

## 2023-01-13 RX ORDER — ESOMEPRAZOLE MAGNESIUM 40 MG/1
CAPSULE, DELAYED RELEASE ORAL
Qty: 180 CAPSULE | Refills: 1 | Status: SHIPPED | OUTPATIENT
Start: 2023-01-13

## 2023-01-13 RX ORDER — MONTELUKAST SODIUM 10 MG/1
10 TABLET ORAL DAILY
Qty: 90 TABLET | Refills: 1 | Status: SHIPPED | OUTPATIENT
Start: 2023-01-13

## 2023-01-13 RX ORDER — METOPROLOL TARTRATE 50 MG/1
50 TABLET, FILM COATED ORAL 2 TIMES DAILY
Qty: 180 TABLET | Refills: 1 | Status: SHIPPED | OUTPATIENT
Start: 2023-01-13

## 2023-01-13 RX ORDER — ASPIRIN 81 MG/1
TABLET ORAL
Qty: 90 TABLET | Refills: 1 | Status: SHIPPED | OUTPATIENT
Start: 2023-01-13

## 2023-01-13 RX ORDER — POTASSIUM CHLORIDE 600 MG/1
TABLET, FILM COATED, EXTENDED RELEASE ORAL
Qty: 450 TABLET | Refills: 1 | Status: SHIPPED | OUTPATIENT
Start: 2023-01-13

## 2023-01-13 RX ORDER — HYDROCHLOROTHIAZIDE 25 MG/1
TABLET ORAL
Qty: 90 TABLET | Refills: 1 | Status: SHIPPED | OUTPATIENT
Start: 2023-01-13

## 2023-01-13 RX ORDER — LEVOTHYROXINE SODIUM 0.12 MG/1
TABLET ORAL
Qty: 90 TABLET | Refills: 1 | Status: SHIPPED | OUTPATIENT
Start: 2023-01-13

## 2023-01-13 RX ORDER — GABAPENTIN 800 MG/1
800 TABLET ORAL 2 TIMES DAILY
Qty: 180 TABLET | Refills: 0 | Status: SHIPPED | OUTPATIENT
Start: 2023-02-01 | End: 2023-05-02

## 2023-01-13 NOTE — TELEPHONE ENCOUNTER
Maxine Chung called requesting a refill of the below medication which has been pended for you: last filled Gabapentin 1/2/2023. Put a do not fill date of 12/1/2023. Patient states she only takes twice daily so put a quantity of 180    Requested Prescriptions     Pending Prescriptions Disp Refills    esomeprazole (NEXIUM) 40 MG delayed release capsule 180 capsule 1     Sig: TAKE 1 CAPSULE BY MOUTH TWICE DAILY    potassium chloride (KLOR-CON) 8 MEQ extended release tablet 450 tablet 1     Sig: TAKE 5 TABLETS BY MOUTH EVERY DAY    aspirin (ASPIRIN LOW DOSE) 81 MG EC tablet 90 tablet 1     Sig: TAKE 1 TABLET BY MOUTH DAILY    metoprolol tartrate (LOPRESSOR) 50 MG tablet 180 tablet 1     Sig: Take 1 tablet by mouth 2 times daily    montelukast (SINGULAIR) 10 MG tablet 90 tablet 1     Sig: Take 1 tablet by mouth daily    levothyroxine (SYNTHROID) 125 MCG tablet 90 tablet 1     Sig: TAKE 1 TABLET BY MOUTH EVERY DAY    Cholecalciferol (VITAMIN D3) 125 MCG (5000 UT) TABS 90 tablet 0     Sig: Take 1 tablet by mouth daily    hydroCHLOROthiazide (HYDRODIURIL) 25 MG tablet 90 tablet 1     Sig: TAKE 1 TABLET BY MOUTH DAILY    gabapentin (NEURONTIN) 800 MG tablet 180 tablet 0     Sig: Take 1 tablet by mouth 2 times daily for 90 days.      Signed Prescriptions Disp Refills    oxybutynin (DITROPAN XL) 10 MG extended release tablet 90 tablet 1     Sig: Take 1 tablet by mouth daily     Authorizing Provider: Earle Juan       Last Appointment Date: 9/15/2022  Next Appointment Date: 1/24/2023    Allergies   Allergen Reactions    Morphine Itching, Nausea Only and Other (See Comments)    Allopurinol Other (See Comments)     Other reaction(s): nausea    Bumex [Bumetanide] Other (See Comments)     Extreme fatigue, nausea, dizziness    Colchicine     Furosemide Other (See Comments) and Dizziness or Vertigo     Dizziness, extreme fatigue  Throat swelling  Dizziness, extreme fatigue      Indocin [Indomethacin]     Uloric [Febuxostat] Hydrocodone-Acetaminophen Nausea Only

## 2023-01-24 ENCOUNTER — OFFICE VISIT (OUTPATIENT)
Dept: FAMILY MEDICINE CLINIC | Age: 53
End: 2023-01-24
Payer: MEDICARE

## 2023-01-24 ENCOUNTER — HOSPITAL ENCOUNTER (OUTPATIENT)
Age: 53
Discharge: HOME | End: 2023-01-24
Payer: MEDICARE

## 2023-01-24 VITALS
DIASTOLIC BLOOD PRESSURE: 80 MMHG | HEIGHT: 66 IN | TEMPERATURE: 98 F | BODY MASS INDEX: 46.28 KG/M2 | RESPIRATION RATE: 16 BRPM | HEART RATE: 76 BPM | OXYGEN SATURATION: 96 % | SYSTOLIC BLOOD PRESSURE: 130 MMHG | WEIGHT: 288 LBS

## 2023-01-24 DIAGNOSIS — D50.8 OTHER IRON DEFICIENCY ANEMIA: ICD-10-CM

## 2023-01-24 DIAGNOSIS — J32.4 CHRONIC PANSINUSITIS: Primary | ICD-10-CM

## 2023-01-24 DIAGNOSIS — J34.89 SINUS PAIN: ICD-10-CM

## 2023-01-24 DIAGNOSIS — E03.9 ACQUIRED HYPOTHYROIDISM: ICD-10-CM

## 2023-01-24 DIAGNOSIS — I10 PRIMARY HYPERTENSION: ICD-10-CM

## 2023-01-24 DIAGNOSIS — R00.2 PALPITATIONS: ICD-10-CM

## 2023-01-24 DIAGNOSIS — E11.9 TYPE 2 DIABETES MELLITUS WITHOUT COMPLICATION, WITHOUT LONG-TERM CURRENT USE OF INSULIN (HCC): ICD-10-CM

## 2023-01-24 LAB
ABSOLUTE EOS #: 0.33 K/UL (ref 0–0.44)
ABSOLUTE IMMATURE GRANULOCYTE: 0.08 K/UL (ref 0–0.3)
ABSOLUTE LYMPH #: 3.62 K/UL (ref 1.1–3.7)
ABSOLUTE MONO #: 0.87 K/UL (ref 0.1–1.2)
ALBUMIN SERPL-MCNC: 4.3 G/DL (ref 3.5–5.2)
ALBUMIN/GLOBULIN RATIO: 1.7 (ref 1–2.5)
ALP BLD-CCNC: 152 U/L (ref 35–104)
ALT SERPL-CCNC: 48 U/L (ref 5–33)
ANION GAP SERPL CALCULATED.3IONS-SCNC: 9 MMOL/L (ref 9–17)
AST SERPL-CCNC: 37 U/L
BASOPHILS # BLD: 1 % (ref 0–2)
BASOPHILS ABSOLUTE: 0.06 K/UL (ref 0–0.2)
BILIRUB SERPL-MCNC: 0.2 MG/DL (ref 0.3–1.2)
BUN BLDV-MCNC: 13 MG/DL (ref 6–20)
BUN/CREAT BLD: 14 (ref 9–20)
CALCIUM SERPL-MCNC: 9.4 MG/DL (ref 8.6–10.4)
CHLORIDE BLD-SCNC: 103 MMOL/L (ref 98–107)
CO2: 29 MMOL/L (ref 20–31)
CREAT SERPL-MCNC: 0.91 MG/DL (ref 0.5–0.9)
EOSINOPHILS RELATIVE PERCENT: 3 % (ref 1–4)
GFR SERPL CREATININE-BSD FRML MDRD: >60 ML/MIN/1.73M2
GLUCOSE BLD-MCNC: 110 MG/DL (ref 70–99)
HCT VFR BLD CALC: 39.1 % (ref 36.3–47.1)
HEMOGLOBIN: 12.9 G/DL (ref 11.9–15.1)
IMMATURE GRANULOCYTES: 1 %
LYMPHOCYTES # BLD: 32 % (ref 24–43)
MCH RBC QN AUTO: 28.5 PG (ref 25.2–33.5)
MCHC RBC AUTO-ENTMCNC: 33 G/DL (ref 25.2–33.5)
MCV RBC AUTO: 86.3 FL (ref 82.6–102.9)
MONOCYTES # BLD: 8 % (ref 3–12)
NRBC AUTOMATED: 0 PER 100 WBC
PDW BLD-RTO: 14.4 % (ref 11.8–14.4)
PLATELET # BLD: 233 K/UL (ref 138–453)
PMV BLD AUTO: 10.9 FL (ref 8.1–13.5)
POTASSIUM SERPL-SCNC: 3.9 MMOL/L (ref 3.7–5.3)
RBC # BLD: 4.53 M/UL (ref 3.95–5.11)
SEG NEUTROPHILS: 55 % (ref 36–65)
SEGMENTED NEUTROPHILS ABSOLUTE COUNT: 6.47 K/UL (ref 1.5–8.1)
SODIUM BLD-SCNC: 141 MMOL/L (ref 135–144)
TOTAL PROTEIN: 6.9 G/DL (ref 6.4–8.3)
TSH SERPL DL<=0.05 MIU/L-ACNC: 4.02 UIU/ML (ref 0.3–5)
WBC # BLD: 11.4 K/UL (ref 3.5–11.3)

## 2023-01-24 PROCEDURE — 83036 HEMOGLOBIN GLYCOSYLATED A1C: CPT

## 2023-01-24 PROCEDURE — 82043 UR ALBUMIN QUANTITATIVE: CPT

## 2023-01-24 PROCEDURE — 82570 ASSAY OF URINE CREATININE: CPT

## 2023-01-24 PROCEDURE — 84439 ASSAY OF FREE THYROXINE: CPT

## 2023-01-24 PROCEDURE — 83550 IRON BINDING TEST: CPT

## 2023-01-24 PROCEDURE — 3075F SYST BP GE 130 - 139MM HG: CPT | Performed by: FAMILY MEDICINE

## 2023-01-24 PROCEDURE — 84443 ASSAY THYROID STIM HORMONE: CPT

## 2023-01-24 PROCEDURE — 83540 ASSAY OF IRON: CPT

## 2023-01-24 PROCEDURE — 36415 COLL VENOUS BLD VENIPUNCTURE: CPT

## 2023-01-24 PROCEDURE — 99213 OFFICE O/P EST LOW 20 MIN: CPT | Performed by: FAMILY MEDICINE

## 2023-01-24 PROCEDURE — 82728 ASSAY OF FERRITIN: CPT

## 2023-01-24 PROCEDURE — 80053 COMPREHEN METABOLIC PANEL: CPT

## 2023-01-24 PROCEDURE — 85025 COMPLETE CBC W/AUTO DIFF WBC: CPT

## 2023-01-24 PROCEDURE — 3079F DIAST BP 80-89 MM HG: CPT | Performed by: FAMILY MEDICINE

## 2023-01-24 PROCEDURE — 99214 OFFICE O/P EST MOD 30 MIN: CPT | Performed by: FAMILY MEDICINE

## 2023-01-24 RX ORDER — PRAZOSIN HYDROCHLORIDE 2 MG/1
CAPSULE ORAL
COMMUNITY
Start: 2023-01-12

## 2023-01-24 ASSESSMENT — ENCOUNTER SYMPTOMS
SHORTNESS OF BREATH: 0
SINUS PAIN: 1
SINUS PRESSURE: 1
SORE THROAT: 0
RHINORRHEA: 0

## 2023-01-24 ASSESSMENT — PATIENT HEALTH QUESTIONNAIRE - PHQ9
SUM OF ALL RESPONSES TO PHQ QUESTIONS 1-9: 13
7. TROUBLE CONCENTRATING ON THINGS, SUCH AS READING THE NEWSPAPER OR WATCHING TELEVISION: 2
SUM OF ALL RESPONSES TO PHQ QUESTIONS 1-9: 13
SUM OF ALL RESPONSES TO PHQ QUESTIONS 1-9: 13
4. FEELING TIRED OR HAVING LITTLE ENERGY: 2
5. POOR APPETITE OR OVEREATING: 2
9. THOUGHTS THAT YOU WOULD BE BETTER OFF DEAD, OR OF HURTING YOURSELF: 0
10. IF YOU CHECKED OFF ANY PROBLEMS, HOW DIFFICULT HAVE THESE PROBLEMS MADE IT FOR YOU TO DO YOUR WORK, TAKE CARE OF THINGS AT HOME, OR GET ALONG WITH OTHER PEOPLE: 3
SUM OF ALL RESPONSES TO PHQ QUESTIONS 1-9: 13
6. FEELING BAD ABOUT YOURSELF - OR THAT YOU ARE A FAILURE OR HAVE LET YOURSELF OR YOUR FAMILY DOWN: 1
2. FEELING DOWN, DEPRESSED OR HOPELESS: 2
1. LITTLE INTEREST OR PLEASURE IN DOING THINGS: 2
SUM OF ALL RESPONSES TO PHQ9 QUESTIONS 1 & 2: 4
3. TROUBLE FALLING OR STAYING ASLEEP: 2
8. MOVING OR SPEAKING SO SLOWLY THAT OTHER PEOPLE COULD HAVE NOTICED. OR THE OPPOSITE, BEING SO FIGETY OR RESTLESS THAT YOU HAVE BEEN MOVING AROUND A LOT MORE THAN USUAL: 0

## 2023-01-24 NOTE — PROGRESS NOTES
2023     Pratik Kearns (:  1970) is a 47 y.o. female, here for evaluation of the following medical concerns:    Pain  Associated symptoms include congestion (at times nostrils feel congested). Pertinent negatives include no chest pain, chills, fatigue, fever or sore throat. Patient comes in today with ongoing consistent issues with left-sided facial pain over the maxillary and frontal sinus region and nasal bridge ever since having nasal surgery in July. She did undergo a septoplasty procedure in July but apparently during the procedure her blood pressure elevated significantly and they were unable to get it under control and she was bleeding quite profusely during the surgery in the nasal passages. As a result per report the surgeon was unable to complete the surgical repair and had to stop the procedure. Since that time she has had ongoing issues with pain to the left side of the nares and into the frontal and maxillary sinus region on the left. She states that it is a very consistent pain and feels like someone has hit her in the face with a ball bat. She states when she bends over it is even worse. Feels burning in the nares. She has seen 2 different ear nose and throat specialist and follow-up as Dr. Yg Porras noted that she needed to see another specialist to complete the surgery that she was unable to complete. She had went to the [address] [place] [address],[address] and I did review those notes which appeared that they had debrided some bone fragments and it taken out some purulent drainage out of the nasal passageway but did not feel that she needed any further surgical intervention. This did not help any of her symptoms. She then had followed up with Dr. Maryana Jhaveri in [place] and he also noted that there was a small mild perforation of the septum and a hole in the left inferior turbinate.   He had described it as empty nose syndrome but did not really recommend anything particular to help with her symptoms. He had given her Nurtec for her headache type pain and recommended that she see a neurologist but she notes that the issue is really not migraine type headaches its more nasal pain. No one has done further imaging studies and I did review that with empty nasal syndrome it is recommended to do CT of the sinuses so we can pursue this. She is just very miserable and states that it has impacted her everyday life. Likely we will need to pursue further ENT evaluation or possibly a facial pain specialist in order to help with alleviating some of her symptoms. She also notes that she feels palpitations in her heart and she feels that this likely is related to her iron levels being low as she quit taking her iron supplement. She did start back on her iron supplement but is bothered by the palpitations. She states when they happen she almost feels like she has to cough. We will get her blood count and iron levels today and I did advise her that she can take an extra metoprolol throughout the day to help with the palpitations in the interim. Patient otherwise has no other acute medical concerns. Did review patient's med list, allergies, social history, fam history, pmhx and pshx today as noted in the record. Review of Systems   Constitutional:  Negative for chills, fatigue and fever. HENT:  Positive for congestion (at times nostrils feel congested), sinus pressure and sinus pain. Negative for ear pain, postnasal drip, rhinorrhea and sore throat. Left sided facial pain and nasal pain   Respiratory:  Negative for shortness of breath. Cardiovascular:  Positive for palpitations. Negative for chest pain. Prior to Visit Medications    Medication Sig Taking?  Authorizing Provider   prazosin (MINIPRESS) 2 MG capsule  Yes Historical Provider, MD   esomeprazole (NEXIUM) 40 MG delayed release capsule TAKE 1 CAPSULE BY MOUTH TWICE DAILY Yes Jena Parker, DO   potassium chloride (KLOR-CON) 8 MEQ extended release tablet TAKE 5 TABLETS BY MOUTH EVERY DAY Yes Katie Finley DO   aspirin (ASPIRIN LOW DOSE) 81 MG EC tablet TAKE 1 TABLET BY MOUTH DAILY Yes Katie Finley DO   metoprolol tartrate (LOPRESSOR) 50 MG tablet Take 1 tablet by mouth 2 times daily Yes Yanet Gray,    montelukast (SINGULAIR) 10 MG tablet Take 1 tablet by mouth daily Yes Katie Finley DO   levothyroxine (SYNTHROID) 125 MCG tablet TAKE 1 TABLET BY MOUTH EVERY DAY Yes Katie Finley DO   Cholecalciferol (VITAMIN D3) 125 MCG (5000 UT) TABS Take 1 tablet by mouth daily Yes Katie Finley DO   hydroCHLOROthiazide (HYDRODIURIL) 25 MG tablet TAKE 1 TABLET BY MOUTH DAILY Yes Katie Finley DO   gabapentin (NEURONTIN) 800 MG tablet Take 1 tablet by mouth 2 times daily for 90 days.  Yes Katie Finley DO   oxybutynin (DITROPAN XL) 10 MG extended release tablet Take 1 tablet by mouth daily Yes Katie Finley DO   desvenlafaxine succinate (PRISTIQ) 100 MG TB24 extended release tablet take 1 tablet by mouth daily Yes Historical Provider, MD   NYSTATIN 840156 UNIT/GM powder APPLY TO THE AFFECTED AREA THREE TIMES DAILY Yes Adam Grace,    albuterol sulfate HFA (PROVENTIL;VENTOLIN;PROAIR) 108 (90 Base) MCG/ACT inhaler INHALE 2 PUFFS BY MOUTH EVERY 6 HOURS AS NEEDED FOR WHEEZING Yes Katie Finley DO   lamoTRIgine (LAMICTAL) 150 MG tablet TAKE 1 TABLET BY MOUTH TWICE DAILY Yes Historical Provider, MD   FEROSUL 325 (65 Fe) MG tablet TAKE 1 TABLET BY MOUTH TWICE DAILY Yes Katie Finley DO   traZODone (DESYREL) 300 MG tablet take 1 tablet by mouth at bedtime Yes Historical Provider, MD        Social History     Tobacco Use    Smoking status: Never    Smokeless tobacco: Never   Substance Use Topics    Alcohol use: No        Vitals:    01/24/23 1342   BP: 130/80   Site: Left Upper Arm   Position: Sitting   Cuff Size: Large Adult   Pulse: 76   Resp: 16   Temp: 98 °F (36.7 °C)   TempSrc: Tympanic   SpO2: 96%   Weight: 288 lb (130.6 kg)   Height: 5' 6\" (1.676 m)     Estimated body mass index is 46.48 kg/m² as calculated from the following:    Height as of this encounter: 5' 6\" (1.676 m). Weight as of this encounter: 288 lb (130.6 kg). Physical Exam  Vitals and nursing note reviewed. Constitutional:       General: She is not in acute distress. Appearance: She is well-developed. She is not diaphoretic. HENT:      Head: Normocephalic and atraumatic. Nose:      Comments: Left nostril does have swelling of the nasal mucosa with erythema. There is tenderness with palpation along the nasal bridge and into the maxillary and frontal sinus region. Eyes:      Conjunctiva/sclera: Conjunctivae normal.   Cardiovascular:      Rate and Rhythm: Normal rate. Comments: Did auscultate patient's heart for a prolonged period of time and was found to have intermittent PACs or PVCs that are auscultated. Pulmonary:      Effort: Pulmonary effort is normal.      Breath sounds: Normal breath sounds. No wheezing, rhonchi or rales. Musculoskeletal:      Cervical back: Normal range of motion. Skin:     General: Skin is warm and dry. Coloration: Skin is not pale. Findings: No erythema or rash. Neurological:      Mental Status: She is alert and oriented to person, place, and time. Psychiatric:         Behavior: Behavior normal.         Thought Content: Thought content normal.         Judgment: Judgment normal.       ASSESSMENT/PLAN:  Encounter Diagnoses   Name Primary? Sinus pain     Chronic pansinusitis Yes    Other iron deficiency anemia     Palpitations      No orders of the defined types were placed in this encounter.   Orders Placed This Encounter   Procedures    CT SINUS WO CONTRAST     Standing Status:   Future     Standing Expiration Date:   1/24/2024     Order Specific Question:   Reason for exam:     Answer:   left sided facial pain post operative after sinus surgery July 2022.    CBC with Auto Differential     Standing Status:   Future     Standing Expiration Date:   1/24/2024    Ferritin     Standing Status:   Future     Standing Expiration Date:   1/24/2024    Iron and TIBC     Standing Status:   Future     Standing Expiration Date:   1/24/2024     Order Specific Question:   Is Patient Fasting? Answer:   no     Order Specific Question:   No of Hours? Answer:   n/a     At this point we will do a CT of the sinuses for further evaluation. Likely will need to see another ENT and hopefully if we referred to another specialist at [facility] or [place] they will be able to provide her with some suggestions to help alleviate her pain. Patient is to have CBC and iron levels checked today. Patient can take an extra metoprolol to help with the frequent PVCs. Patient is to return to my office as scheduled for her routine medical follow-up visit or sooner if any further problems or symptoms arise. (Please note that portions of this note were completed with a voice recognition program. Efforts were made to edit the dictations but occasionally words are mis-transcribed.)        No follow-ups on file. An electronic signature was used to authenticate this note.     --Maggi Dillon, DO on 1/24/2023 at 2:28 PM

## 2023-01-25 ENCOUNTER — E-VISIT (OUTPATIENT)
Dept: FAMILY MEDICINE CLINIC | Age: 53
End: 2023-01-25
Payer: MEDICARE

## 2023-01-25 DIAGNOSIS — R00.2 PALPITATIONS: ICD-10-CM

## 2023-01-25 DIAGNOSIS — R30.0 DYSURIA: Primary | ICD-10-CM

## 2023-01-25 LAB
CREATININE URINE: 66.3 MG/DL (ref 28–217)
ESTIMATED AVERAGE GLUCOSE: 140 MG/DL
FERRITIN: 242 NG/ML (ref 13–150)
HBA1C MFR BLD: 6.5 % (ref 4–6)
IRON SATURATION: 23 % (ref 20–55)
IRON: 68 UG/DL (ref 37–145)
MICROALBUMIN/CREAT 24H UR: <12 MG/L
MICROALBUMIN/CREAT UR-RTO: NORMAL MCG/MG CREAT
THYROXINE, FREE: 1.26 NG/DL (ref 0.93–1.7)
TOTAL IRON BINDING CAPACITY: 301 UG/DL (ref 250–450)
UNSATURATED IRON BINDING CAPACITY: 233 UG/DL (ref 112–347)

## 2023-01-25 PROCEDURE — 99422 OL DIG E/M SVC 11-20 MIN: CPT | Performed by: FAMILY MEDICINE

## 2023-01-26 ENCOUNTER — HOSPITAL ENCOUNTER (OUTPATIENT)
Age: 53
Setting detail: SPECIMEN
Discharge: HOME | End: 2023-01-26
Payer: MEDICARE

## 2023-01-26 DIAGNOSIS — R30.0 DYSURIA: ICD-10-CM

## 2023-01-26 LAB
BACTERIA: ABNORMAL
BILIRUBIN URINE: ABNORMAL
COMMENT UA: ABNORMAL
EPITHELIAL CELLS UA: ABNORMAL /HPF (ref 0–5)
GLUCOSE URINE: ABNORMAL
KETONES, URINE: NEGATIVE
LEUKOCYTE ESTERASE, URINE: ABNORMAL
NITRITE, URINE: POSITIVE
PH UA: 5 (ref 5–6)
PROTEIN UA: ABNORMAL
RBC UA: ABNORMAL /HPF (ref 0–4)
SPECIFIC GRAVITY UA: 1.01 (ref 1.01–1.02)
URINE HGB: ABNORMAL
UROBILINOGEN, URINE: ABNORMAL
WBC UA: ABNORMAL /HPF (ref 0–4)

## 2023-01-26 PROCEDURE — 81001 URINALYSIS AUTO W/SCOPE: CPT

## 2023-01-26 PROCEDURE — 87086 URINE CULTURE/COLONY COUNT: CPT

## 2023-01-26 RX ORDER — CEPHALEXIN 500 MG/1
500 CAPSULE ORAL 3 TIMES DAILY
Qty: 21 CAPSULE | Refills: 0 | Status: SHIPPED | OUTPATIENT
Start: 2023-01-26 | End: 2023-02-02

## 2023-01-26 NOTE — PROGRESS NOTES
Patient with symptoms suggestive of possible UTI. I will place an order for patient to get a urinalysis with culture and then will treat as needed based on results. 11-20 minutes were spent on the digital evaluation and management of this patient.

## 2023-01-27 ENCOUNTER — HOSPITAL ENCOUNTER (OUTPATIENT)
Dept: CT IMAGING | Age: 53
End: 2023-01-27
Payer: MEDICARE

## 2023-01-27 DIAGNOSIS — J32.4 CHRONIC PANSINUSITIS: ICD-10-CM

## 2023-01-27 DIAGNOSIS — J34.89 SINUS PAIN: ICD-10-CM

## 2023-01-27 LAB
CULTURE: NORMAL
SPECIMEN DESCRIPTION: NORMAL

## 2023-01-27 PROCEDURE — 70486 CT MAXILLOFACIAL W/O DYE: CPT

## 2023-02-07 ENCOUNTER — PATIENT MESSAGE (OUTPATIENT)
Dept: FAMILY MEDICINE CLINIC | Age: 53
End: 2023-02-07

## 2023-02-07 RX ORDER — PREDNISONE 20 MG/1
TABLET ORAL
Qty: 18 TABLET | Refills: 0 | Status: SHIPPED | OUTPATIENT
Start: 2023-02-07

## 2023-02-13 ENCOUNTER — OFFICE VISIT (OUTPATIENT)
Dept: FAMILY MEDICINE CLINIC | Age: 53
End: 2023-02-13
Payer: MEDICARE

## 2023-02-13 ENCOUNTER — HOSPITAL ENCOUNTER (OUTPATIENT)
Dept: NON INVASIVE DIAGNOSTICS | Age: 53
Discharge: HOME | End: 2023-02-13
Payer: MEDICARE

## 2023-02-13 VITALS
DIASTOLIC BLOOD PRESSURE: 80 MMHG | SYSTOLIC BLOOD PRESSURE: 124 MMHG | TEMPERATURE: 99.1 F | OXYGEN SATURATION: 96 % | HEIGHT: 66 IN | WEIGHT: 284 LBS | RESPIRATION RATE: 18 BRPM | HEART RATE: 60 BPM | BODY MASS INDEX: 45.64 KG/M2

## 2023-02-13 DIAGNOSIS — E78.2 MIXED HYPERLIPIDEMIA: ICD-10-CM

## 2023-02-13 DIAGNOSIS — R00.2 PALPITATIONS: ICD-10-CM

## 2023-02-13 DIAGNOSIS — J01.41 ACUTE RECURRENT PANSINUSITIS: ICD-10-CM

## 2023-02-13 DIAGNOSIS — I10 PRIMARY HYPERTENSION: ICD-10-CM

## 2023-02-13 DIAGNOSIS — E03.9 ACQUIRED HYPOTHYROIDISM: ICD-10-CM

## 2023-02-13 DIAGNOSIS — E11.9 TYPE 2 DIABETES MELLITUS WITHOUT COMPLICATION, WITHOUT LONG-TERM CURRENT USE OF INSULIN (HCC): Primary | ICD-10-CM

## 2023-02-13 PROCEDURE — 93271 ECG/MONITORING AND ANALYSIS: CPT

## 2023-02-13 PROCEDURE — 99213 OFFICE O/P EST LOW 20 MIN: CPT | Performed by: FAMILY MEDICINE

## 2023-02-13 PROCEDURE — 99214 OFFICE O/P EST MOD 30 MIN: CPT | Performed by: FAMILY MEDICINE

## 2023-02-13 PROCEDURE — 3078F DIAST BP <80 MM HG: CPT | Performed by: FAMILY MEDICINE

## 2023-02-13 PROCEDURE — 3044F HG A1C LEVEL LT 7.0%: CPT | Performed by: FAMILY MEDICINE

## 2023-02-13 PROCEDURE — 93270 REMOTE 30 DAY ECG REV/REPORT: CPT

## 2023-02-13 PROCEDURE — 3074F SYST BP LT 130 MM HG: CPT | Performed by: FAMILY MEDICINE

## 2023-02-13 RX ORDER — AMOXICILLIN AND CLAVULANATE POTASSIUM 875; 125 MG/1; MG/1
1 TABLET, FILM COATED ORAL 2 TIMES DAILY
Qty: 20 TABLET | Refills: 0 | Status: SHIPPED | OUTPATIENT
Start: 2023-02-13 | End: 2023-02-23

## 2023-02-13 SDOH — ECONOMIC STABILITY: FOOD INSECURITY: WITHIN THE PAST 12 MONTHS, YOU WORRIED THAT YOUR FOOD WOULD RUN OUT BEFORE YOU GOT MONEY TO BUY MORE.: NEVER TRUE

## 2023-02-13 SDOH — ECONOMIC STABILITY: INCOME INSECURITY: HOW HARD IS IT FOR YOU TO PAY FOR THE VERY BASICS LIKE FOOD, HOUSING, MEDICAL CARE, AND HEATING?: NOT HARD AT ALL

## 2023-02-13 SDOH — ECONOMIC STABILITY: FOOD INSECURITY: WITHIN THE PAST 12 MONTHS, THE FOOD YOU BOUGHT JUST DIDN'T LAST AND YOU DIDN'T HAVE MONEY TO GET MORE.: NEVER TRUE

## 2023-02-13 SDOH — ECONOMIC STABILITY: HOUSING INSECURITY
IN THE LAST 12 MONTHS, WAS THERE A TIME WHEN YOU DID NOT HAVE A STEADY PLACE TO SLEEP OR SLEPT IN A SHELTER (INCLUDING NOW)?: NO

## 2023-02-13 ASSESSMENT — ENCOUNTER SYMPTOMS
EYE DISCHARGE: 0
COUGH: 0
SHORTNESS OF BREATH: 0
WHEEZING: 0
EYE REDNESS: 0
CONSTIPATION: 0
VOMITING: 0
NAUSEA: 0
TROUBLE SWALLOWING: 0
ABDOMINAL PAIN: 0
DIARRHEA: 0
SORE THROAT: 0

## 2023-02-13 NOTE — PROGRESS NOTES
2023     Charito Lopez (:  1970) is a 47 y.o. female, here for evaluation of the following medical concerns:    HPI  Patient comes in today for follow up of chronic health issues Patient continues to have ongoing chronic issues with left sinus pain that has stemmed from a failed sinus surgery in 2022. She does have an appointment to see a specialist at [facility] for another opinion regarding further intervention to try to help with this ongoing pain issues next week. In the interim however she believes now she has another recurrent sinus infection as she has developed increased mucus production and sinus pressure and pain with thick green drainage. She recently had contacted my office to see if perhaps we can do a trial on oral steroid to help with inflammation in the sinuses and she has been taking that but it has not really helped with any of her pain or discomfort. She has not had any sore throat cough or chest congestion. Ears feel plugged but not terribly painful. With regards to her chronic health condition she has a known history of diabetes mellitus type 2 which is stable and controlled with her current dietary regimen. Does have a known history of hypertension and her blood pressure is stable and controlled on her current medical regimen. Has known hypothyroidism and her thyroid levels are stable and adequately supplemented on her current thyroid dose. Has a known history of hyperlipidemia and her cholesterol levels were not checked with her most recent lab draw. She did have high triglycerides and low HDL with her last check and so we did encourage her to follow a lipid-lowering diet and try to increase exercise to help with cholesterol control but ultimately will just plan to repeat this in 6 months duration. Otherwise today no other acute medical concerns to discuss. .  Patient's recent lab reports are as follows:      Lab Results   Component Value Date/Time    WBC 11.4 01/24/2023 04:40 PM    RBC 4.53 01/24/2023 04:40 PM    HGB 12.9 01/24/2023 04:40 PM    HCT 39.1 01/24/2023 04:40 PM    MCV 86.3 01/24/2023 04:40 PM    MCH 28.5 01/24/2023 04:40 PM    MCHC 33.0 01/24/2023 04:40 PM    RDW 14.4 01/24/2023 04:40 PM     01/24/2023 04:40 PM    MPV 10.9 01/24/2023 04:40 PM       Lab Results   Component Value Date/Time    CHOL 210 08/15/2022 09:19 AM    HDL 35 08/15/2022 09:19 AM    CHOLHDLRATIO 6.0 08/15/2022 09:19 AM    TRIG 344 08/15/2022 09:19 AM    VLDL NOT REPORTED 09/01/2021 07:40 AM       Lab Results   Component Value Date/Time    TSH 4.02 01/24/2023 04:40 PM       Lab Results   Component Value Date/Time     01/24/2023 04:40 PM    K 3.9 01/24/2023 04:40 PM     01/24/2023 04:40 PM    CO2 29 01/24/2023 04:40 PM    BUN 13 01/24/2023 04:40 PM    CREATININE 0.91 01/24/2023 04:40 PM    GLUCOSE 110 01/24/2023 04:40 PM    CALCIUM 9.4 01/24/2023 04:40 PM       Lab Results   Component Value Date/Time    LABA1C 6.5 01/24/2023 04:40 PM          Other review of systems are as noted below.    Preventative measures are reviewed today. See health maintenance section for complete preventative plan of care.    Did review patient's med list, allergies, social history, fam history, pmhx and pshx today as noted in the record.      Review of Systems   Constitutional:  Negative for chills, fatigue and fever.   HENT:  Positive for congestion, postnasal drip, rhinorrhea, sinus pressure and sinus pain. Negative for ear pain, sore throat and trouble swallowing.    Eyes:  Negative for discharge and redness.   Respiratory:  Negative for cough, shortness of breath and wheezing.    Cardiovascular:  Negative for chest pain.   Gastrointestinal:  Negative for abdominal pain, constipation, diarrhea, nausea and vomiting.   Genitourinary:  Negative for dysuria, flank pain, frequency and urgency.   Musculoskeletal:  Negative for arthralgias, myalgias and neck pain.   Skin:  Negative for rash and  wound. Allergic/Immunologic: Negative for environmental allergies. Neurological:  Negative for dizziness, weakness, light-headedness and headaches. Hematological:  Negative for adenopathy. Psychiatric/Behavioral: Negative. Prior to Visit Medications    Medication Sig Taking? Authorizing Provider   predniSONE (DELTASONE) 20 MG tablet 1 bid for 5 days, 1 qd for 5 days, 1/2 tab daily for 6 days  Betty Stevens DO   prazosin (MINIPRESS) 2 MG capsule   Historical Provider, MD   esomeprazole (NEXIUM) 40 MG delayed release capsule TAKE 1 CAPSULE BY MOUTH TWICE DAILY  Betty Stevens DO   potassium chloride (KLOR-CON) 8 MEQ extended release tablet TAKE 5 TABLETS BY MOUTH EVERY DAY  Betty Stevens DO   aspirin (ASPIRIN LOW DOSE) 81 MG EC tablet TAKE 1 TABLET BY MOUTH DAILY  Betty Stevens DO   metoprolol tartrate (LOPRESSOR) 50 MG tablet Take 1 tablet by mouth 2 times daily  Yanet Gray DO   montelukast (SINGULAIR) 10 MG tablet Take 1 tablet by mouth daily  Betty Stevens DO   levothyroxine (SYNTHROID) 125 MCG tablet TAKE 1 TABLET BY MOUTH EVERY DAY  Betyt Stevens DO   Cholecalciferol (VITAMIN D3) 125 MCG (5000 UT) TABS Take 1 tablet by mouth daily  Yanet Gray DO   hydroCHLOROthiazide (HYDRODIURIL) 25 MG tablet TAKE 1 TABLET BY MOUTH DAILY  Betty Stevens DO   gabapentin (NEURONTIN) 800 MG tablet Take 1 tablet by mouth 2 times daily for 90 days.   Betty Stevens DO   oxybutynin (DITROPAN XL) 10 MG extended release tablet Take 1 tablet by mouth daily  Yanet Gray DO   desvenlafaxine succinate (PRISTIQ) 100 MG TB24 extended release tablet take 1 tablet by mouth daily  Historical Provider, MD   NYSTATIN [id number] UNIT/GM powder APPLY TO THE AFFECTED AREA THREE TIMES DAILY  Adam Grace DO   albuterol sulfate HFA (PROVENTIL;VENTOLIN;PROAIR) 108 (90 Base) MCG/ACT inhaler INHALE 2 PUFFS BY MOUTH EVERY 6 HOURS AS NEEDED FOR WHEEZING  Betty Stevens DO   lamoTRIgine (LAMICTAL) 150 MG tablet TAKE 1 TABLET BY MOUTH TWICE DAILY  Historical Provider, MD   FEROSUL 325 (65 Fe) MG tablet TAKE 1 TABLET BY MOUTH TWICE DAILY  Yanet Gray DO   traZODone (DESYREL) 300 MG tablet take 1 tablet by mouth at bedtime  Historical Provider, MD        Social History     Tobacco Use    Smoking status: Never    Smokeless tobacco: Never   Substance Use Topics    Alcohol use: No        There were no vitals filed for this visit.  Estimated body mass index is 46.48 kg/m² as calculated from the following:    Height as of 1/24/23: 5' 6\" (1.676 m).    Weight as of 1/24/23: 288 lb (130.6 kg).    Physical Exam  Vitals and nursing note reviewed.   Constitutional:       General: She is not in acute distress.     Appearance: Normal appearance. She is well-developed. She is not diaphoretic.   HENT:      Head: Normocephalic and atraumatic.      Right Ear: External ear normal.      Left Ear: External ear normal.      Ears:      Comments: TMs dull with fluid behind the TM     Nose: Congestion and rhinorrhea present.      Comments: Maxillary and frontal sinus tenderness with palpation bilaterally         Mouth/Throat:      Pharynx: No posterior oropharyngeal erythema.      Comments: Post nasal drainage noted  Eyes:      General: No scleral icterus.        Right eye: No discharge.         Left eye: No discharge.      Conjunctiva/sclera: Conjunctivae normal.      Pupils: Pupils are equal, round, and reactive to light.   Neck:      Thyroid: No thyromegaly.   Cardiovascular:      Rate and Rhythm: Normal rate and regular rhythm.      Heart sounds: Normal heart sounds.   Pulmonary:      Effort: Pulmonary effort is normal. No respiratory distress.      Breath sounds: Normal breath sounds. No wheezing.   Musculoskeletal:      Cervical back: Normal range of motion and neck supple.   Lymphadenopathy:      Cervical: Cervical adenopathy present.   Skin:     General: Skin is warm and dry.      Findings: No  rash.   Neurological:      Mental Status: She is alert and oriented to person, place, and time. Psychiatric:         Behavior: Behavior normal.         Thought Content: Thought content normal.         Judgment: Judgment normal.         ASSESSMENT/PLAN:    Encounter Diagnoses   Name Primary? Type 2 diabetes mellitus without complication, without long-term current use of insulin (HCC) Yes    Primary hypertension     Acquired hypothyroidism     Mixed hyperlipidemia     Acute recurrent pansinusitis      Orders Placed This Encounter   Medications    amoxicillin-clavulanate (AUGMENTIN) 875-125 MG per tablet     Sig: Take 1 tablet by mouth 2 times daily for 10 days     Dispense:  20 tablet     Refill:  0     Orders Placed This Encounter   Procedures    CBC with Auto Differential     Standing Status:   Future     Standing Expiration Date:   2/13/2024    Comprehensive Metabolic Panel     Standing Status:   Future     Standing Expiration Date:   2/13/2024    Hemoglobin A1C     Standing Status:   Future     Standing Expiration Date:   2/13/2024    Lipid Panel     Standing Status:   Future     Standing Expiration Date:   2/13/2024     Order Specific Question:   Is Patient Fasting?/# of Hours     Answer:   12 hours    TSH     Standing Status:   Future     Standing Expiration Date:   2/13/2024    T4, Free     Standing Status:   Future     Standing Expiration Date:   2/13/2024    HM DIABETES FOOT EXAM     We will place her on another course of oral antibiotic to see if we can help with the acute infection. She should use Mucinex to help clear any thick sinus drainage. Would avoid doing any sinus rinses or nasal sprays at this time due to the significant inflammation and irritation to the left sinus passageway. She states she did try to do some Afrin nasal spray just open up her sinuses and it caused significant burning and discomfort in her left nares.   Would follow-up with the specialist at [facility] next week as scheduled. Patient is to continue on the rest of her current medical regimen. No additional changes are made at this time. Patient is to follow a low-carb/low sugar/low-fat diet and increase exercise for optimal blood sugar and cholesterol control. Patient is to return to my office in 6 months duration or sooner if any further problems or symptoms arise. (Please note that portions of this note were completed with a voice recognition program. Efforts were made to edit the dictations but occasionally words are mis-transcribed.)    No follow-ups on file. An electronic signature was used to authenticate this note.     --Jena Parker,  on 2/13/2023 at 7:14 AM

## 2023-02-14 ASSESSMENT — ENCOUNTER SYMPTOMS
SINUS PAIN: 1
RHINORRHEA: 1
SINUS PRESSURE: 1

## 2023-02-17 ENCOUNTER — TELEMEDICINE (OUTPATIENT)
Dept: FAMILY MEDICINE CLINIC | Age: 53
End: 2023-02-17

## 2023-02-17 DIAGNOSIS — Z00.00 MEDICARE ANNUAL WELLNESS VISIT, SUBSEQUENT: ICD-10-CM

## 2023-02-17 DIAGNOSIS — Z12.11 ENCOUNTER FOR SCREENING FOR MALIGNANT NEOPLASM OF COLON: Primary | ICD-10-CM

## 2023-02-17 ASSESSMENT — PATIENT HEALTH QUESTIONNAIRE - PHQ9
2. FEELING DOWN, DEPRESSED OR HOPELESS: 2
SUM OF ALL RESPONSES TO PHQ QUESTIONS 1-9: 13
SUM OF ALL RESPONSES TO PHQ9 QUESTIONS 1 & 2: 4
4. FEELING TIRED OR HAVING LITTLE ENERGY: 2
6. FEELING BAD ABOUT YOURSELF - OR THAT YOU ARE A FAILURE OR HAVE LET YOURSELF OR YOUR FAMILY DOWN: 1
7. TROUBLE CONCENTRATING ON THINGS, SUCH AS READING THE NEWSPAPER OR WATCHING TELEVISION: 2
10. IF YOU CHECKED OFF ANY PROBLEMS, HOW DIFFICULT HAVE THESE PROBLEMS MADE IT FOR YOU TO DO YOUR WORK, TAKE CARE OF THINGS AT HOME, OR GET ALONG WITH OTHER PEOPLE: 3
8. MOVING OR SPEAKING SO SLOWLY THAT OTHER PEOPLE COULD HAVE NOTICED. OR THE OPPOSITE, BEING SO FIGETY OR RESTLESS THAT YOU HAVE BEEN MOVING AROUND A LOT MORE THAN USUAL: 0
3. TROUBLE FALLING OR STAYING ASLEEP: 2
SUM OF ALL RESPONSES TO PHQ QUESTIONS 1-9: 13
5. POOR APPETITE OR OVEREATING: 2
SUM OF ALL RESPONSES TO PHQ QUESTIONS 1-9: 13
9. THOUGHTS THAT YOU WOULD BE BETTER OFF DEAD, OR OF HURTING YOURSELF: 0
1. LITTLE INTEREST OR PLEASURE IN DOING THINGS: 2
SUM OF ALL RESPONSES TO PHQ QUESTIONS 1-9: 13

## 2023-02-17 ASSESSMENT — LIFESTYLE VARIABLES
HOW OFTEN DO YOU HAVE A DRINK CONTAINING ALCOHOL: NEVER
HOW MANY STANDARD DRINKS CONTAINING ALCOHOL DO YOU HAVE ON A TYPICAL DAY: PATIENT DOES NOT DRINK

## 2023-02-17 NOTE — PROGRESS NOTES
Medicare Annual Wellness Visit    Regina Valle is here for Medicare AWV (Subsequent; last 6/14/2018)    Assessment & Plan   Encounter for screening for malignant neoplasm of colon    Recommendations for Preventive Services Due: see orders and patient instructions/AVS.    Patient declines flu, shingles, hep b, and covid vaccines. Declines hep c screening. Recommended screening schedule for the next 5-10 years is provided to the patient in written form: see Patient Instructions/AVS.     No follow-ups on file. Subjective   Patient's complete Health Risk Assessment and screening values have been reviewed and are found in Flowsheets. The following problems were reviewed today and where indicated follow up appointments were made and/or referrals ordered.     Positive Risk Factor Screenings with Interventions:        Depression:  PHQ-2 Score: 4  PHQ-9 Total Score: 13    Interpretation:   1-4 = minimal  5-9 = mild  10-14 = moderate  15-19 = moderately severe  20-27 = severe    Interventions:  Patient recently discussed with PCP-is followed routinely-patient feels she is at her baseline             Weight and Activity:  Physical Activity: Inactive    Days of Exercise per Week: 0 days    Minutes of Exercise per Session: 0 min     On average, how many days per week do you engage in moderate to strenuous exercise (like a brisk walk)?: 0 days  Have you lost any weight without trying in the past 3 months?: No         Inactivity Interventions:  See AVS for additional education material  Obesity Interventions:  See AVS for additional education material      Dentist Screen:  Have you seen the dentist within the past year?: (!) No    Intervention:  Advised to schedule with their dentist      Safety:  Do you have either shower bars, grab bars, non-slip mats or non-slip surfaces in your shower or bathtub?: (!) No    Interventions:  Recommended bath mat-see AVS for tub safety features             Objective      Patient-Reported Vitals  Patient-Reported Weight: 284lb  Patient-Reported Height: 5'6\"            Allergies   Allergen Reactions    Morphine Itching, Nausea Only and Other (See Comments)    Allopurinol Other (See Comments)     Other reaction(s): nausea    Bumex [Bumetanide] Other (See Comments)     Extreme fatigue, nausea, dizziness    Cat Hair Extract      Other reaction(s): Sneezing    Colchicine     Furosemide Other (See Comments) and Dizziness or Vertigo     Dizziness, extreme fatigue  Throat swelling  Dizziness, extreme fatigue      Gramineae Pollens      Other reaction(s): Sneezing    Indocin [Indomethacin]     Molds & Smuts      Other reaction(s): Sneezing    Uloric [Febuxostat]     Hydrocodone-Acetaminophen Nausea Only     Prior to Visit Medications    Medication Sig Taking?  Authorizing Provider   amoxicillin-clavulanate (AUGMENTIN) 875-125 MG per tablet Take 1 tablet by mouth 2 times daily for 10 days Yes Autumn Yan, DO   predniSONE (DELTASONE) 20 MG tablet 1 bid for 5 days, 1 qd for 5 days, 1/2 tab daily for 6 days Yes Autumn Yan, DO   prazosin (MINIPRESS) 2 MG capsule  Yes Historical Provider, MD   esomeprazole (NEXIUM) 40 MG delayed release capsule TAKE 1 CAPSULE BY MOUTH TWICE DAILY Yes Autumn Yan, DO   potassium chloride (KLOR-CON) 8 MEQ extended release tablet TAKE 5 TABLETS BY MOUTH EVERY DAY Yes Autumn Yan, DO   aspirin (ASPIRIN LOW DOSE) 81 MG EC tablet TAKE 1 TABLET BY MOUTH DAILY Yes Autumn Yan, DO   metoprolol tartrate (LOPRESSOR) 50 MG tablet Take 1 tablet by mouth 2 times daily Yes Yanet Gray, DO   montelukast (SINGULAIR) 10 MG tablet Take 1 tablet by mouth daily Yes Autumn Yan, DO   levothyroxine (SYNTHROID) 125 MCG tablet TAKE 1 TABLET BY MOUTH EVERY DAY Yes Autumn Yan, DO   Cholecalciferol (VITAMIN D3) 125 MCG (5000 UT) TABS Take 1 tablet by mouth daily Yes Autumn Yan, DO   hydroCHLOROthiazide (HYDRODIURIL) 25 MG tablet TAKE 1 TABLET BY MOUTH DAILY Yes Yanet Gray DO   gabapentin (NEURONTIN) 800 MG tablet Take 1 tablet by mouth 2 times daily for 90 days. Yes Yanet Gray DO   oxybutynin (DITROPAN XL) 10 MG extended release tablet Take 1 tablet by mouth daily Yes Yanet Gray DO   desvenlafaxine succinate (PRISTIQ) 100 MG TB24 extended release tablet take 1 tablet by mouth daily Yes Historical Provider, MD   NYSTATIN 501612 UNIT/GM powder APPLY TO THE AFFECTED AREA THREE TIMES DAILY Yes Adam Grace,    albuterol sulfate HFA (PROVENTIL;VENTOLIN;PROAIR) 108 (90 Base) MCG/ACT inhaler INHALE 2 PUFFS BY MOUTH EVERY 6 HOURS AS NEEDED FOR WHEEZING Yes Yanet Gray DO   lamoTRIgine (LAMICTAL) 150 MG tablet TAKE 1 TABLET BY MOUTH TWICE DAILY Yes Historical Provider, MD   FEROSUL 325 (65 Fe) MG tablet TAKE 1 TABLET BY MOUTH TWICE DAILY Yes Yanet Gray DO   traZODone (DESYREL) 300 MG tablet take 1 tablet by mouth at bedtime Yes Historical Provider, MD Yao (Including outside providers/suppliers regularly involved in providing care):   Patient Care Team:  Yanet Gray DO as PCP - General (Family Medicine)  Yanet Gray DO as PCP - Empaneled Provider     Reviewed and updated this visit:  Tobacco  Allergies  Meds  Med Hx  Surg Hx  Soc Hx  Fam Hx        I, Judy Lima RN, 2/17/2023, performed the documented evaluation under the direct supervision of the attending physician.  Judy Kearns, was evaluated through a synchronous (real-time) audio encounter. The patient (or guardian if applicable) is aware that this is a billable service, which includes applicable co-pays. This Virtual Visit was conducted with patient's (and/or legal guardian's) consent. The visit was conducted pursuant to the emergency declaration under the [place] Act and the National Emergencies Act, 1135 waiver authority and the Coronavirus Preparedness and Response Supplemental Appropriations Act.  Patient identification was  verified, and a caregiver was present when appropriate.    The patient was located at Home: [id number]  Provider was located at [facility] (Appt Dept): [id number] Evelyn Ellsworth RN

## 2023-02-17 NOTE — PATIENT INSTRUCTIONS
Personalized Preventive Plan for Ayanna Martin - 2/17/2023  Medicare offers a range of preventive health benefits. Some of the tests and screenings are paid in full while other may be subject to a deductible, co-insurance, and/or copay. Some of these benefits include a comprehensive review of your medical history including lifestyle, illnesses that may run in your family, and various assessments and screenings as appropriate. After reviewing your medical record and screening and assessments performed today your provider may have ordered immunizations, labs, imaging, and/or referrals for you. A list of these orders (if applicable) as well as your Preventive Care list are included within your After Visit Summary for your review. Other Preventive Recommendations:    A preventive eye exam performed by an eye specialist is recommended every 1-2 years to screen for glaucoma; cataracts, macular degeneration, and other eye disorders. A preventive dental visit is recommended every 6 months. Try to get at least 150 minutes of exercise per week or 10,000 steps per day on a pedometer . Order or download the FREE \"Exercise & Physical Activity: Your Everyday Guide\" from [employer]. Call 3-281.716.3913 or search [employer] online. You need 6781-5340 mg of calcium and 5141-7165 IU of vitamin D per day. It is possible to meet your calcium requirement with diet alone, but a vitamin D supplement is usually necessary to meet this goal.  When exposed to the sun, use a sunscreen that protects against both UVA and UVB radiation with an SPF of 30 or greater. Reapply every 2 to 3 hours or after sweating, drying off with a towel, or swimming. Always wear a seat belt when traveling in a car. Always wear a helmet when riding a bicycle or motorcycle. Heart-Healthy Diet   Sodium, Fat, and Cholesterol Controlled Diet       What Is a Heart Healthy Diet?    A heart-healthy diet is one that limits sodium , certain types of fat , and cholesterol . This type of diet is recommended for:   People with any form of cardiovascular disease (eg, coronary heart disease , peripheral vascular disease , previous heart attack , previous stroke )   People with risk factors for cardiovascular disease, such as high blood pressure , high cholesterol , or diabetes   Anyone who wants to lower their risk of developing cardiovascular disease   Sodium    Sodium is a mineral found in many foods. In general, most people consume much more sodium than they need. Diets high in sodium can increase blood pressure and lead to edema (water retention). On a heart-healthy diet, you should consume no more than 2,300 mg (milligrams) of sodium per dayabout the amount in one teaspoon of table salt. The foods highest in sodium include table salt (about 50% sodium), processed foods, convenience foods, and preserved foods. Cholesterol    Cholesterol is a fat-like, waxy substance in your blood. Our bodies make some cholesterol. It is also found in animal products, with the highest amounts in fatty meat, egg yolks, whole milk, cheese, shellfish, and organ meats. On a heart-healthy diet, you should limit your cholesterol intake to less than 200 mg per day. It is normal and important to have some cholesterol in your bloodstream. But too much cholesterol can cause plaque to build up within your arteries, which can eventually lead to a heart attack or stroke. The two types of cholesterol that are most commonly referred to are:   Low-density lipoprotein (LDL) cholesterol  Also known as bad cholesterol, this is the cholesterol that tends to build up along your arteries. Bad cholesterol levels are increased by eating fats that are saturated or hydrogenated. Optimal level of this cholesterol is less than 100. Over 130 starts to get risky for heart disease.    High-density lipoprotein (HDL) cholesterol  Also known as good cholesterol, this type of cholesterol actually carries cholesterol away from your arteries and may, therefore, help lower your risk of having a heart attack. You want this level to be high (ideally greater than 60). It is a risk to have a level less than 40. You can raise this good cholesterol by eating olive oil, canola oil, avocados, or nuts. Exercise raises this level, too. Fat    Fat is calorie dense and packs a lot of calories into a small amount of food. Even though fats should be limited due to their high calorie content, not all fats are bad. In fact, some fats are quite healthful. Fat can be broken down into four main types. The good-for-you fats are:   Monounsaturated fat  found in oils such as olive and canola, avocados, and nuts and natural nut butters; can decrease cholesterol levels, while keeping levels of HDL cholesterol high   Polyunsaturated fat  found in oils such as safflower, sunflower, soybean, corn, and sesame; can decrease total cholesterol and LDL cholesterol   Omega-3 fatty acids  particularly those found in fatty fish (such as salmon, trout, tuna, mackerel, herring, and sardines); can decrease risk of arrhythmias, decrease triglyceride levels, and slightly lower blood pressure   The fats that you want to limit are:   Saturated fat  found in animal products, many fast foods, and a few vegetables; increases total blood cholesterol, including LDL levels   Animal fats that are saturated include: butter, lard, whole-milk dairy products, meat fat, and poultry skin   Vegetable fats that are saturated include: hydrogenated shortening, palm oil, coconut oil, cocoa butter   Hydrogenated or trans fat  found in margarine and vegetable shortening, most shelf stable snack foods, and fried foods; increases LDL and decreases HDL     It is generally recommended that you limit your total fat for the day to less than 30% of your total calories.  If you follow an 1800-calorie heart healthy diet, for example, this would mean 60 grams of fat or less per day. Saturated fat and trans fat in your diet raises your blood cholesterol the most, much more than dietary cholesterol does. For this reason, on a heart-healthy diet, less than 7% of your calories should come from saturated fat and ideally 0% from trans fat. On an 1800-calorie diet, this translates into less than 14 grams of saturated fat per day, leaving 46 grams of fat to come from mono- and polyunsaturated fats.    Food Choices on a Heart Healthy Diet   Food Category   Foods Recommended   Foods to Avoid   Grains   Breads and rolls without salted tops Most dry and cooked cereals Unsalted crackers and breadsticks Low-sodium or homemade breadcrumbs or stuffing All rice and pastas   Breads, rolls, and crackers with salted tops High-fat baked goods (eg, muffins, donuts, pastries) Quick breads, self-rising flour, and biscuit mixes Regular bread crumbs Instant hot cereals Commercially prepared rice, pasta, or stuffing mixes   Vegetables   Most fresh, frozen, and low-sodium canned vegetables Low-sodium and salt-free vegetable juices Canned vegetables if unsalted or rinsed   Regular canned vegetables and juices, including sauerkraut and pickled vegetables Frozen vegetables with sauces Commercially prepared potato and vegetable mixes   Fruits   Most fresh, frozen, and canned fruits All fruit juices   Fruits processed with salt or sodium   Milk   Nonfat or low-fat (1%) milk Nonfat or low-fat yogurt Cottage cheese, low-fat ricotta, cheeses labeled as low-fat and low-sodium   Whole milk Reduced-fat (2%) milk Malted and chocolate milk Full fat yogurt Most cheeses (unless low-fat and low salt) Buttermilk (no more than 1 cup per week)   Meats and Beans   Lean cuts of fresh or frozen beef, veal, lamb, or pork (look for the word loin) Fresh or frozen poultry without the skin Fresh or frozen fish and some shellfish Egg whites and egg substitutes (Limit whole eggs to three per week) Tofu Nuts or seeds (unsalted, dry-roasted), low-sodium peanut butter Dried peas, beans, and lentils   Any smoked, cured, salted, or canned meat, fish, or poultry (including easley, chipped beef, cold cuts, hot dogs, sausages, sardines, and anchovies) Poultry skins Breaded and/or fried fish or meats Canned peas, beans, and lentils Salted nuts   Fats and Oils   Olive oil and canola oil Low-sodium, low-fat salad dressings and mayonnaise   Butter, margarine, coconut and palm oils, easley fat   Snacks, Sweets, and Condiments   Low-sodium or unsalted versions of broths, soups, soy sauce, and condiments Pepper, herbs, and spices; vinegar, lemon, or lime juice Low-fat frozen desserts (yogurt, sherbet, fruit bars) Sugar, cocoa powder, honey, syrup, jam, and preserves Low-fat, trans-fat free cookies, cakes, and pies Brett and animal crackers, fig bars, irena snaps   High-fat desserts Broth, soups, gravies, and sauces, made from instant mixes or other high-sodium ingredients Salted snack foods Canned olives Meat tenderizers, seasoning salt, and most flavored vinegars   Beverages   Low-sodium carbonated beverages Tea and coffee in moderation Soy milk   Commercially softened water   Suggestions   Make whole grains, fruits, and vegetables the base of your diet. Choose heart-healthy fats such as canola, olive, and flaxseed oil, and foods high in heart-healthy fats, such as nuts, seeds, soybeans, tofu, and fish. Eat fish at least twice per week; the fish highest in omega-3 fatty acids and lowest in mercury include salmon, herring, mackerel, sardines, and canned chunk light tuna. If you eat fish less than twice per week or have high triglycerides, talk to your doctor about taking fish oil supplements. Read food labels. For products low in fat and cholesterol, look for fat free, low-fat, cholesterol free, saturated fat free, and trans fat freeAlso scan the Nutrition Facts Label, which lists saturated fat, trans fat, and cholesterol amounts. For products low in sodium, look for sodium free, very low sodium, low sodium, no added salt, and unsalted   Skip the salt when cooking or at the table; if food needs more flavor, get creative and try out different herbs and spices. Garlic and onion also add substantial flavor to foods. Trim any visible fat off meat and poultry before cooking, and drain the fat off after huff. Use cooking methods that require little or no added fat, such as grilling, boiling, baking, poaching, broiling, roasting, steaming, stir-frying, and sauting. Avoid fast food and convenience food. They tend to be high in saturated and trans fat and have a lot of added salt. Talk to a registered dietitian for individualized diet advice.       Last Reviewed: March 2011 Anthony Gardner MS, MPH, RD   Updated: 3/29/2011

## 2023-03-01 DIAGNOSIS — I49.3 FREQUENT PVCS: Primary | ICD-10-CM

## 2023-03-08 ENCOUNTER — OFFICE VISIT (OUTPATIENT)
Dept: FAMILY MEDICINE CLINIC | Age: 53
End: 2023-03-08
Payer: MEDICARE

## 2023-03-08 VITALS
HEIGHT: 66 IN | DIASTOLIC BLOOD PRESSURE: 80 MMHG | HEART RATE: 76 BPM | SYSTOLIC BLOOD PRESSURE: 124 MMHG | OXYGEN SATURATION: 95 % | WEIGHT: 286 LBS | TEMPERATURE: 99.6 F | RESPIRATION RATE: 18 BRPM | BODY MASS INDEX: 45.96 KG/M2

## 2023-03-08 DIAGNOSIS — H92.02 OTALGIA, LEFT: Primary | ICD-10-CM

## 2023-03-08 DIAGNOSIS — G50.0 TRIGEMINAL NEURALGIA OF LEFT SIDE OF FACE: ICD-10-CM

## 2023-03-08 PROCEDURE — 99213 OFFICE O/P EST LOW 20 MIN: CPT | Performed by: FAMILY MEDICINE

## 2023-03-08 PROCEDURE — 99214 OFFICE O/P EST MOD 30 MIN: CPT | Performed by: FAMILY MEDICINE

## 2023-03-08 PROCEDURE — 3074F SYST BP LT 130 MM HG: CPT | Performed by: FAMILY MEDICINE

## 2023-03-08 PROCEDURE — 3079F DIAST BP 80-89 MM HG: CPT | Performed by: FAMILY MEDICINE

## 2023-03-08 RX ORDER — BACLOFEN 10 MG/1
TABLET ORAL EVERY 8 HOURS PRN
COMMUNITY
Start: 2023-02-23

## 2023-03-08 RX ORDER — GABAPENTIN 800 MG/1
800 TABLET ORAL 3 TIMES DAILY
COMMUNITY

## 2023-03-08 RX ORDER — HYDROXYZINE PAMOATE 25 MG/1
1 CAPSULE ORAL 2 TIMES DAILY PRN
COMMUNITY
Start: 2022-12-15

## 2023-03-08 RX ORDER — MEMANTINE HYDROCHLORIDE 5 MG/1
5 TABLET ORAL 2 TIMES DAILY
Qty: 60 TABLET | Refills: 2 | Status: SHIPPED | OUTPATIENT
Start: 2023-03-08

## 2023-03-08 ASSESSMENT — ENCOUNTER SYMPTOMS
RHINORRHEA: 0
SINUS PAIN: 0
SORE THROAT: 0
SINUS PRESSURE: 0

## 2023-03-08 NOTE — PROGRESS NOTES
3/8/2023     Gilda Kearns (:  1970) is a 47 y.o. female, here for evaluation of the following medical concerns:    Otalgia   There is pain in the left ear. This is a new problem. The current episode started 1 to 4 weeks ago (over the last 2 weeks patient has had). The problem occurs constantly. The problem has been gradually worsening. The pain is severe. Associated symptoms include headaches and neck pain (pain is going down to the left side of the neck). Pertinent negatives include no rhinorrhea or sore throat. Patient reports that the pain in the nasal area now radiates back to the ear and down into the mandible and even going down to the left side of the neck. She has pain that radiates up around her eye. Did see the ENT at [facility] and she did not feel there was any obvious etiology in the nasal passages to explain her acute pain and felt that it may have been more of an underlying trigeminal neuralgia. Set her up to see the neurologist which she did see in May thought perhaps this also was likely the etiology. Had increased her gabapentin and had placed her on baclofen but she has not really noticed any improvement at all. The baclofen does cause severe sedation. And so she is not really able to tolerate this. Is waiting on an MRI and an MRI of the brain and they cannot get this scheduled until the end of April. I did review her notes and it appears that there were other treatment options 1 of which was Namenda and so I did suggest perhaps starting this for the pain as it sounds to be more related to her neuropathic pain symptoms. Has noted drainage out of the left ear. Has not had any diminished hearing. She describes it as feeling as if there is someone stabbing her ear with an ice pick. Did review patient's med list, allergies, social history,pmhx and pshx today as noted in the record. Review of Systems   Constitutional:  Negative for chills, fatigue and fever. HENT:  Positive for ear pain. Negative for congestion, postnasal drip, rhinorrhea, sinus pressure, sinus pain and sore throat. Musculoskeletal:  Positive for neck pain (pain is going down to the left side of the neck). Neurological:  Positive for headaches. Prior to Visit Medications    Medication Sig Taking? Authorizing Provider   baclofen (LIORESAL) 10 MG tablet Take by mouth every 8 hours as needed Yes Historical Provider, MD   hydrOXYzine pamoate (VISTARIL) 25 MG capsule Take 1 capsule by mouth 2 times daily as needed Yes Historical Provider, MD   gabapentin (NEURONTIN) 800 MG tablet Take 800 mg by mouth 3 times daily.  Yes Historical Provider, MD   memantine (NAMENDA) 5 MG tablet Take 1 tablet by mouth 2 times daily Yes Nenita Tapia DO   prazosin (MINIPRESS) 2 MG capsule  Yes Historical Provider, MD   esomeprazole (NEXIUM) 40 MG delayed release capsule TAKE 1 CAPSULE BY MOUTH TWICE DAILY Yes Nenita Tapia DO   potassium chloride (KLOR-CON) 8 MEQ extended release tablet TAKE 5 TABLETS BY MOUTH EVERY DAY Yes Nenita Tapia DO   aspirin (ASPIRIN LOW DOSE) 81 MG EC tablet TAKE 1 TABLET BY MOUTH DAILY Yes Nenita Tapia DO   metoprolol tartrate (LOPRESSOR) 50 MG tablet Take 1 tablet by mouth 2 times daily Yes Nenita Tapia DO   montelukast (SINGULAIR) 10 MG tablet Take 1 tablet by mouth daily Yes Nenita Tapia DO   levothyroxine (SYNTHROID) 125 MCG tablet TAKE 1 TABLET BY MOUTH EVERY DAY Yes Nenita Tapia DO   Cholecalciferol (VITAMIN D3) 125 MCG (5000 UT) TABS Take 1 tablet by mouth daily Yes Nenita Tapia DO   hydroCHLOROthiazide (HYDRODIURIL) 25 MG tablet TAKE 1 TABLET BY MOUTH DAILY Yes Nenita Tapia DO   oxybutynin (DITROPAN XL) 10 MG extended release tablet Take 1 tablet by mouth daily Yes Nenita Tapia DO   desvenlafaxine succinate (PRISTIQ) 100 MG TB24 extended release tablet take 1 tablet by mouth daily Yes Historical Provider, MD   albuterol sulfate HFA (PROVENTIL;VENTOLIN;PROAIR) 108 (90 Base) MCG/ACT inhaler INHALE 2 PUFFS BY MOUTH EVERY 6 HOURS AS NEEDED FOR WHEEZING Yes [place] Right, DO   lamoTRIgine (LAMICTAL) 150 MG tablet TAKE 1 TABLET BY MOUTH TWICE DAILY Yes Historical Provider, MD   FEROSUL 325 (65 Fe) MG tablet TAKE 1 TABLET BY MOUTH TWICE DAILY Yes [place] Right, DO   traZODone (DESYREL) 300 MG tablet take 1 tablet by mouth at bedtime Yes Historical Provider, MD        Social History     Tobacco Use    Smoking status: Never    Smokeless tobacco: Never   Substance Use Topics    Alcohol use: No        Vitals:    03/08/23 1121   BP: 124/80   Site: Left Upper Arm   Position: Sitting   Cuff Size: Large Adult   Pulse: 76   Resp: 18   Temp: 99.6 °F (37.6 °C)   TempSrc: Tympanic   SpO2: 95%   Weight: 286 lb (129.7 kg)   Height: 5' 6\" (1.676 m)     Estimated body mass index is 46.16 kg/m² as calculated from the following:    Height as of this encounter: 5' 6\" (1.676 m). Weight as of this encounter: 286 lb (129.7 kg). Physical Exam  Vitals and nursing note reviewed. Constitutional:       General: She is not in acute distress. Appearance: She is well-developed. She is not diaphoretic. Comments: Patient visibly appears uncomfortable. HENT:      Head: Normocephalic and atraumatic. Right Ear: Tympanic membrane, ear canal and external ear normal. There is no impacted cerumen. Left Ear: Tympanic membrane, ear canal and external ear normal. There is no impacted cerumen. Nose: Nose normal. No congestion or rhinorrhea. Mouth/Throat:      Mouth: Mucous membranes are moist.      Pharynx: Oropharynx is clear. No oropharyngeal exudate or posterior oropharyngeal erythema. Eyes:      Conjunctiva/sclera: Conjunctivae normal.   Pulmonary:      Effort: Pulmonary effort is normal.   Musculoskeletal:      Cervical back: Normal range of motion.       Comments: No reproducible tenderness with palpation around the left side of the face or around the ear or mastoid region. Does not have pain down into the mandible. There is some slight pain along the anterior lower neck but no palpable lymph nodes. She is able to move the head and neck in a normal range of motion. When I do palpate to the side of her face and cheek she does state it gives her headache but its not definitive reproducible pain with palpation to that direct area. Skin:     General: Skin is warm and dry. Coloration: Skin is not pale. Findings: No erythema or rash. Neurological:      Mental Status: She is alert and oriented to person, place, and time. Psychiatric:         Behavior: Behavior normal.         Thought Content: Thought content normal.         Judgment: Judgment normal.       ASSESSMENT/PLAN:  Encounter Diagnoses   Name Primary? Otalgia, left Yes    Trigeminal neuralgia of left side of face      Orders Placed This Encounter   Medications    memantine (NAMENDA) 5 MG tablet     Sig: Take 1 tablet by mouth 2 times daily     Dispense:  60 tablet     Refill:  2     Symptoms to me seem more consistent with trigeminal neuralgia. She is going to contact the neurologist at [facility] clinic to see if there is something more that they can do but will try the Namenda in the interim to see if that provides her with any benefit. I do not see anything structurally abnormal with her exam today to suggest infection or other etiology of her pain. Seems to be more neurologic in nature. Can take Tylenol as needed for pain. Return  if no improvement in symptoms or if any further symptoms arise. (Please note that portions of this note were completed with a voice recognition program. Efforts were made to edit the dictations but occasionally words are mis-transcribed.)      No follow-ups on file. An electronic signature was used to authenticate this note.     --Marylen Cipro,  on 3/8/2023 at 12:24 PM

## 2023-03-17 ENCOUNTER — OFFICE VISIT (OUTPATIENT)
Dept: CARDIOLOGY | Age: 53
End: 2023-03-17
Payer: MEDICARE

## 2023-03-17 VITALS
SYSTOLIC BLOOD PRESSURE: 122 MMHG | HEART RATE: 95 BPM | DIASTOLIC BLOOD PRESSURE: 78 MMHG | OXYGEN SATURATION: 98 % | WEIGHT: 287 LBS | BODY MASS INDEX: 46.12 KG/M2 | HEIGHT: 66 IN

## 2023-03-17 DIAGNOSIS — I49.3 PVC'S (PREMATURE VENTRICULAR CONTRACTIONS): Primary | ICD-10-CM

## 2023-03-17 PROBLEM — E11.9 TYPE 2 DIABETES MELLITUS (HCC): Status: ACTIVE | Noted: 2023-03-17

## 2023-03-17 PROCEDURE — 3074F SYST BP LT 130 MM HG: CPT | Performed by: INTERNAL MEDICINE

## 2023-03-17 PROCEDURE — 99214 OFFICE O/P EST MOD 30 MIN: CPT | Performed by: INTERNAL MEDICINE

## 2023-03-17 PROCEDURE — 99203 OFFICE O/P NEW LOW 30 MIN: CPT | Performed by: INTERNAL MEDICINE

## 2023-03-17 PROCEDURE — 3078F DIAST BP <80 MM HG: CPT | Performed by: INTERNAL MEDICINE

## 2023-03-17 RX ORDER — METOPROLOL TARTRATE 75 MG/1
50 TABLET, FILM COATED ORAL 2 TIMES DAILY
Qty: 180 TABLET | Refills: 3 | Status: SHIPPED | OUTPATIENT
Start: 2023-03-17

## 2023-03-17 ASSESSMENT — ENCOUNTER SYMPTOMS
VOMITING: 0
BACK PAIN: 0
NAUSEA: 0
EYE DISCHARGE: 0
CHEST TIGHTNESS: 0
SHORTNESS OF BREATH: 0
ABDOMINAL PAIN: 0

## 2023-03-17 NOTE — PROGRESS NOTES
Today's Date: 3/17/2023  Patient's Name: Keyona Snow  Patient's age: 47 y.o., 1970    Subjective: The patient is a 47y.o. year old, , female is in the office for PVCs. Denies exertional chest pain or SOB, no dizziness or syncope. Does have palpitations. Past Medical History:   has a past medical history of Abnormal CT of the abdomen, Allergic rhinitis, Anemia, Anxiety, Asthma, Chronic low back pain, Chronic neck pain, Degenerative joint disease of knee, Depression, Endometriosis, GERD (gastroesophageal reflux disease), Gout, Hyperlipidemia, Hypertension, Hypothyroid, Kidney stone, Left shoulder pain, Leukocytosis, Palpitation, Paresthesias, Right knee pain, and Seizure ([facility] [address] Past Surgical History:   has a past surgical history that includes Lumbar disc surgery (10/06/2010); Lumbar spine surgery (Left, 02/13/2008); laparoscopy (03/08/2002); Tubal ligation (04/02/1990); arthroplasty (03/26/1986); Knee arthroscopy (Left); Nasal septum surgery; spinal cord decompression (04/23/2012); Cystoscopy (02/08/2013); Knee arthroscopy (Left, 10/16/2013); Hysterectomy (05/2005); Knee arthroscopy (Right, 02/08/2012); Cholecystectomy, laparoscopic (11/14/2011); cervical fusion (06/2014); other surgical history (12/30/2014); other surgical history (04/16/2015); Total knee arthroplasty (Left, 04/2015); joint replacement; Upper gastrointestinal endoscopy (10/14/2011); Upper gastrointestinal endoscopy (N/A, 09/10/2018); lumbar fusion (09/26/2020); Ovary removal; Nasal septoplasty w/ turbinoplasty (Bilateral, 07/18/2022); and Septoplasty (07/20/2022). Home Medications:  Prior to Admission medications    Medication Sig Start Date End Date Taking?  Authorizing Provider   baclofen (LIORESAL) 10 MG tablet Take by mouth every 8 hours as needed 2/23/23  Yes Historical Provider, MD   hydrOXYzine pamoate (VISTARIL) 25 MG capsule Take 1 capsule by mouth 2 times daily as needed 12/15/22  Yes Historical Provider, MD   gabapentin (NEURONTIN) 800 MG tablet Take 800 mg by mouth 3 times daily.    Yes Historical Provider, MD   memantine (NAMENDA) 5 MG tablet Take 1 tablet by mouth 2 times daily 3/8/23  Yes Karina Hence, DO   prazosin (MINIPRESS) 2 MG capsule  1/12/23  Yes Historical Provider, MD   esomeprazole ([address]) 40 MG delayed release capsule TAKE 1 CAPSULE BY MOUTH TWICE DAILY 1/13/23  Yes Karina Hence, DO   potassium chloride (KLOR-CON) 8 MEQ extended release tablet TAKE 5 TABLETS BY MOUTH EVERY DAY 1/13/23  Yes Karina Hence, DO   aspirin (ASPIRIN LOW DOSE) 81 MG EC tablet TAKE 1 TABLET BY MOUTH DAILY 1/13/23  Yes Karina Hence, DO   metoprolol tartrate (LOPRESSOR) 50 MG tablet Take 1 tablet by mouth 2 times daily 1/13/23  Yes Karina Hence, DO   montelukast (SINGULAIR) 10 MG tablet Take 1 tablet by mouth daily 1/13/23  Yes Karina Hence, DO   levothyroxine (SYNTHROID) 125 MCG tablet TAKE 1 TABLET BY MOUTH EVERY DAY 1/13/23  Yes Karina Hence, DO   Cholecalciferol (VITAMIN D3) 125 MCG (5000 UT) TABS Take 1 tablet by mouth daily 1/13/23  Yes Karina Hence, DO   hydroCHLOROthiazide (HYDRODIURIL) 25 MG tablet TAKE 1 TABLET BY MOUTH DAILY 1/13/23  Yes Karina Hence, DO   oxybutynin (DITROPAN XL) 10 MG extended release tablet Take 1 tablet by mouth daily 1/10/23  Yes Karina Hence, DO   desvenlafaxine succinate (PRISTIQ) 100 MG TB24 extended release tablet take 1 tablet by mouth daily 12/12/22  Yes Historical Provider, MD   albuterol sulfate HFA (PROVENTIL;VENTOLIN;PROAIR) 108 (90 Base) MCG/ACT inhaler INHALE 2 PUFFS BY MOUTH EVERY 6 HOURS AS NEEDED FOR WHEEZING 8/12/22  Yes Karina Hence, DO   lamoTRIgine (LAMICTAL) 150 MG tablet TAKE 1 TABLET BY MOUTH TWICE DAILY 7/18/22  Yes Historical Provider, MD   FEROSUL 325 (65 Fe) MG tablet TAKE 1 TABLET BY MOUTH TWICE DAILY 5/16/22  Yes Karina Hence, DO   traZODone (DESYREL) 300 MG tablet take 1 tablet by mouth at bedtime 3/1/18  Yes Historical Provider, MD       Allergies:  Morphine, Allopurinol, Bumex [bumetanide], Cat hair extract, Colchicine, Furosemide, Gramineae pollens, Indocin [indomethacin], Molds & smuts, Uloric [febuxostat], and Hydrocodone-acetaminophen    Social History:   reports that she has never smoked. She has never used smokeless tobacco. She reports that she does not drink alcohol and does not use drugs. Review of Systems:  Review of Systems   Constitutional:  Negative for chills, fatigue and fever. HENT:  Negative for congestion and dental problem. Eyes:  Negative for discharge. Respiratory:  Negative for chest tightness and shortness of breath. Cardiovascular:  Positive for palpitations. Negative for chest pain. Gastrointestinal:  Negative for abdominal pain, nausea and vomiting. Musculoskeletal:  Negative for arthralgias and back pain. Neurological:  Negative for dizziness and syncope. Psychiatric/Behavioral:  Negative for agitation and behavioral problems. Physical Exam:  /78   Pulse 95   Ht 5' 6\" (1.676 m)   Wt 287 lb (130.2 kg)   LMP 05/05/2005 (Exact Date)   SpO2 98%   BMI 46.32 kg/m²    Physical Exam  Constitutional:       Appearance: Normal appearance. HENT:      Head: Normocephalic and atraumatic. Cardiovascular:      Rate and Rhythm: Normal rate and regular rhythm. Pulses: Normal pulses. Heart sounds: Normal heart sounds. No murmur heard. Pulmonary:      Effort: Pulmonary effort is normal. No respiratory distress. Breath sounds: Normal breath sounds. No stridor. Musculoskeletal:         General: No swelling or tenderness. Cervical back: No rigidity or tenderness. Skin:     Coloration: Skin is not jaundiced or pale. Neurological:      General: No focal deficit present. Mental Status: She is alert and oriented to person, place, and time.    Psychiatric:         Mood and Affect: Mood normal.         Behavior: Behavior normal. Cardiac Data:  EKG:     Labs:     CBC: No results for input(s): WBC, HGB, HCT, PLT in the last 72 hours. BMP:No results for input(s): NA, K, CO2, BUN, CREATININE, LABGLOM, GLUCOSE in the last 72 hours. PT/INR: No results for input(s): PROTIME, INR in the last 72 hours. FASTING LIPID PANEL:  Lab Results   Component Value Date/Time    HDL 35 08/15/2022 09:19 AM    TRIG 344 08/15/2022 09:19 AM     LIVER PROFILE:No results for input(s): AST, ALT, LABALBU in the last 72 hours. IMPRESSION:    Patient Active Problem List   Diagnosis    Iron deficiency anemia    Chronic low back pain    Chronic neck pain    Hypertension    Depression    Anxiety    Hypothyroidism    Allergic rhinitis    GERD (gastroesophageal reflux disease)    Anemia    Chronic fatigue    Mixed hyperlipidemia    Paresthesias    Hyperlipidemia    Tripping over things    Balance problems    Memory problem    Bilateral carpal tunnel syndrome    Polyneuropathy associated with underlying disease (HCC)    Ulnar neuropathy of both upper extremities    Peroneal neuropathy    Tibial neuropathy    Slurred speech    Other dysphagia    Cerebral ischemia    Abnormal EEG    Nocturnal seizures (HCC)    Obstructive sleep apnea    Localized swelling of both lower legs    Seizure disorder (HCC)    Elevated hemoglobin A1c    Morbid obesity with BMI of 40.0-44.9, adult ([facility] [address]       Assessment/Plan:  Event Monitor showing frequent PVCs. On Metoprolol. Will increase Metoprolol to 75 mg po BID. Echo 8/2020 EF 65%. Grade I DD. Normal valves. Essential hypertension. Well controlled.      Carlee Seaman MD, MD  [facility] Cardiology Consult           [phone]

## 2023-03-17 NOTE — PROGRESS NOTES
I called and spoke to pharmacist at [facility] to clarify metoprolol tartrate 75 mg bid is the correct dosage from Dr. Aleks Cline today.

## 2023-03-20 ENCOUNTER — OFFICE VISIT (OUTPATIENT)
Dept: PRIMARY CARE CLINIC | Age: 53
End: 2023-03-20
Payer: MEDICARE

## 2023-03-20 VITALS
SYSTOLIC BLOOD PRESSURE: 126 MMHG | OXYGEN SATURATION: 97 % | BODY MASS INDEX: 46.12 KG/M2 | WEIGHT: 287 LBS | HEART RATE: 50 BPM | TEMPERATURE: 100 F | HEIGHT: 66 IN | DIASTOLIC BLOOD PRESSURE: 70 MMHG

## 2023-03-20 DIAGNOSIS — J06.9 UPPER RESPIRATORY TRACT INFECTION, UNSPECIFIED TYPE: Primary | ICD-10-CM

## 2023-03-20 LAB
INFLUENZA A ANTIGEN, POC: NEGATIVE
INFLUENZA B ANTIGEN, POC: NEGATIVE
LOT EXPIRE DATE: NORMAL
LOT KIT NUMBER: NORMAL
SARS-COV-2, POC: NORMAL
VALID INTERNAL CONTROL: YES
VENDOR AND KIT NAME POC: NORMAL

## 2023-03-20 PROCEDURE — 87428 SARSCOV & INF VIR A&B AG IA: CPT

## 2023-03-20 PROCEDURE — 99212 OFFICE O/P EST SF 10 MIN: CPT

## 2023-03-20 RX ORDER — TRIAMCINOLONE ACETONIDE 40 MG/ML
40 INJECTION, SUSPENSION INTRA-ARTICULAR; INTRAMUSCULAR ONCE
Status: COMPLETED | OUTPATIENT
Start: 2023-03-20 | End: 2023-03-20

## 2023-03-20 RX ORDER — BENZONATATE 100 MG/1
100 CAPSULE ORAL 3 TIMES DAILY PRN
Qty: 21 CAPSULE | Refills: 0 | Status: SHIPPED | OUTPATIENT
Start: 2023-03-20 | End: 2023-03-27

## 2023-03-20 RX ORDER — IBUPROFEN 200 MG
400 TABLET ORAL ONCE
Status: COMPLETED | OUTPATIENT
Start: 2023-03-20 | End: 2023-03-20

## 2023-03-20 RX ADMIN — Medication 400 MG: at 09:06

## 2023-03-20 RX ADMIN — TRIAMCINOLONE ACETONIDE 40 MG: 40 INJECTION, SUSPENSION INTRA-ARTICULAR; INTRAMUSCULAR at 09:05

## 2023-03-20 ASSESSMENT — ENCOUNTER SYMPTOMS
NAUSEA: 1
SWOLLEN GLANDS: 0
DIARRHEA: 0
CHEST TIGHTNESS: 0
SINUS PRESSURE: 0
VOMITING: 0
COUGH: 1
SORE THROAT: 1
RHINORRHEA: 0
SINUS PAIN: 0
ABDOMINAL PAIN: 0

## 2023-03-20 ASSESSMENT — PATIENT HEALTH QUESTIONNAIRE - PHQ9
SUM OF ALL RESPONSES TO PHQ9 QUESTIONS 1 & 2: 0
SUM OF ALL RESPONSES TO PHQ QUESTIONS 1-9: 0
1. LITTLE INTEREST OR PLEASURE IN DOING THINGS: 0
2. FEELING DOWN, DEPRESSED OR HOPELESS: 0
SUM OF ALL RESPONSES TO PHQ QUESTIONS 1-9: 0

## 2023-03-20 ASSESSMENT — PAIN DESCRIPTION - DESCRIPTORS: DESCRIPTORS: OTHER (COMMENT)

## 2023-03-20 ASSESSMENT — PAIN DESCRIPTION - LOCATION: LOCATION: OTHER (COMMENT)

## 2023-03-20 ASSESSMENT — PAIN SCALES - GENERAL: PAINLEVEL_OUTOF10: 0

## 2023-03-20 ASSESSMENT — VISUAL ACUITY: OU: 1

## 2023-03-20 NOTE — PROGRESS NOTES
Date)   SpO2 97%   BMI 46.32 kg/m²   Physical Exam  Vitals reviewed. Constitutional:       General: She is not in acute distress. HENT:      Head: Normocephalic. Right Ear: Hearing, tympanic membrane and ear canal normal.      Left Ear: Hearing, tympanic membrane and ear canal normal.      Nose: Mucosal edema and congestion present. No rhinorrhea. Right Turbinates: Swollen. Left Turbinates: Swollen. Right Sinus: No maxillary sinus tenderness or frontal sinus tenderness. Left Sinus: No maxillary sinus tenderness or frontal sinus tenderness. Mouth/Throat:      Lips: Pink. Mouth: Mucous membranes are moist.      Pharynx: Uvula midline. Oropharyngeal exudate and posterior oropharyngeal erythema present. Comments: Mild amount of mucous to posterior orpharynx  Eyes:      General: Vision grossly intact. Extraocular Movements: Extraocular movements intact. Conjunctiva/sclera: Conjunctivae normal.      Pupils: Pupils are equal, round, and reactive to light. Cardiovascular:      Rate and Rhythm: Normal rate and regular rhythm. Heart sounds: No murmur heard. Pulmonary:      Effort: Pulmonary effort is normal. No tachypnea, accessory muscle usage or respiratory distress. Breath sounds: Normal breath sounds. Musculoskeletal:         General: No swelling. Cervical back: Full passive range of motion without pain, normal range of motion and neck supple. Lymphadenopathy:      Cervical: No cervical adenopathy. Neurological:      General: No focal deficit present. Mental Status: She is alert and oriented to person, place, and time.    Psychiatric:         Mood and Affect: Mood normal.         Behavior: Behavior normal.   Office Visit on 2023   Component Date Value Ref Range Status    VALID INTERNAL CONTROL 2023 yes   Final    Lot/Kit Number [id number]   Final    Lot/Kit  date: 2023 3/16/24   Final    SARS-COV-2, POC

## 2023-03-20 NOTE — PATIENT INSTRUCTIONS
Your covid/influenza tests were negative  Please take claritin for congestion  Tessalon for cough  Rotate tylenol/ibuprofen for pain/fevers  Increase water intake  Return for continued or worsening symptoms

## 2023-03-21 ENCOUNTER — PATIENT MESSAGE (OUTPATIENT)
Dept: FAMILY MEDICINE CLINIC | Age: 53
End: 2023-03-21

## 2023-03-21 RX ORDER — CEFUROXIME AXETIL 500 MG/1
500 TABLET ORAL 2 TIMES DAILY
Qty: 20 TABLET | Refills: 0 | Status: SHIPPED | OUTPATIENT
Start: 2023-03-21 | End: 2023-03-31

## 2023-03-21 NOTE — TELEPHONE ENCOUNTER
From: Deandra Faria  To: Dr. Fernandez Si: 3/21/2023 12:33 PM EDT  Subject: Urgent care visit    I was in urgent care yesterday. The CNA said I have a respiratory infection but didnt give me an antibiotic. I am now throwing up blood in all the mucus and have a 101 fever. Do i need to be seen again or can i get an antibiotic? I tried to get an appointment with you but you dont have any openings til the 28th. I can also do an evisit if you want.      Thank you

## 2023-03-23 ENCOUNTER — HOSPITAL ENCOUNTER (EMERGENCY)
Age: 53
Discharge: HOME | End: 2023-03-23
Attending: EMERGENCY MEDICINE
Payer: MEDICARE

## 2023-03-23 ENCOUNTER — APPOINTMENT (OUTPATIENT)
Dept: CT IMAGING | Age: 53
End: 2023-03-23
Payer: MEDICARE

## 2023-03-23 VITALS
HEART RATE: 82 BPM | DIASTOLIC BLOOD PRESSURE: 81 MMHG | WEIGHT: 285 LBS | OXYGEN SATURATION: 96 % | TEMPERATURE: 98.6 F | RESPIRATION RATE: 18 BRPM | SYSTOLIC BLOOD PRESSURE: 155 MMHG | HEIGHT: 66 IN | BODY MASS INDEX: 45.8 KG/M2

## 2023-03-23 DIAGNOSIS — J40 BRONCHITIS: Primary | ICD-10-CM

## 2023-03-23 LAB
ABSOLUTE EOS #: 0.12 K/UL (ref 0–0.44)
ABSOLUTE IMMATURE GRANULOCYTE: 0.05 K/UL (ref 0–0.3)
ABSOLUTE LYMPH #: 2.64 K/UL (ref 1.1–3.7)
ABSOLUTE MONO #: 0.54 K/UL (ref 0.1–1.2)
ANION GAP SERPL CALCULATED.3IONS-SCNC: 12 MMOL/L (ref 9–17)
BASOPHILS # BLD: 0 % (ref 0–2)
BASOPHILS ABSOLUTE: <0.03 K/UL (ref 0–0.2)
BNP SERPL-MCNC: 41 PG/ML
BUN SERPL-MCNC: 8 MG/DL (ref 6–20)
BUN/CREAT BLD: 9 (ref 9–20)
CALCIUM SERPL-MCNC: 8.9 MG/DL (ref 8.6–10.4)
CHLORIDE SERPL-SCNC: 103 MMOL/L (ref 98–107)
CO2 SERPL-SCNC: 27 MMOL/L (ref 20–31)
CREAT SERPL-MCNC: 0.92 MG/DL (ref 0.5–0.9)
D DIMER BLD IA.RAPID-MCNC: 0.77 MG/L FEU (ref 0–0.59)
EOSINOPHILS RELATIVE PERCENT: 2 % (ref 1–4)
GFR SERPL CREATININE-BSD FRML MDRD: >60 ML/MIN/1.73M2
GLUCOSE SERPL-MCNC: 79 MG/DL (ref 70–99)
HCT VFR BLD AUTO: 41.7 % (ref 36.3–47.1)
HGB BLD-MCNC: 13.2 G/DL (ref 11.9–15.1)
IMMATURE GRANULOCYTES: 1 %
LYMPHOCYTES # BLD: 48 % (ref 24–43)
MCH RBC QN AUTO: 27.6 PG (ref 25.2–33.5)
MCHC RBC AUTO-ENTMCNC: 31.7 G/DL (ref 25.2–33.5)
MCV RBC AUTO: 87.2 FL (ref 82.6–102.9)
MONOCYTES # BLD: 10 % (ref 3–12)
NRBC AUTOMATED: 0 PER 100 WBC
PDW BLD-RTO: 14.4 % (ref 11.8–14.4)
PLATELET # BLD AUTO: 157 K/UL (ref 138–453)
PMV BLD AUTO: 10.8 FL (ref 8.1–13.5)
POTASSIUM SERPL-SCNC: 3.4 MMOL/L (ref 3.7–5.3)
RBC # BLD: 4.78 M/UL (ref 3.95–5.11)
SARS-COV-2 RDRP RESP QL NAA+PROBE: NOT DETECTED
SEG NEUTROPHILS: 39 % (ref 36–65)
SEGMENTED NEUTROPHILS ABSOLUTE COUNT: 2.15 K/UL (ref 1.5–8.1)
SODIUM SERPL-SCNC: 142 MMOL/L (ref 135–144)
SPECIMEN DESCRIPTION: NORMAL
TROPONIN I SERPL DL<=0.01 NG/ML-MCNC: <6 NG/L (ref 0–14)
WBC # BLD AUTO: 5.5 K/UL (ref 3.5–11.3)

## 2023-03-23 PROCEDURE — 84484 ASSAY OF TROPONIN QUANT: CPT

## 2023-03-23 PROCEDURE — 6370000000 HC RX 637 (ALT 250 FOR IP): Performed by: EMERGENCY MEDICINE

## 2023-03-23 PROCEDURE — 80048 BASIC METABOLIC PNL TOTAL CA: CPT

## 2023-03-23 PROCEDURE — 2580000003 HC RX 258: Performed by: EMERGENCY MEDICINE

## 2023-03-23 PROCEDURE — 87635 SARS-COV-2 COVID-19 AMP PRB: CPT

## 2023-03-23 PROCEDURE — 2709999900 CT CHEST PULMONARY EMBOLISM W CONTRAST

## 2023-03-23 PROCEDURE — 99285 EMERGENCY DEPT VISIT HI MDM: CPT

## 2023-03-23 PROCEDURE — 83880 ASSAY OF NATRIURETIC PEPTIDE: CPT

## 2023-03-23 PROCEDURE — 85379 FIBRIN DEGRADATION QUANT: CPT

## 2023-03-23 PROCEDURE — 85025 COMPLETE CBC W/AUTO DIFF WBC: CPT

## 2023-03-23 PROCEDURE — 6360000004 HC RX CONTRAST MEDICATION: Performed by: EMERGENCY MEDICINE

## 2023-03-23 PROCEDURE — 36415 COLL VENOUS BLD VENIPUNCTURE: CPT

## 2023-03-23 PROCEDURE — 71260 CT THORAX DX C+: CPT | Performed by: EMERGENCY MEDICINE

## 2023-03-23 PROCEDURE — 93005 ELECTROCARDIOGRAM TRACING: CPT | Performed by: EMERGENCY MEDICINE

## 2023-03-23 PROCEDURE — 94640 AIRWAY INHALATION TREATMENT: CPT

## 2023-03-23 RX ORDER — PREDNISONE 20 MG/1
60 TABLET ORAL ONCE
Status: COMPLETED | OUTPATIENT
Start: 2023-03-23 | End: 2023-03-23

## 2023-03-23 RX ORDER — IPRATROPIUM BROMIDE AND ALBUTEROL SULFATE 2.5; .5 MG/3ML; MG/3ML
1 SOLUTION RESPIRATORY (INHALATION) ONCE
Status: COMPLETED | OUTPATIENT
Start: 2023-03-23 | End: 2023-03-23

## 2023-03-23 RX ORDER — 0.9 % SODIUM CHLORIDE 0.9 %
500 INTRAVENOUS SOLUTION INTRAVENOUS ONCE
Status: COMPLETED | OUTPATIENT
Start: 2023-03-23 | End: 2023-03-23

## 2023-03-23 RX ORDER — PREDNISONE 50 MG/1
50 TABLET ORAL DAILY
Qty: 4 TABLET | Refills: 0 | Status: SHIPPED | OUTPATIENT
Start: 2023-03-24 | End: 2023-03-28

## 2023-03-23 RX ORDER — ALBUTEROL SULFATE 90 UG/1
2 AEROSOL, METERED RESPIRATORY (INHALATION) 4 TIMES DAILY
Qty: 18 G | Refills: 0 | Status: SHIPPED | OUTPATIENT
Start: 2023-03-23

## 2023-03-23 RX ADMIN — SODIUM CHLORIDE 500 ML: 9 INJECTION, SOLUTION INTRAVENOUS at 14:30

## 2023-03-23 RX ADMIN — PREDNISONE 60 MG: 20 TABLET ORAL at 15:15

## 2023-03-23 RX ADMIN — IPRATROPIUM BROMIDE AND ALBUTEROL SULFATE 1 AMPULE: .5; 2.5 SOLUTION RESPIRATORY (INHALATION) at 13:02

## 2023-03-23 RX ADMIN — IOPAMIDOL 100 ML: 755 INJECTION, SOLUTION INTRAVENOUS at 14:08

## 2023-03-23 ASSESSMENT — PAIN DESCRIPTION - DESCRIPTORS: DESCRIPTORS: SHARP

## 2023-03-23 ASSESSMENT — LIFESTYLE VARIABLES: HOW OFTEN DO YOU HAVE A DRINK CONTAINING ALCOHOL: NEVER

## 2023-03-23 ASSESSMENT — PAIN DESCRIPTION - ONSET: ONSET: GRADUAL

## 2023-03-23 ASSESSMENT — PAIN - FUNCTIONAL ASSESSMENT: PAIN_FUNCTIONAL_ASSESSMENT: PREVENTS OR INTERFERES SOME ACTIVE ACTIVITIES AND ADLS

## 2023-03-23 ASSESSMENT — PAIN SCALES - GENERAL: PAINLEVEL_OUTOF10: 4

## 2023-03-23 ASSESSMENT — PAIN DESCRIPTION - ORIENTATION: ORIENTATION: MID

## 2023-03-23 ASSESSMENT — PAIN DESCRIPTION - LOCATION: LOCATION: CHEST

## 2023-03-23 NOTE — ED PROVIDER NOTES
CAPSULE BY MOUTH TWICE DAILY, Disp-180 capsule, R-1Normal      potassium chloride (KLOR-CON) 8 MEQ extended release tablet TAKE 5 TABLETS BY MOUTH EVERY DAY, Disp-450 tablet, R-1Normal      aspirin (ASPIRIN LOW DOSE) 81 MG EC tablet TAKE 1 TABLET BY MOUTH DAILY, Disp-90 tablet, R-1Normal      montelukast (SINGULAIR) 10 MG tablet Take 1 tablet by mouth daily, Disp-90 tablet, R-1Normal      levothyroxine (SYNTHROID) 125 MCG tablet TAKE 1 TABLET BY MOUTH EVERY DAY, Disp-90 tablet, R-1Normal      Cholecalciferol (VITAMIN D3) 125 MCG (5000 UT) TABS Take 1 tablet by mouth daily, Disp-90 tablet, R-1Normal      hydroCHLOROthiazide (HYDRODIURIL) 25 MG tablet TAKE 1 TABLET BY MOUTH DAILY, Disp-90 tablet, R-1Normal      oxybutynin (DITROPAN XL) 10 MG extended release tablet Take 1 tablet by mouth daily, Disp-90 tablet, R-1Normal      desvenlafaxine succinate (PRISTIQ) 100 MG TB24 extended release tablet take 1 tablet by mouth dailyHistorical Med      !! albuterol sulfate HFA (PROVENTIL;VENTOLIN;PROAIR) 108 (90 Base) MCG/ACT inhaler INHALE 2 PUFFS BY MOUTH EVERY 6 HOURS AS NEEDED FOR WHEEZING, Disp-8.5 g, R-2Normal      lamoTRIgine (LAMICTAL) 150 MG tablet TAKE 1 TABLET BY MOUTH TWICE DAILYHistorical Med      FEROSUL 325 (65 Fe) MG tablet TAKE 1 TABLET BY MOUTH TWICE DAILY, Disp-180 tablet, R-1Normal      traZODone (DESYREL) 300 MG tablet take 1 tablet by mouth at bedtime, R-0Historical Med       !! - Potential duplicate medications found. Please discuss with provider. ALLERGIES     is allergic to morphine, allopurinol, bumex [bumetanide], cat hair extract, colchicine, furosemide, gramineae pollens, indocin [indomethacin], molds & smuts, uloric [febuxostat], and hydrocodone-acetaminophen. FAMILY HISTORY     She indicated that her mother is alive. She indicated that her father is alive. She indicated that both of her sisters are alive. She indicated that both of her brothers are alive.  She indicated that her maternal

## 2023-03-23 NOTE — DISCHARGE INSTRUCTIONS
Take medications as prescribed    Return immediately if any worsening symptoms or any other concerns    Tell us how we did visit: [email or web address]   and let us know about your experience

## 2023-03-26 LAB
EKG ATRIAL RATE: 81 BPM
EKG P AXIS: 22 DEGREES
EKG P-R INTERVAL: 174 MS
EKG Q-T INTERVAL: 428 MS
EKG QRS DURATION: 92 MS
EKG QTC CALCULATION (BAZETT): 497 MS
EKG R AXIS: 29 DEGREES
EKG T AXIS: 19 DEGREES
EKG VENTRICULAR RATE: 81 BPM

## 2023-03-31 ENCOUNTER — OFFICE VISIT (OUTPATIENT)
Dept: FAMILY MEDICINE CLINIC | Age: 53
End: 2023-03-31
Payer: MEDICARE

## 2023-03-31 ENCOUNTER — HOSPITAL ENCOUNTER (OUTPATIENT)
Age: 53
Setting detail: SPECIMEN
Discharge: HOME | End: 2023-03-31
Payer: MEDICARE

## 2023-03-31 VITALS
TEMPERATURE: 98.1 F | BODY MASS INDEX: 45.8 KG/M2 | HEIGHT: 66 IN | OXYGEN SATURATION: 97 % | RESPIRATION RATE: 18 BRPM | SYSTOLIC BLOOD PRESSURE: 124 MMHG | HEART RATE: 68 BPM | WEIGHT: 285 LBS | DIASTOLIC BLOOD PRESSURE: 76 MMHG

## 2023-03-31 DIAGNOSIS — R30.0 DYSURIA: ICD-10-CM

## 2023-03-31 DIAGNOSIS — R06.2 WHEEZING: ICD-10-CM

## 2023-03-31 DIAGNOSIS — J20.9 ACUTE BRONCHITIS, UNSPECIFIED ORGANISM: Primary | ICD-10-CM

## 2023-03-31 DIAGNOSIS — N30.00 ACUTE CYSTITIS WITHOUT HEMATURIA: ICD-10-CM

## 2023-03-31 PROBLEM — G30.9 ALZHEIMER'S DISEASE, UNSPECIFIED (CODE) (HCC): Status: ACTIVE | Noted: 2023-03-31

## 2023-03-31 LAB
BACTERIA: ABNORMAL
BILIRUBIN URINE: ABNORMAL
COLOR: ABNORMAL
COMMENT UA: ABNORMAL
EPITHELIAL CELLS UA: ABNORMAL /HPF (ref 0–5)
GLUCOSE UR STRIP.AUTO-MCNC: ABNORMAL MG/DL
KETONES UR STRIP.AUTO-MCNC: ABNORMAL MG/DL
LEUKOCYTE ESTERASE UR QL STRIP.AUTO: ABNORMAL
NITRITE UR QL STRIP.AUTO: POSITIVE
PROT UR STRIP.AUTO-MCNC: 5.5 MG/DL (ref 5–6)
PROT UR STRIP.AUTO-MCNC: ABNORMAL MG/DL
RBC CLUMPS #/AREA URNS AUTO: ABNORMAL /HPF (ref 0–4)
SPECIFIC GRAVITY UA: 1.02 (ref 1.01–1.02)
TURBIDITY: ABNORMAL
URINE HGB: ABNORMAL
UROBILINOGEN, URINE: ABNORMAL
WBC UA: ABNORMAL /HPF (ref 0–4)

## 2023-03-31 PROCEDURE — 3074F SYST BP LT 130 MM HG: CPT | Performed by: FAMILY MEDICINE

## 2023-03-31 PROCEDURE — 87086 URINE CULTURE/COLONY COUNT: CPT

## 2023-03-31 PROCEDURE — 3078F DIAST BP <80 MM HG: CPT | Performed by: FAMILY MEDICINE

## 2023-03-31 PROCEDURE — 99213 OFFICE O/P EST LOW 20 MIN: CPT | Performed by: FAMILY MEDICINE

## 2023-03-31 PROCEDURE — 99214 OFFICE O/P EST MOD 30 MIN: CPT | Performed by: FAMILY MEDICINE

## 2023-03-31 PROCEDURE — 87186 SC STD MICRODIL/AGAR DIL: CPT

## 2023-03-31 PROCEDURE — 87088 URINE BACTERIA CULTURE: CPT

## 2023-03-31 PROCEDURE — 81015 MICROSCOPIC EXAM OF URINE: CPT

## 2023-03-31 RX ORDER — LACOSAMIDE 100 MG/1
TABLET ORAL
COMMUNITY
Start: 2023-03-09 | End: 2023-08-09

## 2023-03-31 RX ORDER — LEVOFLOXACIN 750 MG/1
750 TABLET ORAL DAILY
Qty: 10 TABLET | Refills: 0 | Status: SHIPPED | OUTPATIENT
Start: 2023-03-31 | End: 2023-04-10

## 2023-03-31 RX ORDER — SOLIFENACIN SUCCINATE 10 MG/1
10 TABLET, FILM COATED ORAL DAILY
Qty: 90 TABLET | Refills: 1 | Status: SHIPPED | OUTPATIENT
Start: 2023-03-31

## 2023-03-31 RX ORDER — METOPROLOL TARTRATE 75 MG/1
75 TABLET, FILM COATED ORAL 2 TIMES DAILY
COMMUNITY

## 2023-03-31 RX ORDER — FLUTICASONE PROPIONATE AND SALMETEROL 500; 50 UG/1; UG/1
1 POWDER RESPIRATORY (INHALATION) EVERY 12 HOURS
Qty: 60 EACH | Refills: 0 | Status: SHIPPED | OUTPATIENT
Start: 2023-03-31

## 2023-04-01 ENCOUNTER — TELEPHONE (OUTPATIENT)
Dept: PRIMARY CARE CLINIC | Age: 53
End: 2023-04-01

## 2023-04-01 DIAGNOSIS — R30.0 DYSURIA: Primary | ICD-10-CM

## 2023-04-01 RX ORDER — NITROFURANTOIN 25; 75 MG/1; MG/1
100 CAPSULE ORAL 2 TIMES DAILY
Qty: 10 CAPSULE | Refills: 0 | Status: SHIPPED | OUTPATIENT
Start: 2023-04-01 | End: 2023-04-06

## 2023-04-01 NOTE — TELEPHONE ENCOUNTER
Pt believes antibiotic started yesterday is causing vomiting, requesting different antibiotic. In basket message sent to Dr. Breanna Flores.

## 2023-04-02 LAB
MICROORGANISM SPEC CULT: ABNORMAL
SPECIMEN DESCRIPTION: ABNORMAL

## 2023-04-03 RX ORDER — DOXYCYCLINE HYCLATE 100 MG
100 TABLET ORAL 2 TIMES DAILY
Qty: 20 TABLET | Refills: 0 | OUTPATIENT
Start: 2023-04-03 | End: 2023-04-13

## 2023-04-03 NOTE — TELEPHONE ENCOUNTER
Maxine Chung called requesting a refill of the below medication which has been pended for you:     Requested Prescriptions     Pending Prescriptions Disp Refills    doxycycline hyclate (VIBRA-TABS) 100 MG tablet 20 tablet 0     Sig: Take 1 tablet by mouth 2 times daily for 10 days       Last Appointment Date: 3/31/2023  Next Appointment Date: 8/16/2023    Allergies   Allergen Reactions    Morphine Itching, Nausea Only and Other (See Comments)    Allopurinol Other (See Comments)     Other reaction(s): nausea    Bumex [Bumetanide] Other (See Comments)     Extreme fatigue, nausea, dizziness    Cat Hair Extract      Other reaction(s): Sneezing    Colchicine     Furosemide Other (See Comments) and Dizziness or Vertigo     Dizziness, extreme fatigue  Throat swelling  Dizziness, extreme fatigue      Gramineae Pollens      Other reaction(s): Sneezing    Indocin [Indomethacin]     Molds & Smuts      Other reaction(s): Sneezing    Uloric [Febuxostat]     Hydrocodone-Acetaminophen Nausea Only

## 2023-04-04 ASSESSMENT — ENCOUNTER SYMPTOMS
COUGH: 1
NAUSEA: 0
CONSTIPATION: 0
SHORTNESS OF BREATH: 1
WHEEZING: 1
EYE REDNESS: 0
EYE DISCHARGE: 0
RHINORRHEA: 0
SINUS PRESSURE: 0
TROUBLE SWALLOWING: 0
ABDOMINAL PAIN: 0
CHEST TIGHTNESS: 1
SORE THROAT: 1
VOMITING: 0
DIARRHEA: 0

## 2023-04-04 NOTE — PROGRESS NOTES
succinate (PRISTIQ) 100 MG TB24 extended release tablet take 1 tablet by mouth daily Yes Historical Provider, MD   albuterol sulfate HFA (PROVENTIL;VENTOLIN;PROAIR) 108 (90 Base) MCG/ACT inhaler INHALE 2 PUFFS BY MOUTH EVERY 6 HOURS AS NEEDED FOR WHEEZING Yes Denise Kemp DO   lamoTRIgine (LAMICTAL) 150 MG tablet TAKE 1 TABLET BY MOUTH TWICE DAILY Yes Historical Provider, MD   FEROSUL 325 (65 Fe) MG tablet TAKE 1 TABLET BY MOUTH TWICE DAILY Yes Denise Kemp DO   traZODone (DESYREL) 300 MG tablet take 1 tablet by mouth at bedtime Yes Historical Provider, MD   doxycycline hyclate (VIBRA-TABS) 100 MG tablet Take 1 tablet by mouth 2 times daily  Denise Kemp DO   nitrofurantoin, macrocrystal-monohydrate, (MACROBID) 100 MG capsule Take 1 capsule by mouth 2 times daily for 5 days  Mateo Gibson MD        Social History     Tobacco Use    Smoking status: Never    Smokeless tobacco: Never   Substance Use Topics    Alcohol use: No        Vitals:    03/31/23 1556   BP: 124/76   Site: Left Upper Arm   Position: Sitting   Cuff Size: Large Adult   Pulse: 68   Resp: 18   Temp: 98.1 °F (36.7 °C)   TempSrc: Tympanic   SpO2: 97%   Weight: 285 lb (129.3 kg)   Height: 5' 6\" (1.676 m)     Estimated body mass index is 46 kg/m² as calculated from the following:    Height as of this encounter: 5' 6\" (1.676 m). Weight as of this encounter: 285 lb (129.3 kg). Physical Exam  Vitals and nursing note reviewed. Constitutional:       General: She is not in acute distress. Appearance: Normal appearance. She is well-developed. She is not diaphoretic. HENT:      Head: Normocephalic and atraumatic. Right Ear: External ear normal.      Left Ear: External ear normal.      Ears:      Comments: TMs dull with fluid behind the TM     Nose: Congestion and rhinorrhea present. Mouth/Throat:      Pharynx: No posterior oropharyngeal erythema.       Comments: Post nasal drainage noted  Eyes:      General: No scleral

## 2023-04-21 ENCOUNTER — HOSPITAL ENCOUNTER (OUTPATIENT)
Dept: CT IMAGING | Age: 53
End: 2023-04-21
Payer: MEDICARE

## 2023-04-21 DIAGNOSIS — H91.20 SUDDEN-ONSET SENSORINEURAL HEARING LOSS: ICD-10-CM

## 2023-04-21 PROCEDURE — 70481 CT ORBIT/EAR/FOSSA W/DYE: CPT

## 2023-04-21 PROCEDURE — 6360000004 HC RX CONTRAST MEDICATION: Performed by: ORTHOPAEDIC SURGERY

## 2023-04-21 RX ADMIN — IOPAMIDOL 75 ML: 755 INJECTION, SOLUTION INTRAVENOUS at 13:24

## 2023-05-01 RX ORDER — OXYBUTYNIN CHLORIDE 10 MG/1
TABLET, EXTENDED RELEASE ORAL
COMMUNITY
Start: 2023-04-03

## 2023-05-01 NOTE — TELEPHONE ENCOUNTER
Left message for patient to return call to confirm pharmacy.     Pepe called requesting a refill of the below medication which has been pended for you:     Requested Prescriptions     Pending Prescriptions Disp Refills    esomeprazole (Simple Admit) 40 MG delayed release capsule [Pharmacy Med Name: ESOMEPRA MAG CAP 40MG DR] 180 capsule 1     Sig: TAKE 1 CAPSULE TWICE DAILY       Last Appointment Date: 3/31/2023  Next Appointment Date: 8/16/2023    Allergies   Allergen Reactions    Morphine Itching, Nausea Only and Other (See Comments)    Allopurinol Other (See Comments)     Other reaction(s): nausea    Bumex [Bumetanide] Other (See Comments)     Extreme fatigue, nausea, dizziness    Cat Hair Extract      Other reaction(s): Sneezing    Colchicine     Furosemide Other (See Comments) and Dizziness or Vertigo     Dizziness, extreme fatigue  Throat swelling  Dizziness, extreme fatigue      Gramineae Pollens      Other reaction(s): Sneezing    Indocin [Indomethacin]     Molds & Smuts      Other reaction(s): Sneezing    Uloric [Febuxostat]     Hydrocodone-Acetaminophen Nausea Only

## 2023-05-02 RX ORDER — ESOMEPRAZOLE MAGNESIUM 40 MG/1
CAPSULE, DELAYED RELEASE ORAL
Qty: 180 CAPSULE | Refills: 1 | Status: SHIPPED | OUTPATIENT
Start: 2023-05-02

## 2023-05-02 NOTE — TELEPHONE ENCOUNTER
Christo Vegas called requesting a refill of the below medication which has been pended for you:     Requested Prescriptions     Pending Prescriptions Disp Refills    esomeprazole (ZupCat) 40 MG delayed release capsule [Pharmacy Med Name: ESOMEPRA MAG CAP 40MG DR] 180 capsule 1     Sig: TAKE 1 CAPSULE TWICE DAILY       Last Appointment Date: 3/31/2023  Next Appointment Date: 8/16/2023    Allergies   Allergen Reactions    Morphine Itching, Nausea Only and Other (See Comments)    Allopurinol Other (See Comments)     Other reaction(s): nausea    Bumex [Bumetanide] Other (See Comments)     Extreme fatigue, nausea, dizziness    Cat Hair Extract      Other reaction(s): Sneezing    Colchicine     Furosemide Other (See Comments) and Dizziness or Vertigo     Dizziness, extreme fatigue  Throat swelling  Dizziness, extreme fatigue      Gramineae Pollens      Other reaction(s): Sneezing    Indocin [Indomethacin]     Molds & Smuts      Other reaction(s): Sneezing    Uloric [Febuxostat]     Hydrocodone-Acetaminophen Nausea Only

## 2023-05-07 LAB — NONINV COLON CA DNA+OCC BLD SCRN STL QL: NEGATIVE

## 2023-06-06 ENCOUNTER — OFFICE VISIT (OUTPATIENT)
Dept: PRIMARY CARE CLINIC | Age: 53
End: 2023-06-06
Payer: MEDICARE

## 2023-06-06 VITALS
OXYGEN SATURATION: 95 % | RESPIRATION RATE: 20 BRPM | DIASTOLIC BLOOD PRESSURE: 62 MMHG | SYSTOLIC BLOOD PRESSURE: 102 MMHG | BODY MASS INDEX: 45.96 KG/M2 | HEART RATE: 80 BPM | TEMPERATURE: 99.3 F | WEIGHT: 286 LBS | HEIGHT: 66 IN

## 2023-06-06 DIAGNOSIS — R73.09 ELEVATED HEMOGLOBIN A1C: ICD-10-CM

## 2023-06-06 DIAGNOSIS — J01.40 ACUTE NON-RECURRENT PANSINUSITIS: Primary | ICD-10-CM

## 2023-06-06 PROCEDURE — 3078F DIAST BP <80 MM HG: CPT | Performed by: FAMILY MEDICINE

## 2023-06-06 PROCEDURE — 3074F SYST BP LT 130 MM HG: CPT | Performed by: FAMILY MEDICINE

## 2023-06-06 PROCEDURE — 99214 OFFICE O/P EST MOD 30 MIN: CPT | Performed by: FAMILY MEDICINE

## 2023-06-06 PROCEDURE — 99212 OFFICE O/P EST SF 10 MIN: CPT | Performed by: FAMILY MEDICINE

## 2023-06-06 RX ORDER — DOXYCYCLINE HYCLATE 100 MG
100 TABLET ORAL 2 TIMES DAILY
Qty: 20 TABLET | Refills: 0 | Status: SHIPPED | OUTPATIENT
Start: 2023-06-06

## 2023-06-06 RX ORDER — PREDNISONE 20 MG/1
TABLET ORAL
Qty: 15 TABLET | Refills: 0 | Status: SHIPPED | OUTPATIENT
Start: 2023-06-06

## 2023-06-06 RX ORDER — METOPROLOL TARTRATE 50 MG/1
TABLET, FILM COATED ORAL
COMMUNITY
Start: 2023-04-03

## 2023-06-06 RX ORDER — PROMETHAZINE HYDROCHLORIDE 25 MG/1
TABLET ORAL PRN
COMMUNITY
Start: 2023-04-06

## 2023-06-06 ASSESSMENT — ENCOUNTER SYMPTOMS
SHORTNESS OF BREATH: 0
EYE DISCHARGE: 0
VOMITING: 0
EYE REDNESS: 0
TROUBLE SWALLOWING: 0
WHEEZING: 1
NAUSEA: 0
DIARRHEA: 0
CONSTIPATION: 0
SORE THROAT: 0
ABDOMINAL PAIN: 0
COUGH: 1
SINUS PAIN: 1
RHINORRHEA: 1
SINUS PRESSURE: 1

## 2023-06-06 NOTE — PROGRESS NOTES
2023     Omar Kearns (:  1970) is a 46 y.o. female, here for evaluation of the following medical concerns:    URI   This is a new problem. The current episode started in the past 7 days (started ). The problem has been gradually worsening. There has been no fever. Associated symptoms include congestion, coughing (productive of green sputum), headaches (chronic in nature and unchanged), rhinorrhea, sinus pain and wheezing. Pertinent negatives include no abdominal pain, chest pain, diarrhea, dysuria, ear pain, nausea, neck pain, rash, sore throat or vomiting. Treatments tried: albuterol inhaler, singulair, zyrtec, sudafed. The treatment provided no relief. Patient also requesting medication to help with weight management. Patient has been trying to eat healthier and also has been walking, but just not losing any weight. Did want to discuss possibly trying a GLP1 agonist medication. With known history of elevated hgbA1c she would be a candidate to consider this medication. Did discuss the potential side effects. Did review patient's med list, allergies, social history,pmhx and pshx today as noted in the record. Review of Systems   Constitutional:  Positive for fatigue. Negative for chills and fever. HENT:  Positive for congestion, postnasal drip, rhinorrhea, sinus pressure and sinus pain. Negative for ear pain, sore throat and trouble swallowing. Eyes:  Negative for discharge and redness. Respiratory:  Positive for cough (productive of green sputum) and wheezing. Negative for shortness of breath. Cardiovascular:  Negative for chest pain. Gastrointestinal:  Negative for abdominal pain, constipation, diarrhea, nausea and vomiting. Genitourinary:  Negative for dysuria, flank pain, frequency and urgency. Musculoskeletal:  Negative for arthralgias, myalgias and neck pain. Skin:  Negative for rash and wound.    Allergic/Immunologic: Negative for environmental

## 2023-06-27 RX ORDER — POTASSIUM CHLORIDE 600 MG/1
TABLET, FILM COATED, EXTENDED RELEASE ORAL
Qty: 450 TABLET | Refills: 1 | Status: SHIPPED | OUTPATIENT
Start: 2023-06-27

## 2023-06-27 RX ORDER — HYDROCHLOROTHIAZIDE 25 MG/1
TABLET ORAL
Qty: 90 TABLET | Refills: 1 | Status: SHIPPED | OUTPATIENT
Start: 2023-06-27

## 2023-06-27 RX ORDER — OXYBUTYNIN CHLORIDE 10 MG/1
TABLET, EXTENDED RELEASE ORAL
Qty: 90 TABLET | Refills: 1 | Status: SHIPPED | OUTPATIENT
Start: 2023-06-27

## 2023-06-27 RX ORDER — MONTELUKAST SODIUM 10 MG/1
TABLET ORAL
Qty: 90 TABLET | Refills: 1 | Status: SHIPPED | OUTPATIENT
Start: 2023-06-27

## 2023-06-27 RX ORDER — METOPROLOL TARTRATE 50 MG/1
TABLET, FILM COATED ORAL
Qty: 180 TABLET | Refills: 1 | Status: SHIPPED | OUTPATIENT
Start: 2023-06-27

## 2023-06-27 RX ORDER — LEVOTHYROXINE SODIUM 0.12 MG/1
TABLET ORAL
Qty: 90 TABLET | Refills: 1 | Status: SHIPPED | OUTPATIENT
Start: 2023-06-27

## 2023-07-18 DIAGNOSIS — R73.09 ELEVATED HEMOGLOBIN A1C: ICD-10-CM

## 2023-07-18 NOTE — TELEPHONE ENCOUNTER
Rosalva Phillips called requesting a refill of the below medication which has been pended for you:     Requested Prescriptions     Pending Prescriptions Disp Refills    Semaglutide,0.25 or 0.5MG/DOS, 2 MG/3ML SOPN 3 mL 1     Sig: Inject 0.25 mg into the skin once a week for 28 days, THEN 0.5 mg once a week for 28 days.        Last Appointment Date: 6/6/2023  Next Appointment Date: 8/16/2023    Allergies   Allergen Reactions    Furosemide Other (See Comments) and Dizziness or Vertigo     Dizziness, extreme fatigue  Throat swelling  Dizziness, extreme fatigue      Morphine Itching, Nausea Only and Other (See Comments)    Topiramate Swelling, Rash and Angioedema     Facial and Oral edema    Allopurinol Other (See Comments)     Other reaction(s): nausea    Bumex [Bumetanide] Other (See Comments)     Extreme fatigue, nausea, dizziness    Cat Hair Extract      Other reaction(s): Sneezing    Colchicine     Gramineae Pollens      Other reaction(s): Sneezing    Indocin [Indomethacin]     Molds & Smuts      Other reaction(s): Sneezing    Uloric [Febuxostat]     Hydrocodone-Acetaminophen Nausea Only

## 2023-07-27 ENCOUNTER — PATIENT MESSAGE (OUTPATIENT)
Dept: PRIMARY CARE CLINIC | Age: 53
End: 2023-07-27

## 2023-07-27 NOTE — TELEPHONE ENCOUNTER
Niko Nicole MA 7/27/2023 8:06 AM EDT      ----- Message -----  From: Govind Morel  Sent: 7/27/2023 7:33 AM EDT  To: [facility]  Subject: Neurotin     The neurologist changed it back to 800 mg BID. I see that in here it says it is TID. It would be easier for me to do a higher dose BID if possible.

## 2023-08-05 ENCOUNTER — OFFICE VISIT (OUTPATIENT)
Dept: PRIMARY CARE CLINIC | Age: 53
End: 2023-08-05
Payer: MEDICARE

## 2023-08-05 ENCOUNTER — HOSPITAL ENCOUNTER (OUTPATIENT)
Age: 53
End: 2023-08-05
Payer: MEDICARE

## 2023-08-05 ENCOUNTER — HOSPITAL ENCOUNTER (OUTPATIENT)
Age: 53
Setting detail: SPECIMEN
Discharge: HOME | End: 2023-08-05
Payer: MEDICARE

## 2023-08-05 ENCOUNTER — HOSPITAL ENCOUNTER (OUTPATIENT)
Dept: CT IMAGING | Age: 53
End: 2023-08-05
Payer: MEDICARE

## 2023-08-05 VITALS
OXYGEN SATURATION: 98 % | DIASTOLIC BLOOD PRESSURE: 68 MMHG | WEIGHT: 288 LBS | TEMPERATURE: 98.3 F | HEART RATE: 74 BPM | SYSTOLIC BLOOD PRESSURE: 132 MMHG | BODY MASS INDEX: 46.48 KG/M2

## 2023-08-05 DIAGNOSIS — R10.12 LUQ PAIN: ICD-10-CM

## 2023-08-05 DIAGNOSIS — R30.9 PAINFUL URINATION: ICD-10-CM

## 2023-08-05 DIAGNOSIS — R10.12 LUQ PAIN: Primary | ICD-10-CM

## 2023-08-05 LAB
ALBUMIN SERPL-MCNC: 4.2 G/DL (ref 3.5–5.2)
ALBUMIN/GLOB SERPL: 1.7 {RATIO} (ref 1–2.5)
ALP SERPL-CCNC: 158 U/L (ref 35–104)
ALT SERPL-CCNC: 61 U/L (ref 5–33)
AMYLASE SERPL-CCNC: 24 U/L (ref 28–100)
ANION GAP SERPL CALCULATED.3IONS-SCNC: 9 MMOL/L (ref 9–17)
AST SERPL-CCNC: 61 U/L
BASOPHILS # BLD: 0.04 K/UL (ref 0–0.2)
BASOPHILS NFR BLD: 0 % (ref 0–2)
BILIRUB SERPL-MCNC: 0.3 MG/DL (ref 0.3–1.2)
BILIRUB UR QL STRIP: NEGATIVE
BUN SERPL-MCNC: 10 MG/DL (ref 6–20)
BUN/CREAT SERPL: 11 (ref 9–20)
CALCIUM SERPL-MCNC: 9.1 MG/DL (ref 8.6–10.4)
CHLORIDE SERPL-SCNC: 102 MMOL/L (ref 98–107)
CLARITY UR: CLEAR
CO2 SERPL-SCNC: 28 MMOL/L (ref 20–31)
COLOR UR: YELLOW
COMMENT: ABNORMAL
CREAT SERPL-MCNC: 0.9 MG/DL (ref 0.5–0.9)
EOSINOPHIL # BLD: 0.28 K/UL (ref 0–0.44)
EOSINOPHILS RELATIVE PERCENT: 3 % (ref 1–4)
ERYTHROCYTE [DISTWIDTH] IN BLOOD BY AUTOMATED COUNT: 13.9 % (ref 11.8–14.4)
GFR SERPL CREATININE-BSD FRML MDRD: >60 ML/MIN/1.73M2
GLUCOSE SERPL-MCNC: 152 MG/DL (ref 70–99)
GLUCOSE UR STRIP-MCNC: NEGATIVE MG/DL
HCT VFR BLD AUTO: 41.3 % (ref 36.3–47.1)
HGB BLD-MCNC: 13.4 G/DL (ref 11.9–15.1)
HGB UR QL STRIP.AUTO: NEGATIVE
IMM GRANULOCYTES # BLD AUTO: 0.06 K/UL (ref 0–0.3)
IMM GRANULOCYTES NFR BLD: 1 %
KETONES UR STRIP-MCNC: NEGATIVE MG/DL
LEUKOCYTE ESTERASE UR QL STRIP: NEGATIVE
LIPASE SERPL-CCNC: 19 U/L (ref 13–60)
LYMPHOCYTES NFR BLD: 2.68 K/UL (ref 1.1–3.7)
LYMPHOCYTES RELATIVE PERCENT: 25 % (ref 24–43)
MCH RBC QN AUTO: 28.3 PG (ref 25.2–33.5)
MCHC RBC AUTO-ENTMCNC: 32.4 G/DL (ref 25.2–33.5)
MCV RBC AUTO: 87.3 FL (ref 82.6–102.9)
MONOCYTES NFR BLD: 0.62 K/UL (ref 0.1–1.2)
MONOCYTES NFR BLD: 6 % (ref 3–12)
NEUTROPHILS NFR BLD: 65 % (ref 36–65)
NEUTS SEG NFR BLD: 7.12 K/UL (ref 1.5–8.1)
NITRITE UR QL STRIP: NEGATIVE
NRBC BLD-RTO: 0 PER 100 WBC
PH UR STRIP: 5.5 [PH] (ref 5–6)
PLATELET # BLD AUTO: 208 K/UL (ref 138–453)
PMV BLD AUTO: 10.7 FL (ref 8.1–13.5)
POTASSIUM SERPL-SCNC: 3.6 MMOL/L (ref 3.7–5.3)
PROT SERPL-MCNC: 6.7 G/DL (ref 6.4–8.3)
PROT UR STRIP-MCNC: NEGATIVE MG/DL
RBC # BLD AUTO: 4.73 M/UL (ref 3.95–5.11)
SODIUM SERPL-SCNC: 139 MMOL/L (ref 135–144)
SP GR UR STRIP: 1.01 (ref 1.01–1.02)
UROBILINOGEN UR STRIP-ACNC: NORMAL EU/DL (ref 0–1)
WBC OTHER # BLD: 10.8 K/UL (ref 3.5–11.3)

## 2023-08-05 PROCEDURE — 3074F SYST BP LT 130 MM HG: CPT | Performed by: NURSE PRACTITIONER

## 2023-08-05 PROCEDURE — 74177 CT ABD & PELVIS W/CONTRAST: CPT

## 2023-08-05 PROCEDURE — 83690 ASSAY OF LIPASE: CPT

## 2023-08-05 PROCEDURE — 99212 OFFICE O/P EST SF 10 MIN: CPT | Performed by: NURSE PRACTITIONER

## 2023-08-05 PROCEDURE — 3078F DIAST BP <80 MM HG: CPT | Performed by: NURSE PRACTITIONER

## 2023-08-05 PROCEDURE — 82150 ASSAY OF AMYLASE: CPT

## 2023-08-05 PROCEDURE — 85025 COMPLETE CBC W/AUTO DIFF WBC: CPT

## 2023-08-05 PROCEDURE — 6360000004 HC RX CONTRAST MEDICATION: Performed by: NURSE PRACTITIONER

## 2023-08-05 PROCEDURE — 36415 COLL VENOUS BLD VENIPUNCTURE: CPT

## 2023-08-05 PROCEDURE — 80053 COMPREHEN METABOLIC PANEL: CPT

## 2023-08-05 PROCEDURE — 99214 OFFICE O/P EST MOD 30 MIN: CPT | Performed by: NURSE PRACTITIONER

## 2023-08-05 PROCEDURE — 81003 URINALYSIS AUTO W/O SCOPE: CPT

## 2023-08-05 RX ADMIN — IOPAMIDOL 100 ML: 755 INJECTION, SOLUTION INTRAVENOUS at 17:27

## 2023-08-05 ASSESSMENT — ENCOUNTER SYMPTOMS
ABDOMINAL PAIN: 1
VOMITING: 0
RESPIRATORY NEGATIVE: 1
CONSTIPATION: 0
NAUSEA: 0
BLOOD IN STOOL: 0
DIARRHEA: 0

## 2023-08-05 ASSESSMENT — PATIENT HEALTH QUESTIONNAIRE - PHQ9
2. FEELING DOWN, DEPRESSED OR HOPELESS: 0
SUM OF ALL RESPONSES TO PHQ QUESTIONS 1-9: 0
SUM OF ALL RESPONSES TO PHQ QUESTIONS 1-9: 0
1. LITTLE INTEREST OR PLEASURE IN DOING THINGS: 0
SUM OF ALL RESPONSES TO PHQ9 QUESTIONS 1 & 2: 0
SUM OF ALL RESPONSES TO PHQ QUESTIONS 1-9: 0
SUM OF ALL RESPONSES TO PHQ QUESTIONS 1-9: 0

## 2023-08-05 NOTE — PROGRESS NOTES
[facility]             [address] [place], [address] [place]                        Telephone [phone]             Fax [phone]     Riley Torres  1970  RRP:[id number]   Date of visit:  8/5/2023    Subjective:    Riley Torres is a 46 y.o.  female who presents to [facility] today (8/5/2023) for evaluation of:    Chief Complaint   Patient presents with    Abdominal Pain     Left lower quadrant        Abdominal Pain  This is a new problem. The current episode started more than 1 month ago (X 6-7 weeks). The onset quality is sudden. The problem occurs constantly. The problem has been gradually worsening (worse over the last 2 weeks). The pain is located in the LUQ. The pain is at a severity of 6/10. Quality: stabbing. The abdominal pain radiates to the left flank. Pertinent negatives include no constipation, diarrhea, dysuria, fever, frequency, headaches, hematuria, nausea or vomiting. Associated symptoms comments: Left side abdominal pain began after her second dose of semaglutide 6-7 weeks ago. Last bowel movement today, soft brown. . Exacerbated by: sitting. Relieved by: lying down. Treatments tried: ibuprofen. The treatment provided no relief.      She has the following problem list:  Patient Active Problem List   Diagnosis    Iron deficiency anemia    Chronic low back pain    Chronic neck pain    Hypertension    Depression    Anxiety    Hypothyroidism    Allergic rhinitis    GERD (gastroesophageal reflux disease)    Anemia    Chronic fatigue    Mixed hyperlipidemia    Paresthesias    Hyperlipidemia    Tripping over things    Balance problems    Memory problem    Bilateral carpal tunnel syndrome    Polyneuropathy associated with underlying disease (HCC)    Ulnar neuropathy of both upper extremities    Peroneal neuropathy    Tibial neuropathy    Slurred speech    Other dysphagia    Cerebral ischemia    Abnormal

## 2023-08-15 ENCOUNTER — TELEPHONE (OUTPATIENT)
Dept: PHARMACY | Facility: CLINIC | Age: 53
End: 2023-08-15

## 2023-08-16 ENCOUNTER — OFFICE VISIT (OUTPATIENT)
Dept: FAMILY MEDICINE CLINIC | Age: 53
End: 2023-08-16
Payer: MEDICARE

## 2023-08-16 VITALS
BODY MASS INDEX: 47.09 KG/M2 | WEIGHT: 293 LBS | HEART RATE: 68 BPM | RESPIRATION RATE: 18 BRPM | DIASTOLIC BLOOD PRESSURE: 70 MMHG | SYSTOLIC BLOOD PRESSURE: 122 MMHG | HEIGHT: 66 IN | TEMPERATURE: 98.1 F | OXYGEN SATURATION: 96 %

## 2023-08-16 DIAGNOSIS — G63 POLYNEUROPATHY ASSOCIATED WITH UNDERLYING DISEASE (HCC): ICD-10-CM

## 2023-08-16 DIAGNOSIS — J01.40 ACUTE NON-RECURRENT PANSINUSITIS: ICD-10-CM

## 2023-08-16 DIAGNOSIS — I10 PRIMARY HYPERTENSION: ICD-10-CM

## 2023-08-16 DIAGNOSIS — E11.9 TYPE 2 DIABETES MELLITUS WITHOUT COMPLICATION, WITHOUT LONG-TERM CURRENT USE OF INSULIN (HCC): Primary | ICD-10-CM

## 2023-08-16 DIAGNOSIS — E03.9 ACQUIRED HYPOTHYROIDISM: ICD-10-CM

## 2023-08-16 DIAGNOSIS — G72.0 STATIN MYOPATHY: ICD-10-CM

## 2023-08-16 DIAGNOSIS — Z12.31 ENCOUNTER FOR SCREENING MAMMOGRAM FOR MALIGNANT NEOPLASM OF BREAST: ICD-10-CM

## 2023-08-16 DIAGNOSIS — F32.1 CURRENT MODERATE EPISODE OF MAJOR DEPRESSIVE DISORDER WITHOUT PRIOR EPISODE (HCC): ICD-10-CM

## 2023-08-16 DIAGNOSIS — E78.2 MIXED HYPERLIPIDEMIA: ICD-10-CM

## 2023-08-16 DIAGNOSIS — T46.6X5A STATIN MYOPATHY: ICD-10-CM

## 2023-08-16 PROBLEM — G30.9 ALZHEIMER'S DISEASE, UNSPECIFIED (CODE) (HCC): Status: RESOLVED | Noted: 2023-03-31 | Resolved: 2023-08-16

## 2023-08-16 PROCEDURE — 99213 OFFICE O/P EST LOW 20 MIN: CPT | Performed by: FAMILY MEDICINE

## 2023-08-16 PROCEDURE — 3078F DIAST BP <80 MM HG: CPT | Performed by: FAMILY MEDICINE

## 2023-08-16 PROCEDURE — 3074F SYST BP LT 130 MM HG: CPT | Performed by: FAMILY MEDICINE

## 2023-08-16 PROCEDURE — 3044F HG A1C LEVEL LT 7.0%: CPT | Performed by: FAMILY MEDICINE

## 2023-08-16 PROCEDURE — 99214 OFFICE O/P EST MOD 30 MIN: CPT | Performed by: FAMILY MEDICINE

## 2023-08-16 RX ORDER — GABAPENTIN 800 MG/1
800 TABLET ORAL 3 TIMES DAILY
Qty: 90 TABLET | Refills: 2 | Status: SHIPPED | OUTPATIENT
Start: 2023-08-16 | End: 2023-11-14

## 2023-08-16 RX ORDER — METHOCARBAMOL 500 MG/1
TABLET, FILM COATED ORAL
COMMUNITY
Start: 2023-05-26

## 2023-08-16 RX ORDER — CEFDINIR 300 MG/1
300 CAPSULE ORAL 2 TIMES DAILY
Qty: 20 CAPSULE | Refills: 0 | Status: SHIPPED | OUTPATIENT
Start: 2023-08-16 | End: 2023-08-26

## 2023-08-16 ASSESSMENT — ENCOUNTER SYMPTOMS
DIARRHEA: 0
SINUS PRESSURE: 1
NAUSEA: 0
COUGH: 0
SORE THROAT: 0
EYE DISCHARGE: 0
ABDOMINAL PAIN: 0
CONSTIPATION: 0
WHEEZING: 0
EYE REDNESS: 0
SINUS PAIN: 1
RHINORRHEA: 1
TROUBLE SWALLOWING: 0
VOMITING: 0
SHORTNESS OF BREATH: 0

## 2023-08-16 NOTE — PROGRESS NOTES
2023     Clarissa Kearns (:  1970) is a 46 y.o. female, here for evaluation of the following medical concerns:    HPI  Patient comes in today for follow up of chronic health issues Patient continues to have ongoing facial pain and she is working with [facility] regarding this issue. They are going to do some facial nerve blocks but she had to postpone these due to the fact that her  had to have prostatectomy. She will plan on getting these rescheduled. She has started having symptoms suggestive of a sinus infection noting she is getting some increased congestion pressure and pain in the sinuses that started over the weekend about 4 to 5 days ago. Seems to be progressively worsening. She cannot do her sinus rinses any further due to her previous nasal surgery causing some complications in anatomical issues with the nasal passages. Nonetheless we can place her on antibiotics to treat the acute infection. She does have known diabetes mellitus type 2 we did have her on Ozempic but with the second prescription she got for that she started having fairly severe left-sided abdominal pain. Did come into urgent care and had some lab work done which showed elevation in her liver enzymes but otherwise was relatively normal.  Did have CT imaging of the abdomen and pelvis which did not show any concerning acute abnormality. Nonetheless she stopped the Ozempic and the pain subsided so likely this is not going to be a viable treatment option for her. We did talk about following a stricter low-carb/low sugar diet and increasing exercise to help with blood sugar control. She does have a known history of hypertension her blood pressure is stable and controlled on her current medical regimen. Has known hypothyroidism and is due for updated lab work to check the status of her thyroid levels.   Does have known hyperlipidemia and we will check the status of her cholesterol control with updated lab

## 2023-08-17 NOTE — TELEPHONE ENCOUNTER
I added this to her diagnosis list today.
Noted, thank you for reviewing!    =======================================================   For Pharmacy Admin Tracking Only    Program: Clara in place:  No  Recommendation Provided To: Provider: 1 via Note to Provider  Intervention Accepted By: Provider: 1  Gap Closed?: Yes   Time Spent (min): 15
Status   08/05/2023 61 (H) <32 U/L Final     The 10-year ASCVD risk score (Alfredito BURKS, et al., 2019) is: 8.1%    Values used to calculate the score:      Age: 46 years      Sex: Female      Is Non- : No      Diabetic: Yes      Tobacco smoker: No      Systolic Blood Pressure: 594 mmHg      Is BP treated: Yes      HDL Cholesterol: 35 mg/dL      Total Cholesterol: 210 mg/dL     PLAN  The following are interventions that have been identified:  Statin Gap (Diabetes): Patient may be excluded with a CMS approved dx code if entered into a billable office visit as a visit diagnosis - muscle/extremity side effects with previous statin

## 2023-08-21 DIAGNOSIS — R06.2 WHEEZING: ICD-10-CM

## 2023-08-22 RX ORDER — FLUTICASONE PROPIONATE AND SALMETEROL 50; 500 UG/1; UG/1
POWDER RESPIRATORY (INHALATION)
Qty: 120 EACH | Refills: 3 | Status: SHIPPED | OUTPATIENT
Start: 2023-08-22

## 2023-08-22 NOTE — TELEPHONE ENCOUNTER
Lew called requesting a refill of the below medication which has been pended for you:     Requested Prescriptions     Pending Prescriptions Disp Refills    ADVAIR DISKUS 500-50 MCG/ACT AEPB diskus inhaler [Pharmacy Med Name: Twila Marmolejo 500-50 DISKUS]       Sig: inhale 1 puff by mouth and INTO THE LUNGS twice a day Rinse mouth after use       Last Appointment Date: 8/16/2023  Next Appointment Date: 2/16/2024    Allergies   Allergen Reactions    Furosemide Other (See Comments) and Dizziness or Vertigo     Dizziness, extreme fatigue  Throat swelling  Dizziness, extreme fatigue      Morphine Itching, Nausea Only and Other (See Comments)    Topiramate Swelling, Rash and Angioedema     Facial and Oral edema    Allopurinol Other (See Comments)     Other reaction(s): nausea; \"hard on stomach\"    Bumex [Bumetanide] Other (See Comments)     Extreme fatigue, nausea, dizziness    Cat Hair Extract      Other reaction(s): Sneezing    Colchicine     Gramineae Pollens      Other reaction(s): Sneezing    Indocin [Indomethacin]     Molds & Smuts      Other reaction(s): Sneezing    Uloric [Febuxostat]     Hydrocodone-Acetaminophen Nausea Only

## 2023-09-07 ENCOUNTER — OFFICE VISIT (OUTPATIENT)
Dept: FAMILY MEDICINE CLINIC | Age: 53
End: 2023-09-07
Payer: MEDICARE

## 2023-09-07 VITALS
OXYGEN SATURATION: 95 % | HEART RATE: 76 BPM | RESPIRATION RATE: 16 BRPM | DIASTOLIC BLOOD PRESSURE: 64 MMHG | TEMPERATURE: 98.7 F | BODY MASS INDEX: 46.93 KG/M2 | WEIGHT: 292 LBS | SYSTOLIC BLOOD PRESSURE: 114 MMHG | HEIGHT: 66 IN

## 2023-09-07 DIAGNOSIS — L98.9 SKIN LESION: ICD-10-CM

## 2023-09-07 DIAGNOSIS — L08.9 SKIN INFECTION: Primary | ICD-10-CM

## 2023-09-07 PROCEDURE — 3078F DIAST BP <80 MM HG: CPT | Performed by: FAMILY MEDICINE

## 2023-09-07 PROCEDURE — 99213 OFFICE O/P EST LOW 20 MIN: CPT | Performed by: FAMILY MEDICINE

## 2023-09-07 PROCEDURE — 3074F SYST BP LT 130 MM HG: CPT | Performed by: FAMILY MEDICINE

## 2023-09-07 RX ORDER — SODIUM FLUORIDE 6 MG/ML
PASTE, DENTIFRICE DENTAL
COMMUNITY
Start: 2023-08-28

## 2023-09-07 RX ORDER — DOXYCYCLINE HYCLATE 100 MG
100 TABLET ORAL 2 TIMES DAILY
Qty: 20 TABLET | Refills: 0 | Status: SHIPPED | OUTPATIENT
Start: 2023-09-07

## 2023-09-07 ASSESSMENT — ENCOUNTER SYMPTOMS: COLOR CHANGE: 1

## 2023-09-07 NOTE — PROGRESS NOTES
2023     Saeid Kearns (:  1970) is a 46 y.o. female, here for evaluation of the following medical concerns:    HPI  She comes in today secondary to a skin tag to the right lower jawline that has been there for quite some time but now has become very red and swollen and tender over the last couple of days. Yesterday she notes that the lesion became very firm and very tender and now is getting extension of erythema surrounding the skin tag. Has not had any issue with the lesion prior to this. Has not done anything to the area. No other acute issues at this time. Did review patient's med list, allergies, social history,pmhx and pshx today as noted in the record. Review of Systems   Skin:  Positive for color change. Prior to Visit Medications    Medication Sig Taking? Authorizing Provider   ADVAIR DISKUS 500-50 MCG/ACT AEPB diskus inhaler inhale 1 puff by mouth and INTO THE LUNGS twice a day Rinse mouth after use  Yanet Gray,    methocarbamol (ROBAXIN) 500 MG tablet take 1 tablet by mouth three times a day if needed for headache or FACIAL PAIN  Historical Provider, MD   gabapentin (NEURONTIN) 800 MG tablet Take 1 tablet by mouth 3 times daily for 90 days.   Deloise Faden, DO   oxybutynin (DITROPAN-XL) 10 MG extended release tablet TAKE 1 TABLET DAILY  Deloise Faden, DO   hydroCHLOROthiazide (HYDRODIURIL) 25 MG tablet TAKE 1 TABLET DAILY  Deloise Faden, DO   montelukast (SINGULAIR) 10 MG tablet TAKE 1 TABLET DAILY  Deloise Faden, DO   potassium chloride (KLOR-CON) 8 MEQ extended release tablet TAKE 5 TABLETS DAILY  Deloise Faden, DO   levothyroxine (SYNTHROID) 125 MCG tablet TAKE 1 TABLET DAILY  Deloise Faden, DO   metoprolol tartrate (LOPRESSOR) 50 MG tablet TAKE 1 TABLET TWICE A DAY  Yanet Gray,    promethazine (PHENERGAN) 25 MG tablet as needed  Historical Provider, MD   esomeprazole (NEXIUM) 40 MG delayed release capsule TAKE 1 CAPSULE TWICE

## 2023-10-11 ENCOUNTER — HOSPITAL ENCOUNTER (OUTPATIENT)
Dept: MAMMOGRAPHY | Age: 53
Discharge: HOME | End: 2023-10-13
Attending: FAMILY MEDICINE
Payer: MEDICARE

## 2023-10-11 VITALS — HEIGHT: 66 IN | BODY MASS INDEX: 45 KG/M2 | WEIGHT: 280 LBS

## 2023-10-11 DIAGNOSIS — Z12.31 ENCOUNTER FOR SCREENING MAMMOGRAM FOR MALIGNANT NEOPLASM OF BREAST: ICD-10-CM

## 2023-10-11 PROCEDURE — 77063 BREAST TOMOSYNTHESIS BI: CPT

## 2023-10-13 ENCOUNTER — OFFICE VISIT (OUTPATIENT)
Dept: FAMILY MEDICINE CLINIC | Age: 53
End: 2023-10-13
Payer: MEDICARE

## 2023-10-13 ENCOUNTER — HOSPITAL ENCOUNTER (OUTPATIENT)
Dept: CT IMAGING | Age: 53
End: 2023-10-13
Payer: MEDICARE

## 2023-10-13 VITALS
OXYGEN SATURATION: 98 % | HEART RATE: 74 BPM | TEMPERATURE: 99.2 F | DIASTOLIC BLOOD PRESSURE: 76 MMHG | HEIGHT: 66 IN | SYSTOLIC BLOOD PRESSURE: 132 MMHG | WEIGHT: 293 LBS | BODY MASS INDEX: 47.09 KG/M2

## 2023-10-13 DIAGNOSIS — R10.9 LEFT SIDED ABDOMINAL PAIN: ICD-10-CM

## 2023-10-13 DIAGNOSIS — R10.9 LEFT FLANK PAIN: ICD-10-CM

## 2023-10-13 DIAGNOSIS — N20.0 KIDNEY STONES: Primary | ICD-10-CM

## 2023-10-13 DIAGNOSIS — R10.9 LEFT FLANK PAIN: Primary | ICD-10-CM

## 2023-10-13 PROCEDURE — 74176 CT ABD & PELVIS W/O CONTRAST: CPT

## 2023-10-13 PROCEDURE — 3078F DIAST BP <80 MM HG: CPT | Performed by: FAMILY MEDICINE

## 2023-10-13 PROCEDURE — 99214 OFFICE O/P EST MOD 30 MIN: CPT | Performed by: FAMILY MEDICINE

## 2023-10-13 PROCEDURE — 3075F SYST BP GE 130 - 139MM HG: CPT | Performed by: FAMILY MEDICINE

## 2023-10-13 RX ORDER — TRAZODONE HYDROCHLORIDE 100 MG/1
TABLET ORAL
COMMUNITY
Start: 2023-09-26

## 2023-10-13 ASSESSMENT — ENCOUNTER SYMPTOMS
BACK PAIN: 1
VOMITING: 0
ABDOMINAL PAIN: 1
NAUSEA: 0
CONSTIPATION: 0
DIARRHEA: 0

## 2023-10-13 NOTE — PROGRESS NOTES
10/13/2023     Saeid Kearns (:  1970) is a 46 y.o. female, here for evaluation of the following medical concerns:    HPI  Patient comes in today secondary to left-sided flank pain and abdominal pain. Patient has had this for the last couple months duration but it does seem to be waxing and waning. At times is very severe and doubles her over but then other times is noticeable not severe. She has not had any urinary symptoms. Has not had any change in her bowels. She had actually related it to giving herself an Ozempic injection and she felt that this perhaps prompted the pain but really does not seem consistent with the type of pain she is having. She felt that in general it just felt more kidney related. Pain seems to wrap around into her flank region. She does not get nauseated or have any vomiting. No fever or chills. No other related symptoms. Did review patient's med list, allergies, social history,pmhx and pshx today as noted in the record. Review of Systems   Constitutional:  Negative for chills, fatigue and fever. Gastrointestinal:  Positive for abdominal pain (left sided abdominal pain). Negative for constipation, diarrhea, nausea and vomiting. Genitourinary:  Positive for flank pain. Negative for dysuria, frequency, hematuria and urgency. Musculoskeletal:  Positive for back pain and myalgias. Prior to Visit Medications    Medication Sig Taking?  Authorizing Provider   SODIUM FLUORIDE 5000 PPM 1.1 % PSTE USE AS DIRECTED for 2 MINUTES twice a day then SPIT EXCESS, DO NOT RINSE  Provider, MD Linda   doxycycline hyclate (VIBRA-TABS) 100 MG tablet Take 1 tablet by mouth 2 times daily  Yanet Gray DO   ADVAIR DISKUS 500-50 MCG/ACT AEPB diskus inhaler inhale 1 puff by mouth and INTO THE LUNGS twice a day Rinse mouth after use  Yanet Gray DO   methocarbamol (ROBAXIN) 500 MG tablet take 1 tablet by mouth three times a day if needed for headache or

## 2023-10-25 RX ORDER — LANOLIN ALCOHOL/MO/W.PET/CERES
325 CREAM (GRAM) TOPICAL 2 TIMES DAILY
Qty: 60 TABLET | Refills: 0 | Status: SHIPPED | OUTPATIENT
Start: 2023-10-25

## 2023-10-25 RX ORDER — FERROUS SULFATE 325(65) MG
1 TABLET ORAL 2 TIMES DAILY
Qty: 180 TABLET | Refills: 1 | Status: SHIPPED | OUTPATIENT
Start: 2023-10-25

## 2023-10-25 NOTE — TELEPHONE ENCOUNTER
Ana Ferreira called requesting a refill of the below medication which has been pended for you:     Requested Prescriptions     Pending Prescriptions Disp Refills    ferrous sulfate (FEROSUL) 325 (65 Fe) MG tablet 180 tablet 1     Sig: Take 1 tablet by mouth 2 times daily    ferrous sulfate (FE TABS 325) 325 (65 Fe) MG EC tablet 60 tablet 0     Sig: Take 1 tablet by mouth in the morning and at bedtime       Last Appointment Date: 10/13/2023  Next Appointment Date: 2/16/2024    Allergies   Allergen Reactions    Furosemide Other (See Comments) and Dizziness or Vertigo     Dizziness, extreme fatigue  Throat swelling  Dizziness, extreme fatigue      Morphine Itching, Nausea Only and Other (See Comments)    Topiramate Swelling, Rash and Angioedema     Facial and Oral edema    Allopurinol Other (See Comments)     Other reaction(s): nausea; \"hard on stomach\"    Bumex [Bumetanide] Other (See Comments)     Extreme fatigue, nausea, dizziness    Cat Hair Extract      Other reaction(s): Sneezing    Colchicine     Gramineae Pollens      Other reaction(s): Sneezing    Indocin [Indomethacin]     Molds & Smuts      Other reaction(s): Sneezing    Uloric [Febuxostat]     Hydrocodone-Acetaminophen Nausea Only

## 2023-10-27 RX ORDER — ESOMEPRAZOLE MAGNESIUM 40 MG/1
CAPSULE, DELAYED RELEASE ORAL
Qty: 180 CAPSULE | Refills: 1 | Status: SHIPPED | OUTPATIENT
Start: 2023-10-27

## 2023-10-27 NOTE — TELEPHONE ENCOUNTER
Huber Paulson called requesting a refill of the below medication which has been pended for you:     Requested Prescriptions     Pending Prescriptions Disp Refills    esomeprazole ([place]) 40 MG delayed release capsule [Pharmacy Med Name: ESOMEPRA MAG CAP 40MG DR] 180 capsule 1     Sig: TAKE 1 CAPSULE TWICE DAILY       Last Appointment Date: 10/13/2023  Next Appointment Date: 2/16/2024    Allergies   Allergen Reactions    Furosemide Other (See Comments) and Dizziness or Vertigo     Dizziness, extreme fatigue  Throat swelling  Dizziness, extreme fatigue      Morphine Itching, Nausea Only and Other (See Comments)    Topiramate Swelling, Rash and Angioedema     Facial and Oral edema    Allopurinol Other (See Comments)     Other reaction(s): nausea; \"hard on stomach\"    Bumex [Bumetanide] Other (See Comments)     Extreme fatigue, nausea, dizziness    Cat Hair Extract      Other reaction(s): Sneezing    Colchicine     Gramineae Pollens      Other reaction(s): Sneezing    Indocin [Indomethacin]     Molds & Smuts      Other reaction(s): Sneezing    Uloric [Febuxostat]     Hydrocodone-Acetaminophen Nausea Only

## 2023-11-14 ENCOUNTER — PATIENT MESSAGE (OUTPATIENT)
Dept: FAMILY MEDICINE CLINIC | Age: 53
End: 2023-11-14

## 2023-11-14 DIAGNOSIS — R73.09 ELEVATED HEMOGLOBIN A1C: ICD-10-CM

## 2023-11-14 NOTE — TELEPHONE ENCOUNTER
From: Praful Orellana  To: Dr. Cesar Diaz: 11/14/2023 12:47 PM EST  Subject: Racheal Perez    The urologist didnt seem to think my pain was from kidney stones. Can I try the ozempic again and see if i tolerate it? Thank you!

## 2023-11-27 RX ORDER — LANOLIN ALCOHOL/MO/W.PET/CERES
1 CREAM (GRAM) TOPICAL 2 TIMES DAILY
Qty: 60 TABLET | Refills: 3 | Status: SHIPPED | OUTPATIENT
Start: 2023-11-27

## 2023-12-07 ENCOUNTER — HOSPITAL ENCOUNTER (OUTPATIENT)
Age: 53
Discharge: HOME | End: 2023-12-07
Payer: MEDICARE

## 2023-12-07 DIAGNOSIS — E03.9 ACQUIRED HYPOTHYROIDISM: ICD-10-CM

## 2023-12-07 DIAGNOSIS — I10 PRIMARY HYPERTENSION: ICD-10-CM

## 2023-12-07 DIAGNOSIS — E78.2 MIXED HYPERLIPIDEMIA: ICD-10-CM

## 2023-12-07 DIAGNOSIS — E11.9 TYPE 2 DIABETES MELLITUS WITHOUT COMPLICATION, WITHOUT LONG-TERM CURRENT USE OF INSULIN (HCC): ICD-10-CM

## 2023-12-07 LAB
ALBUMIN SERPL-MCNC: 4.1 G/DL (ref 3.5–5.2)
ALBUMIN/GLOB SERPL: 1.4 {RATIO} (ref 1–2.5)
ALP SERPL-CCNC: 164 U/L (ref 35–104)
ALT SERPL-CCNC: 56 U/L (ref 5–33)
ANION GAP SERPL CALCULATED.3IONS-SCNC: 9 MMOL/L (ref 9–17)
AST SERPL-CCNC: 60 U/L
BASOPHILS # BLD: 0.06 K/UL (ref 0–0.2)
BASOPHILS NFR BLD: 1 % (ref 0–2)
BILIRUB SERPL-MCNC: 0.3 MG/DL (ref 0.3–1.2)
BUN SERPL-MCNC: 10 MG/DL (ref 6–20)
BUN/CREAT SERPL: 11 (ref 9–20)
CALCIUM SERPL-MCNC: 9.1 MG/DL (ref 8.6–10.4)
CHLORIDE SERPL-SCNC: 100 MMOL/L (ref 98–107)
CHOLEST SERPL-MCNC: 217 MG/DL (ref 0–199)
CHOLESTEROL/HDL RATIO: 5
CO2 SERPL-SCNC: 31 MMOL/L (ref 20–31)
CREAT SERPL-MCNC: 0.9 MG/DL (ref 0.5–0.9)
EOSINOPHIL # BLD: 0.29 K/UL (ref 0–0.44)
EOSINOPHILS RELATIVE PERCENT: 3 % (ref 1–4)
ERYTHROCYTE [DISTWIDTH] IN BLOOD BY AUTOMATED COUNT: 13.9 % (ref 11.8–14.4)
EST. AVERAGE GLUCOSE BLD GHB EST-MCNC: 131 MG/DL
GFR SERPL CREATININE-BSD FRML MDRD: >60 ML/MIN/1.73M2
GLUCOSE SERPL-MCNC: 118 MG/DL (ref 70–99)
HBA1C MFR BLD: 6.2 % (ref 4–6)
HCT VFR BLD AUTO: 43.1 % (ref 36.3–47.1)
HDLC SERPL-MCNC: 44 MG/DL
HGB BLD-MCNC: 13.8 G/DL (ref 11.9–15.1)
IMM GRANULOCYTES # BLD AUTO: 0.11 K/UL (ref 0–0.3)
IMM GRANULOCYTES NFR BLD: 1 %
LDLC SERPL CALC-MCNC: 138 MG/DL (ref 0–100)
LYMPHOCYTES NFR BLD: 2.86 K/UL (ref 1.1–3.7)
LYMPHOCYTES RELATIVE PERCENT: 30 % (ref 24–43)
MCH RBC QN AUTO: 28.6 PG (ref 25.2–33.5)
MCHC RBC AUTO-ENTMCNC: 32 G/DL (ref 25.2–33.5)
MCV RBC AUTO: 89.4 FL (ref 82.6–102.9)
MONOCYTES NFR BLD: 0.66 K/UL (ref 0.1–1.2)
MONOCYTES NFR BLD: 7 % (ref 3–12)
NEUTROPHILS NFR BLD: 58 % (ref 36–65)
NEUTS SEG NFR BLD: 5.61 K/UL (ref 1.5–8.1)
NRBC BLD-RTO: 0 PER 100 WBC
PLATELET # BLD AUTO: 211 K/UL (ref 138–453)
PMV BLD AUTO: 10.8 FL (ref 8.1–13.5)
POTASSIUM SERPL-SCNC: 3.8 MMOL/L (ref 3.7–5.3)
PROT SERPL-MCNC: 7 G/DL (ref 6.4–8.3)
RBC # BLD AUTO: 4.82 M/UL (ref 3.95–5.11)
SODIUM SERPL-SCNC: 140 MMOL/L (ref 135–144)
T4 FREE SERPL-MCNC: 1.1 NG/DL (ref 0.93–1.7)
TRIGL SERPL-MCNC: 177 MG/DL (ref 0–149)
TSH SERPL DL<=0.05 MIU/L-ACNC: 4.06 UIU/ML (ref 0.3–5)
VLDLC SERPL CALC-MCNC: 35 MG/DL
WBC OTHER # BLD: 9.6 K/UL (ref 3.5–11.3)

## 2023-12-07 PROCEDURE — 80053 COMPREHEN METABOLIC PANEL: CPT

## 2023-12-07 PROCEDURE — 84443 ASSAY THYROID STIM HORMONE: CPT

## 2023-12-07 PROCEDURE — 36415 COLL VENOUS BLD VENIPUNCTURE: CPT

## 2023-12-07 PROCEDURE — 84439 ASSAY OF FREE THYROXINE: CPT

## 2023-12-07 PROCEDURE — 83036 HEMOGLOBIN GLYCOSYLATED A1C: CPT

## 2023-12-07 PROCEDURE — 80061 LIPID PANEL: CPT

## 2023-12-07 PROCEDURE — 85025 COMPLETE CBC W/AUTO DIFF WBC: CPT

## 2023-12-12 RX ORDER — EZETIMIBE 10 MG/1
10 TABLET ORAL DAILY
Qty: 90 TABLET | Refills: 1 | Status: SHIPPED | OUTPATIENT
Start: 2023-12-12

## 2023-12-26 RX ORDER — MONTELUKAST SODIUM 10 MG/1
TABLET ORAL
Qty: 90 TABLET | Refills: 1 | Status: SHIPPED | OUTPATIENT
Start: 2023-12-26

## 2023-12-26 RX ORDER — METOPROLOL TARTRATE 50 MG/1
TABLET, FILM COATED ORAL
Qty: 180 TABLET | Refills: 1 | Status: SHIPPED | OUTPATIENT
Start: 2023-12-26

## 2023-12-26 RX ORDER — OXYBUTYNIN CHLORIDE 10 MG/1
TABLET, EXTENDED RELEASE ORAL
Qty: 90 TABLET | Refills: 1 | Status: SHIPPED | OUTPATIENT
Start: 2023-12-26

## 2023-12-26 RX ORDER — POTASSIUM CHLORIDE 600 MG/1
TABLET, FILM COATED, EXTENDED RELEASE ORAL
Qty: 450 TABLET | Refills: 1 | Status: SHIPPED | OUTPATIENT
Start: 2023-12-26

## 2023-12-26 RX ORDER — LEVOTHYROXINE SODIUM 0.12 MG/1
TABLET ORAL
Qty: 90 TABLET | Refills: 1 | Status: SHIPPED | OUTPATIENT
Start: 2023-12-26

## 2023-12-26 RX ORDER — HYDROCHLOROTHIAZIDE 25 MG/1
TABLET ORAL
Qty: 90 TABLET | Refills: 1 | Status: SHIPPED | OUTPATIENT
Start: 2023-12-26

## 2023-12-26 NOTE — TELEPHONE ENCOUNTER
Huber Paulson called requesting a refill of the below medication which has been pended for you:     Requested Prescriptions     Pending Prescriptions Disp Refills    montelukast (SINGULAIR) 10 MG tablet [Pharmacy Med Name: MONTELUKAST  TAB 10MG] 90 tablet 1     Sig: TAKE 1 TABLET DAILY    oxybutynin (DITROPAN-XL) 10 MG extended release tablet [Pharmacy Med Name: OXYBUTYNIN TAB 10MG ER] 90 tablet 1     Sig: TAKE 1 TABLET DAILY    hydroCHLOROthiazide (HYDRODIURIL) 25 MG tablet [Pharmacy Med Name: HYDROCHLOROT TAB 25MG] 90 tablet 1     Sig: TAKE 1 TABLET DAILY    potassium chloride (KLOR-CON) 8 MEQ extended release tablet [Pharmacy Med Name: Silvia Lone TAB 8MEQ ER] 450 tablet 1     Sig: TAKE 5 TABLETS DAILY    metoprolol tartrate (LOPRESSOR) 50 MG tablet [Pharmacy Med Name: Obed Buzzard TAR TAB 50MG] 180 tablet 1     Sig: TAKE 1 TABLET TWICE A DAY    levothyroxine (SYNTHROID) 125 MCG tablet [Pharmacy Med Name: LEVOTHYROXIN TAB 125MCG] 90 tablet 1     Sig: TAKE 1 TABLET DAILY       Last Appointment Date: 10/13/2023  Next Appointment Date: 2/16/2024    Allergies   Allergen Reactions    Furosemide Other (See Comments) and Dizziness or Vertigo     Dizziness, extreme fatigue  Throat swelling  Dizziness, extreme fatigue      Morphine Itching, Nausea Only and Other (See Comments)    Topiramate Swelling, Rash and Angioedema     Facial and Oral edema    Allopurinol Other (See Comments)     Other reaction(s): nausea; \"hard on stomach\"    Bumex [Bumetanide] Other (See Comments)     Extreme fatigue, nausea, dizziness    Cat Hair Extract      Other reaction(s): Sneezing    Colchicine     Gramineae Pollens      Other reaction(s): Sneezing    Indocin [Indomethacin]     Molds & Smuts      Other reaction(s): Sneezing    Uloric [Febuxostat]     Hydrocodone-Acetaminophen Nausea Only

## 2024-01-04 ENCOUNTER — TELEPHONE (OUTPATIENT)
Dept: FAMILY MEDICINE CLINIC | Age: 54
End: 2024-01-04

## 2024-01-31 ENCOUNTER — OFFICE VISIT (OUTPATIENT)
Dept: FAMILY MEDICINE CLINIC | Age: 54
End: 2024-01-31
Payer: MEDICARE

## 2024-01-31 VITALS
RESPIRATION RATE: 18 BRPM | OXYGEN SATURATION: 96 % | SYSTOLIC BLOOD PRESSURE: 120 MMHG | HEART RATE: 96 BPM | TEMPERATURE: 98.8 F | WEIGHT: 293 LBS | DIASTOLIC BLOOD PRESSURE: 70 MMHG | HEIGHT: 66 IN | BODY MASS INDEX: 47.09 KG/M2

## 2024-01-31 DIAGNOSIS — J32.4 CHRONIC PANSINUSITIS: Primary | ICD-10-CM

## 2024-01-31 DIAGNOSIS — J45.909 UNCOMPLICATED ASTHMA, UNSPECIFIED ASTHMA SEVERITY, UNSPECIFIED WHETHER PERSISTENT: ICD-10-CM

## 2024-01-31 PROCEDURE — 3074F SYST BP LT 130 MM HG: CPT | Performed by: FAMILY MEDICINE

## 2024-01-31 PROCEDURE — 99214 OFFICE O/P EST MOD 30 MIN: CPT | Performed by: FAMILY MEDICINE

## 2024-01-31 PROCEDURE — 3078F DIAST BP <80 MM HG: CPT | Performed by: FAMILY MEDICINE

## 2024-01-31 RX ORDER — FLUTICASONE PROPIONATE AND SALMETEROL 500; 50 UG/1; UG/1
1 POWDER RESPIRATORY (INHALATION) EVERY 12 HOURS
Qty: 60 EACH | Refills: 5 | Status: SHIPPED | OUTPATIENT
Start: 2024-01-31

## 2024-01-31 RX ORDER — ALBUTEROL SULFATE 90 UG/1
2 AEROSOL, METERED RESPIRATORY (INHALATION) EVERY 6 HOURS PRN
Qty: 8.5 G | Refills: 5 | Status: SHIPPED | OUTPATIENT
Start: 2024-01-31

## 2024-01-31 RX ORDER — LORAZEPAM 1 MG/1
1 TABLET ORAL DAILY PRN
COMMUNITY
Start: 2024-01-25

## 2024-01-31 RX ORDER — PREDNISONE 20 MG/1
TABLET ORAL
Qty: 15 TABLET | Refills: 0 | Status: SHIPPED | OUTPATIENT
Start: 2024-01-31

## 2024-01-31 RX ORDER — DOXYCYCLINE HYCLATE 100 MG
100 TABLET ORAL 2 TIMES DAILY
Qty: 28 TABLET | Refills: 0 | Status: SHIPPED | OUTPATIENT
Start: 2024-01-31

## 2024-01-31 ASSESSMENT — PATIENT HEALTH QUESTIONNAIRE - PHQ9
6. FEELING BAD ABOUT YOURSELF - OR THAT YOU ARE A FAILURE OR HAVE LET YOURSELF OR YOUR FAMILY DOWN: 0
3. TROUBLE FALLING OR STAYING ASLEEP: 0
SUM OF ALL RESPONSES TO PHQ QUESTIONS 1-9: 6
SUM OF ALL RESPONSES TO PHQ9 QUESTIONS 1 & 2: 3
10. IF YOU CHECKED OFF ANY PROBLEMS, HOW DIFFICULT HAVE THESE PROBLEMS MADE IT FOR YOU TO DO YOUR WORK, TAKE CARE OF THINGS AT HOME, OR GET ALONG WITH OTHER PEOPLE: 0
2. FEELING DOWN, DEPRESSED OR HOPELESS: 2
SUM OF ALL RESPONSES TO PHQ QUESTIONS 1-9: 6
8. MOVING OR SPEAKING SO SLOWLY THAT OTHER PEOPLE COULD HAVE NOTICED. OR THE OPPOSITE, BEING SO FIGETY OR RESTLESS THAT YOU HAVE BEEN MOVING AROUND A LOT MORE THAN USUAL: 0
5. POOR APPETITE OR OVEREATING: 0
4. FEELING TIRED OR HAVING LITTLE ENERGY: 3
1. LITTLE INTEREST OR PLEASURE IN DOING THINGS: 1
SUM OF ALL RESPONSES TO PHQ QUESTIONS 1-9: 6
9. THOUGHTS THAT YOU WOULD BE BETTER OFF DEAD, OR OF HURTING YOURSELF: 0
SUM OF ALL RESPONSES TO PHQ QUESTIONS 1-9: 6
7. TROUBLE CONCENTRATING ON THINGS, SUCH AS READING THE NEWSPAPER OR WATCHING TELEVISION: 0

## 2024-01-31 ASSESSMENT — ENCOUNTER SYMPTOMS
CHEST TIGHTNESS: 0
VOMITING: 0
NAUSEA: 0
RHINORRHEA: 1
SORE THROAT: 1
EYE REDNESS: 0
ABDOMINAL PAIN: 0
CONSTIPATION: 0
COUGH: 1
SINUS COMPLAINT: 1
SHORTNESS OF BREATH: 0
SINUS PAIN: 1
WHEEZING: 0
DIARRHEA: 0
EYE DISCHARGE: 0
TROUBLE SWALLOWING: 0
SINUS PRESSURE: 1

## 2024-01-31 NOTE — PROGRESS NOTES
2024     Judy Kearns (:  1970) is a 53 y.o. female, here for evaluation of the following medical concerns:    Sinus Problem  This is a new problem. The current episode started in the past 7 days (3 weeks duration). The problem has been gradually worsening since onset. Associated symptoms include congestion, coughing, ear pain, headaches, sinus pressure and a sore throat. Pertinent negatives include no chills, neck pain or shortness of breath. Treatments tried: advil severe sinus, ibuprofen, flonase. The treatment provided moderate relief.     Patient has known chronic issues with her sinuses ever since having sinus surgery/septoplasty in 2022.  Was wondering about getting a referral to Dr. Holbrook at [facility] for further evaluation and opinion.  Also she question if there was something different other than Advair that might be a cheaper co-pay for her as the Advair is $100 per fill.  We can send in Wixela which is a comparable inhaler but unsure if this will be a cheaper option for her.  I did discuss with her checking with the pharmacy as well to see if they have an idea of what might be a more cost effective option.    Did review patient's med list, allergies, social history,pmhx and pshx today as noted in the record.      Review of Systems   Constitutional:  Positive for fatigue. Negative for chills and fever.   HENT:  Positive for congestion, ear pain, postnasal drip, rhinorrhea, sinus pressure, sinus pain and sore throat. Negative for trouble swallowing.    Eyes:  Negative for discharge and redness.   Respiratory:  Positive for cough. Negative for chest tightness, shortness of breath and wheezing.    Cardiovascular:  Negative for chest pain.   Gastrointestinal:  Negative for abdominal pain, constipation, diarrhea, nausea and vomiting.   Genitourinary:  Negative for dysuria, flank pain, frequency and urgency.   Musculoskeletal:  Negative for arthralgias, myalgias

## 2024-02-05 ENCOUNTER — ENROLLMENT (OUTPATIENT)
Dept: PHARMACY | Facility: CLINIC | Age: 54
End: 2024-02-05

## 2024-02-14 ENCOUNTER — TELEPHONE (OUTPATIENT)
Dept: PHARMACY | Facility: CLINIC | Age: 54
End: 2024-02-14

## 2024-02-14 NOTE — TELEPHONE ENCOUNTER
Yanet Gray, DO, from below, appointment with you 2/16/24 - identified with a care gap for diabetes without a statin  Per patient chart review: muscle/extremity side effects with previous statin   8/16/23 PCP OV: Statin myopathy (G72.0, T46.6X5A) dx used    If appropriate, please consider using the following CMS eligible diagnosis within your visit encounter:  Statin myopathy (G72.0)  - This will complete/close this insurer-identified gap for this calendar year.    Please let me know if I can further assist, or if you would like me to try to reach patient to discuss any further.  Thank you,  Zenaida Edwards, PharmD, [facility]  Population Health Pharmacist  [facility]  Department, toll free: [phone], option 1    ==============================================================    [facility] CLINICAL PHARMACY: STATIN THERAPY REVIEW  Identified statin use in persons with diabetes care gap per [employer] (2023)    Last Visit: 1/31/24  Next Visit: 2/16/24    ASSESSMENT  DIABETES ADHERENCE    Insurance Records claims through  2023  (2023 PDC = 39% - FAILED):   OZEMPIC INJ 2MG/3ML last filled on 7/18/23 for 28 day supply. Next refill due: 8/27/23    Per Insurer Portal: 11/14/23 claim reversed  Per chart, was new rx 6/6/23; not on current medication list d/t \"expiring/ending\" 1/9/24 1/4/24 telephone encounter - appears now receiving from patient assistance? (see media tab scanned document 1/2/24)    Lab Results   Component Value Date    LABA1C 6.2 (H) 12/07/2023    LABA1C 6.5 (H) 01/24/2023    LABA1C 6.5 (H) 08/15/2022     STATIN GAP IDENTIFIED    Per chart review: patient may be excluded with appropriate CMS eligible dx code for SUPD:  Myopathy (G72.0)  Per 4/13/21 PCP OV note: \"Lipitor however causes significant heaviness and pain in her arms and legs\"   8/16/23 PCP OV: Statin myopathy (G72.0, T46.6X5A)    Lab Results   Component Value Date    CHOL 217 (H) 12/07/2023    TRIG 177 (H) 12/07/2023

## 2024-02-15 ENCOUNTER — HOSPITAL ENCOUNTER (OUTPATIENT)
Age: 54
Discharge: HOME | End: 2024-02-15
Payer: COMMERCIAL

## 2024-02-15 DIAGNOSIS — E11.9 TYPE 2 DIABETES MELLITUS WITHOUT COMPLICATION, WITHOUT LONG-TERM CURRENT USE OF INSULIN (HCC): ICD-10-CM

## 2024-02-15 DIAGNOSIS — E03.9 ACQUIRED HYPOTHYROIDISM: ICD-10-CM

## 2024-02-15 DIAGNOSIS — E78.2 MIXED HYPERLIPIDEMIA: ICD-10-CM

## 2024-02-15 DIAGNOSIS — I10 PRIMARY HYPERTENSION: ICD-10-CM

## 2024-02-15 LAB
ALBUMIN SERPL-MCNC: 4.1 G/DL (ref 3.5–5.2)
ALBUMIN/GLOB SERPL: 1.3 {RATIO} (ref 1–2.5)
ALP SERPL-CCNC: 162 U/L (ref 35–104)
ALT SERPL-CCNC: 52 U/L (ref 5–33)
ANION GAP SERPL CALCULATED.3IONS-SCNC: 9 MMOL/L (ref 9–17)
AST SERPL-CCNC: 70 U/L
BASOPHILS # BLD: 0.04 K/UL (ref 0–0.2)
BASOPHILS NFR BLD: 0 % (ref 0–2)
BILIRUB SERPL-MCNC: 0.4 MG/DL (ref 0.3–1.2)
BUN SERPL-MCNC: 10 MG/DL (ref 6–20)
BUN/CREAT SERPL: 10 (ref 9–20)
CALCIUM SERPL-MCNC: 9.3 MG/DL (ref 8.6–10.4)
CHLORIDE SERPL-SCNC: 101 MMOL/L (ref 98–107)
CHOLEST SERPL-MCNC: 203 MG/DL (ref 0–199)
CHOLESTEROL/HDL RATIO: 4
CO2 SERPL-SCNC: 29 MMOL/L (ref 20–31)
CREAT SERPL-MCNC: 1 MG/DL (ref 0.5–0.9)
EOSINOPHIL # BLD: 0.37 K/UL (ref 0–0.44)
EOSINOPHILS RELATIVE PERCENT: 4 % (ref 1–4)
ERYTHROCYTE [DISTWIDTH] IN BLOOD BY AUTOMATED COUNT: 14.6 % (ref 11.8–14.4)
EST. AVERAGE GLUCOSE BLD GHB EST-MCNC: 120 MG/DL
GFR SERPL CREATININE-BSD FRML MDRD: >60 ML/MIN/1.73M2
GLUCOSE SERPL-MCNC: 145 MG/DL (ref 70–99)
HBA1C MFR BLD: 5.8 % (ref 4–6)
HCT VFR BLD AUTO: 40.3 % (ref 36.3–47.1)
HDLC SERPL-MCNC: 46 MG/DL
HGB BLD-MCNC: 13 G/DL (ref 11.9–15.1)
IMM GRANULOCYTES # BLD AUTO: 0.08 K/UL (ref 0–0.3)
IMM GRANULOCYTES NFR BLD: 1 %
LDLC SERPL CALC-MCNC: 126 MG/DL (ref 0–100)
LYMPHOCYTES NFR BLD: 2.65 K/UL (ref 1.1–3.7)
LYMPHOCYTES RELATIVE PERCENT: 27 % (ref 24–43)
MCH RBC QN AUTO: 28.5 PG (ref 25.2–33.5)
MCHC RBC AUTO-ENTMCNC: 32.3 G/DL (ref 25.2–33.5)
MCV RBC AUTO: 88.4 FL (ref 82.6–102.9)
MONOCYTES NFR BLD: 0.61 K/UL (ref 0.1–1.2)
MONOCYTES NFR BLD: 6 % (ref 3–12)
NEUTROPHILS NFR BLD: 62 % (ref 36–65)
NEUTS SEG NFR BLD: 6.06 K/UL (ref 1.5–8.1)
NRBC BLD-RTO: 0 PER 100 WBC
PLATELET # BLD AUTO: 230 K/UL (ref 138–453)
PMV BLD AUTO: 11.1 FL (ref 8.1–13.5)
POTASSIUM SERPL-SCNC: 3.9 MMOL/L (ref 3.7–5.3)
PROT SERPL-MCNC: 7.3 G/DL (ref 6.4–8.3)
RBC # BLD AUTO: 4.56 M/UL (ref 3.95–5.11)
RBC # BLD: ABNORMAL 10*6/UL
SODIUM SERPL-SCNC: 139 MMOL/L (ref 135–144)
T4 FREE SERPL-MCNC: 1.3 NG/DL (ref 0.93–1.7)
TRIGL SERPL-MCNC: 157 MG/DL
TSH SERPL DL<=0.05 MIU/L-ACNC: 2.22 UIU/ML (ref 0.3–5)
VLDLC SERPL CALC-MCNC: 31 MG/DL
WBC OTHER # BLD: 9.8 K/UL (ref 3.5–11.3)

## 2024-02-15 PROCEDURE — 83036 HEMOGLOBIN GLYCOSYLATED A1C: CPT

## 2024-02-15 PROCEDURE — 84439 ASSAY OF FREE THYROXINE: CPT

## 2024-02-15 PROCEDURE — 36415 COLL VENOUS BLD VENIPUNCTURE: CPT

## 2024-02-15 PROCEDURE — 85025 COMPLETE CBC W/AUTO DIFF WBC: CPT

## 2024-02-15 PROCEDURE — 80061 LIPID PANEL: CPT

## 2024-02-15 PROCEDURE — 84443 ASSAY THYROID STIM HORMONE: CPT

## 2024-02-15 PROCEDURE — 80053 COMPREHEN METABOLIC PANEL: CPT

## 2024-02-16 ENCOUNTER — OFFICE VISIT (OUTPATIENT)
Dept: FAMILY MEDICINE CLINIC | Age: 54
End: 2024-02-16

## 2024-02-16 VITALS
HEART RATE: 88 BPM | OXYGEN SATURATION: 95 % | BODY MASS INDEX: 45.16 KG/M2 | WEIGHT: 281 LBS | SYSTOLIC BLOOD PRESSURE: 110 MMHG | DIASTOLIC BLOOD PRESSURE: 74 MMHG | TEMPERATURE: 98.7 F | RESPIRATION RATE: 18 BRPM | HEIGHT: 66 IN

## 2024-02-16 DIAGNOSIS — E11.9 TYPE 2 DIABETES MELLITUS WITHOUT COMPLICATION, WITHOUT LONG-TERM CURRENT USE OF INSULIN (HCC): ICD-10-CM

## 2024-02-16 DIAGNOSIS — G63 POLYNEUROPATHY ASSOCIATED WITH UNDERLYING DISEASE (HCC): ICD-10-CM

## 2024-02-16 DIAGNOSIS — E03.9 ACQUIRED HYPOTHYROIDISM: ICD-10-CM

## 2024-02-16 DIAGNOSIS — E78.2 MIXED HYPERLIPIDEMIA: ICD-10-CM

## 2024-02-16 DIAGNOSIS — I10 PRIMARY HYPERTENSION: ICD-10-CM

## 2024-02-16 DIAGNOSIS — G40.909 SEIZURE DISORDER (HCC): ICD-10-CM

## 2024-02-16 DIAGNOSIS — T46.6X5A STATIN MYOPATHY: ICD-10-CM

## 2024-02-16 DIAGNOSIS — R10.32 LEFT LOWER QUADRANT PAIN: Primary | ICD-10-CM

## 2024-02-16 DIAGNOSIS — G50.0 TRIGEMINAL NEURALGIA OF LEFT SIDE OF FACE: ICD-10-CM

## 2024-02-16 DIAGNOSIS — F32.1 CURRENT MODERATE EPISODE OF MAJOR DEPRESSIVE DISORDER WITHOUT PRIOR EPISODE (HCC): ICD-10-CM

## 2024-02-16 DIAGNOSIS — G72.0 STATIN MYOPATHY: ICD-10-CM

## 2024-02-16 RX ORDER — LORATADINE PSEUDOEPHEDRINE SULFATE 10; 240 MG/1; MG/1
1 TABLET, EXTENDED RELEASE ORAL DAILY
Qty: 90 TABLET | Refills: 1 | Status: SHIPPED | OUTPATIENT
Start: 2024-02-16

## 2024-02-16 NOTE — PATIENT INSTRUCTIONS
Hospital Outpatient Visit on 02/15/2024   Component Date Value Ref Range Status    T4 Free 02/15/2024 1.3  0.93 - 1.70 ng/dL Final    TSH 02/15/2024 2.22  0.30 - 5.00 uIU/mL Final    Cholesterol 02/15/2024 203 (H)  0 - 199 mg/dL Final    Comment:    Cholesterol Guidelines:      <200  Desirable   200-240  Borderline      >240  Undesirable         HDL 02/15/2024 46  >40 mg/dL Final    Comment:    HDL Guidelines:    <40     Undesirable   40-59    Borderline    >59     Desirable         LDL Cholesterol 02/15/2024 126 (H)  0 - 100 mg/dL Final    Comment:    LDL Guidelines:     <100    Desirable   100-129   Near to/above Desirable   130-159   Borderline      >159   Undesirable     Direct (measured) LDL and calculated LDL are not interchangeable tests.      Chol/HDL Ratio 02/15/2024 4.0   Final    Triglycerides 02/15/2024 157 (H)  <150 mg/dL Final    Comment:    Triglyceride Guidelines:     <150   Desirable   150-199  Borderline   200-499  High     >499   Very high   Based on AHA Guidelines for fasting triglyceride, October 2012.         VLDL 02/15/2024 31  mg/dL Final    Hemoglobin A1C 02/15/2024 5.8  4.0 - 6.0 % Final    Estimated Avg Glucose 02/15/2024 120  mg/dL Final    Comment: The ADA and AACC recommend providing the estimated average glucose result to permit better   patient understanding of their HBA1c result.      Sodium 02/15/2024 139  135 - 144 mmol/L Final    Potassium 02/15/2024 3.9  3.7 - 5.3 mmol/L Final    Chloride 02/15/2024 101  98 - 107 mmol/L Final    CO2 02/15/2024 29  20 - 31 mmol/L Final    Anion Gap 02/15/2024 9  9 - 17 mmol/L Final    Glucose 02/15/2024 145 (H)  70 - 99 mg/dL Final    BUN 02/15/2024 10  6 - 20 mg/dL Final    Creatinine 02/15/2024 1.0 (H)  0.5 - 0.9 mg/dL Final    Est, Glom Filt Rate 02/15/2024 >60  >60 mL/min/1.73m2 Final    Comment:       These results are not intended for use in patients <18 years of age.        eGFR results are calculated without a race factor using the 2021

## 2024-02-16 NOTE — PROGRESS NOTES
2024     Judy Kearns (:  1970) is a 53 y.o. female, here for evaluation of the following medical concerns:    HPI  Patient comes in today for follow up of chronic health issues Patient has been having continued issues with left lower quadrant pain.  This has been somewhat persistent since October.  We had done a CT of her abdomen and pelvis at that time and it did not show any concerning abnormality.  Did show some diverticulosis but no evidence of acute infection.  It has been waxing and waning persistent pain since then.  No change in bowel movements.  She states sometimes she will get sharp intensity of pain and she will feel nauseated and that will last for about 15 minutes within that seems to go away.  Does not seem to correlate with any specific food ingestions.  She has not any fever or chills.  Was recently on an antibiotic for her sinuses and thought perhaps maybe the pain was better during that time but when the antibiotic was finished it seemed to get worse again.  She has not had a colonoscopy and I did recommend that she pursue colonoscopy evaluation as we have not really found a specific etiology of her ongoing pain.  She hesitantly agrees and we will make referral.  Does have a known history of diabetes mellitus type 2 which is stable and controlled with her current medical therapy.  She does note that the abdominal pain was present before she ever started on the Ozempic and has not changed since she has been on the Ozempic so we will continue with this medical therapy.  She has a known history of hypertension and her blood pressure is stable and controlled on her current medical regimen.  Has a known history of hypothyroidism and her thyroid levels are stable and adequately supplemented on her current thyroid dose.  Does have a known history of hyperlipidemia and does not tolerate statin medication.  She does get statin myalgias and I had prescribed a different statin but she

## 2024-02-20 ENCOUNTER — TELEMEDICINE (OUTPATIENT)
Dept: FAMILY MEDICINE CLINIC | Age: 54
End: 2024-02-20

## 2024-02-20 DIAGNOSIS — Z00.00 MEDICARE ANNUAL WELLNESS VISIT, SUBSEQUENT: Primary | ICD-10-CM

## 2024-02-20 ASSESSMENT — PATIENT HEALTH QUESTIONNAIRE - PHQ9
SUM OF ALL RESPONSES TO PHQ QUESTIONS 1-9: 10
4. FEELING TIRED OR HAVING LITTLE ENERGY: 3
5. POOR APPETITE OR OVEREATING: 0
3. TROUBLE FALLING OR STAYING ASLEEP: 3
SUM OF ALL RESPONSES TO PHQ QUESTIONS 1-9: 10
2. FEELING DOWN, DEPRESSED OR HOPELESS: 2
8. MOVING OR SPEAKING SO SLOWLY THAT OTHER PEOPLE COULD HAVE NOTICED. OR THE OPPOSITE, BEING SO FIGETY OR RESTLESS THAT YOU HAVE BEEN MOVING AROUND A LOT MORE THAN USUAL: 0
7. TROUBLE CONCENTRATING ON THINGS, SUCH AS READING THE NEWSPAPER OR WATCHING TELEVISION: 0
SUM OF ALL RESPONSES TO PHQ QUESTIONS 1-9: 10
SUM OF ALL RESPONSES TO PHQ QUESTIONS 1-9: 10
SUM OF ALL RESPONSES TO PHQ9 QUESTIONS 1 & 2: 3
10. IF YOU CHECKED OFF ANY PROBLEMS, HOW DIFFICULT HAVE THESE PROBLEMS MADE IT FOR YOU TO DO YOUR WORK, TAKE CARE OF THINGS AT HOME, OR GET ALONG WITH OTHER PEOPLE: 1
1. LITTLE INTEREST OR PLEASURE IN DOING THINGS: 1
6. FEELING BAD ABOUT YOURSELF - OR THAT YOU ARE A FAILURE OR HAVE LET YOURSELF OR YOUR FAMILY DOWN: 1
9. THOUGHTS THAT YOU WOULD BE BETTER OFF DEAD, OR OF HURTING YOURSELF: 0

## 2024-02-20 NOTE — PROGRESS NOTES
Medicare Annual Wellness Visit    Judy Kearns is here for Medicare AWV (Subsequent; last 2/17/23)    Assessment & Plan     Recommendations for Preventive Services Due: see orders and patient instructions/AVS.  Recommended screening schedule for the next 5-10 years is provided to the patient in written form: see Patient Instructions/AVS.     No follow-ups on file.     Subjective     Patient's complete Health Risk Assessment and screening values have been reviewed and are found in Flowsheets. The following problems were reviewed today and where indicated follow up appointments were made and/or referrals ordered.    Positive Risk Factor Screenings with Interventions:        Depression:  PHQ-2 Score: 3  PHQ-9 Total Score: 10    Interpretation:  5-9 mild   10-14 moderate   15-19 moderately severe   20-27 severe     Interventions:  Life style changes to improve mood reviewed-is on medication also and is routinely followed for long standing issues with depression.           General HRA Questions:  Select all that apply: (!) Social Isolation (patient states she doesn't like to be around people or leave the house much)    Social Isolation Interventions:  See AVS for additional education material-see note-patient prefers not being around people or leaving her house      Activity, Diet, and Weight:  On average, how many days per week do you engage in moderate to strenuous exercise (like a brisk walk)?: 0 days  On average, how many minutes do you engage in exercise at this level?: 0 min    Do you eat balanced/healthy meals regularly?: Yes    There is no height or weight on file to calculate BMI. (!) Abnormal        Inactivity Interventions:  See AVS for additional education material  Obesity Interventions:  See AVS for additional education material          Objective      Patient-Reported Vitals  BP Observations: No, remote/electronic monitoring device was not used or able to be verified (patient does not monitor BP at home;

## 2024-02-20 NOTE — PATIENT INSTRUCTIONS
heart disease.  Follow-up care is a key part of your treatment and safety. Be sure to make and go to all appointments, and call your doctor if you are having problems. It's also a good idea to know your test results and keep a list of the medicines you take.  How can you care for yourself at home?  Diet    Use less salt when you cook and eat. This helps lower your blood pressure. Taste food before salting. Add only a little salt when you think you need it. With time, your taste buds will adjust to less salt.     Eat fewer snack items, fast foods, canned soups, and other high-salt, high-fat, processed foods.     Read food labels and try to avoid saturated and trans fats. They increase your risk of heart disease by raising cholesterol levels.     Limit the amount of solid fat-butter, margarine, and shortening-you eat. Use olive, peanut, or canola oil when you cook. Bake, broil, and steam foods instead of frying them.     Eat a variety of fruit and vegetables every day. Dark green, deep orange, red, or yellow fruits and vegetables are especially good for you. Examples include spinach, carrots, peaches, and berries.     Foods high in fiber can reduce your cholesterol and provide important vitamins and minerals. High-fiber foods include whole-grain cereals and breads, oatmeal, beans, brown rice, citrus fruits, and apples.     Eat lean proteins. Heart-healthy proteins include seafood, lean meats and poultry, eggs, beans, peas, nuts, seeds, and soy products.     Limit drinks and foods with added sugar. These include candy, desserts, and soda pop.   Lifestyle changes    If your doctor recommends it, get more exercise. Walking is a good choice. Bit by bit, increase the amount you walk every day. Try for at least 30 minutes on most days of the week. You also may want to swim, bike, or do other activities.     Do not smoke. If you need help quitting, talk to your doctor about stop-smoking programs and medicines. These can

## 2024-02-25 DIAGNOSIS — G63 POLYNEUROPATHY ASSOCIATED WITH UNDERLYING DISEASE (HCC): ICD-10-CM

## 2024-02-26 RX ORDER — GABAPENTIN 800 MG/1
800 TABLET ORAL 3 TIMES DAILY
Qty: 90 TABLET | Refills: 0 | Status: SHIPPED | OUTPATIENT
Start: 2024-02-26 | End: 2024-03-27

## 2024-02-26 NOTE — TELEPHONE ENCOUNTER
Per [facility], last fill 01/22/2024, quantity 90 for 30 days.   Judy called requesting a refill of the below medication which has been pended for you:     Requested Prescriptions     Pending Prescriptions Disp Refills    gabapentin (NEURONTIN) 800 MG tablet [Pharmacy Med Name: Gabapentin 800 MG Oral Tablet] 90 tablet 0     Sig: Take 1 tablet by mouth 3 times daily for 30 days.       Last Appointment Date: 2/20/2024  Next Appointment Date: 8/16/2024    Allergies   Allergen Reactions    Furosemide Other (See Comments) and Dizziness or Vertigo     Dizziness, extreme fatigue  Throat swelling  Dizziness, extreme fatigue      Morphine Itching, Nausea Only and Other (See Comments)    Topiramate Swelling, Rash and Angioedema     Facial and Oral edema    Allopurinol Other (See Comments)     Other reaction(s): nausea; \"hard on stomach\"    Bumex [Bumetanide] Other (See Comments)     Extreme fatigue, nausea, dizziness    Cat Hair Extract      Other reaction(s): Sneezing    Colchicine     Gramineae Pollens      Other reaction(s): Sneezing    Indocin [Indomethacin]     Molds & Smuts      Other reaction(s): Sneezing    Uloric [Febuxostat]     Hydrocodone-Acetaminophen Nausea Only      pt is a 18yo male with rash, will give IV steroids, benadryl and pepcid and re-evaluate.

## 2024-02-29 ENCOUNTER — PATIENT MESSAGE (OUTPATIENT)
Dept: FAMILY MEDICINE CLINIC | Age: 54
End: 2024-02-29

## 2024-02-29 NOTE — TELEPHONE ENCOUNTER
Judy called requesting a refill of the below medication which has been pended for you: script was sent to patient assistance.    Requested Prescriptions     Pending Prescriptions Disp Refills    Semaglutide, 1 MG/DOSE, 4 MG/3ML SOPN 12 mL 0     Sig: Inject 1 mg into the skin once a week       Last Appointment Date: 2/20/2024  Next Appointment Date: 8/16/2024    Allergies   Allergen Reactions    Furosemide Other (See Comments) and Dizziness or Vertigo     Dizziness, extreme fatigue  Throat swelling  Dizziness, extreme fatigue      Morphine Itching, Nausea Only and Other (See Comments)    Topiramate Swelling, Rash and Angioedema     Facial and Oral edema    Allopurinol Other (See Comments)     Other reaction(s): nausea; \"hard on stomach\"    Bumex [Bumetanide] Other (See Comments)     Extreme fatigue, nausea, dizziness    Cat Hair Extract      Other reaction(s): Sneezing    Colchicine     Gramineae Pollens      Other reaction(s): Sneezing    Indocin [Indomethacin]     Molds & Smuts      Other reaction(s): Sneezing    Uloric [Febuxostat]     Hydrocodone-Acetaminophen Nausea Only

## 2024-03-19 ENCOUNTER — TELEPHONE (OUTPATIENT)
Dept: FAMILY MEDICINE CLINIC | Age: 54
End: 2024-03-19

## 2024-03-19 NOTE — TELEPHONE ENCOUNTER
Called patient to let her know her Ozempic for patient assistance arrived today and she plans to pick it up today

## 2024-04-18 RX ORDER — ESOMEPRAZOLE MAGNESIUM 40 MG/1
CAPSULE, DELAYED RELEASE ORAL
Qty: 180 CAPSULE | Refills: 1 | Status: SHIPPED | OUTPATIENT
Start: 2024-04-18

## 2024-04-18 NOTE — TELEPHONE ENCOUNTER
Judy called requesting a refill of the below medication which has been pended for you:     Requested Prescriptions     Pending Prescriptions Disp Refills    esomeprazole (NEXIUM) 40 MG delayed release capsule [Pharmacy Med Name: ESOMEPRA MAG CAP 40MG DR] 180 capsule 1     Sig: TAKE 1 CAPSULE TWICE DAILY       Last Appointment Date: 2/20/2024  Next Appointment Date: 8/16/2024    Allergies   Allergen Reactions    Furosemide Other (See Comments) and Dizziness or Vertigo     Dizziness, extreme fatigue  Throat swelling  Dizziness, extreme fatigue      Morphine Itching, Nausea Only and Other (See Comments)    Topiramate Swelling, Rash and Angioedema     Facial and Oral edema    Allopurinol Other (See Comments)     Other reaction(s): nausea; \"hard on stomach\"    Bumex [Bumetanide] Other (See Comments)     Extreme fatigue, nausea, dizziness    Cat Hair Extract      Other reaction(s): Sneezing    Colchicine     Gramineae Pollens      Other reaction(s): Sneezing    Indocin [Indomethacin]     Molds & Smuts      Other reaction(s): Sneezing    Uloric [Febuxostat]     Hydrocodone-Acetaminophen Nausea Only

## 2024-05-02 ENCOUNTER — INITIAL CONSULT (OUTPATIENT)
Dept: SURGERY | Age: 54
End: 2024-05-02
Payer: MEDICARE

## 2024-05-02 VITALS
HEART RATE: 81 BPM | SYSTOLIC BLOOD PRESSURE: 118 MMHG | BODY MASS INDEX: 43.71 KG/M2 | TEMPERATURE: 97.4 F | OXYGEN SATURATION: 98 % | WEIGHT: 272 LBS | HEIGHT: 66 IN | DIASTOLIC BLOOD PRESSURE: 72 MMHG

## 2024-05-02 DIAGNOSIS — R10.30 LOWER ABDOMINAL PAIN: Primary | ICD-10-CM

## 2024-05-02 DIAGNOSIS — E66.01 OBESITY, CLASS III, BMI 40-49.9 (MORBID OBESITY) (HCC): ICD-10-CM

## 2024-05-02 PROCEDURE — 99214 OFFICE O/P EST MOD 30 MIN: CPT | Performed by: SURGERY

## 2024-05-02 PROCEDURE — 3078F DIAST BP <80 MM HG: CPT | Performed by: SURGERY

## 2024-05-02 PROCEDURE — 3074F SYST BP LT 130 MM HG: CPT | Performed by: SURGERY

## 2024-05-02 NOTE — PROGRESS NOTES
Patient presents today for evaluation for lower abdominal pain referred by PCP Dr. Vazquez. Patient states pain started in February reports intermittent LLQ pain. CT ABD Pelvis done 10/13/23 findings as followed;    IMPRESSION:  1. Bilateral nonobstructing renal calculi.  No obstructing calculus or  hydronephrosis.  2. Prior cholecystectomy.  3. Mild colonic diverticulosis.  Moderate stool burden.  4. Normal appendix.  5. Spinal fusion from L3-S1.  1.1 cm anterolisthesis of L5 on S1.  6. Stable regions of low attenuation throughout the spleen which may  represent combination of splenic cysts or hemangiomas.      Abd pain: yes, left lower abdominal pain comes and goes, pain started in February after Ozempic injection, states pain last couple hours and is affected by what she eats and caffeine drinks.   Anemia: no, patient states she has H/O anemia, labs on 2/15/24 Hemoglobin 13.0, Hematocrit 40.3  Bloating: no  Diarrhea: no  Constipation: no, reports she have BM every 4-5 days   Melena: no  Hematochezia:no  Rectal Bleeding:no  Rectal/Anal Pain:no  Pruritus: no  Family history colon Cancer: no  Previous colon cancer: no  Previous Colon Polyp: no  Change in bowels: no  Decrease caliber of stool: no  Change in color of stool: no    Previous work up date: Never had colonoscopy before but has done Cologuard test, last was done 4/27/23 and was negative.       
MG tablet Take 1 tablet by mouth daily (Patient not taking: Reported on 5/2/2024) 90 tablet 1    methocarbamol (ROBAXIN) 500 MG tablet take 1 tablet by mouth three times a day if needed for headache or FACIAL PAIN (Patient not taking: Reported on 5/2/2024)       No current facility-administered medications on file prior to visit.     Allergies as of 05/02/2024 - Fully Reviewed 05/02/2024   Allergen Reaction Noted    Furosemide Other (See Comments) and Dizziness or Vertigo 05/08/2019    Morphine Itching, Nausea Only, and Other (See Comments) 12/19/2013    Topiramate Swelling, Rash, and Angioedema 02/25/2014    Allopurinol Other (See Comments) 08/22/2013    Bumex [bumetanide] Other (See Comments) 07/29/2019    Cat hair extract  11/14/2022    Colchicine  08/22/2013    Gramineae pollens  11/14/2022    Indocin [indomethacin]  08/22/2013    Molds & smuts  11/14/2022    Uloric [febuxostat]  08/22/2013    Hydrocodone-acetaminophen Nausea Only 10/10/2016         PHYSICAL EXAM:    Blood pressure 118/72, pulse 81, temperature 97.4 °F (36.3 °C), height 1.676 m (5' 6\"), weight 123.4 kg (272 lb), last menstrual period 05/05/2005, SpO2 98 %, not currently breastfeeding.    Gen:  A and O x 3, NAD, well nourished  Eyes:  Sclera non icterus, PERRL  Head:  Normocephalic, non-tender  Neck:  Supple, no adenopathy, thyroid non tender and no masses,no carotid bruits  Lungs:  CTA, symmetrical  Chest:  RRR, no murmurs  Abd:  Soft, NT, ND, no mass or hernias  Ext:  No edema, no cyanosis  Psych: reveals appropriate mood, memory and judgment,  Neuro:  Reveals no gross motor or sensory deficits,   Msk:  5/5 strength all 4 extremities, no joint tenderness          ASSESS MENT:    LLQ pain  Age 53  No fam hx colon cancer  No previous CS, but did do cologuard      PLAN:    CS is reasonable and I would recommend it to look at left colon, rule out diverticular disease colitis or polyp or cancer. But at this time the patient would like to wait and

## 2024-05-17 DIAGNOSIS — G63 POLYNEUROPATHY ASSOCIATED WITH UNDERLYING DISEASE (HCC): ICD-10-CM

## 2024-05-17 RX ORDER — GABAPENTIN 800 MG/1
800 TABLET ORAL 3 TIMES DAILY
Qty: 90 TABLET | Refills: 0 | Status: SHIPPED | OUTPATIENT
Start: 2024-05-17 | End: 2024-06-16

## 2024-05-17 NOTE — TELEPHONE ENCOUNTER
Judy called requesting a refill of the below medication which has been pended for you: last filled 3/10/2024 #90    Requested Prescriptions     Pending Prescriptions Disp Refills    gabapentin (NEURONTIN) 800 MG tablet [Pharmacy Med Name: Gabapentin 800 MG Oral Tablet] 90 tablet 0     Sig: Take 1 tablet by mouth 3 times daily.       Last Appointment Date: 2/16/2024  Next Appointment Date: 8/16/2024    Allergies   Allergen Reactions    Furosemide Other (See Comments) and Dizziness or Vertigo     Dizziness, extreme fatigue  Throat swelling  Dizziness, extreme fatigue      Morphine Itching, Nausea Only and Other (See Comments)    Topiramate Swelling, Rash and Angioedema     Facial and Oral edema    Allopurinol Other (See Comments)     Other reaction(s): nausea; \"hard on stomach\"    Bumex [Bumetanide] Other (See Comments)     Extreme fatigue, nausea, dizziness    Cat Hair Extract      Other reaction(s): Sneezing    Colchicine     Gramineae Pollens      Other reaction(s): Sneezing    Indocin [Indomethacin]     Molds & Smuts      Other reaction(s): Sneezing    Uloric [Febuxostat]     Hydrocodone-Acetaminophen Nausea Only

## 2024-05-17 NOTE — TELEPHONE ENCOUNTER
Covering for patient's normal PCP today. Last office visit reviewed. Chart reviewed. [facility] reviewed no concerns identified

## 2024-05-30 ENCOUNTER — OFFICE VISIT (OUTPATIENT)
Dept: FAMILY MEDICINE CLINIC | Age: 54
End: 2024-05-30

## 2024-05-30 VITALS
WEIGHT: 272 LBS | DIASTOLIC BLOOD PRESSURE: 70 MMHG | SYSTOLIC BLOOD PRESSURE: 118 MMHG | HEART RATE: 76 BPM | OXYGEN SATURATION: 98 % | TEMPERATURE: 98.2 F | RESPIRATION RATE: 18 BRPM | BODY MASS INDEX: 43.71 KG/M2 | HEIGHT: 66 IN

## 2024-05-30 DIAGNOSIS — H93.13 TINNITUS OF BOTH EARS: Primary | ICD-10-CM

## 2024-05-30 ASSESSMENT — ENCOUNTER SYMPTOMS
RHINORRHEA: 0
FACIAL SWELLING: 0
SINUS PAIN: 0
SINUS PRESSURE: 0

## 2024-05-30 NOTE — PROGRESS NOTES
2024     Judy Kearns (:  1970) is a 54 y.o. female, here for evaluation of the following medical concerns:    Other  This is a chronic problem. Episode onset: patient has had ongoing issues with ringing in her ears since having sinus surgery in 2022.  Has progressively worsened and is intolerable. The problem occurs constantly. The problem has been gradually worsening. Pertinent negatives include no chills, congestion, fatigue or fever. Associated symptoms comments: Has had ongoing chronic sinus and ear issues since her sinus surgery .  Does note that currently her pain is not really bothersome but the high pitched ringing/squealing sound in her ears is intolerable at this point.  Has had hearing loss in both ears, but she believes this is related to the squealing . She has tried nothing for the symptoms.     Did review patient's med list, allergies, social history,pmhx and pshx today as noted in the record.      Review of Systems   Constitutional:  Negative for chills, fatigue and fever.   HENT:  Positive for tinnitus. Negative for congestion, facial swelling, postnasal drip, rhinorrhea, sinus pressure and sinus pain.        Prior to Visit Medications    Medication Sig Taking? Authorizing Provider   gabapentin (NEURONTIN) 800 MG tablet Take 1 tablet by mouth 3 times daily for 30 days.  Yelena Ryder, APRN - CNP   esomeprazole (NEXIUM) 40 MG delayed release capsule TAKE 1 CAPSULE TWICE DAILY  Yanet Gray, DO   Semaglutide, 1 MG/DOSE, 4 MG/3ML SOPN Inject 1 mg into the skin once a week  Patient taking differently: Inject 1 mg into the skin once a week Every Monday  Yanet Gray, DO   loratadine-pseudoephedrine (CLARITIN-D 24 HOUR)  MG per extended release tablet Take 1 tablet by mouth daily  Yanet Gray DO   LORazepam (ATIVAN) 1 MG tablet Take 1 tablet by mouth daily as needed.  Provider, MD Linda   fluticasone-salmeterol (WIXELA INHUB) 500-50 MCG/ACT

## 2024-06-12 DIAGNOSIS — G63 POLYNEUROPATHY ASSOCIATED WITH UNDERLYING DISEASE (HCC): ICD-10-CM

## 2024-06-12 RX ORDER — GABAPENTIN 800 MG/1
800 TABLET ORAL 3 TIMES DAILY
Qty: 90 TABLET | Refills: 2 | Status: SHIPPED | OUTPATIENT
Start: 2024-06-12 | End: 2024-09-10

## 2024-06-12 NOTE — TELEPHONE ENCOUNTER
Judy called requesting a refill of the below medication which has been pended for you:   Per [facility] last fill date was 05/17/2024 quantity 90 for 30 days  Requested Prescriptions     Pending Prescriptions Disp Refills    gabapentin (NEURONTIN) 800 MG tablet [Pharmacy Med Name: Gabapentin 800 MG Oral Tablet] 90 tablet 0     Sig: Take 1 tablet by mouth 3 times daily.       Last Appointment Date: 5/30/2024  Next Appointment Date: 8/16/2024    Allergies   Allergen Reactions    Furosemide Other (See Comments) and Dizziness or Vertigo     Dizziness, extreme fatigue  Throat swelling  Dizziness, extreme fatigue      Morphine Itching, Nausea Only and Other (See Comments)    Topiramate Swelling, Rash and Angioedema     Facial and Oral edema    Allopurinol Other (See Comments)     Other reaction(s): nausea; \"hard on stomach\"    Bumex [Bumetanide] Other (See Comments)     Extreme fatigue, nausea, dizziness    Cat Hair Extract      Other reaction(s): Sneezing    Colchicine     Gramineae Pollens      Other reaction(s): Sneezing    Indocin [Indomethacin]     Molds & Smuts      Other reaction(s): Sneezing    Uloric [Febuxostat]     Hydrocodone-Acetaminophen Nausea Only

## 2024-06-13 RX ORDER — OXYBUTYNIN CHLORIDE 10 MG/1
TABLET, EXTENDED RELEASE ORAL
Qty: 90 TABLET | Refills: 1 | Status: SHIPPED | OUTPATIENT
Start: 2024-06-13

## 2024-06-13 RX ORDER — POTASSIUM CHLORIDE 600 MG/1
TABLET, FILM COATED, EXTENDED RELEASE ORAL
Qty: 450 TABLET | Refills: 1 | Status: SHIPPED | OUTPATIENT
Start: 2024-06-13

## 2024-06-13 RX ORDER — MONTELUKAST SODIUM 10 MG/1
TABLET ORAL
Qty: 90 TABLET | Refills: 1 | Status: SHIPPED | OUTPATIENT
Start: 2024-06-13

## 2024-06-13 RX ORDER — HYDROCHLOROTHIAZIDE 25 MG/1
TABLET ORAL
Qty: 90 TABLET | Refills: 1 | Status: SHIPPED | OUTPATIENT
Start: 2024-06-13

## 2024-06-13 RX ORDER — LEVOTHYROXINE SODIUM 0.12 MG/1
TABLET ORAL
Qty: 90 TABLET | Refills: 1 | Status: SHIPPED | OUTPATIENT
Start: 2024-06-13

## 2024-06-13 RX ORDER — METOPROLOL TARTRATE 50 MG/1
TABLET, FILM COATED ORAL
Qty: 180 TABLET | Refills: 1 | Status: SHIPPED | OUTPATIENT
Start: 2024-06-13

## 2024-06-13 NOTE — TELEPHONE ENCOUNTER
Judy called requesting a refill of the below medication which has been pended for you:     Requested Prescriptions     Pending Prescriptions Disp Refills    levothyroxine (SYNTHROID) 125 MCG tablet [Pharmacy Med Name: LEVOTHYROXIN TAB 125MCG] 90 tablet 1     Sig: TAKE 1 TABLET DAILY    hydroCHLOROthiazide (HYDRODIURIL) 25 MG tablet [Pharmacy Med Name: HYDROCHLOROT TAB 25MG] 90 tablet 1     Sig: TAKE 1 TABLET DAILY    potassium chloride (KLOR-CON) 8 MEQ extended release tablet [Pharmacy Med Name: POT CHLOR JASBIR TAB 8MEQ ER] 450 tablet 1     Sig: TAKE 5 TABLETS DAILY    metoprolol tartrate (LOPRESSOR) 50 MG tablet [Pharmacy Med Name: METOPROL TAR TAB 50MG] 180 tablet 1     Sig: TAKE 1 TABLET TWICE A DAY    oxyBUTYnin (DITROPAN-XL) 10 MG extended release tablet [Pharmacy Med Name: OXYBUTYNIN TAB 10MG ER] 90 tablet 1     Sig: TAKE 1 TABLET DAILY    montelukast (SINGULAIR) 10 MG tablet [Pharmacy Med Name: MONTELUKAST  TAB 10MG] 90 tablet 1     Sig: TAKE 1 TABLET DAILY       Last Appointment Date: 5/30/2024  Next Appointment Date: 8/16/2024    Allergies   Allergen Reactions    Furosemide Other (See Comments) and Dizziness or Vertigo     Dizziness, extreme fatigue  Throat swelling  Dizziness, extreme fatigue      Morphine Itching, Nausea Only and Other (See Comments)    Topiramate Swelling, Rash and Angioedema     Facial and Oral edema    Allopurinol Other (See Comments)     Other reaction(s): nausea; \"hard on stomach\"    Bumex [Bumetanide] Other (See Comments)     Extreme fatigue, nausea, dizziness    Cat Hair Extract      Other reaction(s): Sneezing    Colchicine     Gramineae Pollens      Other reaction(s): Sneezing    Indocin [Indomethacin]     Molds & Smuts      Other reaction(s): Sneezing    Uloric [Febuxostat]     Hydrocodone-Acetaminophen Nausea Only

## 2024-06-25 ENCOUNTER — TELEPHONE (OUTPATIENT)
Dept: FAMILY MEDICINE CLINIC | Age: 54
End: 2024-06-25

## 2024-06-25 NOTE — TELEPHONE ENCOUNTER
Patient assistance for ozempic was received today. LM on patient voicemail she could come and  at her convenience.

## 2024-08-05 ENCOUNTER — OFFICE VISIT (OUTPATIENT)
Dept: PRIMARY CARE CLINIC | Age: 54
End: 2024-08-05
Payer: MEDICARE

## 2024-08-05 ENCOUNTER — HOSPITAL ENCOUNTER (OUTPATIENT)
Age: 54
Setting detail: SPECIMEN
Discharge: HOME | End: 2024-08-05
Payer: MEDICARE

## 2024-08-05 VITALS
WEIGHT: 263.38 LBS | BODY MASS INDEX: 42.33 KG/M2 | HEIGHT: 66 IN | TEMPERATURE: 98 F | OXYGEN SATURATION: 99 % | DIASTOLIC BLOOD PRESSURE: 80 MMHG | RESPIRATION RATE: 16 BRPM | HEART RATE: 84 BPM | SYSTOLIC BLOOD PRESSURE: 120 MMHG

## 2024-08-05 DIAGNOSIS — N30.00 ACUTE CYSTITIS WITHOUT HEMATURIA: Primary | ICD-10-CM

## 2024-08-05 DIAGNOSIS — R35.0 FREQUENCY OF URINATION: ICD-10-CM

## 2024-08-05 LAB
BACTERIA URNS QL MICRO: ABNORMAL
BILIRUB UR QL STRIP: NEGATIVE
CHARACTER UR: ABNORMAL
CLARITY UR: CLEAR
COLOR UR: YELLOW
EPI CELLS #/AREA URNS HPF: ABNORMAL /HPF (ref 0–5)
GLUCOSE UR STRIP-MCNC: NEGATIVE MG/DL
HGB UR QL STRIP.AUTO: NEGATIVE
KETONES UR STRIP-MCNC: NEGATIVE MG/DL
LEUKOCYTE ESTERASE UR QL STRIP: ABNORMAL
NITRITE UR QL STRIP: NEGATIVE
PH UR STRIP: 6.5 [PH] (ref 5–6)
PROT UR STRIP-MCNC: NEGATIVE MG/DL
RBC #/AREA URNS HPF: ABNORMAL /HPF (ref 0–4)
SP GR UR STRIP: 1.02 (ref 1.01–1.02)
UROBILINOGEN UR STRIP-ACNC: NORMAL EU/DL (ref 0–1)
WBC #/AREA URNS HPF: ABNORMAL /HPF (ref 0–4)

## 2024-08-05 PROCEDURE — 81001 URINALYSIS AUTO W/SCOPE: CPT

## 2024-08-05 PROCEDURE — 99212 OFFICE O/P EST SF 10 MIN: CPT

## 2024-08-05 PROCEDURE — 87186 SC STD MICRODIL/AGAR DIL: CPT

## 2024-08-05 PROCEDURE — 87086 URINE CULTURE/COLONY COUNT: CPT

## 2024-08-05 PROCEDURE — 87077 CULTURE AEROBIC IDENTIFY: CPT

## 2024-08-05 RX ORDER — PHENAZOPYRIDINE HYDROCHLORIDE 100 MG/1
100 TABLET, FILM COATED ORAL 3 TIMES DAILY PRN
Qty: 15 TABLET | Refills: 0 | Status: SHIPPED | OUTPATIENT
Start: 2024-08-05 | End: 2024-08-10

## 2024-08-05 RX ORDER — NITROFURANTOIN 25; 75 MG/1; MG/1
100 CAPSULE ORAL 2 TIMES DAILY
Qty: 14 CAPSULE | Refills: 0 | Status: SHIPPED | OUTPATIENT
Start: 2024-08-05 | End: 2024-08-12

## 2024-08-05 ASSESSMENT — ENCOUNTER SYMPTOMS
VOMITING: 0
NAUSEA: 1

## 2024-08-05 NOTE — PROGRESS NOTES
[facility] Walk In department of [facility]  [address] [facility] [address]  Phone: [phone]  Fax: [phone]      Judy Kearns  1970  MRN: [id number]  Date of visit: 8/5/2024    Chief Complaint:     Judy Kearns is here for c/o of Urinary Pain (She is here for urinary burning, cloudy urine, back pain and feeling the need to urinate. )      HPI:     Judy Kearns is a 54 y.o. female who presents to the [facility] today for her medical conditions/complaints as noted below.    Urinary Pain   This is a new problem. Episode onset: 3 days. The problem occurs every urination. The problem has been gradually worsening. The quality of the pain is described as burning. The pain is at a severity of 5/10. There has been no fever. Associated symptoms include chills, flank pain, frequency, nausea and urgency. Pertinent negatives include no hematuria or vomiting. She has tried NSAIDs (azo) for the symptoms. The treatment provided moderate relief. Her past medical history is significant for recurrent UTIs.       Past Medical History:   Diagnosis Date    Abnormal CT of the abdomen     revealed 2 very small noncalcified pulmonary nodules in the right lung base. Suspect benign progress but repeat CT scan in June 2013 advised.    Allergic rhinitis     Anemia     Anxiety     Asthma     Chronic low back pain     Chronic neck pain     Degenerative joint disease of knee     Bilateral.    Depression     Endometriosis     history of     GERD (gastroesophageal reflux disease)     Gout     Hyperlipidemia     Hypertension     Hypothyroid     Kidney stone     history of     Left shoulder pain     Status post injections.    Leukocytosis     Palpitation     history of     Paresthesias     Generalized    Right knee pain     Status post injections.    Seizure (HCC)     \"nocturnal seizures\"        Allergies   Allergen Reactions    Furosemide Other (See Comments) and Dizziness or

## 2024-08-06 NOTE — PATIENT INSTRUCTIONS
Will call with results of culture  Complete full course of antibiotics  May use AZO, ibuprofen or tylenol for pain  Increase water intake  Decrease caffeine and sugary drinks  If symptoms worsen follow up with PCP  Patient verbalized understanding and agrees with plan of care

## 2024-08-08 DIAGNOSIS — N30.00 ACUTE CYSTITIS WITHOUT HEMATURIA: Primary | ICD-10-CM

## 2024-08-08 LAB
MICROORGANISM SPEC CULT: ABNORMAL
SPECIMEN DESCRIPTION: ABNORMAL

## 2024-08-08 RX ORDER — CEPHALEXIN 500 MG/1
500 CAPSULE ORAL 3 TIMES DAILY
Qty: 15 CAPSULE | Refills: 0 | Status: SHIPPED | OUTPATIENT
Start: 2024-08-08 | End: 2024-08-13

## 2024-08-13 ENCOUNTER — HOSPITAL ENCOUNTER (OUTPATIENT)
Age: 54
Discharge: HOME | End: 2024-08-13
Payer: MEDICARE

## 2024-08-13 DIAGNOSIS — E03.9 ACQUIRED HYPOTHYROIDISM: ICD-10-CM

## 2024-08-13 DIAGNOSIS — E78.2 MIXED HYPERLIPIDEMIA: ICD-10-CM

## 2024-08-13 DIAGNOSIS — E11.9 TYPE 2 DIABETES MELLITUS WITHOUT COMPLICATION, WITHOUT LONG-TERM CURRENT USE OF INSULIN (HCC): ICD-10-CM

## 2024-08-13 DIAGNOSIS — I10 PRIMARY HYPERTENSION: ICD-10-CM

## 2024-08-13 LAB
ALBUMIN SERPL-MCNC: 4.2 G/DL (ref 3.5–5.2)
ALBUMIN/GLOB SERPL: 1.4 {RATIO} (ref 1–2.5)
ALP SERPL-CCNC: 144 U/L (ref 35–104)
ALT SERPL-CCNC: 34 U/L (ref 5–33)
ANION GAP SERPL CALCULATED.3IONS-SCNC: 13 MMOL/L (ref 9–17)
AST SERPL-CCNC: 44 U/L
BASOPHILS # BLD: 0.05 K/UL (ref 0–0.2)
BASOPHILS NFR BLD: 0 % (ref 0–2)
BILIRUB SERPL-MCNC: 0.4 MG/DL (ref 0.3–1.2)
BUN SERPL-MCNC: 10 MG/DL (ref 6–20)
BUN/CREAT SERPL: 10 (ref 9–20)
CALCIUM SERPL-MCNC: 9.5 MG/DL (ref 8.6–10.4)
CHLORIDE SERPL-SCNC: 100 MMOL/L (ref 98–107)
CHOLEST SERPL-MCNC: 213 MG/DL (ref 0–199)
CHOLESTEROL/HDL RATIO: 5
CO2 SERPL-SCNC: 29 MMOL/L (ref 20–31)
CREAT SERPL-MCNC: 1 MG/DL (ref 0.5–0.9)
EOSINOPHIL # BLD: 0.27 K/UL (ref 0–0.44)
EOSINOPHILS RELATIVE PERCENT: 2 % (ref 1–4)
ERYTHROCYTE [DISTWIDTH] IN BLOOD BY AUTOMATED COUNT: 14.2 % (ref 11.8–14.4)
EST. AVERAGE GLUCOSE BLD GHB EST-MCNC: 105 MG/DL
GFR, ESTIMATED: 67 ML/MIN/1.73M2
GLUCOSE SERPL-MCNC: 113 MG/DL (ref 70–99)
HBA1C MFR BLD: 5.3 % (ref 4–6)
HCT VFR BLD AUTO: 40.8 % (ref 36.3–47.1)
HDLC SERPL-MCNC: 45 MG/DL
HGB BLD-MCNC: 13.3 G/DL (ref 11.9–15.1)
IMM GRANULOCYTES # BLD AUTO: 0.07 K/UL (ref 0–0.3)
IMM GRANULOCYTES NFR BLD: 1 %
LDLC SERPL CALC-MCNC: 127 MG/DL (ref 0–100)
LYMPHOCYTES NFR BLD: 2.79 K/UL (ref 1.1–3.7)
LYMPHOCYTES RELATIVE PERCENT: 25 % (ref 24–43)
MCH RBC QN AUTO: 29.2 PG (ref 25.2–33.5)
MCHC RBC AUTO-ENTMCNC: 32.6 G/DL (ref 25.2–33.5)
MCV RBC AUTO: 89.7 FL (ref 82.6–102.9)
MONOCYTES NFR BLD: 0.71 K/UL (ref 0.1–1.2)
MONOCYTES NFR BLD: 6 % (ref 3–12)
NEUTROPHILS NFR BLD: 66 % (ref 36–65)
NEUTS SEG NFR BLD: 7.25 K/UL (ref 1.5–8.1)
NRBC BLD-RTO: 0 PER 100 WBC
PLATELET # BLD AUTO: 224 K/UL (ref 138–453)
PMV BLD AUTO: 10.9 FL (ref 8.1–13.5)
POTASSIUM SERPL-SCNC: 4.6 MMOL/L (ref 3.7–5.3)
PROT SERPL-MCNC: 7.1 G/DL (ref 6.4–8.3)
RBC # BLD AUTO: 4.55 M/UL (ref 3.95–5.11)
SODIUM SERPL-SCNC: 142 MMOL/L (ref 135–144)
T4 FREE SERPL-MCNC: 1.3 NG/DL (ref 0.92–1.68)
TRIGL SERPL-MCNC: 208 MG/DL
TSH SERPL DL<=0.05 MIU/L-ACNC: 1.83 UIU/ML (ref 0.3–5)
VLDLC SERPL CALC-MCNC: 42 MG/DL
WBC OTHER # BLD: 11.1 K/UL (ref 3.5–11.3)

## 2024-08-13 PROCEDURE — 85025 COMPLETE CBC W/AUTO DIFF WBC: CPT

## 2024-08-13 PROCEDURE — 84439 ASSAY OF FREE THYROXINE: CPT

## 2024-08-13 PROCEDURE — 36415 COLL VENOUS BLD VENIPUNCTURE: CPT

## 2024-08-13 PROCEDURE — 84443 ASSAY THYROID STIM HORMONE: CPT

## 2024-08-13 PROCEDURE — 80061 LIPID PANEL: CPT

## 2024-08-13 PROCEDURE — 83036 HEMOGLOBIN GLYCOSYLATED A1C: CPT

## 2024-08-13 PROCEDURE — 80053 COMPREHEN METABOLIC PANEL: CPT

## 2024-08-13 RX ORDER — FERROUS SULFATE 325(65) MG
1 TABLET, DELAYED RELEASE (ENTERIC COATED) ORAL 2 TIMES DAILY
Qty: 180 TABLET | Refills: 1 | Status: SHIPPED | OUTPATIENT
Start: 2024-08-13

## 2024-08-13 NOTE — TELEPHONE ENCOUNTER
Left voicemail to call office or schedule 6 m f/u through [employer], once scheduled refills can be sent to pharmacy

## 2024-08-15 ENCOUNTER — OFFICE VISIT (OUTPATIENT)
Dept: FAMILY MEDICINE CLINIC | Age: 54
End: 2024-08-15

## 2024-08-15 VITALS
TEMPERATURE: 98.4 F | OXYGEN SATURATION: 98 % | WEIGHT: 258 LBS | DIASTOLIC BLOOD PRESSURE: 70 MMHG | HEIGHT: 66 IN | SYSTOLIC BLOOD PRESSURE: 110 MMHG | HEART RATE: 92 BPM | BODY MASS INDEX: 41.46 KG/M2 | RESPIRATION RATE: 18 BRPM

## 2024-08-15 DIAGNOSIS — Z12.31 ENCOUNTER FOR SCREENING MAMMOGRAM FOR MALIGNANT NEOPLASM OF BREAST: ICD-10-CM

## 2024-08-15 DIAGNOSIS — E11.9 TYPE 2 DIABETES MELLITUS WITHOUT COMPLICATION, WITHOUT LONG-TERM CURRENT USE OF INSULIN (HCC): ICD-10-CM

## 2024-08-15 DIAGNOSIS — E78.2 MIXED HYPERLIPIDEMIA: ICD-10-CM

## 2024-08-15 DIAGNOSIS — F32.1 CURRENT MODERATE EPISODE OF MAJOR DEPRESSIVE DISORDER WITHOUT PRIOR EPISODE (HCC): ICD-10-CM

## 2024-08-15 DIAGNOSIS — D50.9 IRON DEFICIENCY ANEMIA, UNSPECIFIED IRON DEFICIENCY ANEMIA TYPE: ICD-10-CM

## 2024-08-15 DIAGNOSIS — E03.9 ACQUIRED HYPOTHYROIDISM: ICD-10-CM

## 2024-08-15 DIAGNOSIS — I10 PRIMARY HYPERTENSION: ICD-10-CM

## 2024-08-15 DIAGNOSIS — M54.16 LUMBAR BACK PAIN WITH RADICULOPATHY AFFECTING LEFT LOWER EXTREMITY: Primary | ICD-10-CM

## 2024-08-15 NOTE — PROGRESS NOTES
8/15/2024     Judy Kearns (:  1970) is a 54 y.o. female, here for evaluation of the following medical concerns:    HPI  Patient comes in today for follow up of chronic health issues Patient has been having worsening lower back pain over the last few weeks duration but now has really become much more intolerable and severe.  She describes severe pain and radiculopathy down into the left lower extremity that radiates down to the left foot.  The pain is 9 out of 10 in severity.  Does state that she is getting burning stabbing and shooting type pain.  Symptoms are worse when she does any type of bending or twisting but has pain even while lying down and sitting.  She is getting some numbness and weakness in the left lower extremity.  Has tried heat and stretching exercises and is also on high-dose gabapentin.  Also has taken Tylenol for her pain.  None of which seems to be providing her with much benefit.  She does have a known history of diabetes mellitus type 2 which is stable and controlled with her current medical therapy.  She has done well with the Ozempic but would like to go up on the dose.  The issue is that she does get quite a bit of nausea when she has tried to titrate up the dose.  The next jump up would be to go to a 2 mg weekly dose but this may be a fairly large jump and may cause some nausea.  We could try a 1-1/2 mg dose but she would have to basically dial the pen to the appropriate amount of clicks to give her 1.5 mg dose.  When I did review this it appears that 56 clicks of a 2 mg per 3 mL pen would give her 1.5 mg so she is going to try this dosing.  She does have a known history of hypertension and her blood pressure is stable and controlled with her current medical regimen.  Has a known history of hypothyroidism and her thyroid levels are stable and adequately supplemented on her current thyroid dose.  Does have a known history of hyperlipidemia and her cholesterol levels are above

## 2024-08-18 ENCOUNTER — PATIENT MESSAGE (OUTPATIENT)
Dept: FAMILY MEDICINE CLINIC | Age: 54
End: 2024-08-18

## 2024-08-19 ENCOUNTER — HOSPITAL ENCOUNTER (OUTPATIENT)
Age: 54
Discharge: HOME | End: 2024-08-19
Payer: MEDICARE

## 2024-08-19 ENCOUNTER — OFFICE VISIT (OUTPATIENT)
Dept: PRIMARY CARE CLINIC | Age: 54
End: 2024-08-19
Payer: MEDICARE

## 2024-08-19 ENCOUNTER — HOSPITAL ENCOUNTER (OUTPATIENT)
Dept: CT IMAGING | Age: 54
Discharge: HOME | End: 2024-08-21
Payer: MEDICARE

## 2024-08-19 VITALS
HEART RATE: 79 BPM | BODY MASS INDEX: 41.97 KG/M2 | TEMPERATURE: 98.1 F | OXYGEN SATURATION: 98 % | DIASTOLIC BLOOD PRESSURE: 80 MMHG | WEIGHT: 260 LBS | SYSTOLIC BLOOD PRESSURE: 114 MMHG

## 2024-08-19 DIAGNOSIS — R31.9 HEMATURIA, UNSPECIFIED TYPE: Primary | ICD-10-CM

## 2024-08-19 DIAGNOSIS — R31.9 HEMATURIA, UNSPECIFIED TYPE: ICD-10-CM

## 2024-08-19 DIAGNOSIS — G89.29 CHRONIC MIDLINE LOW BACK PAIN WITH LEFT-SIDED SCIATICA: ICD-10-CM

## 2024-08-19 DIAGNOSIS — M54.42 CHRONIC MIDLINE LOW BACK PAIN WITH LEFT-SIDED SCIATICA: ICD-10-CM

## 2024-08-19 LAB
BACTERIA URNS QL MICRO: ABNORMAL
BILIRUB UR QL STRIP: NEGATIVE
CHARACTER UR: ABNORMAL
CLARITY UR: CLEAR
COLOR UR: YELLOW
CRP SERPL HS-MCNC: 23.8 MG/L (ref 0–5)
EPI CELLS #/AREA URNS HPF: ABNORMAL /HPF (ref 0–5)
GLUCOSE UR STRIP-MCNC: NEGATIVE MG/DL
HGB UR QL STRIP.AUTO: ABNORMAL
KETONES UR STRIP-MCNC: NEGATIVE MG/DL
LEUKOCYTE ESTERASE UR QL STRIP: NEGATIVE
NITRITE UR QL STRIP: NEGATIVE
PH UR STRIP: 6 [PH] (ref 5–6)
PROT UR STRIP-MCNC: NEGATIVE MG/DL
RBC #/AREA URNS HPF: ABNORMAL /HPF (ref 0–4)
SP GR UR STRIP: 1.02 (ref 1.01–1.02)
UROBILINOGEN UR STRIP-ACNC: NORMAL EU/DL (ref 0–1)
WBC #/AREA URNS HPF: ABNORMAL /HPF (ref 0–4)

## 2024-08-19 PROCEDURE — 99213 OFFICE O/P EST LOW 20 MIN: CPT | Performed by: FAMILY MEDICINE

## 2024-08-19 PROCEDURE — 86140 C-REACTIVE PROTEIN: CPT

## 2024-08-19 PROCEDURE — 2709999900 CT ABDOMEN PELVIS W IV CONTRAST

## 2024-08-19 PROCEDURE — 81001 URINALYSIS AUTO W/SCOPE: CPT

## 2024-08-19 PROCEDURE — 6360000004 HC RX CONTRAST MEDICATION: Performed by: FAMILY MEDICINE

## 2024-08-19 PROCEDURE — 36415 COLL VENOUS BLD VENIPUNCTURE: CPT

## 2024-08-19 RX ORDER — BETAMETHASONE SODIUM PHOSPHATE AND BETAMETHASONE ACETATE 3; 3 MG/ML; MG/ML
6 INJECTION, SUSPENSION INTRA-ARTICULAR; INTRALESIONAL; INTRAMUSCULAR; SOFT TISSUE ONCE
Status: COMPLETED | OUTPATIENT
Start: 2024-08-19 | End: 2024-08-19

## 2024-08-19 RX ADMIN — BETAMETHASONE SODIUM PHOSPHATE AND BETAMETHASONE ACETATE 6 MG: 3; 3 INJECTION, SUSPENSION INTRA-ARTICULAR; INTRALESIONAL; INTRAMUSCULAR at 11:56

## 2024-08-19 RX ADMIN — IOPAMIDOL 100 ML: 755 INJECTION, SOLUTION INTRAVENOUS at 12:04

## 2024-08-19 ASSESSMENT — ENCOUNTER SYMPTOMS
BACK PAIN: 1
ABDOMINAL PAIN: 0
CONSTIPATION: 0
DIARRHEA: 0

## 2024-08-19 NOTE — PROGRESS NOTES
[facility]  [address]  [place] [id number]  Dept: [phone]  Dept Fax: [phone]    Judy Kearns is a 54 y.o. female who presents today for her medical conditions/complaints as noted below.  Judy Kearns is c/o of   Chief Complaint   Patient presents with    Lower Back Pain     For 2 weeks        HPI:     Here today for back pain.     Back Pain  This is a recurrent problem. The current episode started in the past 7 days. The problem occurs constantly. The problem is unchanged. The pain is present in the lumbar spine. The quality of the pain is described as burning, stabbing and shooting. The pain radiates to the left foot. The pain is at a severity of 9/10. The pain is severe. The pain is The same all the time. The symptoms are aggravated by bending, lying down, sitting and twisting. Associated symptoms include leg pain, numbness (left leg) and weakness (left leg). Pertinent negatives include no abdominal pain, dysuria, fever or tingling. (Her left leg is dragging)   She just recently in the past few days started having blood in the urine.        Past Medical History:   Diagnosis Date    Abnormal CT of the abdomen     revealed 2 very small noncalcified pulmonary nodules in the right lung base. Suspect benign progress but repeat CT scan in June 2013 advised.    Allergic rhinitis     Anemia     Anxiety     Asthma     Chronic low back pain     Chronic neck pain     Degenerative joint disease of knee     Bilateral.    Depression     Endometriosis     history of     GERD (gastroesophageal reflux disease)     Gout     Hyperlipidemia     Hypertension     Hypothyroid     Kidney stone     history of     Left shoulder pain     Status post injections.    Leukocytosis     Palpitation     history of     Paresthesias     Generalized    Right knee pain     Status post injections.

## 2024-08-21 ASSESSMENT — ENCOUNTER SYMPTOMS: BACK PAIN: 1

## 2024-08-30 ENCOUNTER — HOSPITAL ENCOUNTER (OUTPATIENT)
Dept: MRI IMAGING | Age: 54
End: 2024-08-30
Attending: FAMILY MEDICINE
Payer: MEDICARE

## 2024-08-30 DIAGNOSIS — M54.16 LUMBAR BACK PAIN WITH RADICULOPATHY AFFECTING LEFT LOWER EXTREMITY: ICD-10-CM

## 2024-08-30 PROCEDURE — 72148 MRI LUMBAR SPINE W/O DYE: CPT

## 2024-09-03 DIAGNOSIS — M54.16 LUMBAR BACK PAIN WITH RADICULOPATHY AFFECTING LEFT LOWER EXTREMITY: Primary | ICD-10-CM

## 2024-09-03 NOTE — PROGRESS NOTES
Referral placed for orthopedic surgeon per instruction from Dr Gray on MRI LS spine done 08/30/2024

## 2024-09-06 ENCOUNTER — TELEPHONE (OUTPATIENT)
Dept: FAMILY MEDICINE CLINIC | Age: 54
End: 2024-09-06

## 2024-09-07 DIAGNOSIS — G63 POLYNEUROPATHY ASSOCIATED WITH UNDERLYING DISEASE (HCC): ICD-10-CM

## 2024-09-09 RX ORDER — GABAPENTIN 800 MG/1
800 TABLET ORAL 3 TIMES DAILY
Qty: 90 TABLET | Refills: 2 | Status: SHIPPED | OUTPATIENT
Start: 2024-09-12 | End: 2024-12-11

## 2024-09-16 ENCOUNTER — APPOINTMENT (OUTPATIENT)
Dept: CT IMAGING | Age: 54
End: 2024-09-16
Payer: MEDICARE

## 2024-09-16 ENCOUNTER — HOSPITAL ENCOUNTER (EMERGENCY)
Age: 54
Discharge: HOME | End: 2024-09-16
Attending: EMERGENCY MEDICINE
Payer: MEDICARE

## 2024-09-16 VITALS
OXYGEN SATURATION: 97 % | WEIGHT: 254 LBS | RESPIRATION RATE: 14 BRPM | HEIGHT: 66 IN | TEMPERATURE: 98.8 F | SYSTOLIC BLOOD PRESSURE: 144 MMHG | HEART RATE: 66 BPM | DIASTOLIC BLOOD PRESSURE: 82 MMHG | BODY MASS INDEX: 40.82 KG/M2

## 2024-09-16 DIAGNOSIS — R10.32 LEFT LOWER QUADRANT ABDOMINAL PAIN: Primary | ICD-10-CM

## 2024-09-16 DIAGNOSIS — N39.0 URINARY TRACT INFECTION WITHOUT HEMATURIA, SITE UNSPECIFIED: ICD-10-CM

## 2024-09-16 LAB
ALBUMIN SERPL-MCNC: 3.8 G/DL (ref 3.5–5.2)
ALBUMIN/GLOB SERPL: 1.4 {RATIO} (ref 1–2.5)
ALP SERPL-CCNC: 149 U/L (ref 35–104)
ALT SERPL-CCNC: 16 U/L (ref 5–33)
AMYLASE SERPL-CCNC: 25 U/L (ref 28–100)
ANION GAP SERPL CALCULATED.3IONS-SCNC: 9 MMOL/L (ref 9–17)
AST SERPL-CCNC: 30 U/L
BACTERIA URNS QL MICRO: ABNORMAL
BASOPHILS # BLD: 0.04 K/UL (ref 0–0.2)
BASOPHILS NFR BLD: 0 % (ref 0–2)
BILIRUB DIRECT SERPL-MCNC: <0.1 MG/DL
BILIRUB INDIRECT SERPL-MCNC: ABNORMAL MG/DL (ref 0–1)
BILIRUB SERPL-MCNC: 0.3 MG/DL (ref 0.3–1.2)
BILIRUB UR QL STRIP: NEGATIVE
BUN SERPL-MCNC: 8 MG/DL (ref 6–20)
BUN/CREAT SERPL: 8 (ref 9–20)
CALCIUM SERPL-MCNC: 9 MG/DL (ref 8.6–10.4)
CHARACTER UR: ABNORMAL
CHLORIDE SERPL-SCNC: 101 MMOL/L (ref 98–107)
CLARITY UR: CLEAR
CO2 SERPL-SCNC: 30 MMOL/L (ref 20–31)
COLOR UR: YELLOW
CREAT SERPL-MCNC: 1 MG/DL (ref 0.5–0.9)
EOSINOPHIL # BLD: 0.4 K/UL (ref 0–0.44)
EOSINOPHILS RELATIVE PERCENT: 3 % (ref 1–4)
EPI CELLS #/AREA URNS HPF: ABNORMAL /HPF (ref 0–5)
ERYTHROCYTE [DISTWIDTH] IN BLOOD BY AUTOMATED COUNT: 13.8 % (ref 11.8–14.4)
GFR, ESTIMATED: 67 ML/MIN/1.73M2
GLOBULIN SER CALC-MCNC: 2.7 G/DL (ref 1.5–3.8)
GLUCOSE SERPL-MCNC: 98 MG/DL (ref 70–99)
GLUCOSE UR STRIP-MCNC: NEGATIVE MG/DL
HCT VFR BLD AUTO: 37.5 % (ref 36.3–47.1)
HGB BLD-MCNC: 12.5 G/DL (ref 11.9–15.1)
HGB UR QL STRIP.AUTO: ABNORMAL
IMM GRANULOCYTES # BLD AUTO: 0.1 K/UL (ref 0–0.3)
IMM GRANULOCYTES NFR BLD: 1 %
KETONES UR STRIP-MCNC: NEGATIVE MG/DL
LEUKOCYTE ESTERASE UR QL STRIP: ABNORMAL
LIPASE SERPL-CCNC: 29 U/L (ref 13–60)
LYMPHOCYTES NFR BLD: 2.93 K/UL (ref 1.1–3.7)
LYMPHOCYTES RELATIVE PERCENT: 25 % (ref 24–43)
MCH RBC QN AUTO: 28.9 PG (ref 25.2–33.5)
MCHC RBC AUTO-ENTMCNC: 33.3 G/DL (ref 25.2–33.5)
MCV RBC AUTO: 86.8 FL (ref 82.6–102.9)
MONOCYTES NFR BLD: 0.86 K/UL (ref 0.1–1.2)
MONOCYTES NFR BLD: 7 % (ref 3–12)
NEUTROPHILS NFR BLD: 64 % (ref 36–65)
NEUTS SEG NFR BLD: 7.58 K/UL (ref 1.5–8.1)
NITRITE UR QL STRIP: POSITIVE
NRBC BLD-RTO: 0 PER 100 WBC
PH UR STRIP: 6 [PH] (ref 5–6)
PLATELET # BLD AUTO: 217 K/UL (ref 138–453)
PMV BLD AUTO: 10.8 FL (ref 8.1–13.5)
POTASSIUM SERPL-SCNC: 3.7 MMOL/L (ref 3.7–5.3)
PROT SERPL-MCNC: 6.5 G/DL (ref 6.4–8.3)
PROT UR STRIP-MCNC: NEGATIVE MG/DL
RBC # BLD AUTO: 4.32 M/UL (ref 3.95–5.11)
RBC #/AREA URNS HPF: ABNORMAL /HPF (ref 0–4)
SODIUM SERPL-SCNC: 140 MMOL/L (ref 135–144)
SP GR UR STRIP: 1.01 (ref 1.01–1.02)
UROBILINOGEN UR STRIP-ACNC: NORMAL EU/DL (ref 0–1)
WBC #/AREA URNS HPF: ABNORMAL /HPF (ref 0–4)
WBC OTHER # BLD: 11.9 K/UL (ref 3.5–11.3)

## 2024-09-16 PROCEDURE — 6360000002 HC RX W HCPCS: Performed by: EMERGENCY MEDICINE

## 2024-09-16 PROCEDURE — 83690 ASSAY OF LIPASE: CPT

## 2024-09-16 PROCEDURE — 96374 THER/PROPH/DIAG INJ IV PUSH: CPT

## 2024-09-16 PROCEDURE — 87086 URINE CULTURE/COLONY COUNT: CPT

## 2024-09-16 PROCEDURE — 80048 BASIC METABOLIC PNL TOTAL CA: CPT

## 2024-09-16 PROCEDURE — 81001 URINALYSIS AUTO W/SCOPE: CPT

## 2024-09-16 PROCEDURE — 36415 COLL VENOUS BLD VENIPUNCTURE: CPT

## 2024-09-16 PROCEDURE — 82150 ASSAY OF AMYLASE: CPT

## 2024-09-16 PROCEDURE — 87186 SC STD MICRODIL/AGAR DIL: CPT

## 2024-09-16 PROCEDURE — 99285 EMERGENCY DEPT VISIT HI MDM: CPT

## 2024-09-16 PROCEDURE — 2709999900 CT ABDOMEN PELVIS W IV CONTRAST

## 2024-09-16 PROCEDURE — 85025 COMPLETE CBC W/AUTO DIFF WBC: CPT

## 2024-09-16 PROCEDURE — 80076 HEPATIC FUNCTION PANEL: CPT

## 2024-09-16 PROCEDURE — 6370000000 HC RX 637 (ALT 250 FOR IP): Performed by: SPECIALIST

## 2024-09-16 PROCEDURE — 2580000003 HC RX 258: Performed by: EMERGENCY MEDICINE

## 2024-09-16 PROCEDURE — 87077 CULTURE AEROBIC IDENTIFY: CPT

## 2024-09-16 PROCEDURE — 96376 TX/PRO/DX INJ SAME DRUG ADON: CPT

## 2024-09-16 PROCEDURE — 6360000004 HC RX CONTRAST MEDICATION: Performed by: EMERGENCY MEDICINE

## 2024-09-16 RX ORDER — KETOROLAC TROMETHAMINE 30 MG/ML
15 INJECTION, SOLUTION INTRAMUSCULAR; INTRAVENOUS ONCE
Status: COMPLETED | OUTPATIENT
Start: 2024-09-16 | End: 2024-09-16

## 2024-09-16 RX ORDER — CEPHALEXIN 500 MG/1
500 CAPSULE ORAL 2 TIMES DAILY
Qty: 14 CAPSULE | Refills: 0 | Status: SHIPPED | OUTPATIENT
Start: 2024-09-16 | End: 2024-09-23

## 2024-09-16 RX ORDER — 0.9 % SODIUM CHLORIDE 0.9 %
1000 INTRAVENOUS SOLUTION INTRAVENOUS ONCE
Status: COMPLETED | OUTPATIENT
Start: 2024-09-16 | End: 2024-09-16

## 2024-09-16 RX ORDER — IOPAMIDOL 755 MG/ML
100 INJECTION, SOLUTION INTRAVASCULAR
Status: COMPLETED | OUTPATIENT
Start: 2024-09-16 | End: 2024-09-16

## 2024-09-16 RX ORDER — PHENAZOPYRIDINE HYDROCHLORIDE 200 MG/1
200 TABLET, FILM COATED ORAL 3 TIMES DAILY PRN
Qty: 6 TABLET | Refills: 0 | Status: SHIPPED | OUTPATIENT
Start: 2024-09-16 | End: 2024-09-20 | Stop reason: SDUPTHER

## 2024-09-16 RX ADMIN — KETOROLAC TROMETHAMINE 15 MG: 30 INJECTION, SOLUTION INTRAMUSCULAR at 18:23

## 2024-09-16 RX ADMIN — IOPAMIDOL 100 ML: 755 INJECTION, SOLUTION INTRAVENOUS at 19:34

## 2024-09-16 RX ADMIN — CEPHALEXIN 500 MG: 250 CAPSULE ORAL at 21:07

## 2024-09-16 RX ADMIN — SODIUM CHLORIDE 1000 ML: 9 INJECTION, SOLUTION INTRAVENOUS at 18:22

## 2024-09-16 RX ADMIN — KETOROLAC TROMETHAMINE 15 MG: 30 INJECTION, SOLUTION INTRAMUSCULAR at 19:23

## 2024-09-16 ASSESSMENT — PAIN DESCRIPTION - ORIENTATION
ORIENTATION: LEFT;LOWER
ORIENTATION: LEFT;LOWER

## 2024-09-16 ASSESSMENT — PAIN - FUNCTIONAL ASSESSMENT
PAIN_FUNCTIONAL_ASSESSMENT: PREVENTS OR INTERFERES SOME ACTIVE ACTIVITIES AND ADLS
PAIN_FUNCTIONAL_ASSESSMENT: 0-10

## 2024-09-16 ASSESSMENT — PAIN SCALES - GENERAL
PAINLEVEL_OUTOF10: 7
PAINLEVEL_OUTOF10: 8

## 2024-09-16 ASSESSMENT — PAIN DESCRIPTION - DESCRIPTORS
DESCRIPTORS: SHARP
DESCRIPTORS: STABBING

## 2024-09-16 ASSESSMENT — PAIN DESCRIPTION - LOCATION
LOCATION: ABDOMEN
LOCATION: ABDOMEN;HIP

## 2024-09-16 ASSESSMENT — PAIN DESCRIPTION - PAIN TYPE: TYPE: CHRONIC PAIN

## 2024-09-17 ENCOUNTER — CARE COORDINATION (OUTPATIENT)
Dept: CARE COORDINATION | Age: 54
End: 2024-09-17

## 2024-09-18 ENCOUNTER — PATIENT MESSAGE (OUTPATIENT)
Dept: FAMILY MEDICINE CLINIC | Age: 54
End: 2024-09-18

## 2024-09-18 DIAGNOSIS — N39.0 RECURRENT UTI: Primary | ICD-10-CM

## 2024-09-18 DIAGNOSIS — R10.32 LLQ PAIN: ICD-10-CM

## 2024-09-18 LAB
MICROORGANISM SPEC CULT: ABNORMAL
SPECIMEN DESCRIPTION: ABNORMAL

## 2024-09-20 RX ORDER — PHENAZOPYRIDINE HYDROCHLORIDE 200 MG/1
200 TABLET, FILM COATED ORAL 3 TIMES DAILY PRN
Qty: 15 TABLET | Refills: 1 | Status: SHIPPED | OUTPATIENT
Start: 2024-09-20

## 2024-09-27 ENCOUNTER — TELEPHONE (OUTPATIENT)
Dept: FAMILY MEDICINE CLINIC | Age: 54
End: 2024-09-27

## 2024-10-14 ENCOUNTER — HOSPITAL ENCOUNTER (OUTPATIENT)
Dept: MAMMOGRAPHY | Age: 54
Discharge: HOME | End: 2024-10-16
Attending: FAMILY MEDICINE
Payer: MEDICARE

## 2024-10-14 VITALS — HEIGHT: 66 IN | BODY MASS INDEX: 40.18 KG/M2 | WEIGHT: 250 LBS

## 2024-10-14 DIAGNOSIS — Z12.31 ENCOUNTER FOR SCREENING MAMMOGRAM FOR MALIGNANT NEOPLASM OF BREAST: ICD-10-CM

## 2024-10-14 PROCEDURE — 77063 BREAST TOMOSYNTHESIS BI: CPT

## 2024-10-15 RX ORDER — ESOMEPRAZOLE MAGNESIUM 40 MG/1
CAPSULE, DELAYED RELEASE ORAL
Qty: 180 CAPSULE | Refills: 1 | Status: SHIPPED | OUTPATIENT
Start: 2024-10-15

## 2024-10-15 NOTE — TELEPHONE ENCOUNTER
Judy called requesting a refill of the below medication which has been pended for you:     Requested Prescriptions     Pending Prescriptions Disp Refills    esomeprazole (NEXIUM) 40 MG delayed release capsule [Pharmacy Med Name: ESOMEPRA MAG CAP 40MG DR] 180 capsule 1     Sig: TAKE 1 CAPSULE TWICE DAILY       Last Appointment Date: 8/15/2024  Next Appointment Date: 2/12/2025    Allergies   Allergen Reactions    Furosemide Other (See Comments) and Dizziness or Vertigo     Dizziness, extreme fatigue  Throat swelling  Dizziness, extreme fatigue      Morphine Itching, Nausea Only and Other (See Comments)    Topiramate Swelling, Rash and Angioedema     Facial and Oral edema    Allopurinol Other (See Comments)     Other reaction(s): nausea; \"hard on stomach\"    Bumex [Bumetanide] Other (See Comments)     Extreme fatigue, nausea, dizziness    Cat Hair Extract      Other reaction(s): Sneezing    Colchicine     Gramineae Pollens      Other reaction(s): Sneezing    Indocin [Indomethacin]     Molds & Smuts      Other reaction(s): Sneezing    Uloric [Febuxostat]     Hydrocodone-Acetaminophen Nausea Only

## 2024-10-29 ENCOUNTER — TELEPHONE (OUTPATIENT)
Dept: FAMILY MEDICINE CLINIC | Age: 54
End: 2024-10-29

## 2024-10-29 DIAGNOSIS — E11.9 TYPE 2 DIABETES MELLITUS WITHOUT COMPLICATION, WITHOUT LONG-TERM CURRENT USE OF INSULIN (HCC): Primary | ICD-10-CM

## 2024-10-29 RX ORDER — SEMAGLUTIDE 2.68 MG/ML
1.5 INJECTION, SOLUTION SUBCUTANEOUS
Qty: 3 ML | Refills: 3
Start: 2024-10-29

## 2024-10-29 NOTE — TELEPHONE ENCOUNTER
Spoke with patient to make sure she was doing well on her Ozempic dosage and didn't want to make any changes at this time. States she had trouble tolerating the Ozempic 2 mg so she is currently taking 1.5 mg and tolerating ok. No changes needed at this time.

## 2024-10-29 NOTE — TELEPHONE ENCOUNTER
Pt returning nurses call she had gotten regarding her ozempic, no encounter to reference to, please call pt.

## 2024-10-30 ENCOUNTER — HOSPITAL ENCOUNTER (OUTPATIENT)
Dept: PHYSICAL THERAPY | Age: 54
Setting detail: THERAPIES SERIES
Discharge: HOME | End: 2024-10-30
Payer: MEDICARE

## 2024-10-30 PROCEDURE — 97161 PT EVAL LOW COMPLEX 20 MIN: CPT | Performed by: PHYSICAL THERAPIST

## 2024-10-30 ASSESSMENT — PAIN SCALES - GENERAL: PAINLEVEL_OUTOF10: 6

## 2024-10-30 ASSESSMENT — PAIN DESCRIPTION - PAIN TYPE: TYPE: CHRONIC PAIN

## 2024-10-30 ASSESSMENT — PAIN DESCRIPTION - ORIENTATION: ORIENTATION: RIGHT;LEFT

## 2024-10-30 ASSESSMENT — PAIN DESCRIPTION - LOCATION: LOCATION: BACK;BUTTOCKS;HIP;LEG

## 2024-10-30 NOTE — FLOWSHEET NOTE
Physical Therapy Daily Treatment Note    Date:  10/30/2024    Patient Name:  Judy Kearns    :  1970  MRN: [id number]  Restrictions/Precautions:  Hx Back surgery x5                                               Needs PT visits to get procedure authorized   Medical/Treatment Diagnosis Information:   Diagnosis: M48.07 Lumbosacral spinal stenosis  Treatment Diagnosis: M48.07 Lumbosacral spinal stenosis  Insurance/Certification information:  PT Insurance Information: Aetna Medicare  Physician Information:   St Lee  Plan of care signed (Y/N):    Visit# / total visits:  1/10  Pain level: 9/10       Time In: 8:10  Time Out:9:30    Progress Note: [x]  Yes  []  No  Next due by: Visit #8, or 24      Subjective: See eval      Objective: See eval  Observation:   Test measurements:      Exercises: ( monitor pain tolerance)  Exercise/Equipment Resistance/Repetitions Other comments   NUSTEP  If able   Counter ex     STS 2\" booster    B hip abd/add     Step up 4\"         Suck in gut     Body Blade                                    [x] Provided verbal/tactile cueing for activities related to strengthening, flexibility, endurance, ROM. (00968)  [] Provided verbal/tactile cueing for activities related to improving balance, coordination, kinesthetic sense, posture, motor skill, proprioception. (21856)    Therapeutic Activities:     [] Therapeutic activities, direct (one-on-one) patient contact (use of dynamic activities to improve functional performance). (59664)    Gait:   [] Provided training and instruction to the patient for ambulation re-education. (09948)    Self-Care/ADL's  [] Self-care/home management training and compensatory training, meal preparation, safety procedures, and instructions in use of assistive technology devices/adaptive equipment, direct one-on-one contact. (27005)    Home Exercise Program:     [] Reviewed/Progressed HEP activities related to strengthening, flexibility, endurance, ROM.

## 2024-10-30 NOTE — PLAN OF CARE
[facility]  Rehabilitation and Sports Medicine    [] [place]  Phone: [phone]  Fax: [phone]      [] [place]  Phone: [phone]  Fax: [phone]        To:        Patient: Judy Kearns  : 1970   MRN: [id number]  Evaluation Date: 10/30/2024      Diagnosis Information:  Diagnosis: M48.07 Lumbosacral spinal stenosis   Treatment Diagnosis: M48.07 Lumbosacral spinal stenosis     Physical Therapy Certification Form  Dear Dr.St Lee  The following patient has been evaluated for physical therapy services and for therapy to continue, Medicare requires monthly physician review of the treatment plan. Please review the attached evaluation and/or summary of the patient's plan of care, and verify that you agree therapy should continue by signing the attached document and sending it back to our office.    Plan of Care/Treatment to date:  [x] Therapeutic Exercise    [] Modalities:  [x] Therapeutic Activity     [] Ultrasound  [] Electrical Stimulation  [] Gait Training      [] Cervical Traction [] Lumbar Traction  [x] Neuromuscular Re-education    [] Cold/hotpack [] Iontophoresis   [x] Instruction in HEP     Other:  [] Manual Therapy      []             [] Aquatic Therapy      []                 Goals:  Short Term Goals  Time Frame for Short Term Goals: 1 week  Short Term Goal 1: Start B LE reconditioning & spinal stabilization  Short Term Goal 2: Monitor pain tolerance to exercise    Long Term Goals  Time Frame for Long Term Goals : 4 weeks  Long Term Goal 1: TUG improve to 15\" for gait efficiency  Long Term Goal 2: Sit to stand from chair height with no UE assist  Long Term Goal 3: Able to walk 15 min  Long Term Goal 4: Satisfy insurance carrier medical management in order to get procedures authorized.    Frequency/Duration:10/30/24 - 24  # Days per week: [] 1 day # Weeks: [] 1 week [] 5 weeks     [x] 2 days   [] 2 weeks [] 6 weeks     [] 3 days   [] 3 weeks [] 7 weeks     [] 4

## 2024-10-30 NOTE — PROGRESS NOTES
stenosis  Activity Tolerance  Activity Tolerance: Patient limited by pain;Treatment limited secondary to medical complications  Activity Tolerance: Patient limited by pain;Treatment limited secondary to medical complications      Plan:   Physical Therapy Plan  Plan weeks: 4  Current Treatment Recommendations: Strengthening, ROM, Balance training, Gait training, Home exercise program, Neuromuscular re-education      OutComes Score:  Oswestry CMS Modifier: CL (10/30/24 0922)  Oswestry Disability Scores %: 71.11 (10/30/24 0922)                         Goals:  Short Term Goals  Time Frame for Short Term Goals: 1 week  Short Term Goal 1: Start B LE reconditioning & spinal stabilization  Short Term Goal 2: Monitor pain tolerance to exercise  Long Term Goals  Time Frame for Long Term Goals : 4 weeks  Long Term Goal 1: TUG improve to 15\" for gait efficiency  Long Term Goal 2: Sit to stand from chair height with no UE assist  Long Term Goal 3: Able to walk 15 min  Long Term Goal 4: Satisfy insurance carrier medical management in order to get procedures authorized.    Therapy Time:   Individual Concurrent Group Co-treatment   Time In  8:10         Time Out  9:30         Minutes  40                 Tad Sue, PT

## 2024-11-01 ENCOUNTER — HOSPITAL ENCOUNTER (OUTPATIENT)
Dept: PHYSICAL THERAPY | Age: 54
Setting detail: THERAPIES SERIES
Discharge: HOME | End: 2024-11-01
Payer: MEDICARE

## 2024-11-04 ENCOUNTER — HOSPITAL ENCOUNTER (OUTPATIENT)
Dept: PHYSICAL THERAPY | Age: 54
Setting detail: THERAPIES SERIES
Discharge: HOME | End: 2024-11-04
Payer: MEDICARE

## 2024-11-04 PROCEDURE — 97110 THERAPEUTIC EXERCISES: CPT

## 2024-11-04 NOTE — FLOWSHEET NOTE
Physical Therapy Daily Treatment Note    Date:  2024    Patient Name:  Judy Kearns    :  1970  MRN: [id number]  Restrictions/Precautions:  Hx Back surgery x5                                               Needs PT visits to get procedure authorized   Medical/Treatment Diagnosis Information:   Diagnosis: M48.07 Lumbosacral spinal stenosis  Treatment Diagnosis: M48.07 Lumbosacral spinal stenosis  Insurance/Certification information:  PT Insurance Information: Aetna Medicare  Physician Information:   St Lee  Plan of care signed (Y/N):    Visit# / total visits:  2/10  Pain level: 9/10       Time In: 12:57  Time Out: 1:22    Progress Note: []  Yes  [x]  No  Next due by: Visit #8, or 24      Subjective: Pt reports doing okay. Just trying to get through the day.     Objective: FRANNY performed per flow sheet for increased mobility and strengthening for improvements in completion of ADLs. Verbal cueing for sequencing and proper form.     Observation:   Test measurements:      Exercises: (monitor pain tolerance)  Exercise/Equipment Resistance/Repetitions Other comments   NUSTEP 5' If able   Counter ex 10x    STS 2\" booster  3x    B hip abd/add 10x red     Step up 4\"         Suck in gut 10x    Body Blade                LAQ 10x    Seated marches  10x              [x] Provided verbal/tactile cueing for activities related to strengthening, flexibility, endurance, ROM. (60964)  [] Provided verbal/tactile cueing for activities related to improving balance, coordination, kinesthetic sense, posture, motor skill, proprioception. (59498)    Therapeutic Activities:     [] Therapeutic activities, direct (one-on-one) patient contact (use of dynamic activities to improve functional performance). (12382)    Gait:   [] Provided training and instruction to the patient for ambulation re-education. (78990)    Self-Care/ADL's  [] Self-care/home management training and compensatory training, meal preparation, safety procedures,

## 2024-11-05 NOTE — PROGRESS NOTES
I have reviewed and agree to the content of the note written by the PTA.  Electronically signed by Tad Sue PT [id number]

## 2024-11-06 ENCOUNTER — HOSPITAL ENCOUNTER (OUTPATIENT)
Dept: PHYSICAL THERAPY | Age: 54
Setting detail: THERAPIES SERIES
Discharge: HOME | End: 2024-11-06
Payer: MEDICARE

## 2024-11-06 NOTE — PROGRESS NOTES
Physical Therapy    Outpatient Physical Therapy    [x] [place]  Phone: [phone]  Fax: [phone]      [] [place]  Phone: [phone]  Fax: [phone]    Physical Therapy  Cancellation/No-show Note  Patient Name:  Judy Kearns  :  1970   Date:  2024  Cancelled visits to date: 2  No-shows to date: 0    For today's appointment patient:  [x]  Cancelled  []  Rescheduled appointment  []  No-show     Reason given by patient:  []  Patient ill  []  Conflicting appointment  []  No transportation    []  Conflict with work  []  No reason given  [x]  Other:     Comments:  cancelling wanting to do home excerises talking with alana landeros  is going to match up her appts with her dads     Electronically signed by: Erin Cowart PTA

## 2024-11-07 ENCOUNTER — HOSPITAL ENCOUNTER (OUTPATIENT)
Dept: PHYSICAL THERAPY | Age: 54
Setting detail: THERAPIES SERIES
Discharge: HOME | End: 2024-11-07
Payer: MEDICARE

## 2024-11-07 PROCEDURE — 97110 THERAPEUTIC EXERCISES: CPT | Performed by: PHYSICAL THERAPIST

## 2024-11-07 NOTE — FLOWSHEET NOTE
Physical Therapy Daily Treatment Note    Date:  2024    Patient Name:  Judy Kearns    :  1970  MRN: [id number]  Restrictions/Precautions:  Hx Back surgery x5                                               Needs PT visits to get procedure authorized   Medical/Treatment Diagnosis Information:   Diagnosis: M48.07 Lumbosacral spinal stenosis  Treatment Diagnosis: M48.07 Lumbosacral spinal stenosis  Insurance/Certification information:  PT Insurance Information: Aetna Medicare  Physician Information:   St Lee  Plan of care signed (Y/N):  y  Visit# / total visits:  3/10  Pain level: 9-10/10       Time In: 8:00  Time Out: 8:19    Progress Note: []  Yes  [x]  No  Next due by: Visit #8, or 24      Subjective: Has low tolerance to any spinal motion or stress. Low tolerance to walking. Sciatica worse with the drive to the hospital or anywhere in town.   Needs to stay close to home and a bathroom due to bowel/ bladder control issue. Started a couple months ago, and progressively getting worse. May be another complication of the back problem.  Also has PTSD & anxiety which makes it difficult to be in a PT gym/ clinic around other people.     Objective:   Observation:   Could not tolerate the Sci-Fit stepping machine .  Test measurements:    R sciatica quickly goes to 9-10/10 when the R LE is moving , and when R LE is in a weight bearing position.   Has the feeling R LE will give out when walking .  Issued a home program for active LE exercise to be done at the counter for safety & support.    Exercises: (monitor pain tolerance)  Exercise/Equipment Resistance/Repetitions Other comments   NUSTEP     Counter ex 10x    STS 2\" booster  3x    B hip abd/add 10x red     Step up 4\"         Suck in gut 10x    Body Blade                LAQ 10x    Seated marches  10x              [x] Provided verbal/tactile cueing for activities related to strengthening, flexibility, endurance, ROM. (56743)  [] Provided verbal/tactile

## 2024-11-11 ENCOUNTER — APPOINTMENT (OUTPATIENT)
Dept: PHYSICAL THERAPY | Age: 54
End: 2024-11-11
Payer: MEDICARE

## 2024-11-13 ENCOUNTER — APPOINTMENT (OUTPATIENT)
Dept: PHYSICAL THERAPY | Age: 54
End: 2024-11-13
Payer: MEDICARE

## 2024-11-20 ENCOUNTER — APPOINTMENT (OUTPATIENT)
Dept: PHYSICAL THERAPY | Age: 54
End: 2024-11-20
Payer: MEDICARE

## 2024-11-22 ENCOUNTER — APPOINTMENT (OUTPATIENT)
Dept: PHYSICAL THERAPY | Age: 54
End: 2024-11-22
Payer: MEDICARE

## 2024-11-25 ENCOUNTER — TELEPHONE (OUTPATIENT)
Dept: FAMILY MEDICINE CLINIC | Age: 54
End: 2024-11-25

## 2024-11-25 NOTE — TELEPHONE ENCOUNTER
Hospital calling wanting to schedule a work clearance appt? She is having back surgery 12/30, I saw she has a appt set up in January. She said to call the patient to get this set up.

## 2024-12-09 ENCOUNTER — OFFICE VISIT (OUTPATIENT)
Dept: FAMILY MEDICINE CLINIC | Age: 54
End: 2024-12-09

## 2024-12-09 VITALS
RESPIRATION RATE: 18 BRPM | WEIGHT: 246 LBS | SYSTOLIC BLOOD PRESSURE: 108 MMHG | DIASTOLIC BLOOD PRESSURE: 78 MMHG | HEART RATE: 76 BPM | OXYGEN SATURATION: 97 % | HEIGHT: 66 IN | BODY MASS INDEX: 39.53 KG/M2

## 2024-12-09 DIAGNOSIS — M43.26 FUSION OF LUMBAR SPINE: Primary | ICD-10-CM

## 2024-12-09 DIAGNOSIS — Z01.818 PRE-OP EVALUATION: ICD-10-CM

## 2024-12-09 ASSESSMENT — PATIENT HEALTH QUESTIONNAIRE - PHQ9
SUM OF ALL RESPONSES TO PHQ QUESTIONS 1-9: 0
7. TROUBLE CONCENTRATING ON THINGS, SUCH AS READING THE NEWSPAPER OR WATCHING TELEVISION: NOT AT ALL
4. FEELING TIRED OR HAVING LITTLE ENERGY: NOT AT ALL
6. FEELING BAD ABOUT YOURSELF - OR THAT YOU ARE A FAILURE OR HAVE LET YOURSELF OR YOUR FAMILY DOWN: NOT AT ALL
8. MOVING OR SPEAKING SO SLOWLY THAT OTHER PEOPLE COULD HAVE NOTICED. OR THE OPPOSITE, BEING SO FIGETY OR RESTLESS THAT YOU HAVE BEEN MOVING AROUND A LOT MORE THAN USUAL: NOT AT ALL
SUM OF ALL RESPONSES TO PHQ QUESTIONS 1-9: 0
1. LITTLE INTEREST OR PLEASURE IN DOING THINGS: NOT AT ALL
SUM OF ALL RESPONSES TO PHQ9 QUESTIONS 1 & 2: 0
SUM OF ALL RESPONSES TO PHQ QUESTIONS 1-9: 0
2. FEELING DOWN, DEPRESSED OR HOPELESS: NOT AT ALL
9. THOUGHTS THAT YOU WOULD BE BETTER OFF DEAD, OR OF HURTING YOURSELF: NOT AT ALL
10. IF YOU CHECKED OFF ANY PROBLEMS, HOW DIFFICULT HAVE THESE PROBLEMS MADE IT FOR YOU TO DO YOUR WORK, TAKE CARE OF THINGS AT HOME, OR GET ALONG WITH OTHER PEOPLE: NOT DIFFICULT AT ALL
5. POOR APPETITE OR OVEREATING: NOT AT ALL
SUM OF ALL RESPONSES TO PHQ QUESTIONS 1-9: 0
3. TROUBLE FALLING OR STAYING ASLEEP: NOT AT ALL

## 2024-12-09 NOTE — PROGRESS NOTES
Subjective:      Patient ID: Judy Kearns is a 54 y.o. female coming in for   Chief Complaint   Patient presents with    Pre-op Exam     [facility]    Other     She has orders for EKG, chest xray and labs from [facility]        HPI  Here for pre op exam for surgery of hardware removal and L5-S1 decompression with Dr. Talbot on 12/30/24.     Pre op exam requested due to hx of diabetes, hypothyroid, asthma, HTN  Diabetes stable with most recent A1c 5.3. hypothyroid stable, recent TSH wnl. Asthma stable. HTN also wnl.     Denies any prior anesthesia issues. Ok with blood transfusion    Denies cp, sob, lightheadedness/dizzines, abnormal bruising/bleeding    Pre op testing ordered through [facility], she plans to get them done here at [facility].     Review of Systems   Constitutional:  Negative for fatigue and unexpected weight change.   Respiratory:  Negative for shortness of breath.    Cardiovascular:  Negative for chest pain and leg swelling.   Gastrointestinal:  Negative for abdominal pain, blood in stool, constipation and diarrhea.   Skin:  Negative for rash.   Neurological:  Negative for dizziness, light-headedness and headaches.        Objective:/78   Pulse 76   Resp 18   Ht 1.676 m (5' 6\")   Wt 111.6 kg (246 lb)   LMP 05/05/2005 (Exact Date)   SpO2 97%   BMI 39.71 kg/m²      Physical Exam  Vitals and nursing note reviewed.   Constitutional:       General: She is not in acute distress.     Appearance: Normal appearance. She is not ill-appearing.   HENT:      Head: Normocephalic.   Cardiovascular:      Rate and Rhythm: Normal rate and regular rhythm.      Heart sounds: Normal heart sounds.   Pulmonary:      Effort: Pulmonary effort is normal.      Breath sounds: Normal breath sounds.   Musculoskeletal:      Right lower leg: No edema.      Left lower leg: No edema.   Skin:     General: Skin is warm and dry.      Findings: No rash.   Neurological:      General: No focal

## 2024-12-10 ENCOUNTER — HOSPITAL ENCOUNTER (OUTPATIENT)
Dept: NON INVASIVE DIAGNOSTICS | Age: 54
Discharge: HOME | End: 2024-12-10
Payer: MEDICARE

## 2024-12-10 ENCOUNTER — HOSPITAL ENCOUNTER (OUTPATIENT)
Dept: GENERAL RADIOLOGY | Age: 54
Discharge: HOME | End: 2024-12-12
Payer: MEDICARE

## 2024-12-10 DIAGNOSIS — E11.9 DIABETES MELLITUS WITHOUT COMPLICATION (HCC): ICD-10-CM

## 2024-12-10 DIAGNOSIS — I10 HYPERTENSION, ESSENTIAL: ICD-10-CM

## 2024-12-10 PROCEDURE — 71046 X-RAY EXAM CHEST 2 VIEWS: CPT

## 2024-12-10 PROCEDURE — 93005 ELECTROCARDIOGRAM TRACING: CPT | Performed by: ORTHOPAEDIC SURGERY

## 2024-12-10 RX ORDER — MONTELUKAST SODIUM 10 MG/1
TABLET ORAL
Qty: 90 TABLET | Refills: 1 | Status: SHIPPED | OUTPATIENT
Start: 2024-12-10

## 2024-12-10 RX ORDER — OXYBUTYNIN CHLORIDE 10 MG/1
TABLET, EXTENDED RELEASE ORAL
Qty: 90 TABLET | Refills: 1 | Status: SHIPPED | OUTPATIENT
Start: 2024-12-10

## 2024-12-10 RX ORDER — POTASSIUM CHLORIDE 600 MG/1
TABLET, FILM COATED, EXTENDED RELEASE ORAL
Qty: 450 TABLET | Refills: 1 | Status: SHIPPED | OUTPATIENT
Start: 2024-12-10

## 2024-12-10 RX ORDER — METOPROLOL TARTRATE 50 MG
TABLET ORAL
Qty: 180 TABLET | Refills: 1 | Status: SHIPPED | OUTPATIENT
Start: 2024-12-10

## 2024-12-10 RX ORDER — LEVOTHYROXINE SODIUM 125 UG/1
TABLET ORAL
Qty: 90 TABLET | Refills: 1 | Status: SHIPPED | OUTPATIENT
Start: 2024-12-10

## 2024-12-10 RX ORDER — HYDROCHLOROTHIAZIDE 25 MG/1
TABLET ORAL
Qty: 90 TABLET | Refills: 1 | Status: SHIPPED | OUTPATIENT
Start: 2024-12-10

## 2024-12-10 NOTE — TELEPHONE ENCOUNTER
Judy called requesting a refill of the below medication which has been pended for you:     Requested Prescriptions     Pending Prescriptions Disp Refills    oxyBUTYnin (DITROPAN-XL) 10 MG extended release tablet [Pharmacy Med Name: OXYBUTYNIN TAB 10MG ER] 90 tablet 1     Sig: TAKE 1 TABLET DAILY    potassium chloride (KLOR-CON) 8 MEQ extended release tablet [Pharmacy Med Name: POT CHLOR JASBIR TAB 8MEQ ER] 450 tablet 1     Sig: TAKE 5 TABLETS DAILY    hydroCHLOROthiazide (HYDRODIURIL) 25 MG tablet [Pharmacy Med Name: HYDROCHLOROT TAB 25MG] 90 tablet 1     Sig: TAKE 1 TABLET DAILY    levothyroxine (SYNTHROID) 125 MCG tablet [Pharmacy Med Name: LEVOTHYROXIN TAB 125MCG] 90 tablet 1     Sig: TAKE 1 TABLET DAILY    montelukast (SINGULAIR) 10 MG tablet [Pharmacy Med Name: MONTELUKAST  TAB 10MG] 90 tablet 1     Sig: TAKE 1 TABLET DAILY    metoprolol tartrate (LOPRESSOR) 50 MG tablet [Pharmacy Med Name: METOPROL TAR TAB 50MG] 180 tablet 1     Sig: TAKE 1 TABLET TWICE A DAY       Last Appointment Date: 8/15/2024  Next Appointment Date: 2/12/2025    Allergies   Allergen Reactions    Furosemide Other (See Comments) and Dizziness or Vertigo     Dizziness, extreme fatigue  Throat swelling  Dizziness, extreme fatigue      Morphine Itching, Nausea Only and Other (See Comments)    Topiramate Swelling, Rash and Angioedema     Facial and Oral edema    Allopurinol Other (See Comments)     Other reaction(s): nausea; \"hard on stomach\"    Bumex [Bumetanide] Other (See Comments)     Extreme fatigue, nausea, dizziness    Cat Hair Extract      Other reaction(s): Sneezing    Colchicine     Gramineae Pollens      Other reaction(s): Sneezing    Indocin [Indomethacin]     Molds & Smuts      Other reaction(s): Sneezing    Uloric [Febuxostat]     Hydrocodone-Acetaminophen Nausea Only    Pantoprazole Other (See Comments)

## 2024-12-11 LAB
EKG ATRIAL RATE: 76 BPM
EKG P AXIS: 33 DEGREES
EKG P-R INTERVAL: 172 MS
EKG Q-T INTERVAL: 418 MS
EKG QRS DURATION: 86 MS
EKG QTC CALCULATION (BAZETT): 470 MS
EKG R AXIS: 72 DEGREES
EKG T AXIS: 53 DEGREES
EKG VENTRICULAR RATE: 76 BPM

## 2024-12-12 ENCOUNTER — TELEPHONE (OUTPATIENT)
Dept: FAMILY MEDICINE CLINIC | Age: 54
End: 2024-12-12

## 2024-12-12 ASSESSMENT — ENCOUNTER SYMPTOMS
ABDOMINAL PAIN: 0
CONSTIPATION: 0
BLOOD IN STOOL: 0
SHORTNESS OF BREATH: 0
DIARRHEA: 0

## 2024-12-17 ENCOUNTER — HOSPITAL ENCOUNTER (OUTPATIENT)
Age: 54
Setting detail: SPECIMEN
Discharge: HOME | End: 2024-12-17
Payer: MEDICARE

## 2024-12-17 ENCOUNTER — HOSPITAL ENCOUNTER (OUTPATIENT)
Age: 54
Discharge: HOME | End: 2024-12-17
Payer: MEDICARE

## 2024-12-17 LAB
ANION GAP SERPL CALCULATED.3IONS-SCNC: 11 MMOL/L (ref 9–17)
BUN SERPL-MCNC: 9 MG/DL (ref 6–20)
BUN/CREAT SERPL: 9 (ref 9–20)
CALCIUM SERPL-MCNC: 9.3 MG/DL (ref 8.6–10.4)
CHLORIDE SERPL-SCNC: 100 MMOL/L (ref 98–107)
CO2 SERPL-SCNC: 30 MMOL/L (ref 20–31)
CREAT SERPL-MCNC: 1 MG/DL (ref 0.5–0.9)
ERYTHROCYTE [DISTWIDTH] IN BLOOD BY AUTOMATED COUNT: 14 % (ref 11.8–14.4)
EST. AVERAGE GLUCOSE BLD GHB EST-MCNC: 103 MG/DL
GFR, ESTIMATED: 67 ML/MIN/1.73M2
GLUCOSE SERPL-MCNC: 92 MG/DL (ref 70–99)
HBA1C MFR BLD: 5.2 % (ref 4–6)
HCT VFR BLD AUTO: 41.2 % (ref 36.3–47.1)
HGB BLD-MCNC: 13.2 G/DL (ref 11.9–15.1)
INR PPP: 1
MCH RBC QN AUTO: 28.5 PG (ref 25.2–33.5)
MCHC RBC AUTO-ENTMCNC: 32 G/DL (ref 25.2–33.5)
MCV RBC AUTO: 89 FL (ref 82.6–102.9)
NRBC BLD-RTO: 0 PER 100 WBC
PARTIAL THROMBOPLASTIN TIME: 31.7 SEC (ref 23.9–33.8)
PLATELET # BLD AUTO: 237 K/UL (ref 138–453)
PMV BLD AUTO: 10.9 FL (ref 8.1–13.5)
POTASSIUM SERPL-SCNC: 3.8 MMOL/L (ref 3.7–5.3)
PROTHROMBIN TIME: 13.1 SEC (ref 11.5–14.2)
RBC # BLD AUTO: 4.63 M/UL (ref 3.95–5.11)
SODIUM SERPL-SCNC: 141 MMOL/L (ref 135–144)
WBC OTHER # BLD: 9.1 K/UL (ref 3.5–11.3)

## 2024-12-17 PROCEDURE — 85730 THROMBOPLASTIN TIME PARTIAL: CPT

## 2024-12-17 PROCEDURE — 80048 BASIC METABOLIC PNL TOTAL CA: CPT

## 2024-12-17 PROCEDURE — 87641 MR-STAPH DNA AMP PROBE: CPT

## 2024-12-17 PROCEDURE — 85610 PROTHROMBIN TIME: CPT

## 2024-12-17 PROCEDURE — 85027 COMPLETE CBC AUTOMATED: CPT

## 2024-12-17 PROCEDURE — 83036 HEMOGLOBIN GLYCOSYLATED A1C: CPT

## 2024-12-17 PROCEDURE — 36415 COLL VENOUS BLD VENIPUNCTURE: CPT

## 2024-12-18 LAB
MRSA, DNA, NASAL: NEGATIVE
SPECIMEN DESCRIPTION: NORMAL

## 2024-12-19 ENCOUNTER — OFFICE VISIT (OUTPATIENT)
Dept: PRIMARY CARE CLINIC | Age: 54
End: 2024-12-19
Payer: MEDICARE

## 2024-12-19 VITALS
WEIGHT: 243 LBS | SYSTOLIC BLOOD PRESSURE: 118 MMHG | HEIGHT: 66 IN | OXYGEN SATURATION: 96 % | DIASTOLIC BLOOD PRESSURE: 82 MMHG | BODY MASS INDEX: 39.05 KG/M2 | HEART RATE: 83 BPM | TEMPERATURE: 97.9 F

## 2024-12-19 DIAGNOSIS — B96.89 BACTERIAL URI: Primary | ICD-10-CM

## 2024-12-19 DIAGNOSIS — J06.9 BACTERIAL URI: Primary | ICD-10-CM

## 2024-12-19 PROCEDURE — 99214 OFFICE O/P EST MOD 30 MIN: CPT | Performed by: NURSE PRACTITIONER

## 2024-12-19 RX ORDER — FLUCONAZOLE 150 MG/1
150 TABLET ORAL
Qty: 2 TABLET | Refills: 0 | Status: SHIPPED | OUTPATIENT
Start: 2024-12-19 | End: 2024-12-25

## 2024-12-19 RX ORDER — DOXYCYCLINE HYCLATE 100 MG
100 TABLET ORAL 2 TIMES DAILY
Qty: 14 TABLET | Refills: 0 | Status: SHIPPED | OUTPATIENT
Start: 2024-12-19 | End: 2024-12-26

## 2024-12-19 ASSESSMENT — ENCOUNTER SYMPTOMS
WHEEZING: 0
SINUS COMPLAINT: 1
SHORTNESS OF BREATH: 0
SINUS PRESSURE: 1
COUGH: 0
SORE THROAT: 0

## 2024-12-19 NOTE — PROGRESS NOTES
Subjective:      Patient ID: Judy Kearns is a 54 y.o. female coming in for   Chief Complaint   Patient presents with    Sinus Problem     Sx started 4 days ago, nasal drainage, ear pain, chills, sinus pressure Does not want tested fr covid/flu        Sinus Problem  This is a new problem. The current episode started in the past 7 days. The maximum temperature recorded prior to her arrival was 100.4 - 100.9 F. The fever has been present for Less than 1 day. Associated symptoms include congestion, ear pain, headaches and sinus pressure. Pertinent negatives include no coughing, shortness of breath or sore throat. Treatments tried: advil cold and flu. The treatment provided mild relief.   Denies any known sick exposures      Review of Systems   Constitutional:  Positive for fever.   HENT:  Positive for congestion, ear pain and sinus pressure. Negative for sore throat.    Respiratory:  Negative for cough, shortness of breath and wheezing.    Neurological:  Positive for headaches.        Objective:/82 (Site: Left Upper Arm, Position: Sitting, Cuff Size: Large Adult)   Pulse 83   Temp 97.9 °F (36.6 °C) (Tympanic)   Ht 1.676 m (5' 6\")   Wt 110.2 kg (243 lb)   LMP 05/05/2005 (Exact Date)   SpO2 96%   BMI 39.22 kg/m²      Physical Exam  Vitals and nursing note reviewed.   Constitutional:       General: She is not in acute distress.     Appearance: Normal appearance. She is not ill-appearing.   HENT:      Head: Normocephalic.      Right Ear: Tympanic membrane normal.      Left Ear: Tympanic membrane normal.      Nose: Congestion and rhinorrhea present.      Mouth/Throat:      Mouth: Mucous membranes are moist.      Pharynx: Oropharynx is clear. No oropharyngeal exudate or posterior oropharyngeal erythema.   Cardiovascular:      Rate and Rhythm: Normal rate and regular rhythm.      Heart sounds: Normal heart sounds.   Pulmonary:      Effort: Pulmonary effort is normal. No respiratory distress.      Breath sounds:

## 2025-01-16 ENCOUNTER — PATIENT MESSAGE (OUTPATIENT)
Dept: FAMILY MEDICINE CLINIC | Age: 55
End: 2025-01-16

## 2025-01-16 RX ORDER — OSELTAMIVIR PHOSPHATE 75 MG/1
75 CAPSULE ORAL DAILY
Qty: 10 CAPSULE | Refills: 0 | Status: SHIPPED | OUTPATIENT
Start: 2025-01-16

## 2025-02-03 DIAGNOSIS — Z98.1 STATUS POST LUMBAR SPINAL FUSION: Primary | ICD-10-CM

## 2025-02-08 DIAGNOSIS — G63 POLYNEUROPATHY ASSOCIATED WITH UNDERLYING DISEASE (HCC): ICD-10-CM

## 2025-02-10 NOTE — TELEPHONE ENCOUNTER
Per [facility], last fill 01/14/2025, quantity 90 for 30 days.   Judy called requesting a refill of the below medication which has been pended for you:     Requested Prescriptions     Pending Prescriptions Disp Refills    gabapentin (NEURONTIN) 800 MG tablet [Pharmacy Med Name: Gabapentin 800 MG Oral Tablet] 90 tablet 2     Sig: Take 1 tablet by mouth 3 times daily for 90 days.       Last Appointment Date: 8/15/2024  Next Appointment Date: 2/12/2025    Allergies   Allergen Reactions    Furosemide Other (See Comments) and Dizziness or Vertigo     Dizziness, extreme fatigue  Throat swelling  Dizziness, extreme fatigue      Morphine Itching, Nausea Only and Other (See Comments)    Topiramate Swelling, Rash and Angioedema     Facial and Oral edema    Allopurinol Other (See Comments)     Other reaction(s): nausea; \"hard on stomach\"    Bumex [Bumetanide] Other (See Comments)     Extreme fatigue, nausea, dizziness    Cat Dander      Other reaction(s): Sneezing    Colchicine     Gramineae Pollens      Other reaction(s): Sneezing    Indocin [Indomethacin]     Molds & Smuts      Other reaction(s): Sneezing    Uloric [Febuxostat]     Hydrocodone-Acetaminophen Nausea Only    Pantoprazole Other (See Comments)

## 2025-02-11 ENCOUNTER — TELEPHONE (OUTPATIENT)
Dept: PHARMACY | Facility: CLINIC | Age: 55
End: 2025-02-11

## 2025-02-11 RX ORDER — GABAPENTIN 800 MG/1
800 TABLET ORAL 3 TIMES DAILY
Qty: 90 TABLET | Refills: 2 | Status: SHIPPED | OUTPATIENT
Start: 2025-02-13 | End: 2025-05-14

## 2025-02-11 NOTE — TELEPHONE ENCOUNTER
Yanet Gray DO, from below, appointment with you 2/12/25 - identified with a care gap for diabetes without a statin  Per patient chart review: Was previously excluded with a dx code of Statin myopathy (G72.0, T46.6X5A) at 2/16/24 and 8/16/23 OVs. This dx code must be entered once each calendar year for the patient to be excluded from taking a statin.     If appropriate, please consider using the following CMS eligible diagnosis within your visit encounter:  Statin myopathy (G72.0)  - This will complete/close this insurer-identified gap for this calendar year.    Please let me know if I can further assist.  Thank you,  Zenaida Edwards, PharmD, [facility]  Population Health Pharmacist  [facility] Clinical Pharmacy  Department, toll free: [phone], option 1    ==============================================================    [facility] CLINICAL PHARMACY: STATIN THERAPY REVIEW  Identified statin use in persons with diabetes care gap  Ozempic through Patient Assistance Program    ASSESSMENT  STATIN GAP IDENTIFIED    Per chart review: patient may be excluded with appropriate CMS eligible dx code for SUPD:  Myopathy (G72.0, G72.89, G72.9)  Per 4/13/21 PCP OV note: \"Lipitor however causes significant heaviness and pain in her arms and legs\"   8/16/23 PCP OV: Statin myopathy (G72.0, T46.6X5A)  2/16/24 PCP OV: Statin myopathy (G72.0, T46.6X5A)    PLAN  The following are interventions that have been identified:   Statin Gap (Diabetes): patient may be excluded with a CMS eligible dx code if entered into a billable office visit as a visit diagnosis  Statin myopathy (G72.0, T46.6X5A) dx used in 2023 and 2024    Next Visit: 2/12/25

## 2025-02-12 ENCOUNTER — OFFICE VISIT (OUTPATIENT)
Dept: FAMILY MEDICINE CLINIC | Age: 55
End: 2025-02-12

## 2025-02-12 VITALS
OXYGEN SATURATION: 98 % | HEART RATE: 72 BPM | WEIGHT: 251 LBS | RESPIRATION RATE: 16 BRPM | SYSTOLIC BLOOD PRESSURE: 108 MMHG | BODY MASS INDEX: 40.34 KG/M2 | TEMPERATURE: 97.2 F | DIASTOLIC BLOOD PRESSURE: 70 MMHG | HEIGHT: 66 IN

## 2025-02-12 DIAGNOSIS — G40.909 SEIZURE DISORDER (HCC): ICD-10-CM

## 2025-02-12 DIAGNOSIS — E78.2 MIXED HYPERLIPIDEMIA: ICD-10-CM

## 2025-02-12 DIAGNOSIS — T46.6X5A STATIN MYOPATHY: ICD-10-CM

## 2025-02-12 DIAGNOSIS — I10 PRIMARY HYPERTENSION: ICD-10-CM

## 2025-02-12 DIAGNOSIS — F32.1 CURRENT MODERATE EPISODE OF MAJOR DEPRESSIVE DISORDER WITHOUT PRIOR EPISODE (HCC): ICD-10-CM

## 2025-02-12 DIAGNOSIS — E11.9 TYPE 2 DIABETES MELLITUS WITHOUT COMPLICATION, WITHOUT LONG-TERM CURRENT USE OF INSULIN (HCC): Primary | ICD-10-CM

## 2025-02-12 DIAGNOSIS — E03.9 ACQUIRED HYPOTHYROIDISM: ICD-10-CM

## 2025-02-12 DIAGNOSIS — G72.0 STATIN MYOPATHY: ICD-10-CM

## 2025-02-12 RX ORDER — PROMETHAZINE HYDROCHLORIDE 25 MG/1
25 TABLET ORAL EVERY 6 HOURS PRN
Qty: 60 TABLET | Refills: 2 | Status: SHIPPED | OUTPATIENT
Start: 2025-02-12

## 2025-02-12 SDOH — ECONOMIC STABILITY: FOOD INSECURITY: WITHIN THE PAST 12 MONTHS, YOU WORRIED THAT YOUR FOOD WOULD RUN OUT BEFORE YOU GOT MONEY TO BUY MORE.: NEVER TRUE

## 2025-02-12 SDOH — ECONOMIC STABILITY: FOOD INSECURITY: WITHIN THE PAST 12 MONTHS, THE FOOD YOU BOUGHT JUST DIDN'T LAST AND YOU DIDN'T HAVE MONEY TO GET MORE.: NEVER TRUE

## 2025-02-12 ASSESSMENT — ENCOUNTER SYMPTOMS
WHEEZING: 0
SHORTNESS OF BREATH: 0
NAUSEA: 0
DIARRHEA: 0
CONSTIPATION: 0
SINUS PRESSURE: 0
VOMITING: 0
EYE REDNESS: 0
RHINORRHEA: 0
ABDOMINAL PAIN: 0
EYE DISCHARGE: 0
SORE THROAT: 0
COUGH: 0
TROUBLE SWALLOWING: 0

## 2025-02-12 ASSESSMENT — PATIENT HEALTH QUESTIONNAIRE - PHQ9
SUM OF ALL RESPONSES TO PHQ9 QUESTIONS 1 & 2: 2
SUM OF ALL RESPONSES TO PHQ QUESTIONS 1-9: 10
1. LITTLE INTEREST OR PLEASURE IN DOING THINGS: SEVERAL DAYS
8. MOVING OR SPEAKING SO SLOWLY THAT OTHER PEOPLE COULD HAVE NOTICED. OR THE OPPOSITE, BEING SO FIGETY OR RESTLESS THAT YOU HAVE BEEN MOVING AROUND A LOT MORE THAN USUAL: NOT AT ALL
7. TROUBLE CONCENTRATING ON THINGS, SUCH AS READING THE NEWSPAPER OR WATCHING TELEVISION: MORE THAN HALF THE DAYS
SUM OF ALL RESPONSES TO PHQ QUESTIONS 1-9: 10
5. POOR APPETITE OR OVEREATING: SEVERAL DAYS
SUM OF ALL RESPONSES TO PHQ QUESTIONS 1-9: 10
6. FEELING BAD ABOUT YOURSELF - OR THAT YOU ARE A FAILURE OR HAVE LET YOURSELF OR YOUR FAMILY DOWN: SEVERAL DAYS
3. TROUBLE FALLING OR STAYING ASLEEP: MORE THAN HALF THE DAYS
SUM OF ALL RESPONSES TO PHQ QUESTIONS 1-9: 10
4. FEELING TIRED OR HAVING LITTLE ENERGY: MORE THAN HALF THE DAYS
9. THOUGHTS THAT YOU WOULD BE BETTER OFF DEAD, OR OF HURTING YOURSELF: NOT AT ALL
10. IF YOU CHECKED OFF ANY PROBLEMS, HOW DIFFICULT HAVE THESE PROBLEMS MADE IT FOR YOU TO DO YOUR WORK, TAKE CARE OF THINGS AT HOME, OR GET ALONG WITH OTHER PEOPLE: SOMEWHAT DIFFICULT
2. FEELING DOWN, DEPRESSED OR HOPELESS: SEVERAL DAYS

## 2025-02-12 NOTE — PROGRESS NOTES
Patient declines flu, covid, pneumonia, shingles, and hep b vaccines. Declines hep c screening. Scheduled [facility].  
experienced nausea due to Ozempic, which was managed with Phenergan. Zofran was ineffective. A prescription for Phenergan will be issued to manage her nausea. She is advised to continue her current regimen of Ozempic.    2. Hair Loss.  She reports significant hair loss, which may be attributed to Ozempic or a potential protein collagen deficiency. The use of Rogaine was discussed, including its potential benefits and drawbacks. She was informed that while Rogaine can stimulate hair follicles and promote hair growth, discontinuation of the product may result in a return to the original state of hair loss. The possibility of using biotin supplements and shampoos containing biotin was also discussed. She is advised to consider over-the-counter collagen supplements.      Patient's other chronic health conditions including her hypothyroidism hyperlipidemia hypertension depression and seizure disorder all stable so she should continue with her current medical therapy and we will plan for follow-up lab work in 6 months.      Patient is to return to my office in 6 months duration or sooner if any further problems or symptoms arise.        The patient (or guardian, if applicable) and other individuals in attendance with the patient were advised that Artificial Intelligence will be utilized during this visit to record, process the conversation to generate a clinical note, and support improvement of the AI technology. The patient (or guardian, if applicable) and other individuals in attendance at the appointment consented to the use of AI, including the recording.        (Please note that portions of this note were completed with a voice recognition program. Efforts were made to edit the dictations but occasionally words are mis-transcribed.)    No follow-ups on file.    An electronic signature was used to authenticate this note.    --Yanet Gray,  on 2/12/2025 at 6:53 AM

## 2025-02-13 ENCOUNTER — OFFICE VISIT (OUTPATIENT)
Dept: ORTHOPEDIC SURGERY | Age: 55
End: 2025-02-13
Payer: MEDICARE

## 2025-02-13 ENCOUNTER — HOSPITAL ENCOUNTER (OUTPATIENT)
Dept: GENERAL RADIOLOGY | Age: 55
Discharge: HOME | End: 2025-02-15
Payer: MEDICARE

## 2025-02-13 VITALS
BODY MASS INDEX: 39.05 KG/M2 | DIASTOLIC BLOOD PRESSURE: 82 MMHG | WEIGHT: 243 LBS | HEIGHT: 66 IN | SYSTOLIC BLOOD PRESSURE: 120 MMHG

## 2025-02-13 DIAGNOSIS — M46.1 ARTHRITIS OF RIGHT SACROILIAC JOINT (HCC): ICD-10-CM

## 2025-02-13 DIAGNOSIS — Z47.89 UNSPECIFIED ORTHOPEDIC AFTERCARE: Primary | ICD-10-CM

## 2025-02-13 DIAGNOSIS — Z98.1 STATUS POST LUMBAR SPINAL FUSION: ICD-10-CM

## 2025-02-13 PROCEDURE — 99214 OFFICE O/P EST MOD 30 MIN: CPT | Performed by: PHYSICIAN ASSISTANT

## 2025-02-13 PROCEDURE — 99024 POSTOP FOLLOW-UP VISIT: CPT | Performed by: PHYSICIAN ASSISTANT

## 2025-02-13 PROCEDURE — 72100 X-RAY EXAM L-S SPINE 2/3 VWS: CPT

## 2025-02-13 NOTE — TELEPHONE ENCOUNTER
Noted, thank you for reviewing    =======================================================   For Pharmacy Admin Tracking Only    Program: Pop Health  CPA in place:  No  Recommendation Provided To: Provider: 1 via Note to Provider  Intervention Accepted By: Provider: 1  Gap Closed?: Yes   Time Spent (min): 15

## 2025-02-13 NOTE — PROGRESS NOTES
Lab Results   Component Value Date/Time    WBC 9.1 12/17/2024 07:45 AM    HGB 13.2 12/17/2024 07:45 AM     12/17/2024 07:45 AM     BMP:    Lab Results   Component Value Date/Time     12/17/2024 07:45 AM    K 3.8 12/17/2024 07:45 AM     12/17/2024 07:45 AM    CO2 30 12/17/2024 07:45 AM    BUN 9 12/17/2024 07:45 AM    CREATININE 1.0 12/17/2024 07:45 AM    CALCIUM 9.3 12/17/2024 07:45 AM    GLUCOSE 92 12/17/2024 07:45 AM     PT/INR:    Lab Results   Component Value Date/Time    PROTIME 13.1 12/17/2024 07:45 AM    PROTIME 9.8 06/30/2022 12:00 AM    INR 1.0 12/17/2024 07:45 AM     Troponin:  No results found for: \"TROPONINI\"  No results for input(s): \"LIPASE\", \"AMYLASE\" in the last 72 hours.  No results for input(s): \"AST\", \"ALT\", \"BILIDIR\", \"BILITOT\", \"ALKPHOS\" in the last 72 hours.  Uric Acid:  No components found for: \"URIC\"  Urine Culture:  No components found for: \"CURINE\"    Radiology:     X-rays personally reviewed by me of lumbar spine dated 2/13/25 shows a posterolateral bony fusion L3-S1, TLIF cafe at L5-S1. Stable spondylolisthesis L5-S1 - fused. No signs of any acute fractures. Removal of instrumentation L4-S1.        Diagnosis Orders   1. Unspecified orthopedic aftercare  External Referral To Pain Clinic      2. Arthritis of right sacroiliac joint (HCC)          1) 6-weeks status post HW removal, L5-S1 revision decompression  2) Right SI joint pain/arthritis    PLAN:  Patient is doing well from a surgical standpoint. She reports improvement of her radicular symptoms. Her main complaint is right buttock pain and she is very tender over the right SI joint. We will refer her back to pain management for a right SI joint injection. We will see her back in our office in 6-weeks for a lumbar recheck.      Electronically signed by PINEDA West on 2/13/2025 at 11:17 AM

## 2025-02-16 ENCOUNTER — HOSPITAL ENCOUNTER (EMERGENCY)
Age: 55
Discharge: HOME | End: 2025-02-16
Attending: EMERGENCY MEDICINE
Payer: MEDICARE

## 2025-02-16 VITALS
OXYGEN SATURATION: 96 % | HEIGHT: 66 IN | BODY MASS INDEX: 39.05 KG/M2 | RESPIRATION RATE: 18 BRPM | DIASTOLIC BLOOD PRESSURE: 80 MMHG | HEART RATE: 73 BPM | WEIGHT: 243 LBS | TEMPERATURE: 98 F | SYSTOLIC BLOOD PRESSURE: 138 MMHG

## 2025-02-16 DIAGNOSIS — R04.0 EPISTAXIS: Primary | ICD-10-CM

## 2025-02-16 PROCEDURE — 99283 EMERGENCY DEPT VISIT LOW MDM: CPT

## 2025-02-16 PROCEDURE — 2500000003 HC RX 250 WO HCPCS: Performed by: EMERGENCY MEDICINE

## 2025-02-16 RX ADMIN — PHENYLEPHRINE HYDROCHLORIDE 2 SPRAY: 0.5 SPRAY NASAL at 06:56

## 2025-02-16 ASSESSMENT — PAIN - FUNCTIONAL ASSESSMENT: PAIN_FUNCTIONAL_ASSESSMENT: NONE - DENIES PAIN

## 2025-02-18 ENCOUNTER — TELEPHONE (OUTPATIENT)
Dept: FAMILY MEDICINE CLINIC | Age: 55
End: 2025-02-18

## 2025-02-18 ENCOUNTER — TELEMEDICINE (OUTPATIENT)
Dept: FAMILY MEDICINE CLINIC | Age: 55
End: 2025-02-18

## 2025-02-18 DIAGNOSIS — Z00.00 MEDICARE ANNUAL WELLNESS VISIT, SUBSEQUENT: Primary | ICD-10-CM

## 2025-02-18 ASSESSMENT — PATIENT HEALTH QUESTIONNAIRE - PHQ9
SUM OF ALL RESPONSES TO PHQ QUESTIONS 1-9: 10
8. MOVING OR SPEAKING SO SLOWLY THAT OTHER PEOPLE COULD HAVE NOTICED. OR THE OPPOSITE, BEING SO FIGETY OR RESTLESS THAT YOU HAVE BEEN MOVING AROUND A LOT MORE THAN USUAL: NOT AT ALL
9. THOUGHTS THAT YOU WOULD BE BETTER OFF DEAD, OR OF HURTING YOURSELF: NOT AT ALL
2. FEELING DOWN, DEPRESSED OR HOPELESS: SEVERAL DAYS
SUM OF ALL RESPONSES TO PHQ QUESTIONS 1-9: 10
6. FEELING BAD ABOUT YOURSELF - OR THAT YOU ARE A FAILURE OR HAVE LET YOURSELF OR YOUR FAMILY DOWN: SEVERAL DAYS
SUM OF ALL RESPONSES TO PHQ9 QUESTIONS 1 & 2: 2
3. TROUBLE FALLING OR STAYING ASLEEP: MORE THAN HALF THE DAYS
1. LITTLE INTEREST OR PLEASURE IN DOING THINGS: SEVERAL DAYS
7. TROUBLE CONCENTRATING ON THINGS, SUCH AS READING THE NEWSPAPER OR WATCHING TELEVISION: MORE THAN HALF THE DAYS
SUM OF ALL RESPONSES TO PHQ QUESTIONS 1-9: 10
SUM OF ALL RESPONSES TO PHQ QUESTIONS 1-9: 10
10. IF YOU CHECKED OFF ANY PROBLEMS, HOW DIFFICULT HAVE THESE PROBLEMS MADE IT FOR YOU TO DO YOUR WORK, TAKE CARE OF THINGS AT HOME, OR GET ALONG WITH OTHER PEOPLE: SOMEWHAT DIFFICULT
4. FEELING TIRED OR HAVING LITTLE ENERGY: MORE THAN HALF THE DAYS
5. POOR APPETITE OR OVEREATING: SEVERAL DAYS

## 2025-02-18 NOTE — PROGRESS NOTES
Medicare Annual Wellness Visit    Judy Kearns is here for Medicare AWV (Subsequent; last 2/20/2024)    Assessment & Plan   Medicare annual wellness visit, subsequent     Return in 1 year (on 2/18/2026) for Medicare AWV.     Subjective     Patient's complete Health Risk Assessment and screening values have been reviewed and are found in Flowsheets. The following problems were reviewed today and where indicated follow up appointments were made and/or referrals ordered.    Positive Risk Factor Screenings with Interventions:    Fall Risk:  Do you feel unsteady or are you worried about falling? : (!) yes (ocassionally)  2 or more falls in past year?: no  Fall with injury in past year?: no     Interventions:    Reviewed medications, home hazards, visual acuity, and co-morbidities that can increase risk for falls  See AVS for additional education material     Depression:  PHQ-2 Score: 2  PHQ-9 Total Score: 10  Total Score Interpretation: 10-14 = moderate depression  Interventions:  Life style changes to improve mood reviewed-is on medication and receives counseling/mental health services           Inactivity:  On average, how many days per week do you engage in moderate to strenuous exercise (like a brisk walk)?: 0 days (!) Abnormal  On average, how many minutes do you engage in exercise at this level?: 0 min  Interventions:  See AVS for additional education material     Abnormal BMI (obese):  There is no height or weight on file to calculate BMI. (!) Abnormal  Interventions:  See AVS for additional education material          Objective    Patient-Reported Vitals  Patient-Reported Systolic (Top): 0 mmHg (patient does not routinely monitor BP at home; during recent office visit BP stable at 108/70)  Patient-Reported Weight: 240lb  Patient-Reported Height: 5'6\"                 Allergies   Allergen Reactions    Furosemide Other (See Comments) and Dizziness or Vertigo     Dizziness, extreme fatigue  Throat

## 2025-02-21 ENCOUNTER — HOSPITAL ENCOUNTER (OUTPATIENT)
Age: 55
Discharge: HOME | End: 2025-02-21
Payer: MEDICARE

## 2025-02-21 ENCOUNTER — OFFICE VISIT (OUTPATIENT)
Dept: FAMILY MEDICINE CLINIC | Age: 55
End: 2025-02-21

## 2025-02-21 VITALS
DIASTOLIC BLOOD PRESSURE: 70 MMHG | HEART RATE: 88 BPM | TEMPERATURE: 98.4 F | SYSTOLIC BLOOD PRESSURE: 100 MMHG | HEIGHT: 66 IN | RESPIRATION RATE: 16 BRPM | OXYGEN SATURATION: 97 % | BODY MASS INDEX: 39.05 KG/M2 | WEIGHT: 243 LBS

## 2025-02-21 DIAGNOSIS — R04.0 EPISTAXIS: ICD-10-CM

## 2025-02-21 DIAGNOSIS — D50.9 IRON DEFICIENCY ANEMIA, UNSPECIFIED IRON DEFICIENCY ANEMIA TYPE: ICD-10-CM

## 2025-02-21 DIAGNOSIS — R04.0 EPISTAXIS: Primary | ICD-10-CM

## 2025-02-21 LAB
BASOPHILS # BLD: 0.04 K/UL (ref 0–0.2)
BASOPHILS NFR BLD: 0 % (ref 0–2)
EOSINOPHIL # BLD: 0.23 K/UL (ref 0–0.44)
EOSINOPHILS RELATIVE PERCENT: 2 % (ref 1–4)
ERYTHROCYTE [DISTWIDTH] IN BLOOD BY AUTOMATED COUNT: 14 % (ref 11.8–14.4)
FERRITIN SERPL-MCNC: 327 NG/ML (ref 15–150)
HCT VFR BLD AUTO: 36.8 % (ref 36.3–47.1)
HGB BLD-MCNC: 11.9 G/DL (ref 11.9–15.1)
IMM GRANULOCYTES # BLD AUTO: 0.07 K/UL (ref 0–0.3)
IMM GRANULOCYTES NFR BLD: 1 %
IRON SATN MFR SERPL: 23 % (ref 20–55)
IRON SERPL-MCNC: 62 UG/DL (ref 37–145)
LYMPHOCYTES NFR BLD: 2.81 K/UL (ref 1.1–3.7)
LYMPHOCYTES RELATIVE PERCENT: 27 % (ref 24–43)
MCH RBC QN AUTO: 28.3 PG (ref 25.2–33.5)
MCHC RBC AUTO-ENTMCNC: 32.3 G/DL (ref 25.2–33.5)
MCV RBC AUTO: 87.6 FL (ref 82.6–102.9)
MONOCYTES NFR BLD: 0.67 K/UL (ref 0.1–1.2)
MONOCYTES NFR BLD: 7 % (ref 3–12)
NEUTROPHILS NFR BLD: 63 % (ref 36–65)
NEUTS SEG NFR BLD: 6.43 K/UL (ref 1.5–8.1)
NRBC BLD-RTO: 0 PER 100 WBC
PLATELET # BLD AUTO: 239 K/UL (ref 138–453)
PMV BLD AUTO: 10.8 FL (ref 8.1–13.5)
RBC # BLD AUTO: 4.2 M/UL (ref 3.95–5.11)
TIBC SERPL-MCNC: 264 UG/DL (ref 250–450)
UNSATURATED IRON BINDING CAPACITY: 202 UG/DL (ref 112–347)
WBC OTHER # BLD: 10.3 K/UL (ref 3.5–11.3)

## 2025-02-21 PROCEDURE — 85025 COMPLETE CBC W/AUTO DIFF WBC: CPT

## 2025-02-21 PROCEDURE — 83540 ASSAY OF IRON: CPT

## 2025-02-21 PROCEDURE — 36415 COLL VENOUS BLD VENIPUNCTURE: CPT

## 2025-02-21 PROCEDURE — 82728 ASSAY OF FERRITIN: CPT

## 2025-02-21 PROCEDURE — 83550 IRON BINDING TEST: CPT

## 2025-02-21 ASSESSMENT — ENCOUNTER SYMPTOMS
SINUS PAIN: 1
RHINORRHEA: 0
SINUS PRESSURE: 1
SORE THROAT: 0

## 2025-02-21 NOTE — ED PROVIDER NOTES
(PROVENTIL;VENTOLIN;PROAIR) 108 (90 Base) MCG/ACT inhaler Inhale 2 puffs into the lungs every 6 hours as needed for Wheezing, Disp-8.5 g, R-5Normal      traZODone (DESYREL) 100 MG tablet take 3 tablets by mouth at bedtimeHistorical Med      prazosin (MINIPRESS) 2 MG capsule Historical Med      Cholecalciferol (VITAMIN D3) 125 MCG (5000 UT) TABS Take 1 tablet by mouth daily, Disp-90 tablet, R-1Normal      desvenlafaxine succinate (PRISTIQ) 100 MG TB24 extended release tablet take 1 tablet by mouth dailyHistorical Med      lamoTRIgine (LAMICTAL) 150 MG tablet TAKE 1 TABLET BY MOUTH TWICE DAILYHistorical Med             ALLERGIES     Furosemide, Morphine, Topiramate, Allopurinol, Bumex [bumetanide], Cat dander, Colchicine, Gramineae pollens, Indocin [indomethacin], Molds & smuts, Uloric [febuxostat], Hydrocodone-acetaminophen, and Pantoprazole    FAMILY HISTORY           Problem Relation Age of Onset    Cancer Mother         melanoma    Thyroid Disease Mother     Coronary Art Dis Father         coronary artery bypass grafting x4    Hypertension Father     Glaucoma Father     Prostate Cancer Father     Heart Disease Father     Asthma Daughter     Depression Daughter     Anxiety Disorder Daughter     Depression Daughter     Anxiety Disorder Daughter     Heart Disease Paternal Grandmother     High Blood Pressure Paternal Grandmother     Diabetes Paternal Grandmother     Thyroid Disease Paternal Grandmother     Breast Cancer Paternal Grandmother     Heart Disease Paternal Grandfather     High Blood Pressure Paternal Grandfather     Diabetes Paternal Grandfather     Breast Cancer Paternal Aunt     Breast Cancer Paternal Aunt     Breast Cancer Paternal Aunt     Asthma Other         sibling     Family Status   Relation Name Status    Mother  Alive    Father  Alive    Sister  Alive    Sister  Alive    Sister  Alive    Brother  Alive    Brother  Alive    Myrna  Alive    Myrna  Alive    MGM      MGF      PGM

## 2025-02-21 NOTE — PROGRESS NOTES
2025     Judy Kearns (:  1970) is a 54 y.o. female, here for evaluation of the following medical concerns:    HPI  History of Present Illness  The patient is a 54-year-old female who presents for evaluation of nosebleeds.    She reports experiencing episodes of blood expulsion from her mouth, akin to vomiting, followed by a continuous stream of blood from her nose. These episodes occur daily, sometimes twice a day, and are often accompanied by clotting in her throat. The duration of each episode varies, lasting between 30 minutes to an hour. She also reports a recent onset of congestion and severe headaches.  Did see the neurosurgeon yesterday regarding her trigeminal neuralgia and he is doing further imaging with MRI before he comes up with a plan as far as what to do for her ongoing left sided maxillary pain.  Unable to get MRI completed until .  She has been using Afrin nasal spray as part of her treatment regimen. She has previously consulted with Dr. Kiran, an ENT specialist, who referred her to pain management. She sought emergency care last , where she was instructed to blow her nose, provided with a nasal spray, and had her nose clamped. Despite these interventions, the frequency of her nosebleeds has not decreased. She reports no gastrointestinal discomfort but does experience nausea during these episodes. She expresses concern about potential iron deficiency due to the significant blood loss, even though she is on a twice-daily iron supplement. The severity of her symptoms has led to extreme fatigue and difficulty in performing daily activities.      Did review patient's med list, allergies, social history,pmhx and pshx today as noted in the record.        Review of Systems   Constitutional:  Positive for fatigue (increased fatigue from having significant nose bleeds.). Negative for chills and fever.   HENT:  Positive for congestion, nosebleeds, sinus pressure and sinus

## 2025-03-06 ENCOUNTER — TELEMEDICINE (OUTPATIENT)
Dept: FAMILY MEDICINE CLINIC | Age: 55
End: 2025-03-06

## 2025-03-06 DIAGNOSIS — J01.40 ACUTE NON-RECURRENT PANSINUSITIS: Primary | ICD-10-CM

## 2025-03-06 RX ORDER — PREDNISONE 20 MG/1
TABLET ORAL
Qty: 15 TABLET | Refills: 0 | Status: SHIPPED | OUTPATIENT
Start: 2025-03-06

## 2025-03-06 ASSESSMENT — ENCOUNTER SYMPTOMS
VOMITING: 0
EYE DISCHARGE: 0
SINUS PAIN: 1
DIARRHEA: 0
NAUSEA: 0
SHORTNESS OF BREATH: 0
TROUBLE SWALLOWING: 0
SINUS PRESSURE: 1
ABDOMINAL PAIN: 0
COUGH: 0
WHEEZING: 0
SORE THROAT: 0
EYE REDNESS: 0
RHINORRHEA: 1
SINUS COMPLAINT: 1
CONSTIPATION: 0

## 2025-03-06 NOTE — PROGRESS NOTES
is a billable service, which includes applicable co-pays. This Virtual Visit was conducted with patient's (and/or legal guardian's) consent. Patient identification was verified, and a caregiver was present when appropriate.   The patient was located at Home: [address] [address]  Provider was located at Facility (Appt Dept): [address] [place] [address]  Confirm you are appropriately licensed, registered, or certified to deliver care in the state where the patient is located as indicated above. If you are not or unsure, please re-schedule the visit: Yes, I confirm.      Total time spent for this encounter: Not billed by time    --Yanet Gray DO on 3/6/2025 at 3:41 PM    An electronic signature was used to authenticate this note.    .

## 2025-03-27 RX ORDER — LORATADINE PSEUDOEPHEDRINE SULFATE 10; 240 MG/1; MG/1
1 TABLET, EXTENDED RELEASE ORAL DAILY
Qty: 90 TABLET | Refills: 0 | Status: SHIPPED | OUTPATIENT
Start: 2025-03-27

## 2025-03-27 NOTE — TELEPHONE ENCOUNTER
Judy called requesting a refill of the below medication which has been pended for you:     Requested Prescriptions     Pending Prescriptions Disp Refills    loratadine-pseudoephedrine (CLARITIN-D 24 HOUR)  MG per extended release tablet [Pharmacy Med Name: ALLERGY/DANA RELF D 24H ER TAB] 90 tablet 0     Sig: Take 1 tablet by mouth once daily       Last Appointment Date: 3/6/2025  Next Appointment Date: 8/13/2025    Allergies   Allergen Reactions    Furosemide Other (See Comments) and Dizziness or Vertigo     Dizziness, extreme fatigue  Throat swelling  Dizziness, extreme fatigue      Morphine Itching, Nausea Only and Other (See Comments)    Topiramate Swelling, Rash and Angioedema     Facial and Oral edema    Allopurinol Other (See Comments)     Other reaction(s): nausea; \"hard on stomach\"    Bumex [Bumetanide] Other (See Comments)     Extreme fatigue, nausea, dizziness    Cat Dander      Other reaction(s): Sneezing    Colchicine     Gramineae Pollens      Other reaction(s): Sneezing    Indocin [Indomethacin]     Molds & Smuts      Other reaction(s): Sneezing    Uloric [Febuxostat]     Hydrocodone-Acetaminophen Nausea Only    Pantoprazole Other (See Comments)

## 2025-04-14 RX ORDER — ESOMEPRAZOLE MAGNESIUM 40 MG/1
40 CAPSULE, DELAYED RELEASE ORAL 2 TIMES DAILY
Qty: 180 CAPSULE | Refills: 1 | Status: SHIPPED | OUTPATIENT
Start: 2025-04-14

## 2025-04-17 ENCOUNTER — OFFICE VISIT (OUTPATIENT)
Dept: FAMILY MEDICINE CLINIC | Age: 55
End: 2025-04-17
Payer: MEDICARE

## 2025-04-17 VITALS
HEIGHT: 66 IN | RESPIRATION RATE: 16 BRPM | DIASTOLIC BLOOD PRESSURE: 80 MMHG | HEART RATE: 88 BPM | TEMPERATURE: 98.8 F | BODY MASS INDEX: 41.95 KG/M2 | SYSTOLIC BLOOD PRESSURE: 110 MMHG | WEIGHT: 261 LBS | OXYGEN SATURATION: 94 %

## 2025-04-17 DIAGNOSIS — M54.2 NECK PAIN: Primary | ICD-10-CM

## 2025-04-17 DIAGNOSIS — R51.9 ACUTE INTRACTABLE HEADACHE, UNSPECIFIED HEADACHE TYPE: ICD-10-CM

## 2025-04-17 RX ORDER — TRIAMCINOLONE ACETONIDE 40 MG/ML
40 INJECTION, SUSPENSION INTRA-ARTICULAR; INTRAMUSCULAR ONCE
Status: COMPLETED | OUTPATIENT
Start: 2025-04-17 | End: 2025-04-17

## 2025-04-17 RX ORDER — BUTALBITAL, ACETAMINOPHEN AND CAFFEINE 300; 40; 50 MG/1; MG/1; MG/1
1 CAPSULE ORAL EVERY 8 HOURS PRN
Qty: 30 CAPSULE | Refills: 0 | Status: SHIPPED | OUTPATIENT
Start: 2025-04-17

## 2025-04-17 RX ADMIN — TRIAMCINOLONE ACETONIDE 40 MG: 40 INJECTION, SUSPENSION INTRA-ARTICULAR; INTRAMUSCULAR at 16:45

## 2025-04-17 NOTE — PROGRESS NOTES
2025     Judy Kearns (:  1970) is a 54 y.o. female, here for evaluation of the following medical concerns:    HPI    History of Present Illness  The patient is a 54-year-old female who presents for evaluation of neck pain.    She reports severe neck pain that has been causing intense migraines, which started a few weeks ago. The pain is localized at the top of her spine and radiates to the top of her head, predominantly on the right side, extending into the hairline. Additionally, she experiences a crunching noise when turning her head. Weakness is noted in her arms, more pronounced on the right side. Pain management has included Tylenol and ibuprofen, with more relief from Tylenol. She has not tried Fioricet for her headaches but has some muscle relaxers left from a previous prescription. A history of occipital neuralgia and trigeminal neuralgia is noted, with no injections received for these conditions.     A fusion surgery at C5-C6 was performed in , followed by another at C6-C7 in . An MRI in 2022 indicated wear and narrowing at the C4-C5 disk, potentially contributing to her current symptoms.    She discontinued Ozempic and subsequently gained 12 pounds, experiencing severe sugar cravings. Resuming Ozempic at a lower dose of 0.5 mg is being considered. Ozempic was obtained for free through patient assistance through the company. Protein and collagen supplements are currently being taken to manage hair loss and skin issues.      Did review patient's med list, allergies, social history,pmhx and pshx today as noted in the record.      Review of Systems   Constitutional:  Negative for chills, fatigue and fever.   Musculoskeletal:  Positive for myalgias, neck pain and neck stiffness.   Neurological:  Positive for weakness (in upper extremities) and headaches.       Prior to Visit Medications    Medication Sig Taking? Authorizing Provider   esomeprazole (NEXIUM) 40 MG delayed release

## 2025-04-21 ENCOUNTER — OFFICE VISIT (OUTPATIENT)
Dept: PRIMARY CARE CLINIC | Age: 55
End: 2025-04-21
Payer: MEDICARE

## 2025-04-21 ENCOUNTER — HOSPITAL ENCOUNTER (OUTPATIENT)
Dept: GENERAL RADIOLOGY | Age: 55
Discharge: HOME | End: 2025-04-23
Payer: MEDICARE

## 2025-04-21 VITALS
OXYGEN SATURATION: 98 % | SYSTOLIC BLOOD PRESSURE: 120 MMHG | WEIGHT: 261 LBS | HEIGHT: 65 IN | HEART RATE: 74 BPM | DIASTOLIC BLOOD PRESSURE: 72 MMHG | BODY MASS INDEX: 43.49 KG/M2 | RESPIRATION RATE: 18 BRPM

## 2025-04-21 DIAGNOSIS — R09.89 AIR HUNGER: Primary | ICD-10-CM

## 2025-04-21 DIAGNOSIS — J45.909 UNCOMPLICATED ASTHMA, UNSPECIFIED ASTHMA SEVERITY, UNSPECIFIED WHETHER PERSISTENT: ICD-10-CM

## 2025-04-21 DIAGNOSIS — R09.89 AIR HUNGER: ICD-10-CM

## 2025-04-21 PROCEDURE — 3074F SYST BP LT 130 MM HG: CPT | Performed by: FAMILY MEDICINE

## 2025-04-21 PROCEDURE — 3078F DIAST BP <80 MM HG: CPT | Performed by: FAMILY MEDICINE

## 2025-04-21 PROCEDURE — 93010 ELECTROCARDIOGRAM REPORT: CPT | Performed by: FAMILY MEDICINE

## 2025-04-21 PROCEDURE — 99214 OFFICE O/P EST MOD 30 MIN: CPT | Performed by: FAMILY MEDICINE

## 2025-04-21 PROCEDURE — 71046 X-RAY EXAM CHEST 2 VIEWS: CPT

## 2025-04-21 PROCEDURE — 93005 ELECTROCARDIOGRAM TRACING: CPT | Performed by: FAMILY MEDICINE

## 2025-04-21 RX ORDER — ALBUTEROL SULFATE 90 UG/1
2 INHALANT RESPIRATORY (INHALATION) EVERY 6 HOURS PRN
Qty: 8.5 G | Refills: 5 | Status: SHIPPED | OUTPATIENT
Start: 2025-04-21

## 2025-04-21 ASSESSMENT — ENCOUNTER SYMPTOMS
RHINORRHEA: 0
SHORTNESS OF BREATH: 1
GASTROINTESTINAL NEGATIVE: 1
COUGH: 1
SINUS PRESSURE: 0
EYES NEGATIVE: 1

## 2025-04-21 NOTE — PROGRESS NOTES
Subjective:      Patient ID: Judy Kearns is a 54 y.o. female.    HPI  acute walk in clinic visit for asthma concerns.  Last night she laid down to go to bed and felt acutely sob.  She propped herself up and used an inhaler, but didn't feel a lot of relief.  Didn't sleep well.  Using cpap.  Compliant.  Generally feels like she can't take a deep enough breath.  No changes between sedentary and active movement.  Dosent feel like central chest pressure, just a sense of tightness through out the lungs.  She had an IM steroid last Thursday for neck pain by description.        Past Medical History:   Diagnosis Date    Abnormal CT of the abdomen     revealed 2 very small noncalcified pulmonary nodules in the right lung base. Suspect benign progress but repeat CT scan in June 2013 advised.    Allergic rhinitis     Anemia     Anxiety     Asthma     Chronic low back pain     Chronic neck pain     Degenerative joint disease of knee     Bilateral.    Depression     Endometriosis     history of     GERD (gastroesophageal reflux disease)     Gout     Hyperlipidemia     Hypertension     Hypothyroid     Kidney stone     history of     Left shoulder pain     Status post injections.    Leukocytosis     Palpitation     history of     Paresthesias     Generalized    Right knee pain     Status post injections.    Seizure (HCC)     \"nocturnal seizures\"     Past Surgical History:   Procedure Laterality Date    ARTHROPLASTY  03/26/1986    5th metatarsals, MTP joints, right and left feet.    CERVICAL FUSION  06/2014    Dr Diaz    CHOLECYSTECTOMY, LAPAROSCOPIC  11/14/2011    CYSTOSCOPY  02/08/2013    no acute findings    HYSTERECTOMY (CERVIX STATUS UNKNOWN)  05/2005    JOINT REPLACEMENT      KNEE ARTHROSCOPY Left     KNEE ARTHROSCOPY Left 10/16/2013    KNEE ARTHROSCOPY Right 02/08/2012    With partial medial and lateral synovectomies and abrasion chondroplasty.    LAPAROSCOPY  03/08/2002    diagnostic with D&C and hysterscopy.    LUMBAR

## 2025-04-22 RX ORDER — FLUTICASONE PROPIONATE AND SALMETEROL 500; 50 UG/1; UG/1
POWDER RESPIRATORY (INHALATION)
Qty: 60 EACH | Refills: 5 | Status: SHIPPED | OUTPATIENT
Start: 2025-04-22

## 2025-04-22 RX ORDER — ALBUTEROL SULFATE 90 UG/1
2 INHALANT RESPIRATORY (INHALATION) EVERY 6 HOURS PRN
Qty: 9 G | Refills: 0 | OUTPATIENT
Start: 2025-04-22

## 2025-04-22 NOTE — TELEPHONE ENCOUNTER
Judy called requesting a refill of the below medication which has been pended for you:     Requested Prescriptions     Pending Prescriptions Disp Refills    fluticasone-salmeterol (ADVAIR) 500-50 MCG/ACT AEPB diskus inhaler [Pharmacy Med Name: Fluticasone-Salmeterol 500-50 MCG/ACT Inhalation Aerosol Powder Breath Activated] 60 each 5     Sig: INHALE 1 DOSE BY MOUTH TWICE DAILY     Refused Prescriptions Disp Refills    albuterol sulfate HFA (PROVENTIL;VENTOLIN;PROAIR) 108 (90 Base) MCG/ACT inhaler [Pharmacy Med Name: Albuterol Sulfate  (90 Base) MCG/ACT Inhalation Aerosol Solution] 9 g 0     Sig: INHALE 2 PUFFS BY MOUTH EVERY 6 HOURS AS NEEDED FOR WHEEZING       Last Appointment Date: 4/17/2025  Next Appointment Date: 8/13/2025    Allergies   Allergen Reactions    Furosemide Other (See Comments) and Dizziness or Vertigo     Dizziness, extreme fatigue  Throat swelling  Dizziness, extreme fatigue      Morphine Itching, Nausea Only and Other (See Comments)    Topiramate Swelling, Rash and Angioedema     Facial and Oral edema    Allopurinol Other (See Comments)     Other reaction(s): nausea; \"hard on stomach\"    Bumex [Bumetanide] Other (See Comments)     Extreme fatigue, nausea, dizziness    Cat Dander      Other reaction(s): Sneezing    Colchicine     Gramineae Pollens      Other reaction(s): Sneezing    Indocin [Indomethacin]     Molds & Smuts      Other reaction(s): Sneezing    Uloric [Febuxostat]     Hydrocodone-Acetaminophen Nausea Only    Pantoprazole Other (See Comments)

## 2025-04-25 ENCOUNTER — TELEPHONE (OUTPATIENT)
Dept: FAMILY MEDICINE CLINIC | Age: 55
End: 2025-04-25

## 2025-04-25 NOTE — TELEPHONE ENCOUNTER
Pt calling requesting a call from [employer] regarding her applications for some medications. Please advise.

## 2025-04-25 NOTE — TELEPHONE ENCOUNTER
Spoke with patient and advised her the patient assistance for her Ozempic was approved and should be arriving to the office in the next 10-14 days.

## 2025-04-28 ENCOUNTER — RESULTS FOLLOW-UP (OUTPATIENT)
Dept: FAMILY MEDICINE CLINIC | Age: 55
End: 2025-04-28

## 2025-04-30 ENCOUNTER — PATIENT MESSAGE (OUTPATIENT)
Dept: FAMILY MEDICINE CLINIC | Age: 55
End: 2025-04-30

## 2025-05-02 ENCOUNTER — HOSPITAL ENCOUNTER (OUTPATIENT)
Dept: MRI IMAGING | Age: 55
Discharge: HOME | End: 2025-05-02
Attending: FAMILY MEDICINE
Payer: MEDICARE

## 2025-05-02 DIAGNOSIS — M54.2 NECK PAIN: ICD-10-CM

## 2025-05-02 PROCEDURE — 72141 MRI NECK SPINE W/O DYE: CPT

## 2025-05-06 ENCOUNTER — RESULTS FOLLOW-UP (OUTPATIENT)
Dept: PRIMARY CARE CLINIC | Age: 55
End: 2025-05-06

## 2025-05-06 DIAGNOSIS — M54.2 NECK PAIN: Primary | ICD-10-CM

## 2025-05-07 ENCOUNTER — OFFICE VISIT (OUTPATIENT)
Dept: FAMILY MEDICINE CLINIC | Age: 55
End: 2025-05-07
Payer: MEDICARE

## 2025-05-07 ENCOUNTER — RESULTS FOLLOW-UP (OUTPATIENT)
Dept: FAMILY MEDICINE CLINIC | Age: 55
End: 2025-05-07

## 2025-05-07 ENCOUNTER — HOSPITAL ENCOUNTER (OUTPATIENT)
Age: 55
Discharge: HOME | End: 2025-05-07
Payer: MEDICARE

## 2025-05-07 VITALS
WEIGHT: 262 LBS | TEMPERATURE: 98.3 F | OXYGEN SATURATION: 98 % | SYSTOLIC BLOOD PRESSURE: 110 MMHG | DIASTOLIC BLOOD PRESSURE: 60 MMHG | HEART RATE: 80 BPM | RESPIRATION RATE: 16 BRPM | HEIGHT: 65 IN | BODY MASS INDEX: 43.65 KG/M2

## 2025-05-07 DIAGNOSIS — R06.00 DYSPNEA, UNSPECIFIED TYPE: ICD-10-CM

## 2025-05-07 DIAGNOSIS — R06.00 DYSPNEA, UNSPECIFIED TYPE: Primary | ICD-10-CM

## 2025-05-07 DIAGNOSIS — R09.89 PULMONARY AIR TRAPPING: ICD-10-CM

## 2025-05-07 LAB
ANION GAP SERPL CALCULATED.3IONS-SCNC: 12 MMOL/L (ref 9–16)
BASOPHILS # BLD: 0.05 K/UL (ref 0–0.2)
BASOPHILS NFR BLD: 0 % (ref 0–2)
BUN SERPL-MCNC: 10 MG/DL (ref 6–20)
BUN/CREAT SERPL: 11 (ref 9–20)
CALCIUM SERPL-MCNC: 9.3 MG/DL (ref 8.6–10.4)
CHLORIDE SERPL-SCNC: 103 MMOL/L (ref 98–107)
CO2 SERPL-SCNC: 27 MMOL/L (ref 20–31)
CREAT SERPL-MCNC: 0.9 MG/DL (ref 0.6–0.9)
D DIMER PPP FEU-MCNC: 0.57 UG/ML FEU (ref 0–0.59)
EOSINOPHIL # BLD: 0.33 K/UL (ref 0–0.44)
EOSINOPHILS RELATIVE PERCENT: 3 % (ref 1–4)
ERYTHROCYTE [DISTWIDTH] IN BLOOD BY AUTOMATED COUNT: 14 % (ref 11.8–14.4)
GFR, ESTIMATED: 75 ML/MIN/1.73M2
GLUCOSE SERPL-MCNC: 82 MG/DL (ref 74–99)
HCT VFR BLD AUTO: 39.3 % (ref 36.3–47.1)
HGB BLD-MCNC: 12.8 G/DL (ref 11.9–15.1)
IMM GRANULOCYTES # BLD AUTO: 0.09 K/UL (ref 0–0.3)
IMM GRANULOCYTES NFR BLD: 1 %
LYMPHOCYTES NFR BLD: 3.44 K/UL (ref 1.1–3.7)
LYMPHOCYTES RELATIVE PERCENT: 27 % (ref 24–43)
MCH RBC QN AUTO: 28.6 PG (ref 25.2–33.5)
MCHC RBC AUTO-ENTMCNC: 32.6 G/DL (ref 25.2–33.5)
MCV RBC AUTO: 87.7 FL (ref 82.6–102.9)
MONOCYTES NFR BLD: 0.9 K/UL (ref 0.1–1.2)
MONOCYTES NFR BLD: 7 % (ref 3–12)
NEUTROPHILS NFR BLD: 62 % (ref 36–65)
NEUTS SEG NFR BLD: 8.06 K/UL (ref 1.5–8.1)
NRBC BLD-RTO: 0 PER 100 WBC
PLATELET # BLD AUTO: 233 K/UL (ref 138–453)
PMV BLD AUTO: 9.8 FL (ref 8.1–13.5)
POTASSIUM SERPL-SCNC: 3.9 MMOL/L (ref 3.7–5.3)
RBC # BLD AUTO: 4.48 M/UL (ref 3.95–5.11)
SODIUM SERPL-SCNC: 142 MMOL/L (ref 136–145)
WBC OTHER # BLD: 12.9 K/UL (ref 3.5–11.3)

## 2025-05-07 PROCEDURE — 99214 OFFICE O/P EST MOD 30 MIN: CPT | Performed by: FAMILY MEDICINE

## 2025-05-07 PROCEDURE — 80048 BASIC METABOLIC PNL TOTAL CA: CPT

## 2025-05-07 PROCEDURE — 3078F DIAST BP <80 MM HG: CPT | Performed by: FAMILY MEDICINE

## 2025-05-07 PROCEDURE — G2211 COMPLEX E/M VISIT ADD ON: HCPCS | Performed by: FAMILY MEDICINE

## 2025-05-07 PROCEDURE — 36415 COLL VENOUS BLD VENIPUNCTURE: CPT

## 2025-05-07 PROCEDURE — 85025 COMPLETE CBC W/AUTO DIFF WBC: CPT

## 2025-05-07 PROCEDURE — 3074F SYST BP LT 130 MM HG: CPT | Performed by: FAMILY MEDICINE

## 2025-05-07 PROCEDURE — 85379 FIBRIN DEGRADATION QUANT: CPT

## 2025-05-07 ASSESSMENT — ENCOUNTER SYMPTOMS
EYES NEGATIVE: 1
CHEST TIGHTNESS: 0
COUGH: 0
SHORTNESS OF BREATH: 0
WHEEZING: 0

## 2025-05-07 NOTE — PROGRESS NOTES
rash.   Neurological:      Mental Status: She is alert and oriented to person, place, and time.   Psychiatric:         Behavior: Behavior normal.         Thought Content: Thought content normal.         Judgment: Judgment normal.         ASSESSMENT/PLAN:  Encounter Diagnoses   Name Primary?    Dyspnea, unspecified type Yes    Pulmonary air trapping      No orders of the defined types were placed in this encounter.    Orders Placed This Encounter   Procedures    CT CHEST W CONTRAST     Standing Status:   Future     Expected Date:   5/7/2025     Expiration Date:   5/7/2026     Additional Contrast?:   None     STAT Creatinine as needed::   Yes     Reason for exam::   dyspnea    D-Dimer, Quantitative     Standing Status:   Future     Expected Date:   5/7/2025     Expiration Date:   5/7/2026    Basic Metabolic Panel     Standing Status:   Future     Expected Date:   5/7/2025     Expiration Date:   5/7/2026    CBC with Auto Differential     Standing Status:   Future     Expected Date:   5/7/2025     Expiration Date:   5/7/2026    Full PFT Study With Bronchodilator     If an ABG is needed along with this PFT procedure, please place the appropriate lab order     Standing Status:   Future     Expected Date:   5/7/2025     Expiration Date:   5/7/2026     Assessment & Plan  1. Dyspnea.  - Difficulty with deep breathing primarily with exhalation, particularly at night and worsening over the past month.  - Physical exam reveals no wheezing, phlegm, or cough; chest x-ray was normal.  - Discussed potential causes including hiatal hernia and airway obstruction; reviewed previous CT scan from 03/2023 showing nodules.  - Ordered D-dimer test today to rule out clot; CT scan and pulmonary function tests will be scheduled for further evaluation.    Not in any respiratory distress with breathing at rest so would just monitor for now until testing is complete and then depending on testing results will make further invention.  If it

## 2025-05-08 ENCOUNTER — TELEPHONE (OUTPATIENT)
Dept: FAMILY MEDICINE CLINIC | Age: 55
End: 2025-05-08

## 2025-05-13 ENCOUNTER — HOSPITAL ENCOUNTER (OUTPATIENT)
Dept: CT IMAGING | Age: 55
Discharge: HOME | End: 2025-05-15
Attending: FAMILY MEDICINE
Payer: MEDICARE

## 2025-05-13 DIAGNOSIS — R06.00 DYSPNEA, UNSPECIFIED TYPE: ICD-10-CM

## 2025-05-13 PROCEDURE — 71260 CT THORAX DX C+: CPT

## 2025-05-13 PROCEDURE — 6360000004 HC RX CONTRAST MEDICATION: Performed by: FAMILY MEDICINE

## 2025-05-13 RX ORDER — IOPAMIDOL 755 MG/ML
75 INJECTION, SOLUTION INTRAVASCULAR
Status: COMPLETED | OUTPATIENT
Start: 2025-05-13 | End: 2025-05-13

## 2025-05-13 RX ADMIN — IOPAMIDOL 75 ML: 755 INJECTION, SOLUTION INTRAVENOUS at 15:19

## 2025-05-14 ENCOUNTER — HOSPITAL ENCOUNTER (OUTPATIENT)
Dept: PULMONOLOGY | Age: 55
Discharge: HOME | End: 2025-05-14
Attending: FAMILY MEDICINE
Payer: MEDICARE

## 2025-05-14 VITALS — OXYGEN SATURATION: 99 %

## 2025-05-14 DIAGNOSIS — R06.00 DYSPNEA, UNSPECIFIED TYPE: ICD-10-CM

## 2025-05-14 LAB
DLCO %PRED: NORMAL
DLCO PRED: NORMAL
DLCO/VA %PRED: NORMAL
DLCO/VA PRED: NORMAL
DLCO/VA: NORMAL
DLCO: NORMAL
EXPIRATORY TIME-POST: NORMAL
EXPIRATORY TIME: NORMAL
FEF 25-75 %CHNG: NORMAL
FEF 25-75 POST %PRED: NORMAL
FEF 25-75% %PRED-PRE: NORMAL
FEF 25-75% PRED: NORMAL
FEF 25-75-POST: NORMAL
FEF 25-75-PRE: NORMAL
FEV1 %PRED-POST: NORMAL
FEV1 %PRED-PRE: NORMAL
FEV1 PRED: NORMAL
FEV1-POST: NORMAL
FEV1-PRE: NORMAL
FEV1/FVC %PRED-POST: NORMAL
FEV1/FVC %PRED-PRE: NORMAL
FEV1/FVC PRED: NORMAL
FEV1/FVC-POST: NORMAL
FEV1/FVC-PRE: NORMAL
FVC %PRED-POST: NORMAL
FVC %PRED-PRE: NORMAL
FVC PRED: NORMAL
FVC-POST: NORMAL
FVC-PRE: NORMAL
GAW %PRED: NORMAL
GAW PRED: NORMAL
GAW: NORMAL
IC PRE %PRED: NORMAL
IC PRED: NORMAL
IC: NORMAL
MEP: NORMAL
MIP: NORMAL
MVV %PRED-PRE: NORMAL
MVV PRED: NORMAL
MVV-PRE: NORMAL
PEF %PRED-POST: NORMAL
PEF %PRED-PRE: NORMAL
PEF PRED: NORMAL
PEF%CHNG: NORMAL
PEF-POST: NORMAL
PEF-PRE: NORMAL
RAW %PRED: NORMAL
RAW PRED: NORMAL
RAW: NORMAL
RV PRE %PRED: NORMAL
RV PRED: NORMAL
RV: NORMAL
SVC %PRED: NORMAL
SVC PRED: NORMAL
SVC: NORMAL
TLC PRE %PRED: NORMAL
TLC PRED: NORMAL
TLC: NORMAL
VA %PRED: NORMAL
VA PRED: NORMAL
VA: NORMAL
VTG %PRED: NORMAL
VTG PRED: NORMAL
VTG: NORMAL

## 2025-05-14 PROCEDURE — 94729 DIFFUSING CAPACITY: CPT

## 2025-05-14 PROCEDURE — 94726 PLETHYSMOGRAPHY LUNG VOLUMES: CPT

## 2025-05-14 PROCEDURE — 94640 AIRWAY INHALATION TREATMENT: CPT

## 2025-05-14 PROCEDURE — 94060 EVALUATION OF WHEEZING: CPT

## 2025-05-14 PROCEDURE — 6370000000 HC RX 637 (ALT 250 FOR IP): Performed by: INTERNAL MEDICINE

## 2025-05-14 RX ORDER — ALBUTEROL SULFATE 90 UG/1
4 INHALANT RESPIRATORY (INHALATION) ONCE
Status: COMPLETED | OUTPATIENT
Start: 2025-05-14 | End: 2025-05-14

## 2025-05-14 RX ADMIN — ALBUTEROL SULFATE 4 PUFF: 90 AEROSOL, METERED RESPIRATORY (INHALATION) at 13:23

## 2025-05-15 DIAGNOSIS — R06.00 DYSPNEA, UNSPECIFIED TYPE: Primary | ICD-10-CM

## 2025-05-15 DIAGNOSIS — R09.89 PULMONARY AIR TRAPPING: ICD-10-CM

## 2025-05-15 NOTE — PROCEDURES
[address] [place] [address]                           PULMONARY FUNCTION      PATIENT NAME: ROXANA COUCH               : 1970  MED REC NO: [id number]                         ROOM:   ACCOUNT NO: [id number]                       ADMIT DATE: 2025  PROVIDER: Julianna Lugo MD      DATE OF PROCEDURE: 2025    SURGEON:  Julianna Lugo MD    REFERRING PHYSICIAN:  CARIDAD THAO    Spirometry shows FVC is 2.82, 79% predicted.  FEV1 is 2.47, 89% predicted which is within normal limits.  FEV1/FVC ratio is normal without evidence of obstruction.  Post bronchodilator, there is no significant improvement in FEV1 or FVC to suggest airway responsiveness.    Lung volume shows residual volume is 2.24, 114% predicted; total lung capacity is 5.08, 97% predicted, both are within normal limits.  Diffusion capacity is 20.50, 80% predicted, which is within normal limits.    IMPRESSION:  This pulmonary function test shows normal spirometry without evidence of obstruction.  Normal lung volume and normal diffusion capacity.  Normal pulmonary function test.  Clinical correlation is recommended.          JULIANNA LUGO MD      D:  2025 19:39:15     T:  2025 20:11:25     TEO/RYANN  Job #:  [id number]     Doc#:  [id number]

## 2025-06-08 NOTE — TELEPHONE ENCOUNTER
Judy called requesting a refill of the below medication which has been pended for you:     Requested Prescriptions     Pending Prescriptions Disp Refills    potassium chloride (KLOR-CON) 8 MEQ extended release tablet [Pharmacy Med Name: POT CHLOR JASBIR TAB 8MEQ ER] 450 tablet 1     Sig: TAKE 5 TABLETS DAILY    levothyroxine (SYNTHROID) 125 MCG tablet [Pharmacy Med Name: LEVOTHYROXIN TAB 125MCG] 90 tablet 1     Sig: TAKE 1 TABLET DAILY    hydroCHLOROthiazide (HYDRODIURIL) 25 MG tablet [Pharmacy Med Name: HYDROCHLOROT TAB 25MG] 90 tablet 1     Sig: TAKE 1 TABLET DAILY    metoprolol tartrate (LOPRESSOR) 50 MG tablet [Pharmacy Med Name: METOPROL TAR TAB 50MG] 180 tablet 1     Sig: TAKE 1 TABLET TWICE A DAY    montelukast (SINGULAIR) 10 MG tablet [Pharmacy Med Name: MONTELUKAST  TAB 10MG] 90 tablet 1     Sig: TAKE 1 TABLET DAILY    oxyBUTYnin (DITROPAN-XL) 10 MG extended release tablet [Pharmacy Med Name: OXYBUTYNIN TAB 10MG ER] 90 tablet 1     Sig: TAKE 1 TABLET DAILY       Last Appointment Date: 5/7/2025  Next Appointment Date: 8/13/2025    Allergies   Allergen Reactions    Furosemide Other (See Comments) and Dizziness or Vertigo     Dizziness, extreme fatigue  Throat swelling  Dizziness, extreme fatigue      Morphine Itching, Nausea Only and Other (See Comments)    Topiramate Swelling, Rash and Angioedema     Facial and Oral edema    Allopurinol Other (See Comments)     Other reaction(s): nausea; \"hard on stomach\"    Bumex [Bumetanide] Other (See Comments)     Extreme fatigue, nausea, dizziness    Cat Dander      Other reaction(s): Sneezing    Colchicine     Gramineae Pollens      Other reaction(s): Sneezing    Indocin [Indomethacin]     Molds & Smuts      Other reaction(s): Sneezing    Uloric [Febuxostat]     Hydrocodone-Acetaminophen Nausea Only    Pantoprazole Other (See Comments)

## 2025-06-09 RX ORDER — MONTELUKAST SODIUM 10 MG/1
10 TABLET ORAL DAILY
Qty: 90 TABLET | Refills: 1 | Status: SHIPPED | OUTPATIENT
Start: 2025-06-09

## 2025-06-09 RX ORDER — METOPROLOL TARTRATE 50 MG
50 TABLET ORAL 2 TIMES DAILY
Qty: 180 TABLET | Refills: 1 | Status: SHIPPED | OUTPATIENT
Start: 2025-06-09

## 2025-06-09 RX ORDER — LEVOTHYROXINE SODIUM 125 UG/1
125 TABLET ORAL DAILY
Qty: 90 TABLET | Refills: 1 | Status: SHIPPED | OUTPATIENT
Start: 2025-06-09

## 2025-06-09 RX ORDER — POTASSIUM CHLORIDE 600 MG/1
TABLET, EXTENDED RELEASE ORAL
Qty: 450 TABLET | Refills: 1 | Status: SHIPPED | OUTPATIENT
Start: 2025-06-09

## 2025-06-09 RX ORDER — HYDROCHLOROTHIAZIDE 25 MG/1
25 TABLET ORAL DAILY
Qty: 90 TABLET | Refills: 1 | Status: SHIPPED | OUTPATIENT
Start: 2025-06-09

## 2025-06-09 RX ORDER — OXYBUTYNIN CHLORIDE 10 MG/1
10 TABLET, EXTENDED RELEASE ORAL DAILY
Qty: 90 TABLET | Refills: 1 | Status: SHIPPED | OUTPATIENT
Start: 2025-06-09

## 2025-07-03 ENCOUNTER — OFFICE VISIT (OUTPATIENT)
Dept: FAMILY MEDICINE CLINIC | Age: 55
End: 2025-07-03

## 2025-07-03 VITALS
OXYGEN SATURATION: 97 % | HEART RATE: 72 BPM | RESPIRATION RATE: 18 BRPM | TEMPERATURE: 98.6 F | DIASTOLIC BLOOD PRESSURE: 76 MMHG | SYSTOLIC BLOOD PRESSURE: 120 MMHG | WEIGHT: 268 LBS | BODY MASS INDEX: 44.65 KG/M2 | HEIGHT: 65 IN

## 2025-07-03 DIAGNOSIS — R00.2 PALPITATIONS: ICD-10-CM

## 2025-07-03 DIAGNOSIS — R06.00 DYSPNEA, UNSPECIFIED TYPE: ICD-10-CM

## 2025-07-03 DIAGNOSIS — E66.813 OBESITY, CLASS 3 (HCC): ICD-10-CM

## 2025-07-03 DIAGNOSIS — R09.89 PULMONARY AIR TRAPPING: Primary | ICD-10-CM

## 2025-07-03 DIAGNOSIS — G63 POLYNEUROPATHY ASSOCIATED WITH UNDERLYING DISEASE: ICD-10-CM

## 2025-07-03 DIAGNOSIS — E11.9 TYPE 2 DIABETES MELLITUS WITHOUT COMPLICATION, WITHOUT LONG-TERM CURRENT USE OF INSULIN (HCC): ICD-10-CM

## 2025-07-03 DIAGNOSIS — R06.02 SHORTNESS OF BREATH: ICD-10-CM

## 2025-07-03 RX ORDER — GABAPENTIN 800 MG/1
800 TABLET ORAL 3 TIMES DAILY
Qty: 90 TABLET | Refills: 2 | Status: SHIPPED | OUTPATIENT
Start: 2025-07-03 | End: 2025-10-01

## 2025-07-03 ASSESSMENT — ENCOUNTER SYMPTOMS
CHEST TIGHTNESS: 0
COUGH: 0
WHEEZING: 0
SHORTNESS OF BREATH: 1
GASTROINTESTINAL NEGATIVE: 1

## 2025-07-03 NOTE — PROGRESS NOTES
7/3/2025     Judy Kearns (:  1970) is a 55 y.o. female, here for evaluation of the following medical concerns:    HPI  History of Present Illness  The patient is a 55-year-old female who presents for continued issues with shortness of breath and heart palpitations.    She reports experiencing daily episodes of intense heartbeats, sometimes accompanied by a sensation of skipped beats. These episodes are often associated with difficulty in catching her breath and a feeling of her heart pounding. She also experiences occasional chest pain, localized under her bra. The intensity of these symptoms seems to increase when she lies down. She recalls undergoing a long-term Holter monitor test a few years ago, which revealed numerous PVCs and skipped beats, but no significant arrhythmia. She consulted a cardiologist in , who did not find any serious issues. She has been taking metoprolol 50 mg twice daily, with occasional extra doses, which she believes helps manage her symptoms. She does not think her symptoms are related to blood pressure. She occasionally experiences shortness of breath during exertion and has been feeling unusually tired recently. She also reports episodes of lightheadedness. She underwent an EKG in 2025.    She discontinued Ozempic after reducing the dose to 0.5 mg, which resulted in a week-long illness. She has regained most of the weight she lost while on Ozempic. She is considering trying Mounjaro to see if she tolerates this medication better.    FAMILY HISTORY  She is unsure about her family history of heart problems on her father's side.        Review of Systems   Constitutional:  Negative for chills, fatigue and fever.   HENT: Negative.     Respiratory:  Positive for shortness of breath. Negative for cough, chest tightness and wheezing.    Cardiovascular:  Positive for chest pain and palpitations.   Gastrointestinal: Negative.    Neurological:  Positive for light-headedness.

## 2025-07-07 ENCOUNTER — HOSPITAL ENCOUNTER (OUTPATIENT)
Age: 55
Discharge: HOME | End: 2025-07-09
Attending: FAMILY MEDICINE
Payer: MEDICARE

## 2025-07-07 DIAGNOSIS — R00.2 PALPITATIONS: ICD-10-CM

## 2025-07-07 PROCEDURE — 93242 EXT ECG>48HR<7D RECORDING: CPT

## 2025-07-10 ENCOUNTER — TELEPHONE (OUTPATIENT)
Dept: PRIMARY CARE CLINIC | Age: 55
End: 2025-07-10

## 2025-07-10 DIAGNOSIS — G63 POLYNEUROPATHY ASSOCIATED WITH UNDERLYING DISEASE: ICD-10-CM

## 2025-07-10 RX ORDER — GABAPENTIN 800 MG/1
800 TABLET ORAL 3 TIMES DAILY
Qty: 3 TABLET | Refills: 0 | Status: SHIPPED | OUTPATIENT
Start: 2025-07-10 | End: 2025-07-13 | Stop reason: SDUPTHER

## 2025-07-10 NOTE — TELEPHONE ENCOUNTER
Patient needs (3) tablets of gabapentin 800 mg sent to [facility] [address] [address]

## 2025-07-11 ENCOUNTER — OFFICE VISIT (OUTPATIENT)
Dept: PRIMARY CARE CLINIC | Age: 55
End: 2025-07-11
Payer: MEDICARE

## 2025-07-11 VITALS
HEART RATE: 76 BPM | TEMPERATURE: 97 F | BODY MASS INDEX: 44.6 KG/M2 | WEIGHT: 268 LBS | OXYGEN SATURATION: 100 % | DIASTOLIC BLOOD PRESSURE: 72 MMHG | SYSTOLIC BLOOD PRESSURE: 122 MMHG | RESPIRATION RATE: 18 BRPM

## 2025-07-11 DIAGNOSIS — L23.9 ALLERGIC CONTACT DERMATITIS, UNSPECIFIED TRIGGER: Primary | ICD-10-CM

## 2025-07-11 PROCEDURE — 99213 OFFICE O/P EST LOW 20 MIN: CPT

## 2025-07-11 RX ORDER — TRIAMCINOLONE ACETONIDE 1 MG/G
CREAM TOPICAL 2 TIMES DAILY
Qty: 80 G | Refills: 1 | Status: SHIPPED | OUTPATIENT
Start: 2025-07-11

## 2025-07-11 RX ORDER — TRIAMCINOLONE ACETONIDE 40 MG/ML
40 INJECTION, SUSPENSION INTRA-ARTICULAR; INTRAMUSCULAR ONCE
Status: COMPLETED | OUTPATIENT
Start: 2025-07-11 | End: 2025-07-11

## 2025-07-11 RX ADMIN — TRIAMCINOLONE ACETONIDE 40 MG: 200 INJECTION, SUSPENSION INTRA-ARTICULAR; INTRAMUSCULAR at 12:18

## 2025-07-11 ASSESSMENT — ENCOUNTER SYMPTOMS
ABDOMINAL PAIN: 0
COLOR CHANGE: 1
COUGH: 0
NAUSEA: 0
DIARRHEA: 0
VOMITING: 0
SHORTNESS OF BREATH: 0
CONSTIPATION: 0

## 2025-07-11 NOTE — PATIENT INSTRUCTIONS
May use triamcinolone cream for itch 2x/day for up to 2 weeks  Take allergy medication as prescribed for 30 days and follow up with PCP (Claritin/Zyrtec in AM, Benadryl in PM)  If symptoms worsen, SOB, difficulty swallowing seek medical treatment  Patient verbalized understanding and agrees with plan of care

## 2025-07-11 NOTE — PROGRESS NOTES
1     Discussed exam, POCT findings, plan of care, and follow-up at length with patient.  Reviewed all prescribed and recommended medications, administration and side effects. Encouraged patient to follow up with PCP or return to the clinic for no improvement and or worsening of symptoms. All questions were answered and they verbalized understanding and were agreeable with the plan.     Follow up as needed.    The patient (or guardian, if applicable) and other individuals in attendance with the patient were advised that Artificial Intelligence will be utilized during this visit to record, process the conversation to generate a clinical note, and support improvement of the AI technology. The patient (or guardian, if applicable) and other individuals in attendance at the appointment consented to the use of AI, including the recording.        Electronically signed by KENROY Diaz CNP on 7/11/2025 at 12:22 PM

## 2025-07-13 ENCOUNTER — OFFICE VISIT (OUTPATIENT)
Dept: PRIMARY CARE CLINIC | Age: 55
End: 2025-07-13

## 2025-07-13 VITALS
BODY MASS INDEX: 44.65 KG/M2 | DIASTOLIC BLOOD PRESSURE: 80 MMHG | TEMPERATURE: 99.2 F | SYSTOLIC BLOOD PRESSURE: 136 MMHG | HEIGHT: 65 IN | RESPIRATION RATE: 20 BRPM | HEART RATE: 78 BPM | OXYGEN SATURATION: 98 % | WEIGHT: 268 LBS

## 2025-07-13 DIAGNOSIS — G63 POLYNEUROPATHY ASSOCIATED WITH UNDERLYING DISEASE: ICD-10-CM

## 2025-07-13 DIAGNOSIS — B86 SCABIES: Primary | ICD-10-CM

## 2025-07-13 RX ORDER — DIPHENHYDRAMINE HCL 25 MG
25 TABLET ORAL EVERY 6 HOURS PRN
COMMUNITY

## 2025-07-13 RX ORDER — CETIRIZINE HYDROCHLORIDE 10 MG/1
10 TABLET ORAL DAILY
COMMUNITY

## 2025-07-13 RX ORDER — GABAPENTIN 800 MG/1
800 TABLET ORAL 3 TIMES DAILY
Qty: 270 TABLET | Refills: 0 | Status: SHIPPED | OUTPATIENT
Start: 2025-07-13 | End: 2025-10-11

## 2025-07-13 RX ORDER — PERMETHRIN 50 MG/G
CREAM TOPICAL
Qty: 60 G | Refills: 2 | Status: SHIPPED | OUTPATIENT
Start: 2025-07-13

## 2025-07-16 ENCOUNTER — PATIENT MESSAGE (OUTPATIENT)
Dept: FAMILY MEDICINE CLINIC | Age: 55
End: 2025-07-16

## 2025-07-16 RX ORDER — HYDROXYZINE HYDROCHLORIDE 25 MG/1
25 TABLET, FILM COATED ORAL EVERY 8 HOURS PRN
Qty: 30 TABLET | Refills: 0 | Status: SHIPPED | OUTPATIENT
Start: 2025-07-16 | End: 2025-07-26

## 2025-07-24 ENCOUNTER — TELEPHONE (OUTPATIENT)
Dept: FAMILY MEDICINE CLINIC | Age: 55
End: 2025-07-24

## 2025-07-24 NOTE — TELEPHONE ENCOUNTER
Received patient assistance program refill/reorder/change request from [employer], however, spoke with patient who stated she is no longer taking Ozempic due to side effects. Form scanned into media.

## 2025-07-31 ENCOUNTER — HOSPITAL ENCOUNTER (OUTPATIENT)
Age: 55
Discharge: HOME | End: 2025-08-02
Attending: FAMILY MEDICINE
Payer: MEDICARE

## 2025-07-31 ENCOUNTER — HOSPITAL ENCOUNTER (OUTPATIENT)
Dept: NUCLEAR MEDICINE | Age: 55
Discharge: HOME | End: 2025-08-02
Attending: FAMILY MEDICINE
Payer: MEDICARE

## 2025-07-31 DIAGNOSIS — R06.02 SHORTNESS OF BREATH: ICD-10-CM

## 2025-07-31 LAB
NUC STRESS EJECTION FRACTION: 71 %
STRESS BASELINE DIAS BP: 83 MMHG
STRESS BASELINE HR: 71 BPM
STRESS BASELINE SYS BP: 129 MMHG
STRESS ESTIMATED WORKLOAD: 1 METS
STRESS PEAK DIAS BP: 55 MMHG
STRESS PEAK SYS BP: 144 MMHG
STRESS PERCENT HR ACHIEVED: 60 %
STRESS POST PEAK HR: 99 BPM
STRESS RATE PRESSURE PRODUCT: NORMAL BPM*MMHG
STRESS TARGET HR: 164 BPM
TID: 1

## 2025-07-31 PROCEDURE — 78452 HT MUSCLE IMAGE SPECT MULT: CPT

## 2025-07-31 PROCEDURE — 6360000002 HC RX W HCPCS: Performed by: STUDENT IN AN ORGANIZED HEALTH CARE EDUCATION/TRAINING PROGRAM

## 2025-07-31 PROCEDURE — A9500 TC99M SESTAMIBI: HCPCS | Performed by: FAMILY MEDICINE

## 2025-07-31 PROCEDURE — 3430000000 HC RX DIAGNOSTIC RADIOPHARMACEUTICAL: Performed by: FAMILY MEDICINE

## 2025-07-31 PROCEDURE — 93017 CV STRESS TEST TRACING ONLY: CPT

## 2025-07-31 RX ORDER — REGADENOSON 0.08 MG/ML
0.4 INJECTION, SOLUTION INTRAVENOUS ONCE
Status: COMPLETED | OUTPATIENT
Start: 2025-07-31 | End: 2025-07-31

## 2025-07-31 RX ORDER — TETRAKIS(2-METHOXYISOBUTYLISOCYANIDE)COPPER(I) TETRAFLUOROBORATE 1 MG/ML
30 INJECTION, POWDER, LYOPHILIZED, FOR SOLUTION INTRAVENOUS
Status: COMPLETED | OUTPATIENT
Start: 2025-07-31 | End: 2025-07-31

## 2025-07-31 RX ORDER — TETRAKIS(2-METHOXYISOBUTYLISOCYANIDE)COPPER(I) TETRAFLUOROBORATE 1 MG/ML
10 INJECTION, POWDER, LYOPHILIZED, FOR SOLUTION INTRAVENOUS
Status: COMPLETED | OUTPATIENT
Start: 2025-07-31 | End: 2025-07-31

## 2025-07-31 RX ADMIN — Medication 10 MILLICURIE: at 13:00

## 2025-07-31 RX ADMIN — REGADENOSON 0.4 MG: 0.08 INJECTION, SOLUTION INTRAVENOUS at 14:14

## 2025-07-31 RX ADMIN — Medication 30 MILLICURIE: at 14:14

## 2025-08-08 ENCOUNTER — OFFICE VISIT (OUTPATIENT)
Dept: FAMILY MEDICINE CLINIC | Age: 55
End: 2025-08-08

## 2025-08-08 VITALS
DIASTOLIC BLOOD PRESSURE: 70 MMHG | WEIGHT: 272 LBS | HEART RATE: 60 BPM | SYSTOLIC BLOOD PRESSURE: 110 MMHG | RESPIRATION RATE: 18 BRPM | BODY MASS INDEX: 45.32 KG/M2 | TEMPERATURE: 98.1 F | OXYGEN SATURATION: 97 % | HEIGHT: 65 IN

## 2025-08-08 DIAGNOSIS — R42 VERTIGO: ICD-10-CM

## 2025-08-08 DIAGNOSIS — R00.2 PALPITATIONS: Primary | ICD-10-CM

## 2025-08-08 DIAGNOSIS — G50.0 TRIGEMINAL NEURALGIA: ICD-10-CM

## 2025-08-08 DIAGNOSIS — H91.92 HEARING LOSS OF LEFT EAR, UNSPECIFIED HEARING LOSS TYPE: ICD-10-CM

## 2025-08-08 RX ORDER — DESVENLAFAXINE 25 MG/1
25 TABLET, EXTENDED RELEASE ORAL DAILY
COMMUNITY
Start: 2025-07-15

## 2025-08-08 RX ORDER — METOPROLOL TARTRATE 50 MG
75 TABLET ORAL 2 TIMES DAILY
Qty: 270 TABLET | Refills: 1 | Status: SHIPPED | OUTPATIENT
Start: 2025-08-08

## 2025-08-08 RX ORDER — MECLIZINE HYDROCHLORIDE 25 MG/1
25 TABLET ORAL 3 TIMES DAILY PRN
Qty: 30 TABLET | Refills: 1 | Status: SHIPPED | OUTPATIENT
Start: 2025-08-08

## 2025-08-08 ASSESSMENT — ENCOUNTER SYMPTOMS
RHINORRHEA: 0
EYE PAIN: 0
EYE REDNESS: 0
CHEST TIGHTNESS: 0
EYE DISCHARGE: 0
SORE THROAT: 0
WHEEZING: 0
EYE ITCHING: 0
SHORTNESS OF BREATH: 1
SINUS PAIN: 0
SINUS PRESSURE: 0
COUGH: 0

## 2025-08-14 ENCOUNTER — HOSPITAL ENCOUNTER (OUTPATIENT)
Age: 55
Discharge: HOME | End: 2025-08-16
Attending: FAMILY MEDICINE
Payer: MEDICARE

## 2025-08-14 VITALS — BODY MASS INDEX: 45.32 KG/M2 | HEIGHT: 65 IN | WEIGHT: 272 LBS

## 2025-08-14 DIAGNOSIS — R00.2 PALPITATIONS: ICD-10-CM

## 2025-08-14 LAB
ECHO AO ROOT DIAM: 3.5 CM
ECHO AO ROOT INDEX: 1.56 CM/M2
ECHO AV AREA PEAK VELOCITY: 2 CM2
ECHO AV AREA VTI: 2.1 CM2
ECHO AV AREA/BSA PEAK VELOCITY: 0.9 CM2/M2
ECHO AV AREA/BSA VTI: 0.9 CM2/M2
ECHO AV MEAN GRADIENT: 5 MMHG
ECHO AV MEAN GRADIENT: 5 MMHG
ECHO AV MEAN VELOCITY: 1 M/S
ECHO AV PEAK GRADIENT: 9 MMHG
ECHO AV PEAK VELOCITY: 1.5 M/S
ECHO AV VELOCITY RATIO: 0.8
ECHO AV VTI: 35.4 CM
ECHO BSA: 2.38 M2
ECHO EST RA PRESSURE: 3 MMHG
ECHO IVC PROX: 2.1 CM
ECHO LA AREA 2C: 20 CM2
ECHO LA AREA 4C: 17.9 CM2
ECHO LA DIAMETER INDEX: 1.96 CM/M2
ECHO LA DIAMETER: 4.4 CM
ECHO LA MAJOR AXIS: 5.2 CM
ECHO LA MINOR AXIS: 5.6 CM
ECHO LA TO AORTIC ROOT RATIO: 1.26
ECHO LA VOL BP: 55 ML (ref 22–52)
ECHO LA VOL MOD A2C: 58 ML (ref 22–52)
ECHO LA VOL MOD A4C: 50 ML (ref 22–52)
ECHO LA VOL/BSA BIPLANE: 24 ML/M2 (ref 16–34)
ECHO LA VOLUME INDEX MOD A2C: 26 ML/M2 (ref 16–34)
ECHO LA VOLUME INDEX MOD A4C: 22 ML/M2 (ref 16–34)
ECHO LV E' LATERAL VELOCITY: 11 CM/S
ECHO LV E' SEPTAL VELOCITY: 5.55 CM/S
ECHO LV EF PHYSICIAN: 65 %
ECHO LV FRACTIONAL SHORTENING: 43 % (ref 28–44)
ECHO LV INTERNAL DIMENSION DIASTOLE INDEX: 2.09 CM/M2
ECHO LV INTERNAL DIMENSION DIASTOLIC: 4.7 CM (ref 3.9–5.3)
ECHO LV INTERNAL DIMENSION SYSTOLIC INDEX: 1.2 CM/M2
ECHO LV INTERNAL DIMENSION SYSTOLIC: 2.7 CM
ECHO LV IVSD: 1 CM (ref 0.6–0.9)
ECHO LV MASS 2D: 164.5 G (ref 67–162)
ECHO LV MASS INDEX 2D: 73.1 G/M2 (ref 43–95)
ECHO LV POSTERIOR WALL DIASTOLIC: 1 CM (ref 0.6–0.9)
ECHO LV RELATIVE WALL THICKNESS RATIO: 0.43
ECHO LVOT AREA: 2.5 CM2
ECHO LVOT AV VTI INDEX: 0.82
ECHO LVOT DIAM: 1.8 CM
ECHO LVOT MEAN GRADIENT: 3 MMHG
ECHO LVOT PEAK GRADIENT: 5 MMHG
ECHO LVOT PEAK VELOCITY: 1.2 M/S
ECHO LVOT STROKE VOLUME INDEX: 32.7 ML/M2
ECHO LVOT SV: 73.5 ML
ECHO LVOT VTI: 28.9 CM
ECHO MV A VELOCITY: 1.02 M/S
ECHO MV E DECELERATION TIME (DT): 217 MS
ECHO MV E VELOCITY: 0.86 M/S
ECHO MV E/A RATIO: 0.84
ECHO MV E/E' LATERAL: 7.82
ECHO MV E/E' RATIO (AVERAGED): 11.66
ECHO MV E/E' SEPTAL: 15.5
ECHO RIGHT VENTRICULAR SYSTOLIC PRESSURE (RVSP): 22 MMHG
ECHO RV FREE WALL PEAK S': 13.6 CM/S
ECHO RV TAPSE: 2.3 CM (ref 1.7–?)
ECHO TV REGURGITANT MAX VELOCITY: 2.17 M/S
ECHO TV REGURGITANT PEAK GRADIENT: 19 MMHG

## 2025-08-14 PROCEDURE — 93306 TTE W/DOPPLER COMPLETE: CPT

## 2025-08-25 ENCOUNTER — OFFICE VISIT (OUTPATIENT)
Dept: CARDIOLOGY | Age: 55
End: 2025-08-25
Payer: MEDICARE

## 2025-08-25 VITALS
DIASTOLIC BLOOD PRESSURE: 74 MMHG | HEART RATE: 66 BPM | HEIGHT: 65 IN | BODY MASS INDEX: 45.68 KG/M2 | WEIGHT: 274.2 LBS | OXYGEN SATURATION: 95 % | SYSTOLIC BLOOD PRESSURE: 112 MMHG

## 2025-08-25 DIAGNOSIS — I10 ESSENTIAL HYPERTENSION: ICD-10-CM

## 2025-08-25 DIAGNOSIS — R07.9 CHEST PAIN WITH MODERATE RISK FOR CARDIAC ETIOLOGY: ICD-10-CM

## 2025-08-25 DIAGNOSIS — R06.02 SHORTNESS OF BREATH: ICD-10-CM

## 2025-08-25 DIAGNOSIS — I49.3 PVC (PREMATURE VENTRICULAR CONTRACTION): ICD-10-CM

## 2025-08-25 DIAGNOSIS — I49.1 PAC (PREMATURE ATRIAL CONTRACTION): ICD-10-CM

## 2025-08-25 DIAGNOSIS — R94.31 ABNORMAL ECG: ICD-10-CM

## 2025-08-25 DIAGNOSIS — R00.2 PALPITATIONS: Primary | ICD-10-CM

## 2025-08-25 PROCEDURE — 99214 OFFICE O/P EST MOD 30 MIN: CPT | Performed by: INTERNAL MEDICINE

## 2025-08-25 PROCEDURE — 3078F DIAST BP <80 MM HG: CPT | Performed by: INTERNAL MEDICINE

## 2025-08-25 PROCEDURE — 3074F SYST BP LT 130 MM HG: CPT | Performed by: INTERNAL MEDICINE

## 2025-08-25 PROCEDURE — 99204 OFFICE O/P NEW MOD 45 MIN: CPT | Performed by: INTERNAL MEDICINE

## 2025-08-30 ENCOUNTER — HOSPITAL ENCOUNTER (OUTPATIENT)
Dept: LAB | Age: 55
Discharge: HOME | End: 2025-08-30
Payer: MEDICARE

## 2025-08-30 DIAGNOSIS — E78.2 MIXED HYPERLIPIDEMIA: ICD-10-CM

## 2025-08-30 DIAGNOSIS — E11.9 TYPE 2 DIABETES MELLITUS WITHOUT COMPLICATION, WITHOUT LONG-TERM CURRENT USE OF INSULIN (HCC): ICD-10-CM

## 2025-08-30 DIAGNOSIS — I10 PRIMARY HYPERTENSION: ICD-10-CM

## 2025-08-30 DIAGNOSIS — E03.9 ACQUIRED HYPOTHYROIDISM: ICD-10-CM

## 2025-08-30 LAB
ALBUMIN SERPL-MCNC: 4 G/DL (ref 3.5–5.2)
ALBUMIN/GLOB SERPL: 1.5 {RATIO} (ref 1–2.5)
ALP SERPL-CCNC: 156 U/L (ref 35–104)
ALT SERPL-CCNC: 11 U/L (ref 10–35)
ANION GAP SERPL CALCULATED.3IONS-SCNC: 11 MMOL/L (ref 9–16)
AST SERPL-CCNC: 18 U/L (ref 10–35)
BASOPHILS # BLD: 0.05 K/UL (ref 0–0.2)
BASOPHILS NFR BLD: 1 % (ref 0–2)
BILIRUB SERPL-MCNC: 0.2 MG/DL (ref 0–1.2)
BUN SERPL-MCNC: 11 MG/DL (ref 6–20)
BUN/CREAT SERPL: 12 (ref 9–20)
CALCIUM SERPL-MCNC: 8.8 MG/DL (ref 8.6–10.4)
CHLORIDE SERPL-SCNC: 104 MMOL/L (ref 98–107)
CHOLEST SERPL-MCNC: 216 MG/DL (ref 0–199)
CHOLESTEROL/HDL RATIO: 4.2
CO2 SERPL-SCNC: 28 MMOL/L (ref 20–31)
CREAT SERPL-MCNC: 0.9 MG/DL (ref 0.6–0.9)
EOSINOPHIL # BLD: 0.28 K/UL (ref 0–0.44)
EOSINOPHILS RELATIVE PERCENT: 3 % (ref 1–4)
ERYTHROCYTE [DISTWIDTH] IN BLOOD BY AUTOMATED COUNT: 13.9 % (ref 11.8–14.4)
EST. AVERAGE GLUCOSE BLD GHB EST-MCNC: 114 MG/DL
GFR, ESTIMATED: 75 ML/MIN/1.73M2
GLUCOSE SERPL-MCNC: 110 MG/DL (ref 74–99)
HBA1C MFR BLD: 5.6 % (ref 4–6)
HCT VFR BLD AUTO: 42.9 % (ref 36.3–47.1)
HDLC SERPL-MCNC: 51 MG/DL
HGB BLD-MCNC: 13.4 G/DL (ref 11.9–15.1)
IMM GRANULOCYTES # BLD AUTO: 0.1 K/UL (ref 0–0.3)
IMM GRANULOCYTES NFR BLD: 1 %
LDLC SERPL CALC-MCNC: 137 MG/DL (ref 0–100)
LYMPHOCYTES NFR BLD: 2.88 K/UL (ref 1.1–3.7)
LYMPHOCYTES RELATIVE PERCENT: 28 % (ref 24–43)
MCH RBC QN AUTO: 28 PG (ref 25.2–33.5)
MCHC RBC AUTO-ENTMCNC: 31.2 G/DL (ref 25.2–33.5)
MCV RBC AUTO: 89.6 FL (ref 82.6–102.9)
MONOCYTES NFR BLD: 0.54 K/UL (ref 0.1–1.2)
MONOCYTES NFR BLD: 5 % (ref 3–12)
NEUTROPHILS NFR BLD: 62 % (ref 36–65)
NEUTS SEG NFR BLD: 6.28 K/UL (ref 1.5–8.1)
NRBC BLD-RTO: 0 PER 100 WBC
PLATELET # BLD AUTO: 221 K/UL (ref 138–453)
PMV BLD AUTO: 10.9 FL (ref 8.1–13.5)
POTASSIUM SERPL-SCNC: 3.9 MMOL/L (ref 3.7–5.3)
PROT SERPL-MCNC: 6.6 G/DL (ref 6.6–8.7)
RBC # BLD AUTO: 4.79 M/UL (ref 3.95–5.11)
SODIUM SERPL-SCNC: 143 MMOL/L (ref 136–145)
T4 FREE SERPL-MCNC: 1.1 NG/DL (ref 0.9–1.7)
TRIGL SERPL-MCNC: 141 MG/DL
TSH SERPL DL<=0.05 MIU/L-ACNC: 2.81 UIU/ML (ref 0.27–4.2)
VLDLC SERPL CALC-MCNC: 28 MG/DL (ref 1–30)
WBC OTHER # BLD: 10.1 K/UL (ref 3.5–11.3)

## 2025-08-30 PROCEDURE — 80061 LIPID PANEL: CPT

## 2025-08-30 PROCEDURE — 83036 HEMOGLOBIN GLYCOSYLATED A1C: CPT

## 2025-08-30 PROCEDURE — 85025 COMPLETE CBC W/AUTO DIFF WBC: CPT

## 2025-08-30 PROCEDURE — 84443 ASSAY THYROID STIM HORMONE: CPT

## 2025-08-30 PROCEDURE — 84439 ASSAY OF FREE THYROXINE: CPT

## 2025-08-30 PROCEDURE — 36415 COLL VENOUS BLD VENIPUNCTURE: CPT

## 2025-08-30 PROCEDURE — 80053 COMPREHEN METABOLIC PANEL: CPT

## 2025-09-01 DIAGNOSIS — G63 POLYNEUROPATHY ASSOCIATED WITH UNDERLYING DISEASE: ICD-10-CM

## 2025-09-02 RX ORDER — GABAPENTIN 800 MG/1
800 TABLET ORAL 3 TIMES DAILY
Qty: 270 TABLET | Refills: 0 | OUTPATIENT
Start: 2025-09-02

## 2025-09-02 RX ORDER — LORATADINE PSEUDOEPHEDRINE SULFATE 10; 240 MG/1; MG/1
1 TABLET, EXTENDED RELEASE ORAL DAILY
Qty: 90 TABLET | Refills: 0 | Status: SHIPPED | OUTPATIENT
Start: 2025-09-02

## (undated) DEVICE — LINE SAMP O2 6.5FT W/FEMALE CONN F/ADULT CAPNOLINE PLUS

## (undated) DEVICE — FORCEPS BX L240CM JAW DIA2.4MM ORNG L CAP W/ NDL DISP RAD

## (undated) DEVICE — CONNECTOR TBNG AUX H2O JET DISP FOR OLY 160/180 SER

## (undated) DEVICE — ENDOSCOPIC KIT 10X0.75 FT COLON W/ 1.1 OZ LUBE DBL BNDL SEAL

## (undated) DEVICE — BITE BLOCK W/VELCRO STRAP

## (undated) DEVICE — BRAVO CF CAPSULE  DELIVERY DEV, 5-PK: Brand: BRAVO

## (undated) DEVICE — 1200CC GUARDIAN II: Brand: GUARDIAN